# Patient Record
Sex: FEMALE | Race: WHITE | NOT HISPANIC OR LATINO | Employment: OTHER | URBAN - METROPOLITAN AREA
[De-identification: names, ages, dates, MRNs, and addresses within clinical notes are randomized per-mention and may not be internally consistent; named-entity substitution may affect disease eponyms.]

---

## 2017-03-22 ENCOUNTER — HOSPITAL ENCOUNTER (OUTPATIENT)
Dept: RADIOLOGY | Facility: HOSPITAL | Age: 72
Discharge: HOME | End: 2017-03-22
Attending: OBSTETRICS & GYNECOLOGY
Payer: MEDICARE

## 2017-03-22 DIAGNOSIS — Z12.31 VISIT FOR SCREENING MAMMOGRAM: ICD-10-CM

## 2017-03-22 PROCEDURE — 77067 SCR MAMMO BI INCL CAD: CPT | Mod: TC

## 2017-03-22 PROCEDURE — 77067 SCR MAMMO BI INCL CAD: CPT | Mod: 26,,, | Performed by: RADIOLOGY

## 2019-10-04 DIAGNOSIS — Z12.31 VISIT FOR SCREENING MAMMOGRAM: Primary | ICD-10-CM

## 2020-01-27 ENCOUNTER — HOSPITAL ENCOUNTER (OUTPATIENT)
Dept: RADIOLOGY | Facility: HOSPITAL | Age: 75
Discharge: HOME | End: 2020-01-27
Attending: OBSTETRICS & GYNECOLOGY
Payer: MEDICARE

## 2020-01-27 VITALS — WEIGHT: 109 LBS

## 2020-01-27 DIAGNOSIS — Z12.31 VISIT FOR SCREENING MAMMOGRAM: ICD-10-CM

## 2020-01-27 PROCEDURE — 77063 BREAST TOMOSYNTHESIS BI: CPT | Mod: 26,,, | Performed by: RADIOLOGY

## 2020-01-27 PROCEDURE — 77067 SCR MAMMO BI INCL CAD: CPT | Mod: TC

## 2020-01-27 PROCEDURE — 77063 MAMMO DIGITAL SCREENING BILAT WITH TOMOSYNTHESIS_CAD: ICD-10-PCS | Mod: 26,,, | Performed by: RADIOLOGY

## 2020-01-27 PROCEDURE — 77067 SCR MAMMO BI INCL CAD: CPT | Mod: 26,,, | Performed by: RADIOLOGY

## 2020-01-27 PROCEDURE — 77067 MAMMO DIGITAL SCREENING BILAT WITH TOMOSYNTHESIS_CAD: ICD-10-PCS | Mod: 26,,, | Performed by: RADIOLOGY

## 2021-02-10 ENCOUNTER — IMMUNIZATION (OUTPATIENT)
Dept: PRIMARY CARE CLINIC | Facility: CLINIC | Age: 76
End: 2021-02-10
Payer: MEDICARE

## 2021-02-10 DIAGNOSIS — Z23 NEED FOR VACCINATION: Primary | ICD-10-CM

## 2021-02-10 PROCEDURE — 91300 PR SARS-COV- 2 COVID-19 VACCINE, NO PRSV, 30MCG/0.3ML, IM: CPT | Mod: PBBFAC | Performed by: INTERNAL MEDICINE

## 2021-02-10 PROCEDURE — 0001A PR IMMUNIZ ADMIN, SARS-COV-2 COVID-19 VACC, 30MCG/0.3ML, 1ST DOSE: CPT | Mod: PBBFAC | Performed by: INTERNAL MEDICINE

## 2021-02-10 RX ADMIN — RNA INGREDIENT BNT-162B2 0.3 ML: 0.23 INJECTION, SUSPENSION INTRAMUSCULAR at 01:02

## 2021-03-03 ENCOUNTER — IMMUNIZATION (OUTPATIENT)
Dept: PRIMARY CARE CLINIC | Facility: CLINIC | Age: 76
End: 2021-03-03
Payer: MEDICARE

## 2021-03-03 DIAGNOSIS — Z23 NEED FOR VACCINATION: Primary | ICD-10-CM

## 2021-03-03 PROCEDURE — 91300 PR SARS-COV- 2 COVID-19 VACCINE, NO PRSV, 30MCG/0.3ML, IM: CPT | Mod: S$GLB,,, | Performed by: INTERNAL MEDICINE

## 2021-03-03 PROCEDURE — 0002A PR IMMUNIZ ADMIN, SARS-COV-2 COVID-19 VACC, 30MCG/0.3ML, 2ND DOSE: CPT | Mod: CV19,S$GLB,, | Performed by: INTERNAL MEDICINE

## 2021-03-03 PROCEDURE — 0002A PR IMMUNIZ ADMIN, SARS-COV-2 COVID-19 VACC, 30MCG/0.3ML, 2ND DOSE: ICD-10-PCS | Mod: CV19,S$GLB,, | Performed by: INTERNAL MEDICINE

## 2021-03-03 PROCEDURE — 91300 PR SARS-COV- 2 COVID-19 VACCINE, NO PRSV, 30MCG/0.3ML, IM: ICD-10-PCS | Mod: S$GLB,,, | Performed by: INTERNAL MEDICINE

## 2021-03-03 RX ADMIN — Medication 0.3 ML: at 01:03

## 2021-07-01 DIAGNOSIS — Z12.31 VISIT FOR SCREENING MAMMOGRAM: Primary | ICD-10-CM

## 2021-07-01 DIAGNOSIS — M85.89 OTHER SPECIFIED DISORDERS OF BONE DENSITY AND STRUCTURE, MULTIPLE SITES: ICD-10-CM

## 2024-03-01 DIAGNOSIS — Z12.31 ENCOUNTER FOR SCREENING MAMMOGRAM FOR MALIGNANT NEOPLASM OF BREAST: Primary | ICD-10-CM

## 2024-04-15 ENCOUNTER — HOSPITAL ENCOUNTER (EMERGENCY)
Facility: HOSPITAL | Age: 79
Discharge: HOME | End: 2024-04-15
Attending: EMERGENCY MEDICINE
Payer: MEDICARE

## 2024-04-15 VITALS
OXYGEN SATURATION: 98 % | HEART RATE: 92 BPM | DIASTOLIC BLOOD PRESSURE: 90 MMHG | SYSTOLIC BLOOD PRESSURE: 160 MMHG | TEMPERATURE: 98 F | HEIGHT: 62 IN | BODY MASS INDEX: 20.24 KG/M2 | RESPIRATION RATE: 16 BRPM | WEIGHT: 110 LBS

## 2024-04-15 DIAGNOSIS — N39.0 URINARY TRACT INFECTION WITHOUT HEMATURIA, SITE UNSPECIFIED: Primary | ICD-10-CM

## 2024-04-15 DIAGNOSIS — S51.811A LACERATION OF RIGHT FOREARM, INITIAL ENCOUNTER: ICD-10-CM

## 2024-04-15 DIAGNOSIS — R79.89 ELEVATED LFTS: ICD-10-CM

## 2024-04-15 LAB
ALBUMIN SERPL BCP-MCNC: 4.4 G/DL (ref 3.5–5.2)
ALP SERPL-CCNC: 74 U/L (ref 55–135)
ALT SERPL W/O P-5'-P-CCNC: 24 U/L (ref 10–44)
ANION GAP SERPL CALC-SCNC: 13 MMOL/L (ref 8–16)
APTT PPP: <21 SEC (ref 21–32)
AST SERPL-CCNC: 53 U/L (ref 10–40)
BACTERIA #/AREA URNS AUTO: ABNORMAL /HPF
BASOPHILS # BLD AUTO: 0.05 K/UL (ref 0–0.2)
BASOPHILS NFR BLD: 0.3 % (ref 0–1.9)
BILIRUB SERPL-MCNC: 1.8 MG/DL (ref 0.1–1)
BILIRUB UR QL STRIP: NEGATIVE
BUN SERPL-MCNC: 16 MG/DL (ref 8–23)
BUN SERPL-MCNC: 18 MG/DL (ref 6–30)
CALCIUM SERPL-MCNC: 9.7 MG/DL (ref 8.7–10.5)
CHLORIDE SERPL-SCNC: 102 MMOL/L (ref 95–110)
CHLORIDE SERPL-SCNC: 103 MMOL/L (ref 95–110)
CLARITY UR REFRACT.AUTO: ABNORMAL
CO2 SERPL-SCNC: 22 MMOL/L (ref 23–29)
COLOR UR AUTO: YELLOW
CREAT SERPL-MCNC: 1.1 MG/DL (ref 0.5–1.4)
CREAT SERPL-MCNC: 1.2 MG/DL (ref 0.5–1.4)
DIFFERENTIAL METHOD BLD: ABNORMAL
EOSINOPHIL # BLD AUTO: 0 K/UL (ref 0–0.5)
EOSINOPHIL NFR BLD: 0.1 % (ref 0–8)
ERYTHROCYTE [DISTWIDTH] IN BLOOD BY AUTOMATED COUNT: 12.9 % (ref 11.5–14.5)
EST. GFR  (NO RACE VARIABLE): 52.1 ML/MIN/1.73 M^2
GLUCOSE SERPL-MCNC: 84 MG/DL (ref 70–110)
GLUCOSE SERPL-MCNC: 90 MG/DL (ref 70–110)
GLUCOSE UR QL STRIP: NEGATIVE
HCT VFR BLD AUTO: 41.7 % (ref 37–48.5)
HCT VFR BLD CALC: 41 %PCV (ref 36–54)
HGB BLD-MCNC: 13.6 G/DL (ref 12–16)
HGB UR QL STRIP: ABNORMAL
IMM GRANULOCYTES # BLD AUTO: 0.09 K/UL (ref 0–0.04)
IMM GRANULOCYTES NFR BLD AUTO: 0.5 % (ref 0–0.5)
KETONES UR QL STRIP: NEGATIVE
LEUKOCYTE ESTERASE UR QL STRIP: ABNORMAL
LYMPHOCYTES # BLD AUTO: 1.4 K/UL (ref 1–4.8)
LYMPHOCYTES NFR BLD: 7.6 % (ref 18–48)
MAGNESIUM SERPL-MCNC: 2.2 MG/DL (ref 1.6–2.6)
MCH RBC QN AUTO: 31.9 PG (ref 27–31)
MCHC RBC AUTO-ENTMCNC: 32.6 G/DL (ref 32–36)
MCV RBC AUTO: 98 FL (ref 82–98)
MICROSCOPIC COMMENT: ABNORMAL
MONOCYTES # BLD AUTO: 1.1 K/UL (ref 0.3–1)
MONOCYTES NFR BLD: 5.8 % (ref 4–15)
NEUTROPHILS # BLD AUTO: 15.6 K/UL (ref 1.8–7.7)
NEUTROPHILS NFR BLD: 85.7 % (ref 38–73)
NITRITE UR QL STRIP: POSITIVE
NRBC BLD-RTO: 0 /100 WBC
OHS QRS DURATION: 76 MS
OHS QTC CALCULATION: 462 MS
PH UR STRIP: 7 [PH] (ref 5–8)
PLATELET # BLD AUTO: 224 K/UL (ref 150–450)
PMV BLD AUTO: 10.5 FL (ref 9.2–12.9)
POC IONIZED CALCIUM: 1.02 MMOL/L (ref 1.06–1.42)
POC TCO2 (MEASURED): 25 MMOL/L (ref 23–29)
POTASSIUM BLD-SCNC: 4.2 MMOL/L (ref 3.5–5.1)
POTASSIUM SERPL-SCNC: 4.3 MMOL/L (ref 3.5–5.1)
PROT SERPL-MCNC: 7.4 G/DL (ref 6–8.4)
PROT UR QL STRIP: ABNORMAL
RBC # BLD AUTO: 4.26 M/UL (ref 4–5.4)
RBC #/AREA URNS AUTO: 1 /HPF (ref 0–4)
SAMPLE: ABNORMAL
SODIUM BLD-SCNC: 137 MMOL/L (ref 136–145)
SODIUM SERPL-SCNC: 138 MMOL/L (ref 136–145)
SP GR UR STRIP: 1.01 (ref 1–1.03)
SQUAMOUS #/AREA URNS AUTO: 0 /HPF
TROPONIN I SERPL DL<=0.01 NG/ML-MCNC: 0.01 NG/ML (ref 0–0.03)
TSH SERPL DL<=0.005 MIU/L-ACNC: 0.62 UIU/ML (ref 0.4–4)
URN SPEC COLLECT METH UR: ABNORMAL
WBC # BLD AUTO: 18.19 K/UL (ref 3.9–12.7)
WBC #/AREA URNS AUTO: 8 /HPF (ref 0–5)

## 2024-04-15 PROCEDURE — 83735 ASSAY OF MAGNESIUM: CPT | Performed by: EMERGENCY MEDICINE

## 2024-04-15 PROCEDURE — 85730 THROMBOPLASTIN TIME PARTIAL: CPT | Performed by: PHYSICIAN ASSISTANT

## 2024-04-15 PROCEDURE — 80053 COMPREHEN METABOLIC PANEL: CPT | Performed by: EMERGENCY MEDICINE

## 2024-04-15 PROCEDURE — 12002 RPR S/N/AX/GEN/TRNK2.6-7.5CM: CPT

## 2024-04-15 PROCEDURE — 63600175 PHARM REV CODE 636 W HCPCS: Performed by: PHYSICIAN ASSISTANT

## 2024-04-15 PROCEDURE — 85025 COMPLETE CBC W/AUTO DIFF WBC: CPT | Performed by: EMERGENCY MEDICINE

## 2024-04-15 PROCEDURE — 84443 ASSAY THYROID STIM HORMONE: CPT | Performed by: EMERGENCY MEDICINE

## 2024-04-15 PROCEDURE — 25000003 PHARM REV CODE 250: Performed by: PHYSICIAN ASSISTANT

## 2024-04-15 PROCEDURE — 81001 URINALYSIS AUTO W/SCOPE: CPT | Performed by: EMERGENCY MEDICINE

## 2024-04-15 PROCEDURE — 99285 EMERGENCY DEPT VISIT HI MDM: CPT | Mod: 25

## 2024-04-15 PROCEDURE — 90471 IMMUNIZATION ADMIN: CPT | Performed by: PHYSICIAN ASSISTANT

## 2024-04-15 PROCEDURE — 80048 BASIC METABOLIC PNL TOTAL CA: CPT | Mod: XB

## 2024-04-15 PROCEDURE — 90715 TDAP VACCINE 7 YRS/> IM: CPT | Performed by: PHYSICIAN ASSISTANT

## 2024-04-15 PROCEDURE — 93010 ELECTROCARDIOGRAM REPORT: CPT | Mod: ,,, | Performed by: INTERNAL MEDICINE

## 2024-04-15 PROCEDURE — 84484 ASSAY OF TROPONIN QUANT: CPT | Performed by: EMERGENCY MEDICINE

## 2024-04-15 PROCEDURE — 93005 ELECTROCARDIOGRAM TRACING: CPT

## 2024-04-15 PROCEDURE — 12001 RPR S/N/AX/GEN/TRNK 2.5CM/<: CPT

## 2024-04-15 RX ORDER — CEPHALEXIN 500 MG/1
500 CAPSULE ORAL
Status: COMPLETED | OUTPATIENT
Start: 2024-04-15 | End: 2024-04-15

## 2024-04-15 RX ORDER — CEPHALEXIN 500 MG/1
500 CAPSULE ORAL EVERY 8 HOURS
Qty: 21 CAPSULE | Refills: 0 | Status: SHIPPED | OUTPATIENT
Start: 2024-04-15 | End: 2024-04-22

## 2024-04-15 RX ADMIN — CEPHALEXIN 500 MG: 500 CAPSULE ORAL at 04:04

## 2024-04-15 RX ADMIN — TETANUS TOXOID, REDUCED DIPHTHERIA TOXOID AND ACELLULAR PERTUSSIS VACCINE, ADSORBED 0.5 ML: 5; 2.5; 8; 8; 2.5 SUSPENSION INTRAMUSCULAR at 04:04

## 2024-04-15 NOTE — ED PROVIDER NOTES
Encounter Date: 4/15/2024       History     Chief Complaint   Patient presents with    Altered Mental Status     Pt found in front yard trying to climb fence and cut R forearm. Oriented x1. GCS15 at baseline.     78-year-old female presents to the ER for evaluation of altered mental status.  Patient arrives via EMS as she was found trying to jump the fence onto her neighbor's property.      As per patient she was not trying to jump in the neighbors found however she was trying to jump over the chain link fence that goes to the site of her house so she can get to the back door to let herself into the house.  She has a laceration to the right forearm.  As per patient she was trying to jump the fence as she locked herself out.  Her neighbor called the police.  Patient states she lives at home with her dogs.  She is angry that she is here in the emergency room today as she does not feel that she needs to be evaluated.  She states that there was nothing wrong with her.      Denies any recent antibiotic use.  Denies any head injury or LOC. She denies any abdominal pain, chest pain or shortness of breath.    The history is provided by the patient.     Review of patient's allergies indicates:  Not on File  No past medical history on file.  No past surgical history on file.  No family history on file.  Social History     Tobacco Use    Smoking status: Never    Smokeless tobacco: Never   Substance Use Topics    Alcohol use: Never     Review of Systems   Constitutional:  Negative for chills and fever.   HENT:  Negative for congestion.    Eyes:  Negative for visual disturbance.   Respiratory:  Negative for cough and shortness of breath.    Cardiovascular:  Negative for chest pain.   Gastrointestinal:  Negative for nausea and vomiting.   Genitourinary:  Negative for dysuria and flank pain.   Musculoskeletal:  Negative for myalgias.   Skin:  Positive for wound. Negative for rash.   Allergic/Immunologic: Negative for  immunocompromised state.   Neurological:  Negative for weakness and numbness.   Hematological:  Does not bruise/bleed easily.   Psychiatric/Behavioral:  Negative for confusion.        Physical Exam     Initial Vitals [04/15/24 1158]   BP Pulse Resp Temp SpO2   (!) 155/100 108 18 97.5 °F (36.4 °C) 98 %      MAP       --         Physical Exam    Vitals reviewed.  Constitutional: She appears well-developed and well-nourished. She is not diaphoretic. No distress.   HENT:   Head: Normocephalic and atraumatic.   Eyes: Conjunctivae and EOM are normal. Pupils are equal, round, and reactive to light.   Neck: Neck supple.   Cardiovascular:  Normal rate, regular rhythm, normal heart sounds and intact distal pulses.           Pulmonary/Chest: Breath sounds normal. No respiratory distress.   Musculoskeletal:         General: Normal range of motion.      Cervical back: Neck supple.     Neurological: She is alert and oriented to person, place, and time. She is not disoriented. GCS score is 15. GCS eye subscore is 4. GCS verbal subscore is 5. GCS motor subscore is 6.   Skin: Skin is warm.   Three small skin tears approximately 2 cm each.  No active bleeding.         ED Course   Lac Repair    Date/Time: 4/15/2024 10:35 PM    Performed by: Connie Rodney PA-C  Authorized by: Caleb Hatch III, MD    Consent:     Risks discussed:  Pain and infection  Universal protocol:     Patient identity confirmed:  Verbally with patient and arm band  Anesthesia:     Anesthesia method:  None  Laceration details:     Location:  Shoulder/arm    Shoulder/arm location:  R lower arm    Length (cm):  2 (3 separate skin tears 2 cm each)  Treatment:     Area cleansed with:  Povidone-iodine    Amount of cleaning:  Standard    Irrigation solution:  Sterile saline    Irrigation method:  Pressure wash    Debridement:  None    Undermining:  None  Skin repair:     Repair method:  Tissue adhesive  Approximation:     Approximation:  Close  Repair type:      Repair type:  Simple  Post-procedure details:     Dressing:  Open (no dressing)    Procedure completion:  Tolerated well, no immediate complications    Labs Reviewed   CBC W/ AUTO DIFFERENTIAL - Abnormal; Notable for the following components:       Result Value    WBC 18.19 (*)     MCH 31.9 (*)     Gran # (ANC) 15.6 (*)     Immature Grans (Abs) 0.09 (*)     Mono # 1.1 (*)     Gran % 85.7 (*)     Lymph % 7.6 (*)     All other components within normal limits    Narrative:     add on mg per Rainer Swan RN/Caleb Hatch III, MD order#   [id number] 04/15/2024 @ 14:27    COMPREHENSIVE METABOLIC PANEL - Abnormal; Notable for the following components:    CO2 22 (*)     Total Bilirubin 1.8 (*)     AST 53 (*)     eGFR 52.1 (*)     All other components within normal limits    Narrative:     add on mg per Rainer Swan RN/Caleb Hatch III, MD order#   [id number] 04/15/2024 @ 14:27    URINALYSIS, REFLEX TO URINE CULTURE - Abnormal; Notable for the following components:    Appearance, UA Hazy (*)     Protein, UA Trace (*)     Occult Blood UA 1+ (*)     Nitrite, UA Positive (*)     Leukocytes, UA 1+ (*)     All other components within normal limits    Narrative:     Specimen Source->Urine   URINALYSIS MICROSCOPIC - Abnormal; Notable for the following components:    WBC, UA 8 (*)     Bacteria Moderate (*)     All other components within normal limits    Narrative:     Specimen Source->Urine   ISTAT PROCEDURE - Abnormal; Notable for the following components:    POC Ionized Calcium 1.02 (*)     All other components within normal limits   TROPONIN I    Narrative:     add on mg per Rainer Swan RN/Caleb Hatch III, MD order#   [id number] 04/15/2024 @ 14:27    TSH    Narrative:     add on mg per Rainer Swan RN/Caleb Hatch III, MD order#   [id number] 04/15/2024 @ 14:27    APTT   MAGNESIUM   MAGNESIUM    Narrative:     add on mg per Rainer Swan RN/Caleb Hatch III, MD order#   [id number] 04/15/2024 @ 14:27         ECG Results               EKG 12-lead (Final result)        Collection Time Result Time QRS Duration OHS QTC Calculation    04/15/24 13:55:26 04/15/24 16:26:43 76 462                     Final result by Interface, Lab In [facility] (04/15/24 16:26:50)                   Narrative:    Test Reason : R41.82,    Vent. Rate : 095 BPM     Atrial Rate : 095 BPM     P-R Int : 182 ms          QRS Dur : 076 ms      QT Int : 368 ms       P-R-T Axes : 076 -53 067 degrees     QTc Int : 462 ms    Normal sinus rhythm  Left anterior fascicular block  Abnormal ECG  No previous ECGs available  Confirmed by SHANNON LUEVANO, JULITA ([id number]) on 4/15/2024 4:26:41 PM    Referred By: System System           Confirmed By:JULITA ORTEGA MD                                  Imaging Results              CT Head Without Contrast (Final result)  Result time 04/15/24 15:06:53      Final result by Marcin Wallis MD (04/15/24 15:06:53)                   Impression:      No acute intracranial pathology.    Sequela of chronic microvascular ischemic change and central volume loss.      Electronically signed by: Marcin Wallis  Date:    04/15/2024  Time:    15:06               Narrative:    EXAMINATION:  CT HEAD WITHOUT CONTRAST    CLINICAL HISTORY:  Mental status change, unknown cause;    TECHNIQUE:  Low dose axial CT images obtained throughout the head without intravenous contrast. Sagittal and coronal reconstructions were performed.    COMPARISON:  None.    FINDINGS:  Intracranial compartment:    Prominence of the ventricles with compensatory enlargement of the sulci, in keeping with central volume loss.  No extra-axial blood or fluid collections.    Scattered regions of subcortical and periventricular hypoattenuation, overall nonspecific, but can be seen the setting of microvascular ischemic change.  Marked cerebral volume loss.  No parenchymal mass, hemorrhage, edema or major vascular distribution infarct.    Skull/extracranial contents (limited evaluation): No fracture.  Aerated secretions left sphenoid sinus.  Remainder the paranasal sinuses and mastoid air cells are essentially clear.                                       X-Ray Chest AP Portable (Final result)  Result time 04/15/24 14:25:52      Final result by Logan Caldwell MD (04/15/24 14:25:52)                   Impression:      See above      Electronically signed by: Logan Caldwell MD  Date:    04/15/2024  Time:    14:25               Narrative:    EXAMINATION:  XR CHEST AP PORTABLE    CLINICAL HISTORY:  Altered mental status, unspecified    TECHNIQUE:  Single frontal view of the chest was performed.    COMPARISON:  None    FINDINGS:  Heart size normal.  The lungs are clear.  No pleural effusion                                       Medications   Tdap (BOOSTRIX) vaccine injection 0.5 mL (0.5 mLs Intramuscular Given 4/15/24 1650)   cephALEXin capsule 500 mg (500 mg Oral Given 4/15/24 1649)     Medical Decision Making  Differential diagnosis:    Electrolyte derangement, intracranial bleed, intracranial mass, ACS, MI, arrhythmia, ANEL, UTI    Amount and/or Complexity of Data Reviewed  Labs: ordered.  Radiology: ordered. Decision-making details documented in ED Course.    Risk  Prescription drug management.         APC / Resident Notes:   Patient seen in the ER promptly upon arrival.  She is afebrile, no acute distress.  She is alert and oriented x3.  No focal neurological deficit on exam.  She is ambulatory with a steady gait.  Heart lung sounds normal.  Abdomen soft, nondistended, nontender.  Three small skin tears noted to the right distal forearm.  No deformity noted.  No active bleeding.  These are about 2 cm each.  Very superficial in nature  IV access established, labs ordered.        ED Course as of 04/15/24 2238   Mon Apr 15, 2024   1359 EKG shows normal sinus rhythm at a rate of 95 beats per minute.  Left anterior fascicular block [AJ]   1451 X-Ray Chest AP Portable  Unremarkable.  No evidence of acute pathology. [AJ]    1515 CT Head Without Contrast  Impression:     No acute intracranial pathology.     Sequela of chronic microvascular ischemic change and central volume loss.      [AJ]   1528 Patient is somewhat uncooperative at times with nursing staff.  She states she is very unhappy that she was brought to the emergency room and wants to be discharge.  She states she is very concerned about her horse in the [facility] that she checks on on a daily basis.  She states she is the only 1 that and feed the horse.  She also states that she has 2 dogs that are elderly and needs care. [AJ]   1529 I discussed with patient's son regarding patient's mental status.  He does confirm that patient does have a horse and 2 dogs on that baseline his mother can be confrontational.    As per patient's son, the fence to the neighbor's house is about 8 ft tall however there is a chain link fence that she likely jumped over to get to the back of her house. [AJ]   1531 Laboratory studies show leukocytosis of 18.1.  Hemoglobin is stable.  Chemistries reveal bilirubin of 1.8, AST 53, no previous for comparison.  GFR 52, no previous kidney functions noted in chart [AJ]   1532 As per patient's son, his mother does not go to the hospital often. [AJ]   1532 Cardiac workup was essentially unremarkable.    Thyroid function within normal limits. [AJ]   1635 Urinalysis reveals nitrite positive urine with moderate bacteria.  Patient will be given Keflex in the ED [AJ]   2237 Again no evidence of altered mental status during stay in the ED. no evidence of sepsis.  Vitals remained stable  Prescribed home on Keflex use as directed.  Patient's grandson came to pick patient up intake home.  Agreeable to close follow-up with primary doctor.  Given strict return precautions ED that patient agreeable to.  She is otherwise stable for discharge.    The care of this patient was overseen by attending physician who agrees with treatment, plan, and disposition.   [AJ]       ED Course User Index  [AJ] Connie Rodney PA-C                             Clinical Impression:  Final diagnoses:  [N39.0] Urinary tract infection without hematuria, site unspecified (Primary)  [S51.811A] Laceration of right forearm, initial encounter  [R79.89] Elevated LFTs          ED Disposition Condition    Discharge Stable          ED Prescriptions       Medication Sig Dispense Start Date End Date Auth. Provider    cephALEXin (KEFLEX) 500 MG capsule Take 1 capsule (500 mg total) by mouth every 8 (eight) hours. for 7 days 21 capsule 4/15/2024 4/22/2024 Connie Rodney PA-C          Follow-up Information       Follow up With Specialties Details Why Contact Info    Edwar Castaneda II, MD Obstetrics   [address] [place] [id number] [phone]               Connie Rodney PA-C  04/15/24 [id number]

## 2024-04-15 NOTE — ED TRIAGE NOTES
Patient identifiers for Mohini Dougherty 78 y.o. female checked and correct.  Chief Complaint   Patient presents with    Altered Mental Status     Pt found in front yard trying to climb fence and cut R forearm. Oriented x1. GCS15 at baseline.     No past medical history on file.  Allergies reported: Review of patient's allergies indicates:  Not on File      LOC: Patient is awake, alert, and aware of environment with an appropriate affect. Patient is oriented x 4 and speaking appropriately.  APPEARANCE: Patient resting comfortably and in no acute distress. Patient is clean and well groomed, patient's clothing is properly fastened.  SKIN: The skin is warm and dry. Patient has normal skin turgor and moist mucus membranes. Lacerations to right forearm  MUSKULOSKELETAL: Patient is moving all extremities well, no obvious deformities noted. Pulses intact.   RESPIRATORY: Airway is open and patent. Respirations are spontaneous and non-labored with normal effort and rate.  CARDIAC: Patient has a normal rate and rhythm. Normal sinus on cardiac monitor. No peripheral edema noted.   ABDOMEN: No distention noted. Soft and non-tender upon palpation.  NEUROLOGICAL: PERRL. Facial expression is symmetrical. Hand grasps are equal bilaterally. Normal sensation in all extremities when touched with finger.

## 2024-04-15 NOTE — DISCHARGE INSTRUCTIONS
You have a urinary tract infection.  You will need to finish full course of antibiotics that is prescribed to you.  Stay hydrated at home.  Follow up with the primary doctor provided to you.    Your liver function is also elevated, you will need close follow-up for this.

## 2024-06-24 ENCOUNTER — HOSPITAL ENCOUNTER (INPATIENT)
Facility: HOSPITAL | Age: 79
LOS: 199 days | DRG: 884 | End: 2025-01-10
Attending: EMERGENCY MEDICINE | Admitting: STUDENT IN AN ORGANIZED HEALTH CARE EDUCATION/TRAINING PROGRAM
Payer: MEDICARE

## 2024-06-24 DIAGNOSIS — G93.40 ENCEPHALOPATHY: ICD-10-CM

## 2024-06-24 DIAGNOSIS — H40.1290 LOW TENSION GLAUCOMA, UNSPECIFIED GLAUCOMA STAGE, UNSPECIFIED LATERALITY: ICD-10-CM

## 2024-06-24 DIAGNOSIS — R56.9 SEIZURE: ICD-10-CM

## 2024-06-24 DIAGNOSIS — F03.90 DEMENTIA WITHOUT BEHAVIORAL DISTURBANCE: ICD-10-CM

## 2024-06-24 DIAGNOSIS — R07.9 CHEST PAIN: ICD-10-CM

## 2024-06-24 DIAGNOSIS — R41.82 AMS (ALTERED MENTAL STATUS): ICD-10-CM

## 2024-06-24 DIAGNOSIS — I44.0 PROLONGED P-R INTERVAL: ICD-10-CM

## 2024-06-24 DIAGNOSIS — R94.31 PROLONGED Q-T INTERVAL ON ECG: ICD-10-CM

## 2024-06-24 DIAGNOSIS — R41.89 COGNITIVE AND BEHAVIORAL CHANGES: Primary | ICD-10-CM

## 2024-06-24 DIAGNOSIS — R46.89 COGNITIVE AND BEHAVIORAL CHANGES: Primary | ICD-10-CM

## 2024-06-24 PROBLEM — G93.41 ENCEPHALOPATHY, METABOLIC: Chronic | Status: ACTIVE | Noted: 2024-06-24

## 2024-06-24 PROBLEM — G93.41 ENCEPHALOPATHY, METABOLIC: Status: ACTIVE | Noted: 2024-06-24

## 2024-06-24 LAB
ALBUMIN SERPL BCP-MCNC: 4.1 G/DL (ref 3.5–5.2)
ALP SERPL-CCNC: 75 U/L (ref 55–135)
ALT SERPL W/O P-5'-P-CCNC: 20 U/L (ref 10–44)
ANION GAP SERPL CALC-SCNC: 13 MMOL/L (ref 8–16)
AST SERPL-CCNC: 43 U/L (ref 10–40)
BASOPHILS # BLD AUTO: 0.05 K/UL (ref 0–0.2)
BASOPHILS NFR BLD: 0.4 % (ref 0–1.9)
BILIRUB SERPL-MCNC: 1.6 MG/DL (ref 0.1–1)
BILIRUB UR QL STRIP: NEGATIVE
BNP SERPL-MCNC: 115 PG/ML (ref 0–99)
BUN SERPL-MCNC: 18 MG/DL (ref 8–23)
CALCIUM SERPL-MCNC: 9.5 MG/DL (ref 8.7–10.5)
CHLORIDE SERPL-SCNC: 104 MMOL/L (ref 95–110)
CLARITY UR REFRACT.AUTO: CLEAR
CO2 SERPL-SCNC: 20 MMOL/L (ref 23–29)
COLOR UR AUTO: YELLOW
CREAT SERPL-MCNC: 1.1 MG/DL (ref 0.5–1.4)
DIFFERENTIAL METHOD BLD: ABNORMAL
EOSINOPHIL # BLD AUTO: 0.1 K/UL (ref 0–0.5)
EOSINOPHIL NFR BLD: 0.6 % (ref 0–8)
ERYTHROCYTE [DISTWIDTH] IN BLOOD BY AUTOMATED COUNT: 13 % (ref 11.5–14.5)
EST. GFR  (NO RACE VARIABLE): 52.1 ML/MIN/1.73 M^2
GLUCOSE SERPL-MCNC: 88 MG/DL (ref 70–110)
GLUCOSE UR QL STRIP: NEGATIVE
HCT VFR BLD AUTO: 38.2 % (ref 37–48.5)
HCV AB SERPL QL IA: NORMAL
HGB BLD-MCNC: 12.9 G/DL (ref 12–16)
HGB UR QL STRIP: NEGATIVE
HIV 1+2 AB+HIV1 P24 AG SERPL QL IA: NORMAL
IMM GRANULOCYTES # BLD AUTO: 0.06 K/UL (ref 0–0.04)
IMM GRANULOCYTES NFR BLD AUTO: 0.4 % (ref 0–0.5)
KETONES UR QL STRIP: ABNORMAL
LACTATE SERPL-SCNC: 0.8 MMOL/L (ref 0.5–2.2)
LEUKOCYTE ESTERASE UR QL STRIP: NEGATIVE
LYMPHOCYTES # BLD AUTO: 1.7 K/UL (ref 1–4.8)
LYMPHOCYTES NFR BLD: 11.9 % (ref 18–48)
MAGNESIUM SERPL-MCNC: 2.3 MG/DL (ref 1.6–2.6)
MCH RBC QN AUTO: 32.8 PG (ref 27–31)
MCHC RBC AUTO-ENTMCNC: 33.8 G/DL (ref 32–36)
MCV RBC AUTO: 97 FL (ref 82–98)
MONOCYTES # BLD AUTO: 0.9 K/UL (ref 0.3–1)
MONOCYTES NFR BLD: 6.3 % (ref 4–15)
NEUTROPHILS # BLD AUTO: 11.1 K/UL (ref 1.8–7.7)
NEUTROPHILS NFR BLD: 80.4 % (ref 38–73)
NITRITE UR QL STRIP: NEGATIVE
NRBC BLD-RTO: 0 /100 WBC
OHS QRS DURATION: 68 MS
OHS QTC CALCULATION: 462 MS
PH UR STRIP: 6 [PH] (ref 5–8)
PLATELET # BLD AUTO: 263 K/UL (ref 150–450)
PMV BLD AUTO: 10.3 FL (ref 9.2–12.9)
POTASSIUM SERPL-SCNC: 3.9 MMOL/L (ref 3.5–5.1)
PROCALCITONIN SERPL IA-MCNC: 0.04 NG/ML
PROT SERPL-MCNC: 6.6 G/DL (ref 6–8.4)
PROT UR QL STRIP: ABNORMAL
RBC # BLD AUTO: 3.93 M/UL (ref 4–5.4)
SODIUM SERPL-SCNC: 137 MMOL/L (ref 136–145)
SP GR UR STRIP: 1.02 (ref 1–1.03)
TROPONIN I SERPL DL<=0.01 NG/ML-MCNC: 0.01 NG/ML (ref 0–0.03)
TSH SERPL DL<=0.005 MIU/L-ACNC: 0.65 UIU/ML (ref 0.4–4)
URN SPEC COLLECT METH UR: ABNORMAL
WBC # BLD AUTO: 13.86 K/UL (ref 3.9–12.7)

## 2024-06-24 PROCEDURE — 84591 ASSAY OF NOS VITAMIN: CPT

## 2024-06-24 PROCEDURE — 80143 DRUG ASSAY ACETAMINOPHEN: CPT

## 2024-06-24 PROCEDURE — 87040 BLOOD CULTURE FOR BACTERIA: CPT | Mod: 59 | Performed by: HOSPITALIST

## 2024-06-24 PROCEDURE — 82525 ASSAY OF COPPER: CPT

## 2024-06-24 PROCEDURE — 82746 ASSAY OF FOLIC ACID SERUM: CPT

## 2024-06-24 PROCEDURE — 86803 HEPATITIS C AB TEST: CPT | Performed by: PHYSICIAN ASSISTANT

## 2024-06-24 PROCEDURE — 83605 ASSAY OF LACTIC ACID: CPT | Performed by: HOSPITALIST

## 2024-06-24 PROCEDURE — 82607 VITAMIN B-12: CPT

## 2024-06-24 PROCEDURE — 80179 DRUG ASSAY SALICYLATE: CPT

## 2024-06-24 PROCEDURE — 93010 ELECTROCARDIOGRAM REPORT: CPT | Mod: ,,, | Performed by: INTERNAL MEDICINE

## 2024-06-24 PROCEDURE — 84443 ASSAY THYROID STIM HORMONE: CPT

## 2024-06-24 PROCEDURE — 87389 HIV-1 AG W/HIV-1&-2 AB AG IA: CPT | Performed by: PHYSICIAN ASSISTANT

## 2024-06-24 PROCEDURE — 84484 ASSAY OF TROPONIN QUANT: CPT

## 2024-06-24 PROCEDURE — 84425 ASSAY OF VITAMIN B-1: CPT

## 2024-06-24 PROCEDURE — 84145 PROCALCITONIN (PCT): CPT | Performed by: HOSPITALIST

## 2024-06-24 PROCEDURE — G0378 HOSPITAL OBSERVATION PER HR: HCPCS

## 2024-06-24 PROCEDURE — 81003 URINALYSIS AUTO W/O SCOPE: CPT | Performed by: EMERGENCY MEDICINE

## 2024-06-24 PROCEDURE — 85025 COMPLETE CBC W/AUTO DIFF WBC: CPT

## 2024-06-24 PROCEDURE — 93005 ELECTROCARDIOGRAM TRACING: CPT

## 2024-06-24 PROCEDURE — 83735 ASSAY OF MAGNESIUM: CPT

## 2024-06-24 PROCEDURE — 80053 COMPREHEN METABOLIC PANEL: CPT

## 2024-06-24 PROCEDURE — 86593 SYPHILIS TEST NON-TREP QUANT: CPT

## 2024-06-24 PROCEDURE — 83880 ASSAY OF NATRIURETIC PEPTIDE: CPT

## 2024-06-24 RX ORDER — IBUPROFEN 200 MG
24 TABLET ORAL
Status: DISCONTINUED | OUTPATIENT
Start: 2024-06-24 | End: 2025-01-10 | Stop reason: HOSPADM

## 2024-06-24 RX ORDER — SODIUM CHLORIDE 0.9 % (FLUSH) 0.9 %
2 SYRINGE (ML) INJECTION EVERY 12 HOURS PRN
Status: DISCONTINUED | OUTPATIENT
Start: 2024-06-24 | End: 2025-01-10 | Stop reason: HOSPADM

## 2024-06-24 RX ORDER — POLYETHYLENE GLYCOL 3350 17 G/17G
17 POWDER, FOR SOLUTION ORAL
Status: DISCONTINUED | OUTPATIENT
Start: 2024-06-24 | End: 2024-11-27

## 2024-06-24 RX ORDER — PROCHLORPERAZINE EDISYLATE 5 MG/ML
5 INJECTION INTRAMUSCULAR; INTRAVENOUS EVERY 6 HOURS PRN
Status: DISCONTINUED | OUTPATIENT
Start: 2024-06-24 | End: 2024-07-06

## 2024-06-24 RX ORDER — NALOXONE HCL 0.4 MG/ML
0.02 VIAL (ML) INJECTION
Status: DISCONTINUED | OUTPATIENT
Start: 2024-06-24 | End: 2025-01-10 | Stop reason: HOSPADM

## 2024-06-24 RX ORDER — ACETAMINOPHEN 325 MG/1
650 TABLET ORAL EVERY 4 HOURS PRN
Status: DISCONTINUED | OUTPATIENT
Start: 2024-06-24 | End: 2025-01-10 | Stop reason: HOSPADM

## 2024-06-24 RX ORDER — ACETAMINOPHEN 325 MG/1
650 TABLET ORAL EVERY 8 HOURS PRN
Status: DISCONTINUED | OUTPATIENT
Start: 2024-06-24 | End: 2025-01-10 | Stop reason: HOSPADM

## 2024-06-24 RX ORDER — IBUPROFEN 200 MG
16 TABLET ORAL
Status: DISCONTINUED | OUTPATIENT
Start: 2024-06-24 | End: 2025-01-10 | Stop reason: HOSPADM

## 2024-06-24 RX ORDER — TALC
6 POWDER (GRAM) TOPICAL NIGHTLY PRN
Status: DISCONTINUED | OUTPATIENT
Start: 2024-06-24 | End: 2025-01-10 | Stop reason: HOSPADM

## 2024-06-24 RX ORDER — GLUCAGON 1 MG
1 KIT INJECTION
Status: DISCONTINUED | OUTPATIENT
Start: 2024-06-24 | End: 2025-01-10 | Stop reason: HOSPADM

## 2024-06-24 RX ORDER — ENOXAPARIN SODIUM 100 MG/ML
40 INJECTION SUBCUTANEOUS EVERY 24 HOURS
Status: DISCONTINUED | OUTPATIENT
Start: 2024-06-24 | End: 2024-07-02

## 2024-06-24 NOTE — ED TRIAGE NOTES
Patient presents to the ED via EMS from home with complaints of altered mental status. Pt found by family sitting on the porch confused, GCS 14. Family states pt had recent UTI. No neuro deficits. Pt oriented to self. Pt denies any complaints.
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
## 2024-06-25 LAB
ALBUMIN SERPL BCP-MCNC: 3.7 G/DL (ref 3.5–5.2)
ALLENS TEST: ABNORMAL
ALP SERPL-CCNC: 68 U/L (ref 55–135)
ALT SERPL W/O P-5'-P-CCNC: 18 U/L (ref 10–44)
AMPHET+METHAMPHET UR QL: NEGATIVE
ANION GAP SERPL CALC-SCNC: 11 MMOL/L (ref 8–16)
APAP SERPL-MCNC: <3 UG/ML (ref 10–20)
AST SERPL-CCNC: 42 U/L (ref 10–40)
BARBITURATES UR QL SCN>200 NG/ML: NEGATIVE
BASOPHILS # BLD AUTO: 0.06 K/UL (ref 0–0.2)
BASOPHILS NFR BLD: 0.5 % (ref 0–1.9)
BENZODIAZ UR QL SCN>200 NG/ML: NEGATIVE
BILIRUB SERPL-MCNC: 1.3 MG/DL (ref 0.1–1)
BUN SERPL-MCNC: 17 MG/DL (ref 8–23)
BZE UR QL SCN: NEGATIVE
CALCIUM SERPL-MCNC: 8.9 MG/DL (ref 8.7–10.5)
CANNABINOIDS UR QL SCN: NEGATIVE
CHLORIDE SERPL-SCNC: 105 MMOL/L (ref 95–110)
CO2 SERPL-SCNC: 21 MMOL/L (ref 23–29)
CREAT SERPL-MCNC: 0.9 MG/DL (ref 0.5–1.4)
CREAT UR-MCNC: 148 MG/DL (ref 15–325)
DIFFERENTIAL METHOD BLD: ABNORMAL
EOSINOPHIL # BLD AUTO: 0.2 K/UL (ref 0–0.5)
EOSINOPHIL NFR BLD: 1.7 % (ref 0–8)
ERYTHROCYTE [DISTWIDTH] IN BLOOD BY AUTOMATED COUNT: 12.9 % (ref 11.5–14.5)
EST. GFR  (NO RACE VARIABLE): >60 ML/MIN/1.73 M^2
ETHANOL UR-MCNC: <10 MG/DL
FOLATE SERPL-MCNC: 13.5 NG/ML (ref 4–24)
GLUCOSE SERPL-MCNC: 87 MG/DL (ref 70–110)
HCO3 UR-SCNC: 25.1 MMOL/L (ref 24–28)
HCT VFR BLD AUTO: 36.3 % (ref 37–48.5)
HGB BLD-MCNC: 12 G/DL (ref 12–16)
IMM GRANULOCYTES # BLD AUTO: 0.03 K/UL (ref 0–0.04)
IMM GRANULOCYTES NFR BLD AUTO: 0.3 % (ref 0–0.5)
LYMPHOCYTES # BLD AUTO: 1.6 K/UL (ref 1–4.8)
LYMPHOCYTES NFR BLD: 13.6 % (ref 18–48)
MAGNESIUM SERPL-MCNC: 2.2 MG/DL (ref 1.6–2.6)
MCH RBC QN AUTO: 32.3 PG (ref 27–31)
MCHC RBC AUTO-ENTMCNC: 33.1 G/DL (ref 32–36)
MCV RBC AUTO: 98 FL (ref 82–98)
METHADONE UR QL SCN>300 NG/ML: NEGATIVE
MONOCYTES # BLD AUTO: 0.7 K/UL (ref 0.3–1)
MONOCYTES NFR BLD: 5.6 % (ref 4–15)
NEUTROPHILS # BLD AUTO: 9.1 K/UL (ref 1.8–7.7)
NEUTROPHILS NFR BLD: 78.3 % (ref 38–73)
NRBC BLD-RTO: 0 /100 WBC
OPIATES UR QL SCN: NEGATIVE
PCO2 BLDA: 38.5 MMHG (ref 35–45)
PCP UR QL SCN>25 NG/ML: NEGATIVE
PH SMN: 7.42 [PH] (ref 7.35–7.45)
PHOSPHATE SERPL-MCNC: 3.7 MG/DL (ref 2.7–4.5)
PLATELET # BLD AUTO: 252 K/UL (ref 150–450)
PMV BLD AUTO: 10.2 FL (ref 9.2–12.9)
PO2 BLDA: 36 MMHG (ref 40–60)
POC BE: 1 MMOL/L
POC SATURATED O2: 71 % (ref 95–100)
POC TCO2: 26 MMOL/L (ref 24–29)
POTASSIUM SERPL-SCNC: 3.7 MMOL/L (ref 3.5–5.1)
PROT SERPL-MCNC: 6.3 G/DL (ref 6–8.4)
RBC # BLD AUTO: 3.71 M/UL (ref 4–5.4)
SALICYLATES SERPL-MCNC: <5 MG/DL (ref 15–30)
SAMPLE: ABNORMAL
SITE: ABNORMAL
SODIUM SERPL-SCNC: 137 MMOL/L (ref 136–145)
T4 FREE SERPL-MCNC: 1.04 NG/DL (ref 0.71–1.51)
TOXICOLOGY INFORMATION: NORMAL
TREPONEMA PALLIDUM IGG+IGM AB [PRESENCE] IN SERUM OR PLASMA BY IMMUNOASSAY: NONREACTIVE
VIT B12 SERPL-MCNC: 308 PG/ML (ref 210–950)
WBC # BLD AUTO: 11.56 K/UL (ref 3.9–12.7)

## 2024-06-25 PROCEDURE — 25000003 PHARM REV CODE 250

## 2024-06-25 PROCEDURE — 63600175 PHARM REV CODE 636 W HCPCS

## 2024-06-25 PROCEDURE — 11000001 HC ACUTE MED/SURG PRIVATE ROOM

## 2024-06-25 PROCEDURE — 96365 THER/PROPH/DIAG IV INF INIT: CPT

## 2024-06-25 PROCEDURE — 84439 ASSAY OF FREE THYROXINE: CPT

## 2024-06-25 PROCEDURE — 94761 N-INVAS EAR/PLS OXIMETRY MLT: CPT | Mod: XB

## 2024-06-25 PROCEDURE — 80053 COMPREHEN METABOLIC PANEL: CPT

## 2024-06-25 PROCEDURE — A9585 GADOBUTROL INJECTION: HCPCS | Performed by: STUDENT IN AN ORGANIZED HEALTH CARE EDUCATION/TRAINING PROGRAM

## 2024-06-25 PROCEDURE — 99223 1ST HOSP IP/OBS HIGH 75: CPT | Mod: GC,,, | Performed by: PSYCHIATRY & NEUROLOGY

## 2024-06-25 PROCEDURE — 82803 BLOOD GASES ANY COMBINATION: CPT

## 2024-06-25 PROCEDURE — 80321 ALCOHOLS BIOMARKERS 1OR 2: CPT

## 2024-06-25 PROCEDURE — 99900035 HC TECH TIME PER 15 MIN (STAT)

## 2024-06-25 PROCEDURE — 80307 DRUG TEST PRSMV CHEM ANLYZR: CPT

## 2024-06-25 PROCEDURE — 0361U NEURFLMNT LT CHN DIG IA QUAN: CPT

## 2024-06-25 PROCEDURE — 96375 TX/PRO/DX INJ NEW DRUG ADDON: CPT

## 2024-06-25 PROCEDURE — 83735 ASSAY OF MAGNESIUM: CPT

## 2024-06-25 PROCEDURE — S5010 5% DEXTROSE AND 0.45% SALINE: HCPCS

## 2024-06-25 PROCEDURE — 85025 COMPLETE CBC W/AUTO DIFF WBC: CPT

## 2024-06-25 PROCEDURE — 99285 EMERGENCY DEPT VISIT HI MDM: CPT | Mod: 25

## 2024-06-25 PROCEDURE — 84100 ASSAY OF PHOSPHORUS: CPT

## 2024-06-25 PROCEDURE — 25500020 PHARM REV CODE 255: Performed by: STUDENT IN AN ORGANIZED HEALTH CARE EDUCATION/TRAINING PROGRAM

## 2024-06-25 PROCEDURE — 83921 ORGANIC ACID SINGLE QUANT: CPT

## 2024-06-25 PROCEDURE — 36415 COLL VENOUS BLD VENIPUNCTURE: CPT

## 2024-06-25 RX ORDER — DEXTROSE MONOHYDRATE AND SODIUM CHLORIDE 5; .45 G/100ML; G/100ML
INJECTION, SOLUTION INTRAVENOUS CONTINUOUS
Status: ACTIVE | OUTPATIENT
Start: 2024-06-25 | End: 2024-06-26

## 2024-06-25 RX ORDER — LORAZEPAM 2 MG/ML
0.5 INJECTION INTRAMUSCULAR ONCE AS NEEDED
Status: COMPLETED | OUTPATIENT
Start: 2024-06-25 | End: 2024-06-25

## 2024-06-25 RX ORDER — GADOBUTROL 604.72 MG/ML
5 INJECTION INTRAVENOUS
Status: COMPLETED | OUTPATIENT
Start: 2024-06-25 | End: 2024-06-25

## 2024-06-25 RX ADMIN — CEFTRIAXONE 1 G: 1 INJECTION, POWDER, FOR SOLUTION INTRAMUSCULAR; INTRAVENOUS at 12:06

## 2024-06-25 RX ADMIN — ENOXAPARIN SODIUM 40 MG: 40 INJECTION SUBCUTANEOUS at 06:06

## 2024-06-25 RX ADMIN — GADOBUTROL 5 ML: 604.72 INJECTION INTRAVENOUS at 03:06

## 2024-06-25 RX ADMIN — DEXTROSE AND SODIUM CHLORIDE: 5; 450 INJECTION, SOLUTION INTRAVENOUS at 02:06

## 2024-06-25 RX ADMIN — LORAZEPAM 0.5 MG: 2 INJECTION INTRAMUSCULAR; INTRAVENOUS at 02:06

## 2024-06-25 NOTE — SUBJECTIVE & OBJECTIVE
History reviewed. No pertinent past medical history.    History reviewed. No pertinent surgical history.    Review of patient's allergies indicates:  No Known Allergies    No current facility-administered medications on file prior to encounter.     No current outpatient medications on file prior to encounter.     Family History    None       Tobacco Use    Smoking status: Never    Smokeless tobacco: Never   Substance and Sexual Activity    Alcohol use: Never    Drug use: Not on file    Sexual activity: Not on file     Review of Systems   Unable to perform ROS: Mental status change     Objective:     Vital Signs (Most Recent):  Temp: 98.1 °F (36.7 °C) (06/24/24 1751)  Pulse: 84 (06/24/24 2002)  Resp: 18 (06/24/24 1751)  BP: (!) 157/69 (06/24/24 2002)  SpO2: 100 % (06/24/24 1751) Vital Signs (24h Range):  Temp:  [98.1 °F (36.7 °C)-98.4 °F (36.9 °C)] 98.1 °F (36.7 °C)  Pulse:  [] 84  Resp:  [18] 18  SpO2:  [100 %] 100 %  BP: (128-159)/(63-79) 157/69     Weight: 49.9 kg (110 lb)  Body mass index is 20.12 kg/m².     Physical Exam  Vitals and nursing note reviewed.   Constitutional:       General: She is not in acute distress.     Appearance: Normal appearance. She is not ill-appearing or toxic-appearing.   HENT:      Head: Normocephalic and atraumatic.      Nose: Nose normal.      Mouth/Throat:      Mouth: Mucous membranes are moist.   Eyes:      General: No scleral icterus.        Right eye: No discharge.         Left eye: No discharge.      Conjunctiva/sclera: Conjunctivae normal.      Right eye: Right conjunctiva is not injected. No chemosis, exudate or hemorrhage.     Left eye: Left conjunctiva is not injected. No chemosis, exudate or hemorrhage.  Neck:      Meningeal: Kernig's sign absent.      Comments: There is a mobile cystic mass to her right supraclavicular area that is painless.  (apparently she was diagnosed with cyst in that area 20 years ago)  No meningeal signs  Cardiovascular:      Rate and Rhythm:  Normal rate and regular rhythm.      Heart sounds: Normal heart sounds. No murmur heard.     No friction rub. No gallop.   Pulmonary:      Effort: Pulmonary effort is normal. No respiratory distress.      Breath sounds: No wheezing, rhonchi or rales.   Abdominal:      General: Abdomen is flat. There is no distension.      Palpations: Abdomen is soft. There is no mass.      Tenderness: There is no abdominal tenderness. There is no guarding.      Hernia: No hernia is present.   Musculoskeletal:         General: Normal range of motion.      Cervical back: Normal range of motion. No rigidity or tenderness. No pain with movement. Normal range of motion.      Right lower leg: No edema.      Left lower leg: No edema.   Skin:     General: Skin is warm.      Capillary Refill: Capillary refill takes less than 2 seconds.      Coloration: Skin is not jaundiced.      Findings: No bruising or erythema.   Neurological:      General: No focal deficit present.      Mental Status: She is alert. She is disoriented.      Comments: Oriented to self, thinks his 2010, replies that she is somewhere that is non-existent (gibberish)  5/5 upper and lower extremity strength  No asterixis  Clock drawning in media   Psychiatric:         Attention and Perception: She does not perceive auditory or visual hallucinations.         Speech: Speech is delayed.         Behavior: Behavior is slowed. Behavior is cooperative.         Cognition and Memory: Memory is impaired. She exhibits impaired recent memory.      Comments: Remembered 1/3 words.                Significant Labs: All pertinent labs within the past 24 hours have been reviewed.  CBC:   Recent Labs   Lab 06/24/24  1658   WBC 13.86*   HGB 12.9   HCT 38.2        CMP:   Recent Labs   Lab 06/24/24  1658      K 3.9      CO2 20*   GLU 88   BUN 18   CREATININE 1.1   CALCIUM 9.5   PROT 6.6   ALBUMIN 4.1   BILITOT 1.6*   ALKPHOS 75   AST 43*   ALT 20   ANIONGAP 13       Significant  Imaging: I have reviewed all pertinent imaging results/findings within the past 24 hours.

## 2024-06-25 NOTE — ED NOTES
Nurses Note -- 4 Eyes      06/24/24  1905 AM      Skin assessed during: Admit      [] No Altered Skin Integrity Present    []Prevention Measures Documented      [x] Yes- Altered Skin Integrity Present or Discovered   [x] LDA Added if Not in Epic (Describe Wound)   [x] New Altered Skin Integrity was Present on Admit and Documented in LDA   [x] Wound Image Taken    Wound Care Consulted? No    Attending Nurse:  Monique Quinones RN/Staff Member:   EFRAIN Hope
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
## 2024-06-25 NOTE — NURSING
4 EYES:    TWO SKIN TEARS ON SACRAL AREA.  BRUISE ON LEFT SHOULDER  BRUISE ON RIGHT HIP, AND THIGH  BRUISING ON BOTH ARMS    JEFF VALDOVINOS
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
## 2024-06-25 NOTE — ED NOTES
ED techs to transport pt upstairs to patient room. Pt removed off pure wick device, sheets dry. Pt updated on of transport to room.
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
## 2024-06-25 NOTE — ED NOTES
Pt AAOx1, resting comfortably in bed, NAD, respirations E/UL, updated on POC, wheels locked and in low position, call bell with in reach, Comfort positioning and restroom needs were addressed. Necessary items were placed with in reach and was advised when a reassessment would take place.

## 2024-06-25 NOTE — ASSESSMENT & PLAN NOTE
"78-year-old lady here with encephalopathy.  At her baseline, she is normally "authoritative" demanding things, speaking about [employer] acts.  However unclear her mental status as her son does not keep close contact with her.  He does not really know if her mentation has gotten progressively worse or what is her baseline.  Normally she is able to go to the [place] daily to see her horse.  Overall workup was only notable for a leukocytosis with granulocytes however with no clear source or systemic response.  However, we will treat 1 time dose of ceftriaxone see if improvement in her symptoms.    Unclear etiology at this time, however we will rule out reversible causes of acute encephalopathy, such as infectious, pending blood cultures/RPR, vitamin deficiencies (B1, B3, B9, B12), less likely meningitis as patient has full range of motion of her neck however can consider LP in the morning?  Utox.     Differentials include but not limited to progressing age-related dementia, vitamin deficiencies encephalitis, UTI?, less likely stroke, toxicities,     Results:  WBCs 13  CMP unremarkable  Lactate normal,  BNP mildly elevated at 115, troponin negative  EKG was normal sinus rhythm, no ischemic changes  TSH WNL  Protocol normal  UA positive for 2+ ketones trace protein no nitrites    CT head showed no acute intracranial process  Chest x-ray showed no focal consolidation    Plan:  - pending reversible causes workup  - F/U B1, B3, B9, B12  - F/U copper  - F/U MRI brain  - F/U syphilis antibody  - 1 time dose of ceftriaxone, less likely UTI however if improving symptoms possible source?  - can consider LP in the morning, however patient is afebrile, no compliant of headache or visual changes, no meningeal signs.         "
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
## 2024-06-25 NOTE — SUBJECTIVE & OBJECTIVE
"History reviewed. No pertinent past medical history.    History reviewed. No pertinent surgical history.    Review of patient's allergies indicates:  No Known Allergies    Current Neurological Medications:     No current facility-administered medications on file prior to encounter.     No current outpatient medications on file prior to encounter.     Family History    None       Tobacco Use    Smoking status: Never    Smokeless tobacco: Never   Substance and Sexual Activity    Alcohol use: Never    Drug use: Not on file    Sexual activity: Not on file     Review of Systems   Unable to perform ROS: Mental status change     Objective:     Vital Signs (Most Recent):  Temp: 98 °F (36.7 °C) (06/25/24 1100)  Pulse: 89 (06/25/24 1100)  Resp: 18 (06/25/24 1100)  BP: 130/72 (06/25/24 1100)  SpO2: 99 % (06/25/24 1100) Vital Signs (24h Range):  Temp:  [98 °F (36.7 °C)-99 °F (37.2 °C)] 98 °F (36.7 °C)  Pulse:  [] 89  Resp:  [15-20] 18  SpO2:  [98 %-100 %] 99 %  BP: (106-159)/(56-88) 130/72     Weight: 49.9 kg (110 lb)  Body mass index is 20.12 kg/m².     Physical Exam  Vitals and nursing note reviewed.   Constitutional:       Appearance: She is normal weight.      Comments: Asleep but arousable to voice; unkempt   HENT:      Head: Normocephalic and atraumatic.   Pulmonary:      Effort: Pulmonary effort is normal. No respiratory distress.   Musculoskeletal:      Right lower leg: No edema.      Left lower leg: No edema.   Skin:     General: Skin is warm and dry.   Neurological:      Motor: Motor strength is normal.     Coordination: Finger-Nose-Finger Test normal.      Deep Tendon Reflexes:      Reflex Scores:       Brachioradialis reflexes are 2+ on the right side and 2+ on the left side.       Patellar reflexes are 2+ on the right side and 2+ on the left side.  Psychiatric:         Speech: Speech normal.          NEUROLOGICAL EXAMINATION:     MENTAL STATUS   Oriented to person ("Mohini Farhat").   Oriented to place. " "("[facility]")  Disoriented to time. Disoriented to year, month, date, day and season.   Registration: recalls 3 of 3 objects. Recall of objects at 5 minutes: Recalls 0 of 3 objects at 5 minutes. Follows 1 step commands.   Attention: decreased. Concentration: decreased.   Speech: speech is normal   Level of consciousness: arousable by verbal stimuli    CRANIAL NERVES     CN II   Visual fields full to confrontation.     CN III, IV, VI   Upgaze: abnormal (Upward gaze palsy)    CN V   Facial sensation intact.     CN VII   Facial expression full, symmetric.     CN VIII   CN VIII normal.     CN IX, X   CN IX normal.   CN X normal.     CN XI   CN XI normal.     CN XII   CN XII normal.     MOTOR EXAM   Muscle bulk: normal  Overall muscle tone: normal    Strength   Strength 5/5 throughout.     REFLEXES     Reflexes   Right brachioradialis: 2+  Left brachioradialis: 2+  Right patellar: 2+  Left patellar: 2+  Right plantar: upgoing  Left plantar: upgoing    SENSORY EXAM        Unable to assess due to mental status     GAIT AND COORDINATION      Coordination   Finger to nose coordination: normal      Significant Labs: All pertinent lab results from the past 24 hours have been reviewed.    Significant Imaging: I have reviewed and interpreted all pertinent imaging results/findings within the past 24 hours.  "

## 2024-06-25 NOTE — ASSESSMENT & PLAN NOTE
"78-year-old lady here with encephalopathy.  At her baseline, she is normally "authoritative" demanding things, speaking about Protestant acts.  However unclear her mental status as her son does not keep close contact with her.  He does not really know if her mentation has gotten progressively worse or what is her baseline.  Normally she is able to go to the [place] daily to see her horse.  Overall workup was only notable for a leukocytosis with granulocytes with no clear source or systemic response. Will treat 1 time dose of ceftriaxone see if improvement in her symptoms.    Unclear etiology at this time, but will rule out reversible causes of acute encephalopathy, such as infectious, pending blood cultures/RPR, vitamin deficiencies (B1, B3, B9, B12), less likely meningitis as patient has full range of motion of her neck, can consider LP in the morning to r/o encephalitis vs menigitis?  Utox.pending    Differentials include but not limited to progressing age-related dementia, vitamin deficiencies encephalitis, UTI?, less likely stroke, toxicities,     Results:  WBCs 13  CMP unremarkable  Lactate normal,  BNP mildly elevated at 115, troponin negative  EKG was normal sinus rhythm, no ischemic changes  TSH WNL  Protocol normal  UA positive for 2+ ketones trace protein no nitrites    CT head showed no acute intracranial process  Chest x-ray showed no focal consolidation    Workup is notable for non-acute MRI Brain with mild/mod generalized atrophy and microvascular disease, grossly unremarkable CBC, CMP, UA. WNL B12 (low normal), and WNL TSH.    Plan:  - f/u with Neuro recs  - reach out to family for more collateral   - will treat empirically w/ ctx less likely UTI but if improvement symptoms possible source? Reassess in the AM  - can consider LP in the morning, however patient is afebrile, no compliant of headache or visual changes, no meningeal signs.         "

## 2024-06-26 LAB
ALBUMIN SERPL BCP-MCNC: 3.6 G/DL (ref 3.5–5.2)
ALP SERPL-CCNC: 75 U/L (ref 55–135)
ALT SERPL W/O P-5'-P-CCNC: 20 U/L (ref 10–44)
ANION GAP SERPL CALC-SCNC: 9 MMOL/L (ref 8–16)
AST SERPL-CCNC: 35 U/L (ref 10–40)
BASOPHILS # BLD AUTO: 0.08 K/UL (ref 0–0.2)
BASOPHILS NFR BLD: 0.7 % (ref 0–1.9)
BILIRUB SERPL-MCNC: 0.9 MG/DL (ref 0.1–1)
BUN SERPL-MCNC: 15 MG/DL (ref 8–23)
CALCIUM SERPL-MCNC: 9 MG/DL (ref 8.7–10.5)
CHLORIDE SERPL-SCNC: 107 MMOL/L (ref 95–110)
CO2 SERPL-SCNC: 22 MMOL/L (ref 23–29)
CREAT SERPL-MCNC: 0.9 MG/DL (ref 0.5–1.4)
DIFFERENTIAL METHOD BLD: ABNORMAL
EOSINOPHIL # BLD AUTO: 0.5 K/UL (ref 0–0.5)
EOSINOPHIL NFR BLD: 4.2 % (ref 0–8)
ERYTHROCYTE [DISTWIDTH] IN BLOOD BY AUTOMATED COUNT: 12.8 % (ref 11.5–14.5)
EST. GFR  (NO RACE VARIABLE): >60 ML/MIN/1.73 M^2
GLUCOSE SERPL-MCNC: 114 MG/DL (ref 70–110)
HCT VFR BLD AUTO: 39.8 % (ref 37–48.5)
HGB BLD-MCNC: 12.8 G/DL (ref 12–16)
IMM GRANULOCYTES # BLD AUTO: 0.04 K/UL (ref 0–0.04)
IMM GRANULOCYTES NFR BLD AUTO: 0.4 % (ref 0–0.5)
LYMPHOCYTES # BLD AUTO: 1.8 K/UL (ref 1–4.8)
LYMPHOCYTES NFR BLD: 16.5 % (ref 18–48)
MAGNESIUM SERPL-MCNC: 2 MG/DL (ref 1.6–2.6)
MCH RBC QN AUTO: 31.2 PG (ref 27–31)
MCHC RBC AUTO-ENTMCNC: 32.2 G/DL (ref 32–36)
MCV RBC AUTO: 97 FL (ref 82–98)
MONOCYTES # BLD AUTO: 0.8 K/UL (ref 0.3–1)
MONOCYTES NFR BLD: 7.3 % (ref 4–15)
NEUTROPHILS # BLD AUTO: 7.9 K/UL (ref 1.8–7.7)
NEUTROPHILS NFR BLD: 70.9 % (ref 38–73)
NRBC BLD-RTO: 0 /100 WBC
PHOSPHATE SERPL-MCNC: 2.8 MG/DL (ref 2.7–4.5)
PLATELET # BLD AUTO: 282 K/UL (ref 150–450)
PMV BLD AUTO: 10.3 FL (ref 9.2–12.9)
POTASSIUM SERPL-SCNC: 3.6 MMOL/L (ref 3.5–5.1)
PROT SERPL-MCNC: 6.2 G/DL (ref 6–8.4)
RBC # BLD AUTO: 4.1 M/UL (ref 4–5.4)
SODIUM SERPL-SCNC: 138 MMOL/L (ref 136–145)
WBC # BLD AUTO: 11.06 K/UL (ref 3.9–12.7)

## 2024-06-26 PROCEDURE — 25000003 PHARM REV CODE 250

## 2024-06-26 PROCEDURE — 84100 ASSAY OF PHOSPHORUS: CPT

## 2024-06-26 PROCEDURE — 92610 EVALUATE SWALLOWING FUNCTION: CPT

## 2024-06-26 PROCEDURE — 11000001 HC ACUTE MED/SURG PRIVATE ROOM

## 2024-06-26 PROCEDURE — 83735 ASSAY OF MAGNESIUM: CPT

## 2024-06-26 PROCEDURE — 63600175 PHARM REV CODE 636 W HCPCS

## 2024-06-26 PROCEDURE — 97162 PT EVAL MOD COMPLEX 30 MIN: CPT

## 2024-06-26 PROCEDURE — 63600175 PHARM REV CODE 636 W HCPCS: Mod: JZ,JG

## 2024-06-26 PROCEDURE — 85025 COMPLETE CBC W/AUTO DIFF WBC: CPT

## 2024-06-26 PROCEDURE — 80053 COMPREHEN METABOLIC PANEL: CPT

## 2024-06-26 PROCEDURE — 99233 SBSQ HOSP IP/OBS HIGH 50: CPT | Mod: GC,,, | Performed by: PSYCHIATRY & NEUROLOGY

## 2024-06-26 PROCEDURE — 97116 GAIT TRAINING THERAPY: CPT

## 2024-06-26 PROCEDURE — 36415 COLL VENOUS BLD VENIPUNCTURE: CPT

## 2024-06-26 PROCEDURE — 97535 SELF CARE MNGMENT TRAINING: CPT

## 2024-06-26 PROCEDURE — 97166 OT EVAL MOD COMPLEX 45 MIN: CPT

## 2024-06-26 RX ORDER — OLANZAPINE 10 MG/2ML
2.5 INJECTION, POWDER, FOR SOLUTION INTRAMUSCULAR ONCE AS NEEDED
Status: COMPLETED | OUTPATIENT
Start: 2024-06-26 | End: 2024-06-26

## 2024-06-26 RX ORDER — RIVASTIGMINE 4.6 MG/24H
1 PATCH, EXTENDED RELEASE TRANSDERMAL DAILY
Status: DISCONTINUED | OUTPATIENT
Start: 2024-06-26 | End: 2024-08-31

## 2024-06-26 RX ADMIN — OLANZAPINE 2.5 MG: 10 INJECTION, POWDER, FOR SOLUTION INTRAMUSCULAR at 01:06

## 2024-06-26 RX ADMIN — RIVASTIGMINE 1 PATCH: 4.6 PATCH, EXTENDED RELEASE TRANSDERMAL at 01:06

## 2024-06-26 RX ADMIN — OLANZAPINE 2.5 MG: 10 INJECTION, POWDER, FOR SOLUTION INTRAMUSCULAR at 09:06

## 2024-06-26 RX ADMIN — ENOXAPARIN SODIUM 40 MG: 40 INJECTION SUBCUTANEOUS at 04:06

## 2024-06-26 RX ADMIN — CEFTRIAXONE 1 G: 1 INJECTION, POWDER, FOR SOLUTION INTRAMUSCULAR; INTRAVENOUS at 01:06
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
## 2024-06-26 NOTE — SUBJECTIVE & OBJECTIVE
Interval History: NAEO. Pt more oriented to surroundings today, 6/26/24. Still no clear metabolic cause of acute encephalopathic episode. Neurology would like to start Donepezil for dementia.     Review of Systems   Constitutional:  Negative for chills, fatigue and fever.   HENT: Negative.     Respiratory: Negative.     Cardiovascular: Negative.    Gastrointestinal: Negative.    Endocrine: Negative.    Genitourinary: Negative.    Musculoskeletal: Negative.    Neurological: Negative.    Hematological: Negative.    Psychiatric/Behavioral: Negative.       Objective:     Vital Signs (Most Recent):  Temp: 98.5 °F (36.9 °C) (06/26/24 1130)  Pulse: 91 (06/26/24 1130)  Resp: 18 (06/26/24 1130)  BP: 133/79 (06/26/24 1130)  SpO2: 97 % (06/26/24 1130) Vital Signs (24h Range):  Temp:  [98.1 °F (36.7 °C)-99.6 °F (37.6 °C)] 98.5 °F (36.9 °C)  Pulse:  [72-95] 91  Resp:  [17-18] 18  SpO2:  [96 %-99 %] 97 %  BP: (125-150)/(68-81) 133/79     Weight: 49.9 kg (110 lb)  Body mass index is 20.12 kg/m².    Intake/Output Summary (Last 24 hours) at 6/26/2024 1224  Last data filed at 6/26/2024 0432  Gross per 24 hour   Intake --   Output 300 ml   Net -300 ml         Physical Exam  Vitals and nursing note reviewed.   Constitutional:       Appearance: She is normal weight.      Comments: Asleep but arousable to voice; unkempt   HENT:      Head: Normocephalic and atraumatic.      Nose: Nose normal.      Mouth/Throat:      Mouth: Mucous membranes are moist.      Pharynx: Oropharynx is clear.   Eyes:      Extraocular Movements: Extraocular movements intact.      Conjunctiva/sclera: Conjunctivae normal.      Pupils: Pupils are equal, round, and reactive to light.   Cardiovascular:      Rate and Rhythm: Normal rate and regular rhythm.      Pulses: Normal pulses.      Heart sounds: Normal heart sounds.   Pulmonary:      Effort: Pulmonary effort is normal. No respiratory distress.   Abdominal:      General: Abdomen is flat. Bowel sounds are normal.       Palpations: Abdomen is soft.   Musculoskeletal:         General: Normal range of motion.      Right lower leg: No edema.      Left lower leg: No edema.   Skin:     General: Skin is warm and dry.   Neurological:      General: No focal deficit present.      Mental Status: She is oriented to person, place, and time. Mental status is at baseline.      Coordination: Finger-Nose-Finger Test normal.      Deep Tendon Reflexes:      Reflex Scores:       Brachioradialis reflexes are 2+ on the right side and 2+ on the left side.       Patellar reflexes are 2+ on the right side and 2+ on the left side.  Psychiatric:         Speech: Speech normal.             Significant Labs: All pertinent labs within the past 24 hours have been reviewed.    Significant Imaging: I have reviewed all pertinent imaging results/findings within the past 24 hours.

## 2024-06-26 NOTE — PLAN OF CARE
Problem: SLP  Goal: SLP Goal  Description: Speech Language Pathology Goals  Goals expected to be met by 6/26:    1. The pt will participate in a bedside swallow evaluation.-Met 6/26    Outcome: Met     Bedside swallow evaluation completed. SLP with recommendation for a regular/thin diet with standard aspiration precautions. At this time, SLP services are not warranted.     Jadyn Gaytan CF-SLP

## 2024-06-26 NOTE — PLAN OF CARE
Problem: Occupational Therapy  Goal: Occupational Therapy Goal  Description: Goals to be met by: 7/10/24     Patient will increase functional independence with ADLs by performing:    LE Dressing with Supervision.  Grooming while standing at sink with Supervision.  Toileting from toilet with Supervision for hygiene and clothing management.   Supine to sit with Supervision.  Toilet transfer to toilet with Supervision.  [place] with BUE HEP to improve activity tolerance to complete ADLs and IADLs  Within home ambulation distances with supervision and LRAD necessary to complete ADLs.      Outcome: Progressing     Patient tolerated OT eval. Goals and POC established. See note for further details.

## 2024-06-26 NOTE — ASSESSMENT & PLAN NOTE
"78-year-old lady here with encephalopathy.  At her baseline, she is normally "authoritative" demanding things, speaking about Vood[employer] acts.  However unclear her mental status as her son does not keep close contact with her.  He does not really know if her mentation has gotten progressively worse or what is her baseline.  Normally she is able to go to the [place] daily to see her horse.  Overall workup was only notable for a leukocytosis with granulocytes with no clear source or systemic response. Will treat 1 time dose of ceftriaxone see if improvement in her symptoms.    Unclear etiology at this time, but will rule out reversible causes of acute encephalopathy, such as infectious, pending blood cultures/RPR, vitamin deficiencies (B1, B3, B9, B12), less likely meningitis as patient has full range of motion of her neck, can consider LP in the morning to r/o encephalitis vs menigitis?  Utox.pending    Differentials include but not limited to progressing age-related dementia, vitamin deficiencies encephalitis, UTI?, less likely stroke, toxicities,     Results:  WBCs 13  CMP unremarkable  Lactate normal,  BNP mildly elevated at 115, troponin negative  EKG was normal sinus rhythm, no ischemic changes  TSH WNL  Protocol normal  UA positive for 2+ ketones trace protein no nitrites    CT head showed no acute intracranial process  Chest x-ray showed no focal consolidation    Workup is notable for non-acute MRI Brain with mild/mod generalized atrophy and microvascular disease, grossly unremarkable CBC, CMP, UA. WNL B12 (low normal), and WNL TSH.    Plan:  - f/u with Neuro recs  - Upon further discussions with son Jose Alejandro, will discuss with legal regarding how to go about appointing him as POA as next of kin. Due to concern of lack of capacity   - will treat empirically w/ CTX less likely UTI but if improvement symptoms possible source  - starting Rivastigmine 4.6 mg  - psych consult for recs to help with behavior and " mood
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
## 2024-06-26 NOTE — HOSPITAL COURSE
Pt admitted to [facility] for encephalopathy workup. Collateral from son strongly suggest Dementia. Psych and Neurology consulted for assistance. Brain imaging suggest dementia but no acute findings such as stroke. Metabolic workup largely negative. She has no active infection, no electrolyte derangements, TSH wnl, RPR negative, cardiac causes ruled out, UDS negative, HIV negative, hepatitis negative, VBG negative for hypercapnia. UA showed no signs of infection, given hx of UTIs we treated with IV CTX and saw no improvement. Hospital course c/b continued attempts to leave hospital and she was PECed on 7/15. CEC  on . Via assessment by the medical team patient has situational capacity and has the ability to designate her POA (currently in process). Pending memory unit placement. Friend, David, has resigned as MPOA. Per CM note, [facility] and [facility] have accepted pt on . Overnight on  patient had a witnessed seizure for 3 minutes with jerking of the left arm and right leg, with post-ictal state. Labs with anion gap acidosis, otherwise no findings.  Discontinued Rivastigmine. EEG abnormal study due to moderate diffuse background slowing consistent with diffuse cerebral dysfunction and encephalopathy which may be on the basis of toxic, metabolic, or primary neuronal disorder. Patient denied evaluation by ophtho despite self reported history of elevated pressure in the eyes. Patient suffered a second seizure , AEDs (Lacosamide 100 mg BID) started per neurology recs. Decreased to Lacosamide 50 mg BID as patient complaining of shaking. IV line off, placed on PRN rectal diazepam. Labs to be drawn once a month on the .     Patient with a witnessed episode of incontinence (urine and bowel) on , concerning for another potential seizures. CBC and CMP unremarkable. UA without signs of infection. CK, lactic acid and magnesium were all WNL. CXR without evidence of focal consolidation or  infective focus. Patient seemed to return to baseline. No antibiotics or further investigations pursued. Court date 12/16, patient under care of Curator Ca Merchant at [phone]. Patient medically stable for discharge to [facility].

## 2024-06-26 NOTE — PLAN OF CARE
Problem: Physical Therapy  Goal: Physical Therapy Goal  Description: Goals to be met by:      Patient will increase functional independence with mobility by performin. Supine to sit with Modified [place]  2. Sit to supine with Modified [place]  3. Sit to stand transfer with Modified [place]  4. Ambulated 200' with LRAD and stand by assistance.     Outcome: Progressing   Evaluation completed, initiated plan of care.   Gianna Aguiar, PT  2024
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
## 2024-06-26 NOTE — SUBJECTIVE & OBJECTIVE
Subjective:     Interval History: Today becoming agitated per RN. IM Zyprexa given to no avail.     Current Neurological Medications:     Current Facility-Administered Medications   Medication Dose Route Frequency Provider Last Rate Last Admin    acetaminophen tablet 650 mg  650 mg Oral Q8H PRN Dior, Son, DO        acetaminophen tablet 650 mg  650 mg Oral Q4H PRN Rader, Son, DO        cefTRIAXone (Rocephin) 1 g in D5W 100 mL IVPB (MB+)  1 g Intravenous Q24H Rader, Apolinar, DO   Stopped at 06/26/24 0223    enoxaparin injection 40 mg  40 mg Subcutaneous Daily Rader, Son, DO   40 mg at 06/25/24 1826    glucagon (human recombinant) injection 1 mg  1 mg Intramuscular PRN Rader, Apolinar, DO        glucose chewable tablet 16 g  16 g Oral PRN Rader, Son, DO        glucose chewable tablet 24 g  24 g Oral PRN Rader, Son, DO        melatonin tablet 6 mg  6 mg Oral Nightly PRN Rader, Apolinar, DO        naloxone 0.4 mg/mL injection 0.02 mg  0.02 mg Intravenous PRN Rader, Son, DO        polyethylene glycol packet 17 g  17 g Oral PRN Rader, Son, DO        prochlorperazine injection Soln 5 mg  5 mg Intravenous Q6H PRN Rader, Apolinar, DO        rivastigmine 4.6 mg/24 hour 1 patch  1 patch Transdermal Daily Tony Charles, DO   1 patch at 06/26/24 1343    sodium chloride 0.9% flush 2 mL  2 mL Intravenous Q12H PRN Rader, Apolinar, DO           Review of Systems   Unable to perform ROS: Dementia     Objective:     Vital Signs (Most Recent):  Temp: 98.2 °F (36.8 °C) (06/26/24 1549)  Pulse: 93 (06/26/24 1549)  Resp: 17 (06/26/24 1549)  BP: 122/79 (06/26/24 1549)  SpO2: 98 % (06/26/24 1549) Vital Signs (24h Range):  Temp:  [98.2 °F (36.8 °C)-99.6 °F (37.6 °C)] 98.2 °F (36.8 °C)  Pulse:  [75-95] 93  Resp:  [17-18] 17  SpO2:  [96 %-98 %] 98 %  BP: (122-150)/(68-81) 122/79     Weight: 49.9 kg (110 lb)  Body mass index is 20.12 kg/m².     Physical Exam  Vitals and nursing note reviewed.   Constitutional:       Comments: Unkempt   HENT:      Head:  Normocephalic and atraumatic.   Pulmonary:      Effort: Pulmonary effort is normal. No respiratory distress.   Musculoskeletal:      Right lower leg: No edema.      Left lower leg: No edema.   Skin:     General: Skin is warm and dry.   Neurological:      Mental Status: She is alert.      Motor: Motor strength is normal.     Deep Tendon Reflexes:      Reflex Scores:       Brachioradialis reflexes are 2+ on the right side and 2+ on the left side.       Patellar reflexes are 2+ on the right side and 2+ on the left side.  Psychiatric:         Speech: Speech normal.          NEUROLOGICAL EXAMINATION:     MENTAL STATUS   Disoriented to person.   Disoriented to place.   Oriented to time.   Attention: decreased. Concentration: decreased.   Speech: speech is normal     CRANIAL NERVES     CN III, IV, VI   Upgaze: abnormal (vertical gaze palsy)    CN V   Facial sensation intact.     CN VII   Facial expression full, symmetric.     CN VIII   CN VIII normal.     CN IX, X   CN IX normal.   CN X normal.     CN XI   CN XI normal.     CN XII   CN XII normal.     MOTOR EXAM   Muscle bulk: normal  Overall muscle tone: normal    Strength   Strength 5/5 throughout.     REFLEXES     Reflexes   Right brachioradialis: 2+  Left brachioradialis: 2+  Right patellar: 2+  Left patellar: 2+      Significant Labs: All pertinent lab results from the past 24 hours have been reviewed.    Significant Imaging: I have reviewed and interpreted all pertinent imaging results/findings within the past 24 hours.

## 2024-06-26 NOTE — CONSULTS
"[facility] ([place] [address])    Wound Care     Patient Name:  Mohini Dougherty  MRN:  [id number]  Date: 6/26/2024  Diagnosis: Encephalopathy     History:  History reviewed. No pertinent past medical history.  Social History     Socioeconomic History    Marital status:    Tobacco Use    Smoking status: Never    Smokeless tobacco: Never   Substance and Sexual Activity    Alcohol use: Never     Precautions:  Allergies as of 06/24/2024    (No Known Allergies)       WO Assessment Details / Treatment:    Patient seen for wound care: New Consult   Chart reviewed for this encounter.   Labs:   WBC (K/uL)   Date Value   06/26/2024 11.06   06/25/2024 11.56     Glucose (mg/dL)   Date Value   06/26/2024 114 (H)   06/25/2024 87     Albumin (g/dL)   Date Value   06/26/2024 3.6   06/25/2024 3.7       Pola Score: 17    Narrative:  Pt seen for WC consultation / follow-up and agreement of visit/assessment : Yes  Pt position: lying down  Pt currently on specialized surface: MedSurg Surface    Removed socks, noted pt has an ulceration on what appears to be a bunion on medial 1st metatarsal head. The ulceration has well adherent dry intact fibrin, cleansed w/Vashe, gently patted dry, applied foam border. Pt states she has had this "for a while" and does not see a podiatrist.     RECOMMENDATIONS:  Bedside nurse assess for acute changes (purulence, increased redness/swelling, increased drainage, malodor, increased pain, pallor, necrosis) please contact physician on any acute changes.  Kristopher - daily     Discussed POC with patient and primary nurse.   See EMR for orders & patient education.     Discussed nutrition and the role of protein in wound healing with the patient. Instructed patient to optimize protein for wound healing.     Bedside nursing to continue care & monitoring.  Bedside nursing to maintain pressure injury prevention interventions.    See Flowsheet for additional documentation.      Goals: Removal of " slough/eschar, Reduce bioburden, and Educate on proper wound management post D/C     Barriers to Wound Healing: dry wound bed advanced age fragile skin history of poor compliance     Recommendations provided? Yes     Thank you for the consult. Wound Care will sign off.  Please place a new consult if needed.       06/26/24 1000   WOCN Assessment   WOCN Total Time (mins) 20   Visit Date 06/26/24   Visit Time 1000   Consult Type New   WOCN Speciality Wound   Wound other   Intervention assessed;applied;chart review;orders   Teaching on-going        Wound 06/24/24 1758 Ulceration Right lateral Toe, first   Date First Assessed/Time First Assessed: 06/24/24 1758   Present on Original Admission: Yes  Primary Wound Type: Ulceration  Side: Right  Orientation: lateral  Location: (c) Toe, first  Is this injury device related?: (c)    Wound Image    Dressing Appearance Open to air   Drainage Amount None   Drainage Characteristics/Odor No odor   Appearance Intact;Fibrin   Tissue loss description Not applicable   Periwound Area Intact;Dry   Wound Edges Defined   Wound Length (cm) 0.5 cm   Wound Width (cm) 0.8 cm   Wound Depth (cm) 0.1 cm   Wound Volume (cm^3) 0.04 cm^3   Wound Surface Area (cm^2) 0.4 cm^2   Care Cleansed with:;Sterile normal saline   Dressing Applied;Foam   Dressing Change Due 07/01/24

## 2024-06-26 NOTE — SUBJECTIVE & OBJECTIVE
Interval History:   Patient mentation waxes and weans. Upon evaluation patient was oriented to self and time but not place. Extensive discussion with son Jose Alejandro. Patient living in unkempt house unknown duration. Awaiting psych input     Review of Systems   Constitutional:  Negative for chills, fatigue and fever.   HENT: Negative.     Respiratory: Negative.     Cardiovascular: Negative.    Gastrointestinal: Negative.    Endocrine: Negative.    Genitourinary: Negative.    Musculoskeletal: Negative.    Neurological: Negative.    Hematological: Negative.    Psychiatric/Behavioral: Negative.       Objective:     Vital Signs (Most Recent):  Temp: 98.2 °F (36.8 °C) (06/26/24 1549)  Pulse: 93 (06/26/24 1549)  Resp: 17 (06/26/24 1549)  BP: 122/79 (06/26/24 1549)  SpO2: 98 % (06/26/24 1549) Vital Signs (24h Range):  Temp:  [98.2 °F (36.8 °C)-99.6 °F (37.6 °C)] 98.2 °F (36.8 °C)  Pulse:  [75-95] 93  Resp:  [17-18] 17  SpO2:  [96 %-98 %] 98 %  BP: (122-150)/(68-81) 122/79     Weight: 49.9 kg (110 lb)  Body mass index is 20.12 kg/m².    Intake/Output Summary (Last 24 hours) at 6/26/2024 1810  Last data filed at 6/26/2024 0432  Gross per 24 hour   Intake --   Output 300 ml   Net -300 ml         Physical Exam  Vitals and nursing note reviewed.   Constitutional:       Appearance: She is normal weight.   HENT:      Head: Normocephalic and atraumatic.      Nose: Nose normal.      Mouth/Throat:      Mouth: Mucous membranes are moist.      Pharynx: Oropharynx is clear.   Eyes:      Extraocular Movements: Extraocular movements intact.      Conjunctiva/sclera: Conjunctivae normal.      Pupils: Pupils are equal, round, and reactive to light.   Cardiovascular:      Rate and Rhythm: Normal rate and regular rhythm.      Pulses: Normal pulses.      Heart sounds: Normal heart sounds.   Pulmonary:      Effort: Pulmonary effort is normal. No respiratory distress.   Abdominal:      General: Abdomen is flat. Bowel sounds are normal.       Palpations: Abdomen is soft.   Musculoskeletal:         General: Normal range of motion.      Right lower leg: No edema.      Left lower leg: No edema.   Skin:     General: Skin is warm and dry.   Neurological:      General: No focal deficit present.      Mental Status: Mental status is at baseline. She is disoriented.      Coordination: Finger-Nose-Finger Test normal.      Deep Tendon Reflexes:      Reflex Scores:       Brachioradialis reflexes are 2+ on the right side and 2+ on the left side.       Patellar reflexes are 2+ on the right side and 2+ on the left side.  Psychiatric:         Speech: Speech normal.             Significant Labs: All pertinent labs within the past 24 hours have been reviewed.    Significant Imaging: I have reviewed all pertinent imaging results/findings within the past 24 hours.
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
## 2024-06-27 LAB
ALBUMIN SERPL BCP-MCNC: 3.6 G/DL (ref 3.5–5.2)
ALP SERPL-CCNC: 73 U/L (ref 55–135)
ALT SERPL W/O P-5'-P-CCNC: 18 U/L (ref 10–44)
ANION GAP SERPL CALC-SCNC: 9 MMOL/L (ref 8–16)
AST SERPL-CCNC: 33 U/L (ref 10–40)
BASOPHILS # BLD AUTO: 0.07 K/UL (ref 0–0.2)
BASOPHILS NFR BLD: 0.7 % (ref 0–1.9)
BILIRUB SERPL-MCNC: 0.8 MG/DL (ref 0.1–1)
BUN SERPL-MCNC: 14 MG/DL (ref 8–23)
CALCIUM SERPL-MCNC: 9.1 MG/DL (ref 8.7–10.5)
CHLORIDE SERPL-SCNC: 109 MMOL/L (ref 95–110)
CLINICAL BIOCHEMIST REVIEW: NORMAL
CO2 SERPL-SCNC: 25 MMOL/L (ref 23–29)
CREAT SERPL-MCNC: 1 MG/DL (ref 0.5–1.4)
DIFFERENTIAL METHOD BLD: ABNORMAL
EOSINOPHIL # BLD AUTO: 0.6 K/UL (ref 0–0.5)
EOSINOPHIL NFR BLD: 5.8 % (ref 0–8)
ERYTHROCYTE [DISTWIDTH] IN BLOOD BY AUTOMATED COUNT: 13.1 % (ref 11.5–14.5)
EST. GFR  (NO RACE VARIABLE): 58.4 ML/MIN/1.73 M^2
GLUCOSE SERPL-MCNC: 93 MG/DL (ref 70–110)
HCT VFR BLD AUTO: 37.3 % (ref 37–48.5)
HGB BLD-MCNC: 12.1 G/DL (ref 12–16)
IMM GRANULOCYTES # BLD AUTO: 0.03 K/UL (ref 0–0.04)
IMM GRANULOCYTES NFR BLD AUTO: 0.3 % (ref 0–0.5)
LYMPHOCYTES # BLD AUTO: 2.3 K/UL (ref 1–4.8)
LYMPHOCYTES NFR BLD: 23.5 % (ref 18–48)
MAGNESIUM SERPL-MCNC: 2 MG/DL (ref 1.6–2.6)
MCH RBC QN AUTO: 32 PG (ref 27–31)
MCHC RBC AUTO-ENTMCNC: 32.4 G/DL (ref 32–36)
MCV RBC AUTO: 99 FL (ref 82–98)
MONOCYTES # BLD AUTO: 0.7 K/UL (ref 0.3–1)
MONOCYTES NFR BLD: 7.3 % (ref 4–15)
NEUTROPHILS # BLD AUTO: 6 K/UL (ref 1.8–7.7)
NEUTROPHILS NFR BLD: 62.4 % (ref 38–73)
NRBC BLD-RTO: 0 /100 WBC
PHOSPHATE SERPL-MCNC: 3.2 MG/DL (ref 2.7–4.5)
PLATELET # BLD AUTO: 272 K/UL (ref 150–450)
PLPETH BLD-MCNC: <10 NG/ML
PMV BLD AUTO: 10.6 FL (ref 9.2–12.9)
POPETH BLD-MCNC: <10 NG/ML
POTASSIUM SERPL-SCNC: 3.6 MMOL/L (ref 3.5–5.1)
PROT SERPL-MCNC: 6.2 G/DL (ref 6–8.4)
RBC # BLD AUTO: 3.78 M/UL (ref 4–5.4)
SODIUM SERPL-SCNC: 143 MMOL/L (ref 136–145)
WBC # BLD AUTO: 9.6 K/UL (ref 3.9–12.7)

## 2024-06-27 PROCEDURE — 85025 COMPLETE CBC W/AUTO DIFF WBC: CPT

## 2024-06-27 PROCEDURE — 97535 SELF CARE MNGMENT TRAINING: CPT

## 2024-06-27 PROCEDURE — 11000001 HC ACUTE MED/SURG PRIVATE ROOM

## 2024-06-27 PROCEDURE — 80053 COMPREHEN METABOLIC PANEL: CPT

## 2024-06-27 PROCEDURE — 63600175 PHARM REV CODE 636 W HCPCS

## 2024-06-27 PROCEDURE — 83735 ASSAY OF MAGNESIUM: CPT

## 2024-06-27 PROCEDURE — 84100 ASSAY OF PHOSPHORUS: CPT

## 2024-06-27 PROCEDURE — 25000003 PHARM REV CODE 250

## 2024-06-27 PROCEDURE — 99233 SBSQ HOSP IP/OBS HIGH 50: CPT | Mod: GC,,, | Performed by: PSYCHIATRY & NEUROLOGY

## 2024-06-27 PROCEDURE — 36415 COLL VENOUS BLD VENIPUNCTURE: CPT

## 2024-06-27 PROCEDURE — 90792 PSYCH DIAG EVAL W/MED SRVCS: CPT | Mod: GC,,, | Performed by: PSYCHIATRY & NEUROLOGY

## 2024-06-27 RX ADMIN — ENOXAPARIN SODIUM 40 MG: 40 INJECTION SUBCUTANEOUS at 05:06

## 2024-06-27 RX ADMIN — CEFTRIAXONE 1 G: 1 INJECTION, POWDER, FOR SOLUTION INTRAMUSCULAR; INTRAVENOUS at 11:06

## 2024-06-27 RX ADMIN — RIVASTIGMINE 1 PATCH: 4.6 PATCH, EXTENDED RELEASE TRANSDERMAL at 08:06

## 2024-06-27 RX ADMIN — CEFTRIAXONE 1 G: 1 INJECTION, POWDER, FOR SOLUTION INTRAMUSCULAR; INTRAVENOUS at 05:06
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
## 2024-06-27 NOTE — CONSULTS
"CONSULTATION LIAISON PSYCHIATRY INITIAL EVALUATION    Patient Name: Mohini Dougherty  MRN: [id number]  Patient Class: IP- Inpatient  Admission Date: 2024  Attending Physician: Tobin Schilling, *      HPI:   Mohini Dougherty is a 78 y.o. female with no known psychiatric history or significant medical history who presents with AMS and is admitted since  for Encephalopathy. Patient reportedly found in neighbors driveway and noted to be confused by son and police.    Psychiatry consulted for "non-redirectable agitation, fighting staff, recs for behavioral control medications."     Per Neuro consult ():  During my phone conversation with her son, Jose Alejandro, he expressed to me that they have had a strained relationship for ~20 years, starting when he moved out of his family home at age 30. He states that she has always been short-tempered, and would get (in his opinion) disproportionately angry at him when he didn't immediately answer the phone or couldn't leave work or his family to drive her to see her horses on the [place]. He has no knowledge of any psychiatric diagnosis. He does endorse that she is socially isolated and does not really have meaningful relationships, unable to tell me the timeline of this. He noted that on , he noted "weird talk" from his mom on the phone - states that she was talking nonsense and sounded like she had trouble thinking on the right word. Today, he was at her house and reports that it was a mess - there were papers everywhere and  food in the fridge. He is concerned that she is not caring for herself or her home, and is worried about disposition after this hospitalization. By contrast, patient endorses completing all ADLs and IADLs on her own, she stated that she drives herself to the grocery store when she needs food and manages her own finances.      Exam is notable for initially hyper now normotensive, afebrile who is A+OX2, able to follow simple " "commands consistently, 3/3 registration, 2/3 recall, vertical gaze palsy, with no bradyphrenia, tremor, rigidity and o/w grossly non-focal exam. Workup is notable for non-acute MRI Brain with mild/mod generalized atrophy and microvascular disease, grossly unremarkable CBC, CMP, UA. WNL B12 (low normal), and WNL TSH. Pt's presentation is concerning for an underlying neurodegenerative process impairing cognition.     On psych eval by night resident, pt is AAO x3. States she is in the hospital because her son was concerned. Most of her responses to questions are vague, and she doesn't directly address some questions d/t confusion. Has been confused lately w/ impaired short-term memory. States that she lives at home w/ 2 weiner dogs. She also has a rescue horse on the [facility] that she sees each week. Reports that she stays "very busy" and is "constantly moving." Not able to provide a detailed or cohesive explanation of what she means by this. Reports PMH of glaucoma and no past psych hx. Used to work for the [employer] but retired several years ago. No recent issues w/ mood or sleep.     On psych exam by daytime resident:   Patient seen sitting at bedside, awake/alert, and does not appear to be in any acute distress. She is oriented to person, place, month, and year. She is unable to tell me how long she has been in the hospital or why she was brought here. She denies any confusion or difficulties with memory. She reports that she lives alone and states that she takes care of all ADL's independently. She denies having any social support or support otherwise. She reports her son lives locally but she does not see or speak with him frequently as he is busy. She denies any psychiatric complaints and ROS negative. With mental status exam, she appeared to have difficulty with distractibility and reports that she would forget instructions within less than a minute.    Per Chart Review:  Pt received zyprexa 2.5 mg " "IM at 1345 today for agitation    Collateral with patient's permission:     Jose Alejandro Dougherty (Son)  [phone] ([place])       Medical Review of Systems:  A comprehensive review of systems was negative.    Psychiatric Review of Systems (is patient experiencing or having changes in):  sleep: no  appetite: no  weight: no  energy/anergy: no  interest/pleasure/anhedonia: no  somatic symptoms: no  libido: no  anxiety/panic: no  guilty/hopelessness: no  concentration: no  [place]:no  Psychosis: no  Trauma: no  S.I.B.s/risky behavior: no    Past Psychiatric History:  Previous Medication Trials: no  Previous Psychiatric Hospitalizations:no   Previous Suicide Attempts: no  History of Violence: no  Outpatient Psychiatrist: no  Family Psychiatric History: no    Substance Abuse History (with emphasis over the last 12 months):  Recreational Drugs:  denies  Use of Alcohol: denied  Tobacco Use:denies  Rehab History:denies    Social History:  Marital Status:   Children: 1son, lives locally  Employment Status/Info: retired  :no  Education: high school diploma/GED  Special Ed: no  Housing Status: alone  Access to gun: no  Psychosocial Stressors: denies  Functioning Relationships: alone & isolated    Legal History:  Past Charges/Incarcerations: no  Pending charges:no    Mental Status Exam:  General Appearance: appears stated age, well developed and nourished, adequately groomed and appropriately dressed, in no acute distress  Behavior: normal; cooperative; reasonably friendly, pleasant, and polite; appropriate eye-contact; under good behavioral control  Involuntary Movements and Motor Activity: no abnormal involuntary movements noted; no tics, no tremors, no akathisia, no dystonia, no evidence of tardive dyskinesia; no psychomotor agitation or retardation  Gait and Station: unable to assess - patient lying down or seated  Speech and Language: normal rate, rhythm, volume, tone, and pitch  Mood: "fine"  Affect: " constricted  Thought Process and Associations: linear, goal-directed  Thought Content and Perceptions:: no suicidal or homicidal ideation, no auditory or visual hallucinations, no paranoid ideation, no ideas of reference, no evidence of delusions or psychosis  Sensorium and Orientation: alert, oriented to person and place, oriented partially to time, oriented to year, oriented to month  Recent and Remote Memory: impaired, registers 3/3 objects, recalls 0/3 objects at 5 minutes  Attention and Concentration: impaired, easily distractible, unable to spell WORLD forwards, unable to spell WORLD backwards  Fund of Knowledge:  limited, correctly identifies the , incorrectly identifies the last five Presidents of the [place] (in order)  Insight: limited, limited/partial awareness of illness  Judgment: impaired due to neurocognitive disorder, encephalopathy, behavior is inappropriate to the circumstances, noncompliant with health provider's recommendations and instructions    CAM ICU positive? no      ASSESSMENT & RECOMMENDATIONS   Unspecified Major Neurocognitive Disorder  R/O acute encephalopathy, resolving    Dementia  PSYCH MEDICATIONS  PRN - Zyprexa 2.5 mg PO or IM q8hrs PRN for non-redirectable agitation    DELIRIUM  DELIRIUM BEHAVIOR MANAGEMENT  PLEASE utilize CHEMICAL restraints with PRN meds first for agitation. Minimize use of PHYSICAL restraints OR have periods of being out of physical restraints if possible.  Keep window shades open and room lit during day and room dim at night in order to promote normal sleep-wake cycles  Encourage family at bedside. [place] patient often to situation, location, date.  Continue to Limit or Discontinue use of Narcotics, Benzos and Anti-cholinergic medications as they may worsen delirium.  Continue medical workup for causative etiology of Delirium.     RISK ASSESSMENT  NO NEED FOR PEC patient NOT in any imminent danger of hurting self or others  and not gravely disabled.     FOLLOW UP  Will follow up while in house    DISPOSITION - once medically cleared:   Defer to medical team    Please contact ON CALL psychiatry service (24/7) for any acute issues that may arise.    Note prepared by Carlos Meade MD, overnight resident. Patient seen and note edited based on my own evaluation. Case staffed and discussed with Staff Attending, Dr. Haider.    Dr. Krysta Stubbs   Psychiatry  [facility]  6/27/2024 11:50 AM        --------------------------------------------------------------------------------------------------------------------------------------------------------------------------------------------------------------------------------------    CONTINUED HISTORY & OBJECTIVE clinical data & findings reviewed and noted for above decision making    Past Medical/Surgical History:   History reviewed. No pertinent past medical history.  History reviewed. No pertinent surgical history.    Current Medications:   Scheduled Meds:    cefTRIAXone (Rocephin) IV (PEDS and ADULTS)  1 g Intravenous Q24H    enoxparin  40 mg Subcutaneous Daily    rivastigmine  1 patch Transdermal Daily     PRN Meds:   Current Facility-Administered Medications:     acetaminophen, 650 mg, Oral, Q8H PRN    acetaminophen, 650 mg, Oral, Q4H PRN    glucagon (human recombinant), 1 mg, Intramuscular, PRN    glucose, 16 g, Oral, PRN    glucose, 24 g, Oral, PRN    melatonin, 6 mg, Oral, Nightly PRN    naloxone, 0.02 mg, Intravenous, PRN    polyethylene glycol, 17 g, Oral, PRN    prochlorperazine, 5 mg, Intravenous, Q6H PRN    sodium chloride 0.9%, 2 mL, Intravenous, Q12H PRN    Allergies:   Review of patient's allergies indicates:  No Known Allergies    Vitals  Vitals:    06/27/24 1127   BP: 128/70   Pulse: 96   Resp: 18   Temp: 98.5 °F (36.9 °C)       Labs/Imaging/Studies:  Recent Results (from the past 24 hour(s))   Comprehensive Metabolic Panel (CMP)    Collection Time: 06/27/24   3:31 AM   Result Value Ref Range    Sodium 143 136 - 145 mmol/L    Potassium 3.6 3.5 - 5.1 mmol/L    Chloride 109 95 - 110 mmol/L    CO2 25 23 - 29 mmol/L    Glucose 93 70 - 110 mg/dL    BUN 14 8 - 23 mg/dL    Creatinine 1.0 0.5 - 1.4 mg/dL    Calcium 9.1 8.7 - 10.5 mg/dL    Total Protein 6.2 6.0 - 8.4 g/dL    Albumin 3.6 3.5 - 5.2 g/dL    Total Bilirubin 0.8 0.1 - 1.0 mg/dL    Alkaline Phosphatase 73 55 - 135 U/L    AST 33 10 - 40 U/L    ALT 18 10 - 44 U/L    eGFR 58.4 (A) >60 mL/min/1.73 m^2    Anion Gap 9 8 - 16 mmol/L   Magnesium    Collection Time: 06/27/24  3:31 AM   Result Value Ref Range    Magnesium 2.0 1.6 - 2.6 mg/dL   Phosphorus    Collection Time: 06/27/24  3:31 AM   Result Value Ref Range    Phosphorus 3.2 2.7 - 4.5 mg/dL   CBC with Automated Differential    Collection Time: 06/27/24  3:31 AM   Result Value Ref Range    WBC 9.60 3.90 - 12.70 K/uL    RBC 3.78 (L) 4.00 - 5.40 M/uL    Hemoglobin 12.1 12.0 - 16.0 g/dL    Hematocrit 37.3 37.0 - 48.5 %    MCV 99 (H) 82 - 98 fL    MCH 32.0 (H) 27.0 - 31.0 pg    MCHC 32.4 32.0 - 36.0 g/dL    RDW 13.1 11.5 - 14.5 %    Platelets 272 150 - 450 K/uL    MPV 10.6 9.2 - 12.9 fL    Immature Granulocytes 0.3 0.0 - 0.5 %    Gran # (ANC) 6.0 1.8 - 7.7 K/uL    Immature Grans (Abs) 0.03 0.00 - 0.04 K/uL    Lymph # 2.3 1.0 - 4.8 K/uL    Mono # 0.7 0.3 - 1.0 K/uL    Eos # 0.6 (H) 0.0 - 0.5 K/uL    Baso # 0.07 0.00 - 0.20 K/uL    nRBC 0 0 /100 WBC    Gran % 62.4 38.0 - 73.0 %    Lymph % 23.5 18.0 - 48.0 %    Mono % 7.3 4.0 - 15.0 %    Eosinophil % 5.8 0.0 - 8.0 %    Basophil % 0.7 0.0 - 1.9 %    Differential Method Automated      Imaging Results              MRI Brain W WO Contrast (Final result)  Result time 06/25/24 04:11:58      Final result by Hugh Godwin MD (06/25/24 04:11:58)                   Impression:      Motion artifact slightly limits examination.    No acute intracranial process specifically no acute intracranial hemorrhage or acute  infarct.    Electronically signed by resident: Bethany Novak  Date:    06/25/2024  Time:    03:41    Electronically signed by: Hugh Godwin MD  Date:    06/25/2024  Time:    04:11               Narrative:    EXAMINATION:  MRI BRAIN W WO CONTRAST    CLINICAL HISTORY:  Mental status change, unknown cause;    TECHNIQUE:  Multiplanar multisequence MR imaging of the brain was performed before and after the administration of 5 mL Gadavist intravenous contrast.    COMPARISON:  CT head 06/24/2024.    FINDINGS:  Motion artifact slightly limits examination.    Mild generalized cerebral atrophy compensatory enlargement of the ventricles and subarachnoid spaces.  Few scattered punctate foci of T2/FLAIR signal hyperintensity in the supratentorial white matter without corresponding diffusion signal abnormality enhancement which is nonspecific and may be sequela of chronic small vessel ischemic change.    No diffusion restriction to indicate acute infarction.  No intraparenchymal hemorrhage, edema or mass.No abnormal postcontrast parenchymal or leptomeningeal enhancement.    Ventricles are stable in size and configuration for age.  No hydrocephalus or midline shift.  Basal cisterns are patent.    No extra-axial blood or fluid collections.    The T2 skull base flow voids are preserved.    Bone marrow signal intensity unremarkable.    Fluid signal in the sphenoid sinus, otherwise the paranasal sinuses are unremarkable mastoid air cells are unremarkable.                                       X-Ray Abdomen AP 1 View (Final result)  Result time 06/25/24 01:08:27      Final result by Hugh Godwin MD (06/25/24 01:08:27)                   Impression:      No unexpected metallic foreign body identified in the field of view.      Electronically signed by: Hugh Godwin MD  Date:    06/25/2024  Time:    01:08               Narrative:    EXAMINATION:  XR ABDOMEN AP 1 VIEW    CLINICAL HISTORY:  for MRI scan;    TECHNIQUE:  Single AP  View of the abdomen was performed.    COMPARISON:  None.    FINDINGS:  Cardiac wires overlie the chest and abdomen.  Bowel gas pattern is unremarkable.  Calcifications overlie the pelvis.  No unexpected metallic foreign body identified in the field of view.                                       CT Head Without Contrast (Final result)  Result time 06/24/24 17:49:41      Final result by Mane Hsieh MD (06/24/24 17:49:41)                   Impression:      No acute intracranial process.  Additional evaluation with MRI of the brain may be obtained, as clinically warranted.    Changes of chronic vessel ischemic disease and cerebral volume loss.      Electronically signed by: Mane Hsieh MD  Date:    06/24/2024  Time:    17:49               Narrative:    EXAMINATION:  CT HEAD WITHOUT CONTRAST    CLINICAL HISTORY:  Mental status change, unknown cause;    TECHNIQUE:  Low dose axial images were obtained through the head.  Coronal and sagittal reformations were also performed. Contrast was not administered.    COMPARISON:  04/15/2024.    FINDINGS:  The subcutaneous tissues are unremarkable.  The bony calvarium is intact.  The paranasal sinuses are unremarkable.  The mastoid air cells are clear.  The orbits and intraorbital contents are within normal limits.    The craniocervical junction is intact.  The sellar and parasellar structures are unremarkable.  There is no evidence of intracranial hemorrhage.  The ventricles and sulci are prominent, consistent cerebral volume loss.  There are hypodensities within the periventricular and subcortical white matter.  The gray-white differentiation is maintained.  There is no dense vessel sign.  There is no evidence of mass effect.                                       X-Ray Chest AP Portable (Final result)  Result time 06/24/24 17:59:50      Final result by Jarrett Stewart MD (06/24/24 17:59:50)                   Impression:      1. Chronic appearing interstitial findings, no  large focal consolidation.  Correlation with any history of COPD/emphysema.      Electronically signed by: Jarrett Stewart MD  Date:    06/24/2024  Time:    17:59               Narrative:    EXAMINATION:  XR CHEST AP PORTABLE    CLINICAL HISTORY:  AMS;    TECHNIQUE:  Single frontal view of the chest was performed.    COMPARISON:  04/15/2024    FINDINGS:  The cardiomediastinal silhouette is not enlarged.  There is no pleural effusion.  The trachea is midline.  The lungs are symmetrically expanded bilaterally with coarse interstitial attenuation bilaterally, similar to the previous examination..  No large focal consolidation seen.  There is no pneumothorax.  The osseous structures are remarkable for degenerative change..

## 2024-06-27 NOTE — ASSESSMENT & PLAN NOTE
"77 yo F with no significant PMH admitted for AMS. Starting Sunday (6/23), son noted "weird talk" - difficult finding the right word as well as "talking nonsense." Yesterday, was found disoriented by police/son after parking her rental car in neighbor's driveway. Has strained relationship with her son, so he is unable to attest to whether or not there has been a cognitive decline over a prolonged period of time. Today, he went to her house to feed her dogs and noted it "was a mess" with spoiled food in the fridge. He reports a recent hospitalization for encephalopathy associated with UTI this past April, from which she seemingly recovered. Has FH of "senile dementia" in her mother with similar age of onset and presentation. On my exam, she was AO x 2 (self & place), had upward vertical gaze palsy, registered 3/3 objects but recalled 0/3 at 5 minutes. No source of infection noted thus far. Review of MRI shows frontotemporal atrophy and chronic microvascular changes. History and exam most concerning for dementia (can be AD, PSP, vascular, or FTD, among other variants), although diagnosis cannot formally be made in inpatient setting.     Labs  Pending: niacin, copper, Vitamin B1, PETH, MMA, neurofilament light chain, blood cultures  Normal: UDS, Treponema, Vitamin B12, folate, TSH, free T4, HIV, Hep C antibody, U/A [non-infectious]    Imaging  6/25/2024 MRI Brain w/wo contrast - No acute intracranial process specifically no acute intracranial hemorrhage or acute infarct.   6/24/2024 CTH - No acute intracranial process. Changes of chronic vessel ischemic disease and cerebral volume loss.  6/24/2024 CXR - Chronic appearing interstitial findings, no large focal consolidation.     - F/u pending labs as noted above  - No indication for EEG at this time  - Continue rivastigmine 4.6 mg transdermal patch daily  - Recommend Psychiatry consult to assess capacity to self-determine dispo as well as assistance with " agitation/behavior management  - Ambulatory referral to Behavioral Neurology ([facility] message sent to Dr. Urias's staff for scheduling)  - PT/OT  - Delirium precautions    We are signing off. Please contact us if you have any questions.

## 2024-06-27 NOTE — PT/OT/SLP PROGRESS
Occupational Therapy   Treatment    Name: Mohini Dougherty  MRN: [id number]  Admitting Diagnosis:  Encephalopathy       Recommendations:     Discharge Recommendations: Moderate Intensity Therapy  Discharge Equipment Recommendations:  bath bench  Barriers to discharge:  None    Assessment:     Mohini Dougherty is a 78 y.o. female with a medical diagnosis of Encephalopathy.  She presents with good participation and motivation. Pt with improvement in balance on this date. Performance deficits affecting function are weakness, impaired endurance, impaired self care skills, impaired functional mobility, impaired balance, decreased upper extremity function, decreased lower extremity function, decreased safety awareness, impaired cognition.     Rehab Prognosis:  Good; patient would benefit from acute skilled OT services to address these deficits and reach maximum level of function.       Plan:     Patient to be seen 4 x/week to address the above listed problems via self-care/home management, therapeutic activities, therapeutic exercises, neuromuscular re-education, cognitive retraining  Plan of Care Expires: 07/10/24  Plan of Care Reviewed with: patient    Subjective     Chief Complaint: none stated  Patient/Family Comments/goals: Pt was agreeable to therapy session.  Pain/Comfort:  Pain Rating 1: 0/10  Pain Rating Post-Intervention 1: 0/10    Objective:     Communicated with: RN prior to session.  Patient found up in chair with  (no lines; sitter present at start of session) upon OT entry to room.    General Precautions: Standard, fall    Orthopedic Precautions:N/A  Braces: N/A     Occupational Performance:     Functional Mobility/Transfers:  Patient completed Sit <> Stand Transfer with stand by assistance  with  no assistive device   Functional Mobility: SBA-CGA with functional mobility with majority of time at SBA in room & hallway for endurance training.    Activities of Daily Living:  Grooming: stand by assistance  grooming while standing at sink  Upper Body Dressing: moderate assistance donning gown around back while standing  Lower Body Dressing: stand by assistance doffing & donning socks seated in chair.      [facility] 6 Click ADL: 18    Treatment & Education:  Pt performed BUE AROM in 3 planes x 10 reps each while standing to address dynamic standing balance.  Provided education on safety. Pt had no further questions & when asked whether there were any concerns pt reported none.      Patient left up in chair with all lines intact, call button in reach, RN notified, and [employer] present    GOALS:   Multidisciplinary Problems       Occupational Therapy Goals          Problem: Occupational Therapy    Goal Priority Disciplines Outcome Interventions   Occupational Therapy Goal     OT, PT/OT Progressing    Description: Goals to be met by: 7/10/24     Patient will increase functional independence with ADLs by performing:    LE Dressing with Supervision.  Grooming while standing at sink with Supervision.  Toileting from toilet with Supervision for hygiene and clothing management.   Supine to sit with Supervision.  Toilet transfer to toilet with Supervision.  [place] with BUE HEP to improve activity tolerance to complete ADLs and IADLs  Within home ambulation distances with supervision and LRAD necessary to complete ADLs.                           Time Tracking:     OT Date of Treatment: 06/27/24  OT Start Time: 1344  OT Stop Time: 1401  OT Total Time (min): 17 min    Billable Minutes:Self Care/Home Management 17    OT/ELIESER: OT          6/27/2024
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
## 2024-06-27 NOTE — PLAN OF CARE
Problem: Occupational Therapy  Goal: Occupational Therapy Goal  Description: Goals to be met by: 7/10/24     Patient will increase functional independence with ADLs by performing:    LE Dressing with Supervision.  Grooming while standing at sink with Supervision.  Toileting from toilet with Supervision for hygiene and clothing management.   Supine to sit with Supervision.  Toilet transfer to toilet with Supervision.  [place] with BUE HEP to improve activity tolerance to complete ADLs and IADLs  Within home ambulation distances with supervision and LRAD necessary to complete ADLs.      Outcome: Progressing     Goals remain appropriate

## 2024-06-27 NOTE — PLAN OF CARE
Asim Cortez - Med Surg ([facility]-16)  Discharge Reassessment    Primary Care Provider: Edwar Castaneda II, MD    Expected Discharge Date: 7/1/2024    Reassessment (most recent)       Discharge Reassessment - 06/27/24 1527          Discharge Reassessment    Assessment Type Discharge Planning Reassessment (P)      Did the patient's condition or plan change since previous assessment? Yes (P)      Discharge Plan discussed with: Adult children (P)      Communicated MARVEL with patient/caregiver Date not available/Unable to determine (P)      Discharge Plan A New Nursing Home placement - senior living care facility (P)      Discharge Plan B New Nursing Home placement - senior living care facility (P)      DME Needed Upon Discharge  none (P)      Transition of Care Barriers No family/friends to help (P)      Why the patient remains in the hospital Requires continued medical care (P)         Post-Acute Status    Post-Acute Authorization Placement (P)      Post-Acute Placement Status Referrals Sent (P)      Coverage [employer] (P)      Discharge Delays Post-Acute Set-up (P)                  Per MD, pt will need NH placement upon d/c.  Pt doesn't have capacity, son will be making decisions.      Met with pt's son Jose Alejandro on phone [phone] to review discharge recommendation of NH and is agreeable to plan    Patient/family declined list of facilities in-network with patient's payor plan.     Notified that referral sent to below listed facilities from in-network list based on proximity to home/family support:   [facility], [facility], [facility]    Patient/family instructed to identify preference.    Preferred Facility: (if more than 1, listed in order of descending preference)  Somewhere on the [facility].    If an additional preferred facility not listed above is identified, additional referral to be sent. If above facilities unable to accept, will send additional referrals to in-network providers.     CM instructed that  facility will need last 3 mos of bank statements; CG states he has one recent bank statement, will look for others, but there is lots of paperwork to look through - it is a hoarding situation.  Instructed that he will need to sign admit paperwork for NH admission.  ARG[place]INA v/u.    Duyen Fam, MEGHANN, BS, RN, CCM

## 2024-06-27 NOTE — SUBJECTIVE & OBJECTIVE
Subjective:     Interval History: NAEON.     Current Neurological Medications:     Current Facility-Administered Medications   Medication Dose Route Frequency Provider Last Rate Last Admin    acetaminophen tablet 650 mg  650 mg Oral Q8H PRN Dior, Son, DO        acetaminophen tablet 650 mg  650 mg Oral Q4H PRN Dior, Son, DO        cefTRIAXone (Rocephin) 1 g in D5W 100 mL IVPB (MB+)  1 g Intravenous Q24H Rader, Apolinar, DO   Stopped at 06/27/24 0618    enoxaparin injection 40 mg  40 mg Subcutaneous Daily Rader, Apolinar, DO   40 mg at 06/26/24 1604    glucagon (human recombinant) injection 1 mg  1 mg Intramuscular PRN Rader, Apolinar, DO        glucose chewable tablet 16 g  16 g Oral PRN Dior, Son, DO        glucose chewable tablet 24 g  24 g Oral PRN Dior, Son, DO        melatonin tablet 6 mg  6 mg Oral Nightly PRN Dior, Apolinar, DO        naloxone 0.4 mg/mL injection 0.02 mg  0.02 mg Intravenous PRN Dior, Apolinar, DO        polyethylene glycol packet 17 g  17 g Oral PRN Dior, Son, DO        prochlorperazine injection Soln 5 mg  5 mg Intravenous Q6H PRN Dior, Apolinar, DO        rivastigmine 4.6 mg/24 hour 1 patch  1 patch Transdermal Daily Tony Charles DO   1 patch at 06/26/24 1343    sodium chloride 0.9% flush 2 mL  2 mL Intravenous Q12H PRN Dior, Apolinar, DO           Review of Systems   Unable to perform ROS: Dementia     Objective:     Vital Signs (Most Recent):  Temp: 98.6 °F (37 °C) (06/27/24 0733)  Pulse: 86 (06/27/24 0733)  Resp: 18 (06/27/24 0733)  BP: 139/74 (06/27/24 0733)  SpO2: 98 % (06/27/24 0733) Vital Signs (24h Range):  Temp:  [97.8 °F (36.6 °C)-98.6 °F (37 °C)] 98.6 °F (37 °C)  Pulse:  [81-93] 86  Resp:  [17-18] 18  SpO2:  [97 %-99 %] 98 %  BP: (122-157)/(70-81) 139/74     Weight: 49.9 kg (110 lb)  Body mass index is 20.12 kg/m².     Physical Exam  Vitals and nursing note reviewed.   Constitutional:       General: She is not in acute distress.     Appearance: She is not ill-appearing.      Comments: Thin,  better hygiene today with lorie in her hair (thank you to nursing staff)   HENT:      Head: Normocephalic and atraumatic.   Pulmonary:      Effort: Pulmonary effort is normal. No respiratory distress.   Musculoskeletal:      Right lower leg: No edema.      Left lower leg: No edema.   Skin:     General: Skin is warm and dry.   Neurological:      Mental Status: She is alert.      Cranial Nerves: Cranial nerves 2-12 are intact.      Motor: Motor strength is normal.     Coordination: Finger-Nose-Finger Test normal.      Deep Tendon Reflexes:      Reflex Scores:       Brachioradialis reflexes are 2+ on the right side and 2+ on the left side.       Patellar reflexes are 2+ on the right side and 2+ on the left side.  Psychiatric:         Speech: Speech normal.          NEUROLOGICAL EXAMINATION:     MENTAL STATUS   Disoriented to person.   Oriented to place.   Disoriented to time. Oriented to year.   Attention: decreased. Concentration: decreased.   Speech: speech is normal   Level of consciousness: alert    CRANIAL NERVES   Cranial nerves II through XII intact.     MOTOR EXAM   Muscle bulk: normal  Overall muscle tone: normal    Strength   Strength 5/5 throughout.     REFLEXES     Reflexes   Right brachioradialis: 2+  Left brachioradialis: 2+  Right patellar: 2+  Left patellar: 2+    GAIT AND COORDINATION      Coordination   Finger to nose coordination: normal      Significant Labs: All pertinent lab results from the past 24 hours have been reviewed.    Significant Imaging: I have reviewed and interpreted all pertinent imaging results/findings within the past 24 hours.

## 2024-06-27 NOTE — PT/OT/SLP PROGRESS
Physical Therapy      Patient Name:  Mohini Dougherty   MRN:  [id number]    Patient not seen today secondary to attempts x 2 made by writing therapist. Pt actively working with OT Marva VARGAS on initial attempt. Upon second attempt made at approximately 1500, pt actively using restroom with medical team waiting outside room door for consultation. Pt still with good potential to meet PT POC. Will follow-up next available treatment date.    Debby Lazaro, PTA  6/27/2024

## 2024-06-27 NOTE — PLAN OF CARE
Asim Cortez - Med Surg ([facility]16)  Initial Discharge Assessment       Primary Care Provider: Edwar Castaneda II, MD    Admission Diagnosis: Chest pain [R07.9]  AMS (altered mental status) [R41.82]    Admission Date: 6/24/2024  Expected Discharge Date: 7/1/2024    Transition of Care Barriers: (P) None    Payor: MEDICARE / Plan: MEDICARE PART A & B / Product Type: Government /     Extended Emergency Contact Information  Primary Emergency Contact: Jose Alejandro Dougherty   [facility]  Home Phone: [phone]  Mobile Phone: [phone]  Relation: Son    Discharge Plan A: (P) Home  Discharge Plan B: (P) Home    No Pharmacies Listed    Initial Assessment (most recent)       Adult Discharge Assessment - 06/27/24 0819          Discharge Assessment    Assessment Type Discharge Planning Assessment (P)      Confirmed/corrected address, phone number and insurance Yes (P)      Confirmed Demographics Correct on Facesheet (P)      Source of Information patient (P)      Reason For Admission Encephalopathy (P)      People in Home alone (P)      Facility Arrived From: n/a (P)      Do you expect to return to your current living situation? Yes (P)      Do you have help at home or someone to help you manage your care at home? No (P)      Prior to hospitilization cognitive status: Unable to Assess (P)      Current cognitive status: Alert/Oriented (P)      Walking or Climbing Stairs Difficulty no (P)      Dressing/Bathing Difficulty no (P)      Home Layout Able to live on 1st floor (P)      Equipment Currently Used at Home none (P)      Readmission within 30 days? No (P)      Do you currently have service(s) that help you manage your care at home? No (P)      Do you take prescription medications? Yes (P)      Do you have prescription coverage? Yes (P)      Coverage [employer] (P)      Do you have any problems affording any of your prescribed medications? No (P)      Who is going to help you get home at discharge? She states  she will get a ride (P)      How do you get to doctors appointments? car, drives self (P)      Are you on dialysis? No (P)      Do you take coumadin? No (P)      Discharge Plan A Home (P)      Discharge Plan B Home (P)      DME Needed Upon Discharge  none (P)      Discharge Plan discussed with: Patient (P)      Transition of Care Barriers None (P)         Physical Activity    On average, how many days per week do you engage in moderate to strenuous exercise (like a brisk walk)? 0 days (P)      On average, how many minutes do you engage in exercise at this level? 0 min (P)         Financial Resource Strain    How hard is it for you to pay for the very basics like food, housing, medical care, and heating? Not hard at all (P)         Stress    Do you feel stress - tense, restless, nervous, or anxious, or unable to sleep at night because your mind is troubled all the time - these days? Not at all (P)         Social Isolation    How often do you feel lonely or isolated from those around you?  Patient declined (P)         Alcohol Use    Q1: How often do you have a drink containing alcohol? Never (P)      Q2: How many drinks containing alcohol do you have on a typical day when you are drinking? Patient does not drink (P)      Q3: How often do you have six or more drinks on one occasion? Never (P)         Health Literacy    How often do you need to have someone help you when you read instructions, pamphlets, or other written material from your doctor or pharmacy? Rarely (P)                  CM spoke with pt in room yesterday 6/26/24.  Pt lives alone in 1 story home, came from home.  Pt states she will get a ride home, drives to MD appts.  No HH, DME, coumadin, or HD.  Pt independent with bathing and dressing.  Pharmacy: [facility].    Duyen Fam, MEGHANN, BS, RN, CCM

## 2024-06-28 LAB
ALBUMIN SERPL BCP-MCNC: 3.3 G/DL (ref 3.5–5.2)
ALP SERPL-CCNC: 77 U/L (ref 55–135)
ALT SERPL W/O P-5'-P-CCNC: 21 U/L (ref 10–44)
ANION GAP SERPL CALC-SCNC: 9 MMOL/L (ref 8–16)
AST SERPL-CCNC: 34 U/L (ref 10–40)
BASOPHILS # BLD AUTO: 0.09 K/UL (ref 0–0.2)
BASOPHILS NFR BLD: 0.8 % (ref 0–1.9)
BILIRUB SERPL-MCNC: 0.5 MG/DL (ref 0.1–1)
BUN SERPL-MCNC: 20 MG/DL (ref 8–23)
CALCIUM SERPL-MCNC: 8.9 MG/DL (ref 8.7–10.5)
CHLORIDE SERPL-SCNC: 109 MMOL/L (ref 95–110)
CO2 SERPL-SCNC: 23 MMOL/L (ref 23–29)
COPPER SERPL-MCNC: 1058 UG/L (ref 810–1990)
CREAT SERPL-MCNC: 0.9 MG/DL (ref 0.5–1.4)
DIFFERENTIAL METHOD BLD: ABNORMAL
EOSINOPHIL # BLD AUTO: 0.7 K/UL (ref 0–0.5)
EOSINOPHIL NFR BLD: 5.8 % (ref 0–8)
ERYTHROCYTE [DISTWIDTH] IN BLOOD BY AUTOMATED COUNT: 13.1 % (ref 11.5–14.5)
EST. GFR  (NO RACE VARIABLE): >60 ML/MIN/1.73 M^2
GLUCOSE SERPL-MCNC: 103 MG/DL (ref 70–110)
HCT VFR BLD AUTO: 38.2 % (ref 37–48.5)
HGB BLD-MCNC: 11.9 G/DL (ref 12–16)
IMM GRANULOCYTES # BLD AUTO: 0.04 K/UL (ref 0–0.04)
IMM GRANULOCYTES NFR BLD AUTO: 0.3 % (ref 0–0.5)
LYMPHOCYTES # BLD AUTO: 2.3 K/UL (ref 1–4.8)
LYMPHOCYTES NFR BLD: 19.7 % (ref 18–48)
MAGNESIUM SERPL-MCNC: 1.9 MG/DL (ref 1.6–2.6)
MCH RBC QN AUTO: 32.4 PG (ref 27–31)
MCHC RBC AUTO-ENTMCNC: 31.2 G/DL (ref 32–36)
MCV RBC AUTO: 104 FL (ref 82–98)
MONOCYTES # BLD AUTO: 0.8 K/UL (ref 0.3–1)
MONOCYTES NFR BLD: 7 % (ref 4–15)
NEUROFILAMENT LIGHT CHAIN, PLASMA: 30.2 PG/ML
NEUTROPHILS # BLD AUTO: 7.8 K/UL (ref 1.8–7.7)
NEUTROPHILS NFR BLD: 66.4 % (ref 38–73)
NIACIN SERPL-MCNC: <5 NG/ML
NICOTINAMIDE SERPL-MCNC: 22.8 NG/ML (ref 5–48)
NICOTINURATE SERPL-MCNC: <5 NG/ML
NRBC BLD-RTO: 0 /100 WBC
PHOSPHATE SERPL-MCNC: 3.7 MG/DL (ref 2.7–4.5)
PLATELET # BLD AUTO: 257 K/UL (ref 150–450)
PMV BLD AUTO: 10.4 FL (ref 9.2–12.9)
POTASSIUM SERPL-SCNC: 3.6 MMOL/L (ref 3.5–5.1)
PROT SERPL-MCNC: 5.7 G/DL (ref 6–8.4)
RBC # BLD AUTO: 3.67 M/UL (ref 4–5.4)
SODIUM SERPL-SCNC: 141 MMOL/L (ref 136–145)
VIT B1 BLD-MCNC: 64 UG/L (ref 38–122)
WBC # BLD AUTO: 11.65 K/UL (ref 3.9–12.7)

## 2024-06-28 PROCEDURE — 36415 COLL VENOUS BLD VENIPUNCTURE: CPT

## 2024-06-28 PROCEDURE — 11000001 HC ACUTE MED/SURG PRIVATE ROOM

## 2024-06-28 PROCEDURE — 25000003 PHARM REV CODE 250

## 2024-06-28 PROCEDURE — 83735 ASSAY OF MAGNESIUM: CPT

## 2024-06-28 PROCEDURE — 85025 COMPLETE CBC W/AUTO DIFF WBC: CPT

## 2024-06-28 PROCEDURE — 97110 THERAPEUTIC EXERCISES: CPT | Mod: CQ

## 2024-06-28 PROCEDURE — 63600175 PHARM REV CODE 636 W HCPCS: Mod: JZ,JG

## 2024-06-28 PROCEDURE — 80053 COMPREHEN METABOLIC PANEL: CPT

## 2024-06-28 PROCEDURE — 84100 ASSAY OF PHOSPHORUS: CPT

## 2024-06-28 PROCEDURE — 63600175 PHARM REV CODE 636 W HCPCS

## 2024-06-28 PROCEDURE — 97530 THERAPEUTIC ACTIVITIES: CPT | Mod: CQ

## 2024-06-28 RX ORDER — OLANZAPINE 10 MG/2ML
2.5 INJECTION, POWDER, FOR SOLUTION INTRAMUSCULAR ONCE AS NEEDED
Status: COMPLETED | OUTPATIENT
Start: 2024-06-28 | End: 2024-06-28

## 2024-06-28 RX ADMIN — OLANZAPINE 2.5 MG: 10 INJECTION, POWDER, FOR SOLUTION INTRAMUSCULAR at 06:06

## 2024-06-28 RX ADMIN — RIVASTIGMINE 1 PATCH: 4.6 PATCH, EXTENDED RELEASE TRANSDERMAL at 09:06

## 2024-06-28 RX ADMIN — ENOXAPARIN SODIUM 40 MG: 40 INJECTION SUBCUTANEOUS at 05:06

## 2024-06-28 NOTE — NURSING
Patient frequently getting out of bed without staff and attempting to walk out of room, stating she is going home. Attempted to redirect patient multiple times without success. Patient cursing at staff and being combative, fighting and kicking. Charge RN notified GABBIE Rdaer MD via secure chat. New orders placed for soft restraints and zyprexa IM x1 dose. Restraints applied and zyprexa given. Skin tear noted to left posterior hand from patient scratching herself; LDA charted.

## 2024-06-28 NOTE — ASSESSMENT & PLAN NOTE
"78-year-old lady here with encephalopathy.  At her baseline, she is normally "authoritative" demanding things, speaking about Yazidi acts.  However unclear her mental status as her son does not keep close contact with her.  He does not really know if her mentation has gotten progressively worse or what is her baseline.  Normally she is able to go to the [place] daily to see her horse.  Overall workup was only notable for a leukocytosis with granulocytes with no clear source or systemic response. Will treat 1 time dose of ceftriaxone see if improvement in her symptoms.    Unclear etiology at this time, but will rule out reversible causes of acute encephalopathy, such as infectious, pending blood cultures/RPR, vitamin deficiencies (B1, B3, B9, B12), less likely meningitis as patient has full range of motion of her neck, can consider LP in the morning to r/o encephalitis vs menigitis?  Utox.pending    Differentials include but not limited to progressing age-related dementia, vitamin deficiencies encephalitis, UTI?, less likely stroke, toxicities,     Results:  WBCs 13  CMP unremarkable  Lactate normal,  BNP mildly elevated at 115, troponin negative  EKG was normal sinus rhythm, no ischemic changes  TSH WNL  Protocol normal  UA positive for 2+ ketones trace protein no nitrites    CT head showed no acute intracranial process  Chest x-ray showed no focal consolidation    Workup is notable for non-acute MRI Brain with mild/mod generalized atrophy and microvascular disease, grossly unremarkable CBC, CMP, UA. WNL B12 (low normal), and WNL TSH.    Plan:  - f/u with Neuro recs, likely underlying dementia driving presentation (type can't be determined in inpatient setting)  - Behavioral neurology referral per neurology   - Upon further discussions with son Jose Alejandro, will discuss with legal regarding how to go about appointing him as POA as next of kin. Due to concern of lack of capacity   - Completed 3 day course of CTX " with no improvement in mentation, unlikely to be UTI driving presentation. Treated for acute cystitis   - starting Rivastigmine 4.6 mg  - psych consult to determine capacity and agitation recs  - Determined not to have capacity by psychiatry  - Coordinate with CM for placement in NH due to lack of capacity

## 2024-06-28 NOTE — PT/OT/SLP PROGRESS
"Physical Therapy Treatment    Patient Name:  Mohini Dougherty   MRN:  [id number]    Recommendations:     Discharge Recommendations: Moderate Intensity Therapy  Discharge Equipment Recommendations: bath bench  Barriers to discharge: Decreased caregiver support and high fall risk    Assessment:     Mohini Dougherty is a 78 y.o. female admitted with a medical diagnosis of Encephalopathy.  She presents with the following impairments/functional limitations: weakness, impaired endurance, impaired functional mobility, gait instability, decreased coordination, impaired cognition, pain, decreased safety awareness.  Pt was agreeable and tolerated fairly well. Pt progress is limited by impaired cognition and minimally carryover from previous session. Pt ambulated within the room with CGA and RW. Pt demonstrated decrease safety awareness and occasional poor RW management. She is not safe to return home due to confusion and fall risk. Patient continues to demonstrate the need for moderate intensity therapy on a daily basis post acute exhibited by decreased independence with functional mobility    Rehab Prognosis: Good and Fair; patient would benefit from acute skilled PT services to address these deficits and reach maximum level of function.    Recent Surgery: * No surgery found *      Plan:     During this hospitalization, patient to be seen 4 x/week to address the identified rehab impairments via gait training, therapeutic activities, neuromuscular re-education, therapeutic exercises and progress toward the following goals:    Plan of Care Expires:  07/26/24    Subjective     Chief Complaint: wanting to go home   Patient/Family Comments/goals: " I want to see if I can find my car, I drove here yesterday"  Pain/Comfort:  Pain Rating 1: 0/10  Pain Rating Post-Intervention 1: 0/10      Objective:     Communicated with nurse prior to session.  Patient found HOB elevated with restraints, PureWick upon PT entry to room.     General " Precautions: Standard, fall  Orthopedic Precautions: N/A  Braces: N/A  Respiratory Status: Room air     Functional Mobility:  Additional staff present: N/A  Bed Mobility:   Supine to Sit: contact guard assistance; HOB elevated  Sit to Supine: contact guard assistance  Transfers:   Sit <> Stand Transfer: contact guard assistance with rolling walker   Gait:  Pt ambulated ~15+35 ft with contact guard assistance and rolling walker.  1 standing rest break and no LOB  All lines remained intact throughout ambulation trial, gait belt utilized.  Gait Deviation(s): mostly steady gait with decrease arias, decrease step length, poor safety awareness, poor RW management when turning and flexed posture   Verbal/tactile cues for keeping RW on the ground and making smaller turns for safety and hand placement on RW  Balance:   Static/Dynamic sitting EOB balance: CGA-SBA  Completed seated therex    AM-PAC 6 CLICK MOBILITY  Turning over in bed (including adjusting bedclothes, sheets and blankets)?: 4  Sitting down on and standing up from a chair with arms (e.g., wheelchair, bedside commode, etc.): 3  Moving from lying on back to sitting on the side of the bed?: 3  Moving to and from a bed to a chair (including a wheelchair)?: 3  Need to walk in hospital room?: 3  Climbing 3-5 steps with a railing?: 2  Basic Mobility Total Score: 18       Treatment & Education:  Seated BLE therex x15 reps: ankle pumps, long arc quads, and marches  Patient educated on role of therapy, goals of session, and benefits of out of bed mobility.   Instructed on use of call button and importance of calling nursing staff for assistance with mobility   Questions/concerns addressed within PTA scope of practice      Patient left HOB elevated with all lines intact, call button in reach, restraints reapplied at end of session, and PCT present..    GOALS:   Multidisciplinary Problems       Physical Therapy Goals          Problem: Physical Therapy    Goal Priority  Disciplines Outcome Goal Variances Interventions   Physical Therapy Goal     PT, PT/OT Progressing     Description: Goals to be met by:      Patient will increase functional independence with mobility by performin. Supine to sit with Modified [place]  2. Sit to supine with Modified [place]  3. Sit to stand transfer with Modified [place]  4. Ambulated 200' with LRAD and stand by assistance.                          Time Tracking:     PT Received On: 24  PT Start Time: 1135     PT Stop Time: 1200  PT Total Time (min): 25 min     Billable Minutes: Therapeutic Activity 15 and Therapeutic Exercise 10    Treatment Type: Treatment  PT/PTA: PTA     Number of PTA visits since last PT visit: 2024

## 2024-06-28 NOTE — PROGRESS NOTES
"CONSULTATION LIAISON PSYCHIATRY PROGRESS NOTE    Patient Name: Mohini Dougherty  MRN: [id number]  Patient Class: IP- Inpatient  Admission Date: 6/24/2024  Attending Physician: Tobin Schilling, *      SUBJECTIVE:   Mohini Dougherty is a 78 y.o. female with no known psychiatric history or significant medical history who presents with AMS and is admitted since 6/24 for Encephalopathy. Patient reportedly found in neighbors driveway and noted to be confused by son and police.     Psychiatry consulted for "non-redirectable agitation, fighting staff, recs for behavioral control medications."     Zyprexa rec'd 0605    Today, pt sitting up in bed. Soft wrist restraints in place. Pt combative and agitated over night. Tried leaving her room. She states she doesn't understand why the restraints are in place: "I was just trying to leave to get my clothes." She is disoriented to time, place, situation. Oriented to self only. She is calm and cooperative during interview but agitated when discussing her disposition. She states she is able to live at home on her own and there are no benefits for her going to a nursing home. She states there are only risks to going to a nursing home and no benefits. She also states there are no risks of going home and living on her own and states "my animals can take care of me." During interview she has difficult formulating sentences and often forgets what she is trying to say. She is tangential requiring redirection       OBJECTIVE:    Mental Status Exam:  General Appearance: appears stated age, well developed and nourished, adequately groomed and appropriately dressed, in no acute distress  Behavior: normal, cooperative, agitated, redirectable  Involuntary Movements and Motor Activity: no abnormal involuntary movements noted; no tics, no tremors, no akathisia, no dystonia, no evidence of tardive dyskinesia; no psychomotor agitation or retardation  Gait and Station: unable to assess - patient " "lying down or seated  Speech and Language: intact; normal rate, rhythm, volume, tone, and pitch; conversational, spontaneous, and coherent; speaks and understands English proficiently and fluently; repeats words and phrases, no word finding difficulties are noted  Mood: "not good"  Affect: constricted  Thought Process and Associations: illogical, tangential  Thought Content and Perceptions:: no suicidal or homicidal ideation, no auditory or visual hallucinations, no paranoid ideation, no ideas of reference, no evidence of delusions or psychosis  Sensorium and Orientation: oriented to person only  Recent and Remote Memory: impaired  Attention and Concentration: impaired  Fund of Knowledge:  limited, correctly identifies the , incorrectly identifies the last five Presidents of the [place] (in order)   Insight: limited/partial awareness of illness  Judgment: impaired due to neurocognitive disorder, encephalopathy, behavior is inappropriate to the circumstances, noncompliant with health provider's recommendations and instructions     CAM ICU positive? No    CAPACITY EVALUATION         Capacity for:  Ability to refuse nursing home placement     During interview pt was assessed for capacity to refuse nursing home placement. She states she is able to live at home on her own and tend to her ADLs. However, she does not recall being found down at home and does not remember coming to the hospital. She states today that her animals can take care of her when she is home and states she has friends at her [employer] that would be able to take care of when. When asked what would happen if she fell again at home while living alone and she diverts the question back to me stating "well what would happen if you fell." I explained that I do not live alone and if I fell I have a plan in place an again asked her what her plan would be. She states "well I don't have one and I will not fall again." She is " unable to state any benefits of going to a nursing home stating it will only pose risks to her. She is unable to state any risks of living alone and states there are only benefits of living at home alone. She is argumentative during interview regarding her disposition and her story changes often. She initially states she has brand new car to get around then later states she needs to get home because her car is at the repair shop.     Assessment of capacity for the above reason for Mohini Dougherty  :  This patient understands the clinical condition relation to the evaluation:  No.  This patient understands the nature of the proposed assessments and/or treatments:  No.  This patient understands the risks and benefits of the proposed assessments and/or treatments:  No.  This patient understands the risks and benefits of refusing the proposed assessments and/or treatments:  No.  This patient is not in agreement with the assessment and does not consent to treatment.  This patient has evidence of impaired insight and/or judgement:  Yes.    Based upon my evaluation of Mohini Dougherty regarding her decision-making capacity for the ability to refuse nursing home placement, I found with reasonable certainty that she does not have capacity to refuse nursing home placement at this time.  (Please contact next of kin to make medical decisions on pts behalf if pt does not have capacity)        ASSESSMENT & RECOMMENDATIONS   Unspecified Major Neurocognitive Disorder  R/O acute encephalopathy, resolving     Dementia  PSYCH MEDICATIONS  PRN - Zyprexa 2.5 mg PO or IM q8hrs PRN for non-redirectable agitation     DELIRIUM  DELIRIUM BEHAVIOR MANAGEMENT  PLEASE utilize CHEMICAL restraints with PRN meds first for agitation. Minimize use of PHYSICAL restraints OR have periods of being out of physical restraints if possible.  Keep window shades open and room lit during day and room dim at night in order to promote normal sleep-wake  cycles  Encourage family at bedside. [place] patient often to situation, location, date.  Continue to Limit or Discontinue use of Narcotics, Benzos and Anti-cholinergic medications as they may worsen delirium.  Continue medical workup for causative etiology of Delirium.      RISK ASSESSMENT  NO NEED FOR PEC patient NOT in any imminent danger of hurting self or others and not gravely disabled.      FOLLOW UP  Will follow up while in house     DISPOSITION - once medically cleared:   Defer to medical team    Please contact ON CALL psychiatry service (24/7) for any acute issues that may arise.    ARELIS Han   Psychiatry  [facility]  6/28/2024 10:13 AM        --------------------------------------------------------------------------------------------------------------------------------------------------------------------------------------------------------------------------------------    CONTINUED OBJECTIVE clinical data & findings reviewed and noted for above decision making    Current Medications:   Scheduled Meds:    cefTRIAXone (Rocephin) IV (PEDS and ADULTS)  1 g Intravenous Q24H    enoxparin  40 mg Subcutaneous Daily    rivastigmine  1 patch Transdermal Daily     PRN Meds:   Current Facility-Administered Medications:     acetaminophen, 650 mg, Oral, Q8H PRN    acetaminophen, 650 mg, Oral, Q4H PRN    glucagon (human recombinant), 1 mg, Intramuscular, PRN    glucose, 16 g, Oral, PRN    glucose, 24 g, Oral, PRN    melatonin, 6 mg, Oral, Nightly PRN    naloxone, 0.02 mg, Intravenous, PRN    polyethylene glycol, 17 g, Oral, PRN    prochlorperazine, 5 mg, Intravenous, Q6H PRN    sodium chloride 0.9%, 2 mL, Intravenous, Q12H PRN    Allergies:   Review of patient's allergies indicates:  No Known Allergies    Vitals  Vitals:    06/28/24 0728   BP: (!) 146/76   Pulse: 99   Resp: 19   Temp: 97.7 °F (36.5 °C)       Labs/Imaging/Studies:  Recent Results (from the past 24 hour(s))   Comprehensive  Metabolic Panel (CMP)    Collection Time: 06/28/24  3:11 AM   Result Value Ref Range    Sodium 141 136 - 145 mmol/L    Potassium 3.6 3.5 - 5.1 mmol/L    Chloride 109 95 - 110 mmol/L    CO2 23 23 - 29 mmol/L    Glucose 103 70 - 110 mg/dL    BUN 20 8 - 23 mg/dL    Creatinine 0.9 0.5 - 1.4 mg/dL    Calcium 8.9 8.7 - 10.5 mg/dL    Total Protein 5.7 (L) 6.0 - 8.4 g/dL    Albumin 3.3 (L) 3.5 - 5.2 g/dL    Total Bilirubin 0.5 0.1 - 1.0 mg/dL    Alkaline Phosphatase 77 55 - 135 U/L    AST 34 10 - 40 U/L    ALT 21 10 - 44 U/L    eGFR >60.0 >60 mL/min/1.73 m^2    Anion Gap 9 8 - 16 mmol/L   Magnesium    Collection Time: 06/28/24  3:11 AM   Result Value Ref Range    Magnesium 1.9 1.6 - 2.6 mg/dL   Phosphorus    Collection Time: 06/28/24  3:11 AM   Result Value Ref Range    Phosphorus 3.7 2.7 - 4.5 mg/dL   CBC with Automated Differential    Collection Time: 06/28/24  3:12 AM   Result Value Ref Range    WBC 11.65 3.90 - 12.70 K/uL    RBC 3.67 (L) 4.00 - 5.40 M/uL    Hemoglobin 11.9 (L) 12.0 - 16.0 g/dL    Hematocrit 38.2 37.0 - 48.5 %     (H) 82 - 98 fL    MCH 32.4 (H) 27.0 - 31.0 pg    MCHC 31.2 (L) 32.0 - 36.0 g/dL    RDW 13.1 11.5 - 14.5 %    Platelets 257 150 - 450 K/uL    MPV 10.4 9.2 - 12.9 fL    Immature Granulocytes 0.3 0.0 - 0.5 %    Gran # (ANC) 7.8 (H) 1.8 - 7.7 K/uL    Immature Grans (Abs) 0.04 0.00 - 0.04 K/uL    Lymph # 2.3 1.0 - 4.8 K/uL    Mono # 0.8 0.3 - 1.0 K/uL    Eos # 0.7 (H) 0.0 - 0.5 K/uL    Baso # 0.09 0.00 - 0.20 K/uL    nRBC 0 0 /100 WBC    Gran % 66.4 38.0 - 73.0 %    Lymph % 19.7 18.0 - 48.0 %    Mono % 7.0 4.0 - 15.0 %    Eosinophil % 5.8 0.0 - 8.0 %    Basophil % 0.8 0.0 - 1.9 %    Differential Method Automated      Imaging Results              MRI Brain W WO Contrast (Final result)  Result time 06/25/24 04:11:58      Final result by Hugh Godwin MD (06/25/24 04:11:58)                   Impression:      Motion artifact slightly limits examination.    No acute intracranial process  specifically no acute intracranial hemorrhage or acute infarct.    Electronically signed by resident: Bethany Novak  Date:    06/25/2024  Time:    03:41    Electronically signed by: Hugh Godwin MD  Date:    06/25/2024  Time:    04:11               Narrative:    EXAMINATION:  MRI BRAIN W WO CONTRAST    CLINICAL HISTORY:  Mental status change, unknown cause;    TECHNIQUE:  Multiplanar multisequence MR imaging of the brain was performed before and after the administration of 5 mL Gadavist intravenous contrast.    COMPARISON:  CT head 06/24/2024.    FINDINGS:  Motion artifact slightly limits examination.    Mild generalized cerebral atrophy compensatory enlargement of the ventricles and subarachnoid spaces.  Few scattered punctate foci of T2/FLAIR signal hyperintensity in the supratentorial white matter without corresponding diffusion signal abnormality enhancement which is nonspecific and may be sequela of chronic small vessel ischemic change.    No diffusion restriction to indicate acute infarction.  No intraparenchymal hemorrhage, edema or mass.No abnormal postcontrast parenchymal or leptomeningeal enhancement.    Ventricles are stable in size and configuration for age.  No hydrocephalus or midline shift.  Basal cisterns are patent.    No extra-axial blood or fluid collections.    The T2 skull base flow voids are preserved.    Bone marrow signal intensity unremarkable.    Fluid signal in the sphenoid sinus, otherwise the paranasal sinuses are unremarkable mastoid air cells are unremarkable.                                       X-Ray Abdomen AP 1 View (Final result)  Result time 06/25/24 01:08:27      Final result by Hugh Godwin MD (06/25/24 01:08:27)                   Impression:      No unexpected metallic foreign body identified in the field of view.      Electronically signed by: Hugh Godwin MD  Date:    06/25/2024  Time:    01:08               Narrative:    EXAMINATION:  XR ABDOMEN AP 1  VIEW    CLINICAL HISTORY:  for MRI scan;    TECHNIQUE:  Single AP View of the abdomen was performed.    COMPARISON:  None.    FINDINGS:  Cardiac wires overlie the chest and abdomen.  Bowel gas pattern is unremarkable.  Calcifications overlie the pelvis.  No unexpected metallic foreign body identified in the field of view.                                       CT Head Without Contrast (Final result)  Result time 06/24/24 17:49:41      Final result by Mane Hsieh MD (06/24/24 17:49:41)                   Impression:      No acute intracranial process.  Additional evaluation with MRI of the brain may be obtained, as clinically warranted.    Changes of chronic vessel ischemic disease and cerebral volume loss.      Electronically signed by: Mane Hsieh MD  Date:    06/24/2024  Time:    17:49               Narrative:    EXAMINATION:  CT HEAD WITHOUT CONTRAST    CLINICAL HISTORY:  Mental status change, unknown cause;    TECHNIQUE:  Low dose axial images were obtained through the head.  Coronal and sagittal reformations were also performed. Contrast was not administered.    COMPARISON:  04/15/2024.    FINDINGS:  The subcutaneous tissues are unremarkable.  The bony calvarium is intact.  The paranasal sinuses are unremarkable.  The mastoid air cells are clear.  The orbits and intraorbital contents are within normal limits.    The craniocervical junction is intact.  The sellar and parasellar structures are unremarkable.  There is no evidence of intracranial hemorrhage.  The ventricles and sulci are prominent, consistent cerebral volume loss.  There are hypodensities within the periventricular and subcortical white matter.  The gray-white differentiation is maintained.  There is no dense vessel sign.  There is no evidence of mass effect.                                       X-Ray Chest AP Portable (Final result)  Result time 06/24/24 17:59:50      Final result by Jarrett Stewart MD (06/24/24 17:59:50)                    Impression:      1. Chronic appearing interstitial findings, no large focal consolidation.  Correlation with any history of COPD/emphysema.      Electronically signed by: Jarrett Stewart MD  Date:    06/24/2024  Time:    17:59               Narrative:    EXAMINATION:  XR CHEST AP PORTABLE    CLINICAL HISTORY:  AMS;    TECHNIQUE:  Single frontal view of the chest was performed.    COMPARISON:  04/15/2024    FINDINGS:  The cardiomediastinal silhouette is not enlarged.  There is no pleural effusion.  The trachea is midline.  The lungs are symmetrically expanded bilaterally with coarse interstitial attenuation bilaterally, similar to the previous examination..  No large focal consolidation seen.  There is no pneumothorax.  The osseous structures are remarkable for degenerative change..
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
## 2024-06-28 NOTE — SUBJECTIVE & OBJECTIVE
Interval History:   Patient agitated and combative overnight. Needed prn Zyprexa and soft restraints. Psych consulted to determine if patient had capacity, they determined that the patient does not have medical decision capacity at this time.     Review of Systems   Constitutional:  Negative for chills, fatigue and fever.   HENT: Negative.     Respiratory: Negative.     Cardiovascular: Negative.    Gastrointestinal: Negative.    Endocrine: Negative.    Genitourinary: Negative.    Musculoskeletal: Negative.    Neurological: Negative.    Hematological: Negative.    Psychiatric/Behavioral: Negative.       Objective:     Vital Signs (Most Recent):  Temp: 98.5 °F (36.9 °C) (06/28/24 1208)  Pulse: 97 (06/28/24 1208)  Resp: 18 (06/28/24 1208)  BP: (!) 146/88 (06/28/24 1208)  SpO2: 95 % (06/28/24 1208) Vital Signs (24h Range):  Temp:  [97.7 °F (36.5 °C)-98.8 °F (37.1 °C)] 98.5 °F (36.9 °C)  Pulse:  [90-99] 97  Resp:  [18-19] 18  SpO2:  [95 %-98 %] 95 %  BP: (123-175)/(63-88) 146/88     Weight: 49.9 kg (110 lb)  Body mass index is 20.12 kg/m².  No intake or output data in the 24 hours ending 06/28/24 1216        Physical Exam  Vitals and nursing note reviewed.   Constitutional:       Appearance: She is normal weight.   HENT:      Head: Normocephalic and atraumatic.      Nose: Nose normal.      Mouth/Throat:      Mouth: Mucous membranes are moist.      Pharynx: Oropharynx is clear.   Eyes:      Extraocular Movements: Extraocular movements intact.      Conjunctiva/sclera: Conjunctivae normal.      Pupils: Pupils are equal, round, and reactive to light.   Cardiovascular:      Rate and Rhythm: Normal rate and regular rhythm.      Pulses: Normal pulses.      Heart sounds: Normal heart sounds.   Pulmonary:      Effort: Pulmonary effort is normal. No respiratory distress.   Abdominal:      General: Abdomen is flat. Bowel sounds are normal.      Palpations: Abdomen is soft.   Musculoskeletal:         General: Normal range of motion.       Right lower leg: No edema.      Left lower leg: No edema.   Skin:     General: Skin is warm and dry.   Neurological:      General: No focal deficit present.      Mental Status: Mental status is at baseline. She is disoriented.      Coordination: Finger-Nose-Finger Test normal.      Deep Tendon Reflexes:      Reflex Scores:       Brachioradialis reflexes are 2+ on the right side and 2+ on the left side.       Patellar reflexes are 2+ on the right side and 2+ on the left side.  Psychiatric:         Speech: Speech normal.             Significant Labs: All pertinent labs within the past 24 hours have been reviewed.    Significant Imaging: I have reviewed all pertinent imaging results/findings within the past 24 hours.

## 2024-06-28 NOTE — PLAN OF CARE
Problem: Adult Inpatient Plan of Care  Goal: Plan of Care Review  Outcome: Progressing     Problem: Skin Injury Risk Increased  Goal: Skin Health and Integrity  Outcome: Progressing     Problem: Confusion Chronic  Goal: Optimal Cognitive Function  Outcome: Progressing

## 2024-06-29 LAB
BACTERIA BLD CULT: NORMAL
BACTERIA BLD CULT: NORMAL
METHYLMALONATE SERPL-SCNC: 0.16 UMOL/L

## 2024-06-29 PROCEDURE — 63600175 PHARM REV CODE 636 W HCPCS

## 2024-06-29 PROCEDURE — 25000003 PHARM REV CODE 250

## 2024-06-29 PROCEDURE — 11000001 HC ACUTE MED/SURG PRIVATE ROOM

## 2024-06-29 RX ADMIN — RIVASTIGMINE 1 PATCH: 4.6 PATCH, EXTENDED RELEASE TRANSDERMAL at 08:06

## 2024-06-29 RX ADMIN — ENOXAPARIN SODIUM 40 MG: 40 INJECTION SUBCUTANEOUS at 04:06

## 2024-06-29 NOTE — ASSESSMENT & PLAN NOTE
"78-year-old lady here with encephalopathy.  At her baseline, she is normally "authoritative" demanding things, speaking about [employer] acts.  However unclear her mental status as her son does not keep close contact with her.  He does not really know if her mentation has gotten progressively worse or what is her baseline.  Normally she is able to go to the [place] daily to see her horse.  Overall workup was only notable for a leukocytosis with granulocytes with no clear source or systemic response. Will treat 1 time dose of ceftriaxone see if improvement in her symptoms.    Unclear etiology at this time, but will rule out reversible causes of acute encephalopathy, such as infectious, pending blood cultures/RPR, vitamin deficiencies (B1, B3, B9, B12), less likely meningitis as patient has full range of motion of her neck, can consider LP in the morning to r/o encephalitis vs menigitis?  Utox.pending    Differentials include but not limited to progressing age-related dementia, vitamin deficiencies encephalitis, UTI?, less likely stroke, toxicities,     Results:  WBCs 13  CMP unremarkable  Lactate normal,  BNP mildly elevated at 115, troponin negative  EKG was normal sinus rhythm, no ischemic changes  TSH WNL  Protocol normal  UA positive for 2+ ketones trace protein no nitrites    CT head showed no acute intracranial process  Chest x-ray showed no focal consolidation    Workup is notable for non-acute MRI Brain with mild/mod generalized atrophy and microvascular disease, grossly unremarkable CBC, CMP, UA. WNL B12 (low normal), and WNL TSH.    Plan:  - f/u with Neuro recs, likely underlying dementia driving presentation (type can't be determined in inpatient setting)  - Behavioral neurology referral per neurology   - Upon further discussions with son Jose Alejandro, will discuss with legal regarding how to go about appointing him as POA as next of kin. Due to concern of lack of capacity   - Completed 3 day course of CTX " "with no improvement in mentation, unlikely to be UTI driving presentation. Treated for acute cystitis   - starting Rivastigmine 4.6 mg  - psych consult to determine capacity and agitation recs  - Determined not having capacity by psychiatry, noting "her dogs will take care of her" if she goes home   - Coordinate with CM for placement in NH due to lack of capacity     "
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
## 2024-06-29 NOTE — PLAN OF CARE
Problem: Adult Inpatient Plan of Care  Goal: Absence of Hospital-Acquired Illness or Injury  Outcome: Progressing  Intervention: Identify and Manage Fall Risk  Flowsheets (Taken 6/29/2024 1711)  Safety Promotion/Fall Prevention:   assistive device/personal item within reach   bed alarm set   bedside commode chair   diversional activities provided   commode/urinal/bedpan at bedside   Fall Risk reviewed with patient/family   Fall Risk signage in place   family to remain at bedside   high risk medications identified   in recliner, wheels locked   lighting adjusted   medications reviewed   muscle strengthening facilitated   nonskid shoes/socks when out of bed   pulse ox   room near unit station   side rails raised x 2  Intervention: Prevent Skin Injury  Flowsheets (Taken 6/29/2024 1711)  Body Position: position changed independently  Skin Protection:   incontinence pads utilized   pulse oximeter probe site changed   skin sealant/moisture barrier applied  Device Skin Pressure Protection:   absorbent pad utilized/changed   adhesive use limited   positioning supports utilized   pressure points protected  Intervention: Prevent and Manage VTE (Venous Thromboembolism) Risk  Flowsheets (Taken 6/29/2024 1711)  VTE Prevention/Management:   remove, assess skin, and reapply sequential compression device   ambulation promoted   bleeding precations maintained   bleeding risk assessed   bleeding risk factor(s) identified, provider notified   dorsiflexion/plantar flexion performed   fluids promoted   ROM (active) performed  Intervention: Prevent Infection  Flowsheets (Taken 6/29/2024 1711)  Infection Prevention:   cohorting utilized   hand hygiene promoted   single patient room provided   environmental surveillance performed   equipment surfaces disinfected   rest/sleep promoted  Goal: Optimal Comfort and Wellbeing  Outcome: Progressing  Intervention: Monitor Pain and Promote Comfort  Flowsheets (Taken 6/29/2024 1711)  Pain  Management Interventions:   around-the-clock dosing utilized   awakened for pain meds per patient request   breathing exercises utilized   care clustered   cold applied   diversional activity provided   heat applied   medication offered   pain management plan reviewed with patient/caregiver   pillow support provided   position adjusted   premedicated for activity   quiet environment facilitated   relaxation techniques promoted   warm blanket provided  Intervention: Provide Person-Centered Care  Flowsheets (Taken 6/29/2024 1711)  Trust Relationship/Rapport:   care explained   questions answered   choices provided   questions encouraged   emotional support provided   reassurance provided   empathic listening provided   thoughts/feelings acknowledged     Problem: Skin Injury Risk Increased  Goal: Skin Health and Integrity  Outcome: Progressing  Intervention: Optimize Skin Protection  Flowsheets (Taken 6/29/2024 1711)  Pressure Reduction Techniques: positioned off wounds  Pressure Reduction Devices:   foam padding utilized   positioning supports utilized  Skin Protection:   incontinence pads utilized   pulse oximeter probe site changed   skin sealant/moisture barrier applied  Activity Management: Ambulated to bathroom - L4  Head of Bed (HOB) Positioning: HOB elevated  Intervention: Promote and Optimize Oral Intake  Flowsheets (Taken 6/29/2024 1711)  Oral Nutrition Promotion:   social interaction promoted   physical activity promoted   referred to outpatient services   rest periods promoted  Nutrition Interventions:   meal set-up provided   food preferences provided   frequent small meals provided   supplemental foods provided   meals from home/family encouraged   referred to nutrition assistant/tech     Problem: Fall Injury Risk  Goal: Absence of Fall and Fall-Related Injury  Outcome: Progressing  Intervention: Identify and Manage Contributors  Flowsheets (Taken 6/29/2024 1711)  Self-Care Promotion:   independence  encouraged   meal set-up provided   BADL personal objects within reach   BADL personal routines maintained  Medication Review/Management:   medications reviewed   high-risk medications identified   provider consulted   pharmacy consulted  Intervention: Promote Injury-Free Environment  Flowsheets (Taken 6/29/2024 1711)  Safety Promotion/Fall Prevention:   assistive device/personal item within reach   bed alarm set   bedside commode chair   diversional activities provided   commode/urinal/bedpan at bedside   Fall Risk reviewed with patient/family   Fall Risk signage in place   family to remain at bedside   high risk medications identified   in recliner, wheels locked   lighting adjusted   medications reviewed   muscle strengthening facilitated   nonskid shoes/socks when out of bed   pulse ox   room near unit station   side rails raised x 2     Problem: Confusion Chronic  Goal: Optimal Cognitive Function  Outcome: Met  Intervention: Minimize Injury Risk and Provide Safety  Flowsheets (Taken 6/29/2024 1711)  Enhanced Safety Measures: room near unit station  Intervention: Minimize and Manage Confusion  Flowsheets (Taken 6/29/2024 1711)  Self-Care Promotion:   independence encouraged   meal set-up provided   BADL personal objects within reach   BADL personal routines maintained  Environmental Support: calm environment promoted

## 2024-06-29 NOTE — PLAN OF CARE
Problem: Adult Inpatient Plan of Care  Goal: Plan of Care Review  Outcome: Progressing  Goal: Patient-Specific Goal (Individualized)  Outcome: Progressing  Goal: Absence of Hospital-Acquired Illness or Injury  Outcome: Progressing  Goal: Optimal Comfort and Wellbeing  Outcome: Progressing  Goal: Readiness for Transition of Care  Outcome: Progressing     Problem: Wound  Goal: Optimal Coping  Outcome: Progressing  Goal: Optimal Functional Ability  Outcome: Progressing  Goal: Absence of Infection Signs and Symptoms  Outcome: Progressing  Goal: Improved Oral Intake  Outcome: Progressing  Goal: Optimal Pain Control and Function  Outcome: Progressing  Goal: Skin Health and Integrity  Outcome: Progressing  Goal: Optimal Wound Healing  Outcome: Progressing     Problem: Skin Injury Risk Increased  Goal: Skin Health and Integrity  Outcome: Progressing     Problem: Confusion Chronic  Goal: Optimal Cognitive Function  Outcome: Progressing     Problem: Fall Injury Risk  Goal: Absence of Fall and Fall-Related Injury  Outcome: Progressing     Problem: Restraint, Nonviolent  Goal: Absence of Harm or Injury  Outcome: Progressing

## 2024-06-29 NOTE — SUBJECTIVE & OBJECTIVE
Interval History:   Patient very pleasant on eval this AM. She reports she is in no acute distress and had a productive convo with her son Jose Alejandro yesterday. Prn meds not needed for agitation.     Review of Systems   Constitutional:  Negative for chills, fatigue and fever.   HENT: Negative.     Respiratory: Negative.     Cardiovascular: Negative.    Gastrointestinal: Negative.    Endocrine: Negative.    Genitourinary: Negative.    Musculoskeletal: Negative.    Neurological: Negative.    Hematological: Negative.    Psychiatric/Behavioral: Negative.       Objective:     Vital Signs (Most Recent):  Temp: 98.1 °F (36.7 °C) (06/29/24 0340)  Pulse: 86 (06/29/24 0340)  Resp: 18 (06/29/24 0340)  BP: 127/74 (06/29/24 0340)  SpO2: 95 % (06/29/24 0340) Vital Signs (24h Range):  Temp:  [97.7 °F (36.5 °C)-98.7 °F (37.1 °C)] 98.1 °F (36.7 °C)  Pulse:  [] 86  Resp:  [18-19] 18  SpO2:  [95 %-97 %] 95 %  BP: (106-146)/(71-88) 127/74     Weight: 49.9 kg (110 lb)  Body mass index is 20.12 kg/m².  No intake or output data in the 24 hours ending 06/29/24 0703        Physical Exam  Vitals and nursing note reviewed.   Constitutional:       Appearance: She is normal weight.   HENT:      Head: Normocephalic and atraumatic.      Nose: Nose normal.      Mouth/Throat:      Mouth: Mucous membranes are moist.      Pharynx: Oropharynx is clear.   Eyes:      Extraocular Movements: Extraocular movements intact.      Conjunctiva/sclera: Conjunctivae normal.      Pupils: Pupils are equal, round, and reactive to light.   Cardiovascular:      Rate and Rhythm: Normal rate and regular rhythm.      Pulses: Normal pulses.      Heart sounds: Normal heart sounds.   Pulmonary:      Effort: Pulmonary effort is normal. No respiratory distress.   Abdominal:      General: Abdomen is flat. Bowel sounds are normal.      Palpations: Abdomen is soft.   Musculoskeletal:         General: Normal range of motion.      Right lower leg: No edema.      Left lower leg:  No edema.   Skin:     General: Skin is warm and dry.   Neurological:      General: No focal deficit present.      Mental Status: Mental status is at baseline. She is disoriented.      Coordination: Finger-Nose-Finger Test normal.      Deep Tendon Reflexes:      Reflex Scores:       Brachioradialis reflexes are 2+ on the right side and 2+ on the left side.       Patellar reflexes are 2+ on the right side and 2+ on the left side.  Psychiatric:         Speech: Speech normal.             Significant Labs: All pertinent labs within the past 24 hours have been reviewed.    Significant Imaging: I have reviewed all pertinent imaging results/findings within the past 24 hours.

## 2024-06-29 NOTE — PLAN OF CARE
Problem: Adult Inpatient Plan of Care  Goal: Plan of Care Review  Outcome: Progressing     Problem: Skin Injury Risk Increased  Goal: Skin Health and Integrity  Outcome: Progressing     Problem: Confusion Chronic  Goal: Optimal Cognitive Function  Outcome: Progressing     Problem: Fall Injury Risk  Goal: Absence of Fall and Fall-Related Injury  Outcome: Progressing
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
## 2024-06-30 PROCEDURE — 97535 SELF CARE MNGMENT TRAINING: CPT

## 2024-06-30 PROCEDURE — 97530 THERAPEUTIC ACTIVITIES: CPT

## 2024-06-30 PROCEDURE — 11000001 HC ACUTE MED/SURG PRIVATE ROOM

## 2024-06-30 PROCEDURE — 63600175 PHARM REV CODE 636 W HCPCS: Mod: JZ,JG

## 2024-06-30 PROCEDURE — 63600175 PHARM REV CODE 636 W HCPCS

## 2024-06-30 PROCEDURE — 25000003 PHARM REV CODE 250

## 2024-06-30 RX ORDER — OLANZAPINE 10 MG/2ML
2.5 INJECTION, POWDER, FOR SOLUTION INTRAMUSCULAR ONCE AS NEEDED
Status: COMPLETED | OUTPATIENT
Start: 2024-06-30 | End: 2024-06-30

## 2024-06-30 RX ORDER — OLANZAPINE 10 MG/2ML
2.5 INJECTION, POWDER, FOR SOLUTION INTRAMUSCULAR ONCE AS NEEDED
Status: DISCONTINUED | OUTPATIENT
Start: 2024-06-30 | End: 2024-07-11

## 2024-06-30 RX ADMIN — ENOXAPARIN SODIUM 40 MG: 40 INJECTION SUBCUTANEOUS at 04:06

## 2024-06-30 RX ADMIN — OLANZAPINE 2.5 MG: 10 INJECTION, POWDER, FOR SOLUTION INTRAMUSCULAR at 01:06

## 2024-06-30 RX ADMIN — RIVASTIGMINE 1 PATCH: 4.6 PATCH, EXTENDED RELEASE TRANSDERMAL at 09:06
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
## 2024-06-30 NOTE — PLAN OF CARE
Pt engaged well in therapy this date.     Problem: Occupational Therapy  Goal: Occupational Therapy Goal  Description: Goals to be met by: 7/10/24     Patient will increase functional independence with ADLs by performing:    LE Dressing with Supervision.   Grooming while standing at sink with Supervision. - ONGOING  Toileting from toilet with Supervision for hygiene and clothing management.  - ONGOING with verbal cues  Supine to sit with Supervision.  Toilet transfer to toilet with Supervision. - ONGOING  [place] with BUE HEP to improve activity tolerance to complete ADLs and IADLs  Within home ambulation distances with supervision and LRAD necessary to complete ADLs.  - ONGOING    Outcome: Progressing

## 2024-06-30 NOTE — PLAN OF CARE
Problem: Adult Inpatient Plan of Care  Goal: Plan of Care Review  Outcome: Progressing     Problem: Wound  Goal: Skin Health and Integrity  Outcome: Progressing     Problem: Skin Injury Risk Increased  Goal: Skin Health and Integrity  Outcome: Progressing     Problem: Fall Injury Risk  Goal: Absence of Fall and Fall-Related Injury  Outcome: Progressing

## 2024-06-30 NOTE — ASSESSMENT & PLAN NOTE
"78-year-old lady here with encephalopathy.  At her baseline, she is normally "authoritative" demanding things, speaking about [employer] acts.  However unclear her mental status as her son does not keep close contact with her.  He does not really know if her mentation has gotten progressively worse or what is her baseline.  Normally she is able to go to the [place] daily to see her horse.  Overall workup was only notable for a leukocytosis with granulocytes with no clear source or systemic response. Will treat 1 time dose of ceftriaxone see if improvement in her symptoms.    Unclear etiology at this time, but will rule out reversible causes of acute encephalopathy, such as infectious, pending blood cultures/RPR, vitamin deficiencies (B1, B3, B9, B12), less likely meningitis as patient has full range of motion of her neck, can consider LP in the morning to r/o encephalitis vs menigitis?  Utox.pending    Differentials include but not limited to progressing age-related dementia, vitamin deficiencies encephalitis, UTI?, less likely stroke, toxicities,     Results:  WBCs 13  CMP unremarkable  Lactate normal,  BNP mildly elevated at 115, troponin negative  EKG was normal sinus rhythm, no ischemic changes  TSH WNL  Protocol normal  UA positive for 2+ ketones trace protein no nitrites    CT head showed no acute intracranial process  Chest x-ray showed no focal consolidation    Workup is notable for non-acute MRI Brain with mild/mod generalized atrophy and microvascular disease, grossly unremarkable CBC, CMP, UA. WNL B12 (low normal), and WNL TSH.    Patient very resistant to discharging to SNF or any facility. Per our team and Psychiatry patient does not show decision making capacity. Son prefers SNF or facility placement. Per psychiatry, PEC evaluation for psych hospital admission is not warranted as they believe this is not a psychiatric condition but rather neuro-cognitive decline.     Plan:  - f/u with Neuro recs, " "likely underlying dementia driving presentation (type can't be determined in inpatient setting)  - Behavioral neurology referral per neurology   - Upon further discussions with son Jose Alejandro, will discuss with legal regarding how to go about appointing him as POA as next of kin. Due to concern of lack of capacity   - Completed 3 day course of CTX with no improvement in mentation, unlikely to be UTI driving presentation. Treated for acute cystitis   - starting Rivastigmine 4.6 mg  - psych consult to determine capacity and agitation recs; determined not having capacity by psychiatry, noting "her dogs will take care of her" if she goes home   - Coordinate with CM for placement in NH due to lack of capacity \  - persistent concern for appropriateness of discharge to home; will reach out to legal team for their assistance with this.     "

## 2024-06-30 NOTE — PT/OT/SLP PROGRESS
"Occupational Therapy   Treatment    Name: Mohini Dougherty  MRN: [id number]  Admitting Diagnosis:  Encephalopathy       Recommendations:     Discharge Recommendations: Moderate Intensity Therapy  Discharge Equipment Recommendations:  bath bench  Barriers to discharge:  None    Assessment:     Mohini Dougherty is a 78 y.o. female with a medical diagnosis of Encephalopathy.  She presents with performance deficits affecting function are weakness, impaired endurance, impaired self care skills, impaired functional mobility, impaired balance, decreased upper extremity function, decreased lower extremity function, decreased safety awareness, impaired cognition.     Pt encountered ambulatory in room with camera alarm just beginning to sound, sitter at doorway and handed off to writing OT.  OT assisted pt to bathroom with SBA/CGA and toilet with CGA 2* difficulty doffing underwear and urinating on floor.  OT assisted with clean up/floor disinfectant, assisted pt with dressing/toileting ADLs and grooming at sink.  Increased time needed during ADLs 2* confusion and difficulty with functional attention to tasks requiring occasional cues to continue task safely.  Pt returned to upright chair with sitter present, alarm/camera on.  Pt on pathway to post acute therapy at moderate level of intensity.     Rehab Prognosis:  Good; patient would benefit from acute skilled OT services to address these deficits and reach maximum level of function.       Plan:     Patient to be seen 4 x/week to address the above listed problems via self-care/home management, therapeutic activities, therapeutic exercises, neuromuscular re-education, cognitive retraining  Plan of Care Expires: 07/10/24  Plan of Care Reviewed with: patient    Subjective     Chief Complaint: "Who's taking care of my dogs? I need to get out of here and go home"   Patient/Family Comments/goals: Get better, return home   Pain/Comfort:  Pain Rating 1: 0/10    Objective: "     Communicated with: RN prior to session.  Patient found ambulatory in room/bailey with  (no lines, [employer] camera, sitter present) upon OT entry to room.    General Precautions: Standard, fall    Orthopedic Precautions:N/A  Braces: N/A  Respiratory Status: Room air     Occupational Performance:     Bed Mobility:    Not observed     Functional Mobility/Transfers:  Patient completed Sit <> Stand Transfer  From upright chair with stand by assistance  with  no assistive device   To toilet with CGA and cues for safe hand placement on gb  To upright chair with CGA/cues for safe hand placement and controlled lowering of self to chair   Patient completed Toilet Transfer Step Transfer technique with stand by assistance with  hand-held assist  Pt completed sustained  front of toilet and sink ~15min with SBA  Functional Mobility: Pt engaging in functional mobility to simulate household/community distances within hospital room and bathroom with SBA to CGA with unsteadiness and utilizing no AD in order to maximize functional activity tolerance and standing balance required for engagement in occupations of choice.     Activities of Daily Living:  Grooming: contact guard assistance brushing teeth, using mouthwash, washing face with warm towel with frequent cues to redirect pt to activities and also to not perseverate on rinsing toothbrush  Upper Body Dressing: minimum assistance affixing gown at neck and back to maximize coverage   Lower Body Dressing: moderate assistance doffing soiled underwear and donning fresh underwear, assistance with bilateral coordination 2* confusion, redirection to task    Toileting: set up assistance completing seth-rectal care with toilet paper and wet wipes for thorough cleanse, multiple cues to redirect pt to task      [facility] 6 Click ADL: 21    Treatment & Education:  Pt educated on role of OT, POC, and goals for therapy.    POC was dicussed with patient/caregiver, who was included in its  development and is in agreement with the identified goals and treatment plan.   Patient and family aware of patient's deficits and therapy progression.   Time provided for therapeutic counseling and discussion of health disposition.   Educated on importance of EOB/OOB mobility, maintaining routine, sitting up in chair, and maximizing independence with ADLs during admission   Pt completed ADLs and functional mobility for treatment session as noted above   Pt/caregiver verbalized understanding and expressed no further concerns/questions.  Updated communication board with level of assist required       Patient left up in chair with call button in reach, RN notified, sitter present, and [employer] camera system present    GOALS:   Multidisciplinary Problems       Occupational Therapy Goals          Problem: Occupational Therapy    Goal Priority Disciplines Outcome Interventions   Occupational Therapy Goal     OT, PT/OT Progressing    Description: Goals to be met by: 7/10/24     Patient will increase functional independence with ADLs by performing:    LE Dressing with Supervision.  Grooming while standing at sink with Supervision.  Toileting from toilet with Supervision for hygiene and clothing management.   Supine to sit with Supervision.  Toilet transfer to toilet with Supervision.  [place] with BUE HEP to improve activity tolerance to complete ADLs and IADLs  Within home ambulation distances with supervision and LRAD necessary to complete ADLs.                           Time Tracking:     OT Date of Treatment: 06/30/24  OT Start Time: 1219  OT Stop Time: 1242  OT Total Time (min): 23 min    Billable Minutes:Self Care/Home Management 15  Therapeutic Activity 8    OT/ELIESER: OT          6/30/2024

## 2024-06-30 NOTE — SUBJECTIVE & OBJECTIVE
Interval History: Pt with persecutory thoughts this AM. She is frustrated with everyone and does not want to go a SNF or any facility. She believes that we are plotting to take over her property. She is moderately agitated this morning. Team discussed discharge options with her son, who is amenable to facility such as SNF. Spoke to Psychiatry who does not PEC for psych hospital admission is warranted in this situation as this does not seem to be a psychiatric condition from their viewpoint but rather a neuro-cognitive decline. Will reach out to legal team to evaluate legality of discharge to home.     Review of Systems  Objective:     Vital Signs (Most Recent):  Temp: 99.2 °F (37.3 °C) (06/30/24 0809)  Pulse: 96 (06/30/24 0809)  Resp: 17 (06/30/24 0809)  BP: (!) 158/84 (06/30/24 1207)  SpO2: 98 % (06/30/24 0809) Vital Signs (24h Range):  Temp:  [97.9 °F (36.6 °C)-99.2 °F (37.3 °C)] 99.2 °F (37.3 °C)  Pulse:  [83-97] 96  Resp:  [17-18] 17  SpO2:  [97 %-98 %] 98 %  BP: (103-158)/(58-84) 158/84     Weight: 49.9 kg (110 lb)  Body mass index is 20.12 kg/m².  No intake or output data in the 24 hours ending 06/30/24 1338      Physical Exam  Vitals and nursing note reviewed.   Constitutional:       Appearance: Normal appearance. She is normal weight. She is not ill-appearing.   HENT:      Head: Normocephalic and atraumatic.      Nose: Nose normal.      Mouth/Throat:      Mouth: Mucous membranes are moist.      Pharynx: Oropharynx is clear.   Eyes:      Pupils: Pupils are equal, round, and reactive to light.   Cardiovascular:      Rate and Rhythm: Normal rate and regular rhythm.      Pulses: Normal pulses.      Heart sounds: Normal heart sounds. No murmur heard.  Pulmonary:      Effort: Pulmonary effort is normal. No respiratory distress.      Breath sounds: No wheezing or rales.   Abdominal:      General: Abdomen is flat. Bowel sounds are normal.      Palpations: Abdomen is soft.   Musculoskeletal:         General: Normal  range of motion.      Right lower leg: No edema.      Left lower leg: No edema.   Skin:     General: Skin is warm and dry.   Neurological:      General: No focal deficit present.      Mental Status: She is disoriented.      Cranial Nerves: No dysarthria or facial asymmetry.   Psychiatric:         Attention and Perception: Attention normal.         Mood and Affect: Mood is anxious. Affect is angry.         Speech: Speech normal.         Behavior: Behavior is agitated.         Thought Content: Thought content is delusional (presecutory thoughts).             Significant Labs: All pertinent labs within the past 24 hours have been reviewed.    Significant Imaging: I have reviewed all pertinent imaging results/findings within the past 24 hours.

## 2024-06-30 NOTE — PLAN OF CARE
Problem: Adult Inpatient Plan of Care  Goal: Absence of Hospital-Acquired Illness or Injury  Outcome: Progressing  Intervention: Identify and Manage Fall Risk  Flowsheets (Taken 6/30/2024 1624)  Safety Promotion/Fall Prevention:   assistive device/personal item within reach   bed alarm set   bedside commode chair   commode/urinal/bedpan at bedside   diversional activities provided   Fall Risk reviewed with patient/family   Fall Risk signage in place   chair alarm set   family to remain at bedside   high risk medications identified   in recliner, wheels locked   lighting adjusted   medications reviewed   muscle strengthening facilitated   nonskid shoes/socks when out of bed   pulse ox   room near unit station   /camera at bedside   supervised activity   instructed to call staff for mobility  Intervention: Prevent Skin Injury  Flowsheets (Taken 6/30/2024 1624)  Body Position: position changed independently  Skin Protection:   hydrocolloids used   incontinence pads utilized   pulse oximeter probe site changed   skin sealant/moisture barrier applied  Device Skin Pressure Protection:   absorbent pad utilized/changed   adhesive use limited   pressure points protected   positioning supports utilized  Intervention: Prevent and Manage VTE (Venous Thromboembolism) Risk  Flowsheets (Taken 6/30/2024 1624)  VTE Prevention/Management:   remove, assess skin, and reapply sequential compression device   ambulation promoted   bleeding precations maintained   bleeding risk assessed   bleeding risk factor(s) identified, provider notified   dorsiflexion/plantar flexion performed   fluids promoted   ROM (active) performed  Intervention: Prevent Infection  Flowsheets (Taken 6/30/2024 1624)  Infection Prevention:   cohorting utilized   environmental surveillance performed   hand hygiene promoted   single patient room provided   equipment surfaces disinfected   rest/sleep promoted  Goal: Optimal Comfort and  Wellbeing  Outcome: Progressing  Intervention: Monitor Pain and Promote Comfort  Flowsheets (Taken 6/30/2024 1624)  Pain Management Interventions:   around-the-clock dosing utilized   awakened for pain meds per patient request   breathing exercises utilized   care clustered   cold applied   diversional activity provided   heat applied   medication offered   pain management plan reviewed with patient/caregiver   pillow support provided   position adjusted   premedicated for activity   relaxation techniques promoted   quiet environment facilitated   warm blanket provided     Problem: Skin Injury Risk Increased  Goal: Skin Health and Integrity  Outcome: Progressing  Intervention: Optimize Skin Protection  Flowsheets (Taken 6/30/2024 1624)  Pressure Reduction Techniques:   frequent weight shift encouraged   positioned off wounds   pressure points protected   weight shift assistance provided  Pressure Reduction Devices:   pressure-redistributing mattress utilized   positioning supports utilized  Skin Protection:   hydrocolloids used   incontinence pads utilized   pulse oximeter probe site changed   skin sealant/moisture barrier applied  Activity Management: Ambulated in room - L4  Head of Bed (HOB) Positioning: HOB elevated  Intervention: Promote and Optimize Oral Intake  Flowsheets (Taken 6/30/2024 1624)  Oral Nutrition Promotion:   adaptive equipment use encouraged   social interaction promoted   physical activity promoted   nutrition counseling provided   rest periods promoted  Nutrition Interventions:   meal set-up provided   referred to nutrition assistant/tech   meals from home/family encouraged   supplemental foods provided   supplemental drinks provided   frequent small meals provided   food preferences provided   diet advanced     Problem: Fall Injury Risk  Goal: Absence of Fall and Fall-Related Injury  Outcome: Not Progressing  Intervention: Identify and Manage Contributors  Flowsheets (Taken 6/30/2024  1624)  Self-Care Promotion:   independence encouraged   meal set-up provided   BADL personal objects within reach   BADL personal routines maintained  Medication Review/Management:   medications reviewed   high-risk medications identified   pharmacy consulted   provider consulted  Intervention: Promote Injury-Free Environment  Flowsheets (Taken 6/30/2024 1624)  Safety Promotion/Fall Prevention:   assistive device/personal item within reach   bed alarm set   bedside commode chair   commode/urinal/bedpan at bedside   diversional activities provided   Fall Risk reviewed with patient/family   Fall Risk signage in place   chair alarm set   family to remain at bedside   high risk medications identified   in recliner, wheels locked   lighting adjusted   medications reviewed   muscle strengthening facilitated   nonskid shoes/socks when out of bed   pulse ox   room near unit station   /camera at bedside   supervised activity   instructed to call staff for mobility

## 2024-06-30 NOTE — CONSULTS
Plan of Care    Discussed current concerns with primary team regarding patient's desire to go home rather than to a nursing home and need for a PEC. Per primary team and prior psychiatry evaluations, pt does not have capacity to leave AMA. Pt has not required PRN medications per chart review and per primary has been eating and drinking and has been adherent to meds. Discussed possible dispositions for the patient, including nursing home and [facility] psych placement, which would only be appropriate if pt had an acute behavioral disturbance that could be managed in such a setting.     Low threshold to place PEC for grave disability if patient attempt to leave AMA as pt does not have capacity to do so and there is concern for underlying neurocognitive disorder. Plan for weekday CL team to see the patient on Monday morning for further evaluation.       oJy Roy MD  [facility] Psychiat[facility] PGY-II
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
## 2024-07-01 LAB
ALBUMIN SERPL BCP-MCNC: 3.5 G/DL (ref 3.5–5.2)
ALP SERPL-CCNC: 67 U/L (ref 55–135)
ALT SERPL W/O P-5'-P-CCNC: 82 U/L (ref 10–44)
ANION GAP SERPL CALC-SCNC: 10 MMOL/L (ref 8–16)
AST SERPL-CCNC: 75 U/L (ref 10–40)
BASOPHILS # BLD AUTO: 0.07 K/UL (ref 0–0.2)
BASOPHILS NFR BLD: 0.7 % (ref 0–1.9)
BILIRUB SERPL-MCNC: 0.7 MG/DL (ref 0.1–1)
BUN SERPL-MCNC: 15 MG/DL (ref 8–23)
CALCIUM SERPL-MCNC: 9.1 MG/DL (ref 8.7–10.5)
CHLORIDE SERPL-SCNC: 108 MMOL/L (ref 95–110)
CO2 SERPL-SCNC: 24 MMOL/L (ref 23–29)
CREAT SERPL-MCNC: 1 MG/DL (ref 0.5–1.4)
DIFFERENTIAL METHOD BLD: ABNORMAL
EOSINOPHIL # BLD AUTO: 0.6 K/UL (ref 0–0.5)
EOSINOPHIL NFR BLD: 5.7 % (ref 0–8)
ERYTHROCYTE [DISTWIDTH] IN BLOOD BY AUTOMATED COUNT: 13.2 % (ref 11.5–14.5)
EST. GFR  (NO RACE VARIABLE): 58.4 ML/MIN/1.73 M^2
GLUCOSE SERPL-MCNC: 97 MG/DL (ref 70–110)
HCT VFR BLD AUTO: 35.8 % (ref 37–48.5)
HGB BLD-MCNC: 11.7 G/DL (ref 12–16)
IMM GRANULOCYTES # BLD AUTO: 0.03 K/UL (ref 0–0.04)
IMM GRANULOCYTES NFR BLD AUTO: 0.3 % (ref 0–0.5)
LYMPHOCYTES # BLD AUTO: 2.1 K/UL (ref 1–4.8)
LYMPHOCYTES NFR BLD: 20.1 % (ref 18–48)
MCH RBC QN AUTO: 32 PG (ref 27–31)
MCHC RBC AUTO-ENTMCNC: 32.7 G/DL (ref 32–36)
MCV RBC AUTO: 98 FL (ref 82–98)
MONOCYTES # BLD AUTO: 0.7 K/UL (ref 0.3–1)
MONOCYTES NFR BLD: 7.1 % (ref 4–15)
NEUTROPHILS # BLD AUTO: 6.8 K/UL (ref 1.8–7.7)
NEUTROPHILS NFR BLD: 66.1 % (ref 38–73)
NRBC BLD-RTO: 0 /100 WBC
PLATELET # BLD AUTO: 266 K/UL (ref 150–450)
PMV BLD AUTO: 10.5 FL (ref 9.2–12.9)
POTASSIUM SERPL-SCNC: 3.6 MMOL/L (ref 3.5–5.1)
PROT SERPL-MCNC: 5.8 G/DL (ref 6–8.4)
RBC # BLD AUTO: 3.66 M/UL (ref 4–5.4)
SODIUM SERPL-SCNC: 142 MMOL/L (ref 136–145)
WBC # BLD AUTO: 10.27 K/UL (ref 3.9–12.7)

## 2024-07-01 PROCEDURE — 85025 COMPLETE CBC W/AUTO DIFF WBC: CPT

## 2024-07-01 PROCEDURE — 97116 GAIT TRAINING THERAPY: CPT | Mod: CQ

## 2024-07-01 PROCEDURE — 36415 COLL VENOUS BLD VENIPUNCTURE: CPT

## 2024-07-01 PROCEDURE — 25000003 PHARM REV CODE 250

## 2024-07-01 PROCEDURE — 80053 COMPREHEN METABOLIC PANEL: CPT

## 2024-07-01 PROCEDURE — 11000001 HC ACUTE MED/SURG PRIVATE ROOM

## 2024-07-01 PROCEDURE — 99232 SBSQ HOSP IP/OBS MODERATE 35: CPT | Mod: ,,,

## 2024-07-01 PROCEDURE — 63600175 PHARM REV CODE 636 W HCPCS

## 2024-07-01 RX ADMIN — ENOXAPARIN SODIUM 40 MG: 40 INJECTION SUBCUTANEOUS at 05:07

## 2024-07-01 RX ADMIN — RIVASTIGMINE 1 PATCH: 4.6 PATCH, EXTENDED RELEASE TRANSDERMAL at 08:07
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
## 2024-07-01 NOTE — MEDICAL/APP STUDENT
"CONSULTATION LIAISON PSYCHIATRY PROGRESS NOTE    Patient Name: Mohini Dougherty  MRN: [id number]  Patient Class: IP- Inpatient  Admission Date: 6/24/2024  Attending Physician: Gil Ch MD      SUBJECTIVE:   Mohini Dougherty is a 78 y.o. female with no past psychiatric history or past pertinent medical history. She presents to the ED/admitted to the hospital for Encephalopathy    Psychiatry consulted for "agitation, fighting staff, recs for behavioral control medications."    Today, pt has gotten her clothes back, which she says makes her feel much better and more human. She mentions that she's doing "alright" and that she is being sent to go "somewhere in [facility]" for a while until she can figure something else out. She is eager to get back together with her dogs Aaliyah and Ben as well as her horse Casa. She mentions that her dogs are currently being taken care of at a [facility] on [facility] and are being kept together. She is willing to cooperate on disposition as is and hopefully get to a place where she can get back together with her pets. She is cooperative and is able to maintain attention to our conversation.        OBJECTIVE:    Mental Status Exam:  General Appearance: appears stated age, well developed and nourished, adequately groomed and appropriately dressed, in no acute distress  Behavior: normal; cooperative; reasonably friendly, pleasant, and polite; appropriate eye-contact; under good behavioral control  Involuntary Movements and Motor Activity: no abnormal involuntary movements noted; no tics, no tremors, no akathisia, no dystonia, no evidence of tardive dyskinesia; no psychomotor agitation or retardation  Gait and Station: intact  Speech and Language: intact; normal rate, rhythm, volume, tone, and pitch; conversational, spontaneous, and coherent; speaks and understands English proficiently and fluently; repeats words and phrases, no word finding difficulties are noted  Mood: "Alright"  Affect: " euthymic, reactive, appropriate to situation and context  Thought Process and Associations: intact; linear, goal-directed, organized, and logical; no loosening of associations noted  Thought Content and Perceptions:: no suicidal or homicidal ideation, no auditory or visual hallucinations, no paranoid ideation, no ideas of reference, no evidence of delusions or psychosis  Sensorium and Orientation: intact; alert with clear sensorium; oriented fully to person, place, time and situation  Recent and Remote Memory: mild impairments noted  Attention and Concentration: attentive to conversation, able to spell WORLD forwards, unable to spell WORLD backwards  Fund of Knowledge: incorrectly identifies the last five Presidents of the [place] (in order)  Insight: demonstrates awareness of situation, limited/partial awareness of illness  Judgment: intact, behavior is adequate/appropriate to the circumstances, compliant with health provider's recommendations and instructions    CAM ICU positive? no      ASSESSMENT & RECOMMENDATIONS   (Please list each relevant SPECIFIC psychiatric DSMV or medical diagnosis and recs for it under the listing DO NOT WRITE AN IMPRESSION)    PSYCH MEDICATIONS  None needed    RISK ASSESSMENT  NO NEED FOR PEC     FOLLOW UP  Will follow up while in house  Will sign off.     DISPOSITION - once medically cleared:   Defer to medical team    Please contact ON CALL psychiatry service (24/7) for any acute issues that may arise.    MS3 Sung ZAMARRIPA Psychiatry  [facility]  7/1/2024 10:17 AM        --------------------------------------------------------------------------------------------------------------------------------------------------------------------------------------------------------------------------------------    CONTINUED OBJECTIVE clinical data & findings reviewed and noted for above decision making    Current Medications:   Scheduled Meds:    enoxparin  40 mg  Subcutaneous Daily    rivastigmine  1 patch Transdermal Daily     PRN Meds:   Current Facility-Administered Medications:     acetaminophen, 650 mg, Oral, Q8H PRN    acetaminophen, 650 mg, Oral, Q4H PRN    glucagon (human recombinant), 1 mg, Intramuscular, PRN    glucose, 16 g, Oral, PRN    glucose, 24 g, Oral, PRN    melatonin, 6 mg, Oral, Nightly PRN    naloxone, 0.02 mg, Intravenous, PRN    OLANZapine, 2.5 mg, Intramuscular, Once PRN    polyethylene glycol, 17 g, Oral, PRN    prochlorperazine, 5 mg, Intravenous, Q6H PRN    sodium chloride 0.9%, 2 mL, Intravenous, Q12H PRN    Allergies:   Review of patient's allergies indicates:  No Known Allergies    Vitals  Vitals:    07/01/24 0729   BP: (!) 142/79   Pulse: 87   Resp: 18   Temp: 98.6 °F (37 °C)       Labs/Imaging/Studies:  Recent Results (from the past 24 hour(s))   CBC auto differential    Collection Time: 07/01/24  3:59 AM   Result Value Ref Range    WBC 10.27 3.90 - 12.70 K/uL    RBC 3.66 (L) 4.00 - 5.40 M/uL    Hemoglobin 11.7 (L) 12.0 - 16.0 g/dL    Hematocrit 35.8 (L) 37.0 - 48.5 %    MCV 98 82 - 98 fL    MCH 32.0 (H) 27.0 - 31.0 pg    MCHC 32.7 32.0 - 36.0 g/dL    RDW 13.2 11.5 - 14.5 %    Platelets 266 150 - 450 K/uL    MPV 10.5 9.2 - 12.9 fL    Immature Granulocytes 0.3 0.0 - 0.5 %    Gran # (ANC) 6.8 1.8 - 7.7 K/uL    Immature Grans (Abs) 0.03 0.00 - 0.04 K/uL    Lymph # 2.1 1.0 - 4.8 K/uL    Mono # 0.7 0.3 - 1.0 K/uL    Eos # 0.6 (H) 0.0 - 0.5 K/uL    Baso # 0.07 0.00 - 0.20 K/uL    nRBC 0 0 /100 WBC    Gran % 66.1 38.0 - 73.0 %    Lymph % 20.1 18.0 - 48.0 %    Mono % 7.1 4.0 - 15.0 %    Eosinophil % 5.7 0.0 - 8.0 %    Basophil % 0.7 0.0 - 1.9 %    Differential Method Automated    Comprehensive metabolic panel    Collection Time: 07/01/24  3:59 AM   Result Value Ref Range    Sodium 142 136 - 145 mmol/L    Potassium 3.6 3.5 - 5.1 mmol/L    Chloride 108 95 - 110 mmol/L    CO2 24 23 - 29 mmol/L    Glucose 97 70 - 110 mg/dL    BUN 15 8 - 23 mg/dL     Creatinine 1.0 0.5 - 1.4 mg/dL    Calcium 9.1 8.7 - 10.5 mg/dL    Total Protein 5.8 (L) 6.0 - 8.4 g/dL    Albumin 3.5 3.5 - 5.2 g/dL    Total Bilirubin 0.7 0.1 - 1.0 mg/dL    Alkaline Phosphatase 67 55 - 135 U/L    AST 75 (H) 10 - 40 U/L    ALT 82 (H) 10 - 44 U/L    eGFR 58.4 (A) >60 mL/min/1.73 m^2    Anion Gap 10 8 - 16 mmol/L     Imaging Results              MRI Brain W WO Contrast (Final result)  Result time 06/25/24 04:11:58      Final result by Hugh Godwin MD (06/25/24 04:11:58)                   Impression:      Motion artifact slightly limits examination.    No acute intracranial process specifically no acute intracranial hemorrhage or acute infarct.    Electronically signed by resident: Bethany Novak  Date:    06/25/2024  Time:    03:41    Electronically signed by: Hugh Godwin MD  Date:    06/25/2024  Time:    04:11               Narrative:    EXAMINATION:  MRI BRAIN W WO CONTRAST    CLINICAL HISTORY:  Mental status change, unknown cause;    TECHNIQUE:  Multiplanar multisequence MR imaging of the brain was performed before and after the administration of 5 mL Gadavist intravenous contrast.    COMPARISON:  CT head 06/24/2024.    FINDINGS:  Motion artifact slightly limits examination.    Mild generalized cerebral atrophy compensatory enlargement of the ventricles and subarachnoid spaces.  Few scattered punctate foci of T2/FLAIR signal hyperintensity in the supratentorial white matter without corresponding diffusion signal abnormality enhancement which is nonspecific and may be sequela of chronic small vessel ischemic change.    No diffusion restriction to indicate acute infarction.  No intraparenchymal hemorrhage, edema or mass.No abnormal postcontrast parenchymal or leptomeningeal enhancement.    Ventricles are stable in size and configuration for age.  No hydrocephalus or midline shift.  Basal cisterns are patent.    No extra-axial blood or fluid collections.    The T2 skull base flow voids are  preserved.    Bone marrow signal intensity unremarkable.    Fluid signal in the sphenoid sinus, otherwise the paranasal sinuses are unremarkable mastoid air cells are unremarkable.                                       X-Ray Abdomen AP 1 View (Final result)  Result time 06/25/24 01:08:27      Final result by Hugh Godwin MD (06/25/24 01:08:27)                   Impression:      No unexpected metallic foreign body identified in the field of view.      Electronically signed by: Hugh Godwin MD  Date:    06/25/2024  Time:    01:08               Narrative:    EXAMINATION:  XR ABDOMEN AP 1 VIEW    CLINICAL HISTORY:  for MRI scan;    TECHNIQUE:  Single AP View of the abdomen was performed.    COMPARISON:  None.    FINDINGS:  Cardiac wires overlie the chest and abdomen.  Bowel gas pattern is unremarkable.  Calcifications overlie the pelvis.  No unexpected metallic foreign body identified in the field of view.                                       CT Head Without Contrast (Final result)  Result time 06/24/24 17:49:41      Final result by Mane Hsieh MD (06/24/24 17:49:41)                   Impression:      No acute intracranial process.  Additional evaluation with MRI of the brain may be obtained, as clinically warranted.    Changes of chronic vessel ischemic disease and cerebral volume loss.      Electronically signed by: Mane Hsieh MD  Date:    06/24/2024  Time:    17:49               Narrative:    EXAMINATION:  CT HEAD WITHOUT CONTRAST    CLINICAL HISTORY:  Mental status change, unknown cause;    TECHNIQUE:  Low dose axial images were obtained through the head.  Coronal and sagittal reformations were also performed. Contrast was not administered.    COMPARISON:  04/15/2024.    FINDINGS:  The subcutaneous tissues are unremarkable.  The bony calvarium is intact.  The paranasal sinuses are unremarkable.  The mastoid air cells are clear.  The orbits and intraorbital contents are within normal limits.    The  craniocervical junction is intact.  The sellar and parasellar structures are unremarkable.  There is no evidence of intracranial hemorrhage.  The ventricles and sulci are prominent, consistent cerebral volume loss.  There are hypodensities within the periventricular and subcortical white matter.  The gray-white differentiation is maintained.  There is no dense vessel sign.  There is no evidence of mass effect.                                       X-Ray Chest AP Portable (Final result)  Result time 06/24/24 17:59:50      Final result by Jarrett Stewart MD (06/24/24 17:59:50)                   Impression:      1. Chronic appearing interstitial findings, no large focal consolidation.  Correlation with any history of COPD/emphysema.      Electronically signed by: Jarrett Stewart MD  Date:    06/24/2024  Time:    17:59               Narrative:    EXAMINATION:  XR CHEST AP PORTABLE    CLINICAL HISTORY:  AMS;    TECHNIQUE:  Single frontal view of the chest was performed.    COMPARISON:  04/15/2024    FINDINGS:  The cardiomediastinal silhouette is not enlarged.  There is no pleural effusion.  The trachea is midline.  The lungs are symmetrically expanded bilaterally with coarse interstitial attenuation bilaterally, similar to the previous examination..  No large focal consolidation seen.  There is no pneumothorax.  The osseous structures are remarkable for degenerative change..

## 2024-07-01 NOTE — PLAN OF CARE
"CM spoke with pt in room, she states she is agreeable to go to nursing home if she can take her dogs.  CM instructed that NH's do not accept dogs.    Per Dr. Charles, he spoke with her after CM spoke with her.  He states that She is now willing to go to nursing home as a "temporary measure" until she can organize her finances and hire additional help at the house.     2:33 PM  CM spoke with pt's son Jose Alejandro Dougherty [phone].  Instructed that pt had been accepted by [facility] and [facility]'s.  He states he would probably choose [facility], but hasn't had a chance to check either one out.   has sent 2 mos worth of bank statements to CM via email.  He was unable to find the third one and has no legal authority to request bank statements on her behalf. He said he was told by [facility] doctors that the  could get her PEC'd in order to have her go to the NH. He's concerned that if he signs paperwork, since he has no legal authority on her behalf, once she checks in, she could just check herself out of her own volition, and he doesn't want to do that. He wants the  to start the interdiction process. Medical team notified.    MEGHAN CantuN, BS, RN, CCM      "

## 2024-07-01 NOTE — PROGRESS NOTES
"CONSULTATION LIAISON PSYCHIATRY PROGRESS NOTE    Patient Name: Mohini Dougherty  MRN: [id number]  Patient Class: IP- Inpatient  Admission Date: 6/24/2024  Attending Physician: Gil Ch MD      SUBJECTIVE:   Mohini Dougherty is a 78 y.o. female with no past psychiatric history or past pertinent medical history. She presents to the ED/admitted to the hospital for Encephalopathy    Psychiatry consulted for "agitation, fighting staff, recs for behavioral control medications."    No behavioral PRNs given overnight.    Today, pt sitting up in bedside chair. Alert and oriented x3; still disoriented to situation, though improving. Calm, cooperative, and pleasant during today's interview. She has gotten her clothes back, which she says makes her feel much better and more human. She mentions that she's doing "alright" and that she is being sent to go "somewhere on the [facility]" for a while until she can figure something else out. She is eager to get back together with her dogs, Aaliyah and Ben, as well as her horse Casa. She mentions that her dogs are currently being taken care of at a [facility] on [facility] and are being kept together. She is willing to cooperate on disposition as is and hopefully get to a place where she can get back together with her pets. She is cooperative and is able to maintain attention to our conversation.        OBJECTIVE:    Mental Status Exam:  General Appearance: appears stated age, well developed and nourished, adequately groomed and appropriately dressed, in no acute distress  Behavior: normal; cooperative; reasonably friendly, pleasant, and polite; appropriate eye-contact; under good behavioral control  Involuntary Movements and Motor Activity: no abnormal involuntary movements noted; no tics, no tremors, no akathisia, no dystonia, no evidence of tardive dyskinesia; no psychomotor agitation or retardation  Gait and Station: intact  Speech and Language: intact; normal rate, rhythm, volume, " "tone, and pitch; conversational, spontaneous, and coherent; speaks and understands English proficiently and fluently; repeats words and phrases, no word finding difficulties are noted  Mood: "Alright"  Affect: euthymic, reactive, appropriate to situation and context  Thought Process and Associations: intact; linear, goal-directed, organized, and logical; no loosening of associations noted  Thought Content and Perceptions:: no suicidal or homicidal ideation, no auditory or visual hallucinations, no paranoid ideation, no ideas of reference, no evidence of delusions or psychosis  Sensorium and Orientation: oriented fully (to person, place, and time)  Recent and Remote Memory: mild impairments noted  Attention and Concentration: attentive to conversation, able to spell WORLD forwards, unable to spell WORLD backwards  Fund of Knowledge: incorrectly identifies the last five Presidents of the [place] (in order)  Insight: limited/partial awareness of illness  Judgment: impaired    CAM ICU positive? no      ASSESSMENT & RECOMMENDATIONS   Unspecified Major Neurocognitive Disorder  R/O acute encephalopathy, resolving     Dementia  PSYCH MEDICATIONS  PRN - Zyprexa 2.5 mg PO or IM q8hrs PRN for non-redirectable agitation     DELIRIUM  DELIRIUM BEHAVIOR MANAGEMENT  PLEASE utilize CHEMICAL restraints with PRN meds first for agitation. Minimize use of PHYSICAL restraints OR have periods of being out of physical restraints if possible.  Keep window shades open and room lit during day and room dim at night in order to promote normal sleep-wake cycles  Encourage family at bedside. [place] patient often to situation, location, date.  Continue to Limit or Discontinue use of Narcotics, Benzos and Anti-cholinergic medications as they may worsen delirium.  Continue medical workup for causative etiology of Delirium.      RISK ASSESSMENT  NO NEED FOR PEC patient NOT in any imminent danger of hurting self or others and not gravely " disabled.      FOLLOW UP  Will sign off. Please re-consult if any new issues arise     DISPOSITION - once medically cleared:   Defer to medical team    Please contact ON CALL psychiatry service (24/7) for any acute issues that may arise.    ARELIS Han   Psychiatry  [facility]  7/1/2024 10:17 AM        --------------------------------------------------------------------------------------------------------------------------------------------------------------------------------------------------------------------------------------    CONTINUED OBJECTIVE clinical data & findings reviewed and noted for above decision making    Current Medications:   Scheduled Meds:    enoxparin  40 mg Subcutaneous Daily    rivastigmine  1 patch Transdermal Daily     PRN Meds:   Current Facility-Administered Medications:     acetaminophen, 650 mg, Oral, Q8H PRN    acetaminophen, 650 mg, Oral, Q4H PRN    glucagon (human recombinant), 1 mg, Intramuscular, PRN    glucose, 16 g, Oral, PRN    glucose, 24 g, Oral, PRN    melatonin, 6 mg, Oral, Nightly PRN    naloxone, 0.02 mg, Intravenous, PRN    OLANZapine, 2.5 mg, Intramuscular, Once PRN    polyethylene glycol, 17 g, Oral, PRN    prochlorperazine, 5 mg, Intravenous, Q6H PRN    sodium chloride 0.9%, 2 mL, Intravenous, Q12H PRN    Allergies:   Review of patient's allergies indicates:  No Known Allergies    Vitals  Vitals:    07/01/24 0729   BP: (!) 142/79   Pulse: 87   Resp: 18   Temp: 98.6 °F (37 °C)       Labs/Imaging/Studies:  Recent Results (from the past 24 hour(s))   CBC auto differential    Collection Time: 07/01/24  3:59 AM   Result Value Ref Range    WBC 10.27 3.90 - 12.70 K/uL    RBC 3.66 (L) 4.00 - 5.40 M/uL    Hemoglobin 11.7 (L) 12.0 - 16.0 g/dL    Hematocrit 35.8 (L) 37.0 - 48.5 %    MCV 98 82 - 98 fL    MCH 32.0 (H) 27.0 - 31.0 pg    MCHC 32.7 32.0 - 36.0 g/dL    RDW 13.2 11.5 - 14.5 %    Platelets 266 150 - 450 K/uL    MPV 10.5 9.2 - 12.9 fL     Immature Granulocytes 0.3 0.0 - 0.5 %    Gran # (ANC) 6.8 1.8 - 7.7 K/uL    Immature Grans (Abs) 0.03 0.00 - 0.04 K/uL    Lymph # 2.1 1.0 - 4.8 K/uL    Mono # 0.7 0.3 - 1.0 K/uL    Eos # 0.6 (H) 0.0 - 0.5 K/uL    Baso # 0.07 0.00 - 0.20 K/uL    nRBC 0 0 /100 WBC    Gran % 66.1 38.0 - 73.0 %    Lymph % 20.1 18.0 - 48.0 %    Mono % 7.1 4.0 - 15.0 %    Eosinophil % 5.7 0.0 - 8.0 %    Basophil % 0.7 0.0 - 1.9 %    Differential Method Automated    Comprehensive metabolic panel    Collection Time: 07/01/24  3:59 AM   Result Value Ref Range    Sodium 142 136 - 145 mmol/L    Potassium 3.6 3.5 - 5.1 mmol/L    Chloride 108 95 - 110 mmol/L    CO2 24 23 - 29 mmol/L    Glucose 97 70 - 110 mg/dL    BUN 15 8 - 23 mg/dL    Creatinine 1.0 0.5 - 1.4 mg/dL    Calcium 9.1 8.7 - 10.5 mg/dL    Total Protein 5.8 (L) 6.0 - 8.4 g/dL    Albumin 3.5 3.5 - 5.2 g/dL    Total Bilirubin 0.7 0.1 - 1.0 mg/dL    Alkaline Phosphatase 67 55 - 135 U/L    AST 75 (H) 10 - 40 U/L    ALT 82 (H) 10 - 44 U/L    eGFR 58.4 (A) >60 mL/min/1.73 m^2    Anion Gap 10 8 - 16 mmol/L     Imaging Results              MRI Brain W WO Contrast (Final result)  Result time 06/25/24 04:11:58      Final result by Hugh Godwin MD (06/25/24 04:11:58)                   Impression:      Motion artifact slightly limits examination.    No acute intracranial process specifically no acute intracranial hemorrhage or acute infarct.    Electronically signed by resident: Bethany Novak  Date:    06/25/2024  Time:    03:41    Electronically signed by: Hugh Godwin MD  Date:    06/25/2024  Time:    04:11               Narrative:    EXAMINATION:  MRI BRAIN W WO CONTRAST    CLINICAL HISTORY:  Mental status change, unknown cause;    TECHNIQUE:  Multiplanar multisequence MR imaging of the brain was performed before and after the administration of 5 mL Gadavist intravenous contrast.    COMPARISON:  CT head 06/24/2024.    FINDINGS:  Motion artifact slightly limits examination.    Mild  generalized cerebral atrophy compensatory enlargement of the ventricles and subarachnoid spaces.  Few scattered punctate foci of T2/FLAIR signal hyperintensity in the supratentorial white matter without corresponding diffusion signal abnormality enhancement which is nonspecific and may be sequela of chronic small vessel ischemic change.    No diffusion restriction to indicate acute infarction.  No intraparenchymal hemorrhage, edema or mass.No abnormal postcontrast parenchymal or leptomeningeal enhancement.    Ventricles are stable in size and configuration for age.  No hydrocephalus or midline shift.  Basal cisterns are patent.    No extra-axial blood or fluid collections.    The T2 skull base flow voids are preserved.    Bone marrow signal intensity unremarkable.    Fluid signal in the sphenoid sinus, otherwise the paranasal sinuses are unremarkable mastoid air cells are unremarkable.                                       X-Ray Abdomen AP 1 View (Final result)  Result time 06/25/24 01:08:27      Final result by Hugh Godwin MD (06/25/24 01:08:27)                   Impression:      No unexpected metallic foreign body identified in the field of view.      Electronically signed by: Hugh Godwin MD  Date:    06/25/2024  Time:    01:08               Narrative:    EXAMINATION:  XR ABDOMEN AP 1 VIEW    CLINICAL HISTORY:  for MRI scan;    TECHNIQUE:  Single AP View of the abdomen was performed.    COMPARISON:  None.    FINDINGS:  Cardiac wires overlie the chest and abdomen.  Bowel gas pattern is unremarkable.  Calcifications overlie the pelvis.  No unexpected metallic foreign body identified in the field of view.                                       CT Head Without Contrast (Final result)  Result time 06/24/24 17:49:41      Final result by Mane Hsieh MD (06/24/24 17:49:41)                   Impression:      No acute intracranial process.  Additional evaluation with MRI of the brain may be obtained, as  clinically warranted.    Changes of chronic vessel ischemic disease and cerebral volume loss.      Electronically signed by: Mane Hsieh MD  Date:    06/24/2024  Time:    17:49               Narrative:    EXAMINATION:  CT HEAD WITHOUT CONTRAST    CLINICAL HISTORY:  Mental status change, unknown cause;    TECHNIQUE:  Low dose axial images were obtained through the head.  Coronal and sagittal reformations were also performed. Contrast was not administered.    COMPARISON:  04/15/2024.    FINDINGS:  The subcutaneous tissues are unremarkable.  The bony calvarium is intact.  The paranasal sinuses are unremarkable.  The mastoid air cells are clear.  The orbits and intraorbital contents are within normal limits.    The craniocervical junction is intact.  The sellar and parasellar structures are unremarkable.  There is no evidence of intracranial hemorrhage.  The ventricles and sulci are prominent, consistent cerebral volume loss.  There are hypodensities within the periventricular and subcortical white matter.  The gray-white differentiation is maintained.  There is no dense vessel sign.  There is no evidence of mass effect.                                       X-Ray Chest AP Portable (Final result)  Result time 06/24/24 17:59:50      Final result by Jarrett Stewart MD (06/24/24 17:59:50)                   Impression:      1. Chronic appearing interstitial findings, no large focal consolidation.  Correlation with any history of COPD/emphysema.      Electronically signed by: Jarrett Stewart MD  Date:    06/24/2024  Time:    17:59               Narrative:    EXAMINATION:  XR CHEST AP PORTABLE    CLINICAL HISTORY:  AMS;    TECHNIQUE:  Single frontal view of the chest was performed.    COMPARISON:  04/15/2024    FINDINGS:  The cardiomediastinal silhouette is not enlarged.  There is no pleural effusion.  The trachea is midline.  The lungs are symmetrically expanded bilaterally with coarse interstitial attenuation  bilaterally, similar to the previous examination..  No large focal consolidation seen.  There is no pneumothorax.  The osseous structures are remarkable for degenerative change..
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
## 2024-07-01 NOTE — PT/OT/SLP PROGRESS
"Physical Therapy Treatment    Patient Name:  Mohini Dougherty   MRN:  [id number]    Recommendations:     Discharge Recommendations: Moderate Intensity Therapy  Discharge Equipment Recommendations: bath bench  Barriers to discharge: Decreased caregiver support and high fall risk    Assessment:     Mohini Dougherty is a 78 y.o. female admitted with a medical diagnosis of Encephalopathy.  She presents with the following impairments/functional limitations: weakness, impaired endurance, impaired functional mobility, impaired balance, gait instability, impaired cognition, decreased safety awareness Pt was found up in chair fully dressed and watching tv. Pt was agreeable to participate in therapy and was excited to go for a walk. Pt demonstrated increased walking tolerance and improved gait pattern this session. Pt does demonstrate decreased cognition and safety awareness. Pt required frequent VC's on sequencing and directions when ambulating. Pt would benefit from further care at this time.     Rehab Prognosis: Good and Fair; patient would benefit from acute skilled PT services to address these deficits and reach maximum level of function.    Recent Surgery: * No surgery found *      Plan:     During this hospitalization, patient to be seen 4 x/week to address the identified rehab impairments via gait training, therapeutic activities, neuromuscular re-education, therapeutic exercises and progress toward the following goals:    Plan of Care Expires:  07/26/24    Subjective     Chief Complaint: "I want to know if my car is fixed so when I get out of here I can drive. I don't have any family to help me out."  Patient/Family Comments/goals: To go home  Pain/Comfort:  Pain Rating 1: 0/10      Objective:     Communicated with nursing prior to session.  Patient found up in chair with room air  upon PT entry to room.     General Precautions: Standard, fall  Orthopedic Precautions: N/A  Braces: N/A  Respiratory Status: Room air   "   Functional Mobility:  Transfers:     Sit to Stand:  stand by assistance with rolling walker  Gait: ~450 ft with RW and CGA.  Good gait pattern but poor AD mechanics. Required VC's to stay within walker and not to pick it up. Pt needed VC's for directions and had difficult time managing obstacles in hallway.   Balance: static Standing SBA; dynamic Standing CGA      AM-PAC 6 CLICK MOBILITY  Turning over in bed (including adjusting bedclothes, sheets and blankets)?: 4  Sitting down on and standing up from a chair with arms (e.g., wheelchair, bedside commode, etc.): 4  Moving from lying on back to sitting on the side of the bed?: 3  Moving to and from a bed to a chair (including a wheelchair)?: 3  Need to walk in hospital room?: 4  Climbing 3-5 steps with a railing?: 2  Basic Mobility Total Score: 20       Treatment & Education:  Patient provided with daily orientation and goals of this PT session. They were educated to call for assistance and to transfer with hospital staff only.  Also, pt was educated on the effects of prolonged immobility and the importance of performing OOB activity and exercises to promote healing and reduce recovery time.    Patient left up in chair with all lines intact and call button in reach..    GOALS:   Multidisciplinary Problems       Physical Therapy Goals          Problem: Physical Therapy    Goal Priority Disciplines Outcome Goal Variances Interventions   Physical Therapy Goal     PT, PT/OT Progressing     Description: Goals to be met by:      Patient will increase functional independence with mobility by performin. Supine to sit with Modified Gregory  2. Sit to supine with Modified Gregory  3. Sit to stand transfer with Modified Gregory  4. Ambulated 200' with LRAD and stand by assistance.                          Time Tracking:     PT Received On: 24  PT Start Time: 1121     PT Stop Time: 1137  PT Total Time (min): 16 min     Billable Minutes: Gait  Training 16    Treatment Type: Treatment  PT/PTA: PTA     Number of PTA visits since last PT visit: 2     07/01/2024

## 2024-07-01 NOTE — SUBJECTIVE & OBJECTIVE
Interval History: NAEO. Pt HDS in NAD. Pt more open to idea of nursing home placement while she organized her finances to look for additional help at home.    Review of Systems  Objective:     Vital Signs (Most Recent):  Temp: 98.3 °F (36.8 °C) (07/01/24 1120)  Pulse: 94 (07/01/24 1120)  Resp: 18 (07/01/24 1120)  BP: (!) 112/57 (07/01/24 1120)  SpO2: 97 % (07/01/24 1120) Vital Signs (24h Range):  Temp:  [98.1 °F (36.7 °C)-98.6 °F (37 °C)] 98.3 °F (36.8 °C)  Pulse:  [86-99] 94  Resp:  [18] 18  SpO2:  [97 %-99 %] 97 %  BP: (108-144)/(57-95) 112/57     Weight: 49.9 kg (110 lb)  Body mass index is 20.12 kg/m².  No intake or output data in the 24 hours ending 07/01/24 1423      Physical Exam  Vitals and nursing note reviewed.   Constitutional:       Appearance: Normal appearance. She is normal weight. She is not ill-appearing.   HENT:      Head: Normocephalic and atraumatic.      Nose: Nose normal.      Mouth/Throat:      Mouth: Mucous membranes are moist.      Pharynx: Oropharynx is clear.   Eyes:      Pupils: Pupils are equal, round, and reactive to light.   Cardiovascular:      Rate and Rhythm: Normal rate and regular rhythm.      Pulses: Normal pulses.      Heart sounds: Normal heart sounds. No murmur heard.  Pulmonary:      Effort: Pulmonary effort is normal. No respiratory distress.      Breath sounds: No wheezing or rales.   Abdominal:      General: Abdomen is flat. Bowel sounds are normal.      Palpations: Abdomen is soft.   Musculoskeletal:         General: Normal range of motion.      Right lower leg: No edema.      Left lower leg: No edema.   Skin:     General: Skin is warm and dry.   Neurological:      General: No focal deficit present.      Mental Status: She is disoriented.      Cranial Nerves: No dysarthria or facial asymmetry.   Psychiatric:         Attention and Perception: Attention normal.         Mood and Affect: Mood is anxious. Affect is angry.         Speech: Speech normal.         Behavior:  Behavior is agitated.         Thought Content: Thought content is delusional (presecutory thoughts).             Significant Labs: All pertinent labs within the past 24 hours have been reviewed.    Significant Imaging: I have reviewed all pertinent imaging results/findings within the past 24 hours.

## 2024-07-01 NOTE — NURSING
Pt's son, Sukumar, brought up belongings to pt.  Belongings include:  12 dollars, key to her home, key to her barn, 's license, debit card with her name on it, cell phone, cell phone , jeans, 2 shirts, and 1 pair of brown shoes.  Belongings currently secured with pt.

## 2024-07-02 LAB
ALBUMIN SERPL BCP-MCNC: 3.5 G/DL (ref 3.5–5.2)
ALP SERPL-CCNC: 73 U/L (ref 55–135)
ALT SERPL W/O P-5'-P-CCNC: 70 U/L (ref 10–44)
ANION GAP SERPL CALC-SCNC: 9 MMOL/L (ref 8–16)
AST SERPL-CCNC: 53 U/L (ref 10–40)
BASOPHILS # BLD AUTO: 0.08 K/UL (ref 0–0.2)
BASOPHILS NFR BLD: 0.8 % (ref 0–1.9)
BILIRUB SERPL-MCNC: 0.4 MG/DL (ref 0.1–1)
BUN SERPL-MCNC: 21 MG/DL (ref 8–23)
CALCIUM SERPL-MCNC: 9.1 MG/DL (ref 8.7–10.5)
CHLORIDE SERPL-SCNC: 108 MMOL/L (ref 95–110)
CO2 SERPL-SCNC: 25 MMOL/L (ref 23–29)
CREAT SERPL-MCNC: 1 MG/DL (ref 0.5–1.4)
DIFFERENTIAL METHOD BLD: ABNORMAL
EOSINOPHIL # BLD AUTO: 0.6 K/UL (ref 0–0.5)
EOSINOPHIL NFR BLD: 5.8 % (ref 0–8)
ERYTHROCYTE [DISTWIDTH] IN BLOOD BY AUTOMATED COUNT: 13.2 % (ref 11.5–14.5)
EST. GFR  (NO RACE VARIABLE): 58.4 ML/MIN/1.73 M^2
GLUCOSE SERPL-MCNC: 75 MG/DL (ref 70–110)
HCT VFR BLD AUTO: 37.9 % (ref 37–48.5)
HGB BLD-MCNC: 12.1 G/DL (ref 12–16)
IMM GRANULOCYTES # BLD AUTO: 0.04 K/UL (ref 0–0.04)
IMM GRANULOCYTES NFR BLD AUTO: 0.4 % (ref 0–0.5)
LYMPHOCYTES # BLD AUTO: 2.4 K/UL (ref 1–4.8)
LYMPHOCYTES NFR BLD: 25 % (ref 18–48)
MCH RBC QN AUTO: 31.8 PG (ref 27–31)
MCHC RBC AUTO-ENTMCNC: 31.9 G/DL (ref 32–36)
MCV RBC AUTO: 100 FL (ref 82–98)
MONOCYTES # BLD AUTO: 0.7 K/UL (ref 0.3–1)
MONOCYTES NFR BLD: 6.8 % (ref 4–15)
NEUTROPHILS # BLD AUTO: 6 K/UL (ref 1.8–7.7)
NEUTROPHILS NFR BLD: 61.2 % (ref 38–73)
NRBC BLD-RTO: 0 /100 WBC
PLATELET # BLD AUTO: 178 K/UL (ref 150–450)
PMV BLD AUTO: 11.2 FL (ref 9.2–12.9)
POTASSIUM SERPL-SCNC: 4.4 MMOL/L (ref 3.5–5.1)
PROT SERPL-MCNC: 6 G/DL (ref 6–8.4)
RBC # BLD AUTO: 3.8 M/UL (ref 4–5.4)
SODIUM SERPL-SCNC: 142 MMOL/L (ref 136–145)
WBC # BLD AUTO: 9.73 K/UL (ref 3.9–12.7)

## 2024-07-02 PROCEDURE — 97530 THERAPEUTIC ACTIVITIES: CPT | Mod: CO

## 2024-07-02 PROCEDURE — 97112 NEUROMUSCULAR REEDUCATION: CPT | Mod: CQ

## 2024-07-02 PROCEDURE — 25000003 PHARM REV CODE 250

## 2024-07-02 PROCEDURE — 80053 COMPREHEN METABOLIC PANEL: CPT

## 2024-07-02 PROCEDURE — 85025 COMPLETE CBC W/AUTO DIFF WBC: CPT

## 2024-07-02 PROCEDURE — 11000001 HC ACUTE MED/SURG PRIVATE ROOM

## 2024-07-02 PROCEDURE — 36415 COLL VENOUS BLD VENIPUNCTURE: CPT

## 2024-07-02 RX ADMIN — RIVASTIGMINE 1 PATCH: 4.6 PATCH, EXTENDED RELEASE TRANSDERMAL at 09:07

## 2024-07-02 RX ADMIN — Medication 6 MG: at 09:07

## 2024-07-02 NOTE — PLAN OF CARE
Problem: Adult Inpatient Plan of Care  Goal: Plan of Care Review  Outcome: Progressing     Problem: Adult Inpatient Plan of Care  Goal: Patient-Specific Goal (Individualized)  Outcome: Progressing     Problem: Adult Inpatient Plan of Care  Goal: Absence of Hospital-Acquired Illness or Injury  Outcome: Progressing     Problem: Wound  Goal: Skin Health and Integrity  Outcome: Progressing     Problem: Wound  Goal: Optimal Wound Healing  Outcome: Progressing     Problem: Skin Injury Risk Increased  Goal: Skin Health and Integrity  Outcome: Progressing

## 2024-07-02 NOTE — PLAN OF CARE
NICHOLAS uploaded March and May bank statements to .    1:20 PM  CM spoke with pt in room.  She wanted to speak with her son Jose Alejandro about getting bank statements.  We called Jose Alejandro [phone] together to discuss, CM informed Jose Alejandro that we would be calling [facility] together to get statements.  Jose Alejandro asked if I had followed up on our conversation yesterday (interdiction process, getting pt CEC'd, him being able to make decisions on pt's behalf without medical POA).  CM requested medical team to call son to explain process further.  Dr. Charles states he will call Jose Alejandro.    CM asked Jose Alejandro if he'd made a choice regarding which facility he wants pt to go to - he states the one in [place] ([facility]) would be most convenient.  Pt nods in agreement.  NICHOLAS spoke with Karlee at [facility] [phone] and informed that family wants to proceed with [facility].    NICHOLAS called [facility] [phone] together with pt, spoke with Rachid, requested bank statements to be emailed to NICHOLAS.  Rachid states that he will.  Statement receipt pending.    2:13 PM  NICHOLAS has not received emailed bank statements.  NICHOLAS called [facility], asked for Rachid; Jules states there's no Rachid there, but there is an Dereck.  He is at lunch right now.  Jules will give Dereck a message to call NICHOLAS back.    2:45 PM  CM called [facility] back.  LVM with Dereck requesting call back.    3:45 PM  CM called [facility] back.  LVM with Dereck requesting call back.    MARTA Cantu, BS, RN, CCM

## 2024-07-02 NOTE — PT/OT/SLP PROGRESS
"Physical Therapy Treatment    Patient Name:  Mohini Dougherty   MRN:  [id number]    Recommendations:     Discharge Recommendations: Moderate Intensity Therapy  Discharge Equipment Recommendations: bath bench  Barriers to discharge: Decreased caregiver support    Assessment:     Mohini Dougherty is a 78 y.o. female admitted with a medical diagnosis of Encephalopathy.  She presents with the following impairments/functional limitations: impaired cognition, decreased safety awareness, gait instability Pt was found up in chair with sitter present and willing to participate in skilled therapy. Pt demonstrated improving functional mobility and balance but is still at risk for falling when not supervised. Pt's cognitive deficits are primary reason for increased risk of falling at this time.     Rehab Prognosis: Good; patient would benefit from acute skilled PT services to address these deficits and reach maximum level of function.    Recent Surgery: * No surgery found *      Plan:     During this hospitalization, patient to be seen 4 x/week to address the identified rehab impairments via gait training, therapeutic activities, therapeutic exercises, neuromuscular re-education and progress toward the following goals:    Plan of Care Expires:  07/26/24    Subjective     Chief Complaint: "I have to get home to my dogs and horse."  Patient/Family Comments/goals: To go home  Pain/Comfort:  Pain Rating 1: 0/10      Objective:     Communicated with nursing (Jesus) prior to session.  Patient found up in chair with  (no lines, avasys camera, sitter present) upon PT entry to room.     General Precautions: Standard, fall  Orthopedic Precautions: N/A  Braces: N/A  Respiratory Status: Room air     Functional Mobility:  Transfers:     Sit to Stand:  supervision with no AD x15 trials including incorporating step forward after standing to challenge balance  Balance: Performed static and dynamic standing activities w/o UE support with no LOB " or assistance need  Gandhi Score: 44/56       AM-PAC 6 CLICK MOBILITY  Turning over in bed (including adjusting bedclothes, sheets and blankets)?: 4  Sitting down on and standing up from a chair with arms (e.g., wheelchair, bedside commode, etc.): 4  Moving from lying on back to sitting on the side of the bed?: 4  Moving to and from a bed to a chair (including a wheelchair)?: 4  Need to walk in hospital room?: 3  Climbing 3-5 steps with a railing?: 3  Basic Mobility Total Score: 22       Treatment & Education:  Patient provided with daily orientation and goals of this PT session. They were educated to call for assistance and to transfer with hospital staff only.  Also, pt was educated on the progress she has made in skilled PT and current level of assistance being provided during sessions.     Patient left up in chair with all lines intact, call button in reach, and sitter present..    GOALS:   Multidisciplinary Problems       Physical Therapy Goals          Problem: Physical Therapy    Goal Priority Disciplines Outcome Goal Variances Interventions   Physical Therapy Goal     PT, PT/OT Progressing     Description: Goals to be met by:      Patient will increase functional independence with mobility by performin. Supine to sit with Modified [place]  2. Sit to supine with Modified [place]  3. Sit to stand transfer with Modified [place] MET 2024  4. Ambulated 200' with LRAD and stand by assistance. MET 2024    Goals 3-4 MET 2024                         Time Tracking:     PT Received On: 24  PT Start Time: 1111     PT Stop Time: 1129  PT Total Time (min): 18 min     Billable Minutes: Neuromuscular Re-education 18    Treatment Type: Treatment  PT/PTA: PTA     Number of PTA visits since last PT visit: 3     2024

## 2024-07-02 NOTE — ASSESSMENT & PLAN NOTE
"78-year-old lady here with encephalopathy.  At her baseline, she is normally "authoritative" demanding things, speaking about [employer] acts.  However unclear her mental status as her son does not keep close contact with her.  He does not really know if her mentation has gotten progressively worse or what is her baseline.  Normally she is able to go to the [place] daily to see her horse.  Overall workup was only notable for a leukocytosis with granulocytes with no clear source or systemic response. Will treat 1 time dose of ceftriaxone see if improvement in her symptoms.    Unclear etiology at this time, but will rule out reversible causes of acute encephalopathy, such as infectious, pending blood cultures/RPR, vitamin deficiencies (B1, B3, B9, B12), less likely meningitis as patient has full range of motion of her neck, can consider LP in the morning to r/o encephalitis vs menigitis?  Utox.pending    Differentials include but not limited to progressing age-related dementia, vitamin deficiencies encephalitis, UTI?, less likely stroke, toxicities,     Results:  WBCs 13  CMP unremarkable  Lactate normal,  BNP mildly elevated at 115, troponin negative  EKG was normal sinus rhythm, no ischemic changes  TSH WNL  Protocol normal  UA positive for 2+ ketones trace protein no nitrites    CT head showed no acute intracranial process  Chest x-ray showed no focal consolidation    Workup is notable for non-acute MRI Brain with mild/mod generalized atrophy and microvascular disease, grossly unremarkable CBC, CMP, UA. WNL B12 (low normal), and WNL TSH.    Patient very resistant to discharging to SNF or any facility. Per our team and Psychiatry patient does not show decision making capacity. Son prefers SNF or facility placement. Per psychiatry, PEC evaluation for psych hospital admission is not warranted as they believe this is not a psychiatric condition but rather neuro-cognitive decline.     Plan:  - f/u with Neuro recs, " "likely underlying dementia driving presentation (type can't be determined in inpatient setting)  - Behavioral neurology referral per neurology   - Upon further discussions with son Jose Alejandro, will discuss with legal regarding how to go about appointing him as POA as next of kin. Due to concern of lack of capacity   - Completed 3 day course of CTX with no improvement in mentation, unlikely to be UTI driving presentation. Treated for acute cystitis   - starting Rivastigmine 4.6 mg  - psych consult to determine capacity and agitation recs; determined not having capacity by psychiatry, noting "her dogs will take care of her" if she goes home   - Coordinate with CM for placement in NH due to lack of capacity \  - persistent concern for appropriateness of discharge to home; will reach out to legal team for their assistance with this.     "
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
## 2024-07-02 NOTE — PLAN OF CARE
Problem: Wound  Goal: Absence of Infection Signs and Symptoms  Outcome: Progressing     Problem: Fall Injury Risk  Goal: Absence of Fall and Fall-Related Injury  Outcome: Progressing   Sitter and camera in the room. Pt remained calm and cooperative throughout the day. Bed locked and in lowest position. Call light in reach.
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
## 2024-07-02 NOTE — SUBJECTIVE & OBJECTIVE
Interval History: Working with  to pursue nursing home placement.    Review of Systems  Objective:     Vital Signs (Most Recent):  Temp: 98.4 °F (36.9 °C) (07/02/24 1553)  Pulse: 85 (07/02/24 1553)  Resp: 18 (07/02/24 1553)  BP: 116/66 (07/02/24 1553)  SpO2: 95 % (07/02/24 1553) Vital Signs (24h Range):  Temp:  [98 °F (36.7 °C)-98.7 °F (37.1 °C)] 98.4 °F (36.9 °C)  Pulse:  [85-91] 85  Resp:  [17-18] 18  SpO2:  [95 %-99 %] 95 %  BP: (116-167)/(60-71) 116/66     Weight: 49.9 kg (110 lb)  Body mass index is 20.12 kg/m².    Intake/Output Summary (Last 24 hours) at 7/2/2024 1626  Last data filed at 7/1/2024 2100  Gross per 24 hour   Intake 480 ml   Output --   Net 480 ml         Physical Exam  Vitals and nursing note reviewed.   Constitutional:       Appearance: Normal appearance. She is normal weight. She is not ill-appearing.   HENT:      Head: Normocephalic and atraumatic.      Nose: Nose normal.      Mouth/Throat:      Mouth: Mucous membranes are moist.      Pharynx: Oropharynx is clear.   Eyes:      Pupils: Pupils are equal, round, and reactive to light.   Cardiovascular:      Rate and Rhythm: Normal rate and regular rhythm.      Pulses: Normal pulses.      Heart sounds: Normal heart sounds. No murmur heard.  Pulmonary:      Effort: Pulmonary effort is normal. No respiratory distress.      Breath sounds: No wheezing or rales.   Abdominal:      General: Abdomen is flat. Bowel sounds are normal.      Palpations: Abdomen is soft.   Musculoskeletal:         General: Normal range of motion.      Right lower leg: No edema.      Left lower leg: No edema.   Skin:     General: Skin is warm and dry.   Neurological:      General: No focal deficit present.      Mental Status: She is disoriented.      Cranial Nerves: No dysarthria or facial asymmetry.   Psychiatric:         Attention and Perception: Attention normal.         Mood and Affect: Mood is anxious. Affect is angry.         Speech: Speech normal.         Behavior:  Behavior is agitated.         Thought Content: Thought content is delusional (presecutory thoughts).             Significant Labs: All pertinent labs within the past 24 hours have been reviewed.    Significant Imaging: I have reviewed all pertinent imaging results/findings within the past 24 hours.

## 2024-07-02 NOTE — PLAN OF CARE
Problem: Physical Therapy  Goal: Physical Therapy Goal  Description: Goals to be met by:      Patient will increase functional independence with mobility by performin. Supine to sit with Modified [place]  2. Sit to supine with Modified [place]  3. Sit to stand transfer with Modified [place] MET 2024  4. Ambulated 200' with LRAD and stand by assistance. MET 2024    Goals 3-4 MET 2024    Outcome: Progressing

## 2024-07-02 NOTE — PT/OT/SLP PROGRESS
Occupational Therapy   Treatment    Name: Mohini Dougherty  MRN: [id number]  Admitting Diagnosis:  Encephalopathy       Recommendations:     Discharge Recommendations: Moderate Intensity Therapy  Discharge Equipment Recommendations:  bath bench  Barriers to discharge:       Assessment:     Mohini Dougherty is a 78 y.o. female with a medical diagnosis of Encephalopathy.  She presents with the following performance deficits affecting function: weakness, impaired endurance, decreased safety awareness, gait instability, impaired cognition.     Rehab Prognosis:  Good; patient would benefit from acute skilled OT services to address these deficits and reach maximum level of function.       Plan:     Patient to be seen 4 x/week to address the above listed problems via self-care/home management, therapeutic activities, therapeutic exercises, cognitive retraining  Plan of Care Expires: 07/10/24  Plan of Care Reviewed with: patient    Subjective     Patient/Family Comments/goals: to improve function  Pain/Comfort:  Pain Rating 1: 0/10  Pain Rating Post-Intervention 1: 0/10    Objective:     Communicated with: RN prior to session.  Patient found up in chair with  (no lines, avasys camera and siter present) upon OT entry to room.  A client care conference was completed by the OTR and the LAGUNAS prior to treatment by the LAGUNAS to discuss the patient's POC and current status.    General Precautions: Standard, fall    Orthopedic Precautions:N/A  Braces: N/A  Respiratory Status: Room air     Occupational Performance:     Bed Mobility:    Pt up in chair     Functional Mobility/Transfers:  Patient completed Sit <> Stand Transfer with supervision  with  no assistive device   Patient completed Toilet Transfer Step Transfer technique with stand by assistance with  no AD  Functional Mobility: pt ambulating community distances with SBA using no AD. No LOB or SOB noted.     Activities of Daily Living:  Lower Body Dressing: supervision to don/doff  boots      [facility] 6 Click ADL: 21    Treatment & Education:  Pt educated on OT POC and frequency during hospital stay.   Pt educated on importance of OOB activity to improve function and activity tolerance.  Addressed all patient questions/concerns within LAGUNAS scope of practice.     Patient left up in chair with all lines intact, call button in reach, RN notified, and sitter present    GOALS:   Multidisciplinary Problems       Occupational Therapy Goals          Problem: Occupational Therapy    Goal Priority Disciplines Outcome Interventions   Occupational Therapy Goal     OT, PT/OT Progressing    Description: Goals to be met by: 7/10/24     Patient will increase functional independence with ADLs by performing:    LE Dressing with Supervision.  Grooming while standing at sink with Supervision.  Toileting from toilet with Supervision for hygiene and clothing management.   Supine to sit with Supervision.  Toilet transfer to toilet with Supervision.  [place] with BUE HEP to improve activity tolerance to complete ADLs and IADLs  Within home ambulation distances with supervision and LRAD necessary to complete ADLs.                           Time Tracking:     OT Date of Treatment: 07/02/24  OT Start Time: 1019  OT Stop Time: 1029  OT Total Time (min): 10 min    Billable Minutes:Therapeutic Activity 10    OT/ELIESER: ELIESER     Number of ELIESER visits since last OT visit: 1 7/2/2024

## 2024-07-03 PROCEDURE — 25000003 PHARM REV CODE 250

## 2024-07-03 PROCEDURE — 97530 THERAPEUTIC ACTIVITIES: CPT | Mod: CO

## 2024-07-03 PROCEDURE — 97116 GAIT TRAINING THERAPY: CPT

## 2024-07-03 PROCEDURE — 11000001 HC ACUTE MED/SURG PRIVATE ROOM

## 2024-07-03 RX ADMIN — RIVASTIGMINE 1 PATCH: 4.6 PATCH, EXTENDED RELEASE TRANSDERMAL at 09:07

## 2024-07-03 NOTE — PLAN OF CARE
Problem: Physical Therapy  Goal: Physical Therapy Goal  Description: Goals to be met by:      Patient will increase functional independence with mobility by performin. Supine to sit with Modified [place]  2. Sit to supine with Modified [place]  3. Sit to stand transfer with Modified [place] MET 2024  4. Ambulated 200' with LRAD and stand by assistance. MET 2024    Goals 3-4 MET 2024    Outcome: Met   Patient has met goals, appropriate for RN mobility protocol. She is safe to ambulate with supervision for safety due to cognitive deficits. She would benefit from 24 hr care for safety on discharge.   Gianna Aguiar, PT  7/3/2024

## 2024-07-03 NOTE — PLAN OF CARE
CM called [employer] [phone] to f/u on CM never received bank statements.  Phone rang and rang, no answer.    12:16 PM  CM spoke with pt in room per her request.  She states that she signed some papers with her son who had been in her room, she's not sure what papers were and she wants to speak with her son about it.  CM offered to call pt's son together.  Pt declined.  Pt wants to know why she's not supposed to go home, why she needs to go somewhere else upon d/c.  CM explained that it wasn't safe, MD had determined that she needs to go elsewhere.  Pt kept repeating the same questions.  Pt states she wants to wait until her  Alen comes back; he is out of town right now.      CM called [employer] [phone] [phone].  CM left message requesting call back from Dereck.  CM emailed [email or web address] and requested bank statements.    2:18 PM  NICHOLAS spoke with pt's son Jose Alejandro [phone].  Instructed that pt was concerned about the paperwork she signed.   Jose Alejandro states the paperwork was for POA, that's what he told her when she signed it. He will come by hospital at some point to give pt a copy of the letter.  CM instructed CG that pt states that she wants to wait until her  Alen comes back.  CG states that pt doesn't have  named Alen; Alen is not an , he is someone pt used to work with.  CG states he discussed this with MD yesterday and states that pt needs to be interdicted.    Duyen Fam, MEGHANN, BS, RN, CCM
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
## 2024-07-03 NOTE — PT/OT/SLP PROGRESS
"Physical Therapy Treatment and Discharge    Patient Name:  Mohini Dougherty   MRN:  [id number]    Recommendations:     Discharge Recommendations: No Therapy Indicated (24/7 supervision for safety due to cognitive deficits)  Discharge Equipment Recommendations: bath bench  Barriers to discharge: Decreased caregiver support    The patient is safe and appropriate to mobilize with RN staff outside of therapy sessions: The patient is safe to ambulate with RN supervision.       Assessment:     Mohini Dougherty is a 78 y.o. female admitted with a medical diagnosis of Encephalopathy.  She presents with the following impairments/functional limitations: gait instability, decreased safety awareness. The patient demonstrates good functional strength with mobility. She continues to need supervision for safety with mobility due to cognitive deficits and confusion. She would benefit from basic NH placement for safety.     Plan:   Discharge from acute PT at this time, patient is at her functional baseline.     Subjective     Chief Complaint: "I need a copy of the papers I signed" - patient had met with  for POA   Patient/Family Comments/goals: unable to state  Pain/Comfort:  Pain Rating 1: 0/10      Objective:     Communicated with RN prior to session.  Patient found up in chair with  (avasys and sitter present) upon PT entry to room.     General Precautions: Standard, fall  Orthopedic Precautions: N/A  Braces: N/A  Respiratory Status: Room air     Functional Mobility:    Bed Mobility  Deferred, up in chair   Transfers Sit to Stand:  supervision assistance    Gait  Gait Distance: 300 ft with no AD  Assistance Level: supervision assistance   Description: increased lateral weight shift, shuffling gait pattern, reciprocal arm swing          AM-PAC 6 CLICK MOBILITY  Turning over in bed (including adjusting bedclothes, sheets and blankets)?: 4  Sitting down on and standing up from a chair with arms (e.g., wheelchair, bedside " commode, etc.): 4  Moving from lying on back to sitting on the side of the bed?: 4  Moving to and from a bed to a chair (including a wheelchair)?: 4  Need to walk in hospital room?: 4  Climbing 3-5 steps with a railing?: 4  Basic Mobility Total Score: 24       Treatment & Education:  Patient educated on:  -role of therapy  -goals of session  -PT POC  -benefits of out of bed mobility and consequences of immobility  -calling for staff assist to mobilize safely  Patient agreeable to mobilize with therapy.      Gait training: cued for upright posture, reciprocal strides, pacing for energy conservation     Patient encouraged to ambulate, sit up in chair 3x/day to prevent deconditioning during hospitalization. Patient verbalized understanding and agreement to mobilize only with RN assist for safety.     Patient left up in chair with all lines intact, call button in reach, and sitter present..    GOALS:   Multidisciplinary Problems       Physical Therapy Goals       Not on file              Multidisciplinary Problems (Resolved)          Problem: Physical Therapy    Goal Priority Disciplines Outcome Goal Variances Interventions   Physical Therapy Goal   (Resolved)     PT, PT/OT Met     Description: Goals to be met by:      Patient will increase functional independence with mobility by performin. Supine to sit with Modified Edgar  2. Sit to supine with Modified Edgar  3. Sit to stand transfer with Modified Edgar MET 2024  4. Ambulated 200' with LRAD and stand by assistance. MET 2024    Goals 3-4 MET 2024                         Time Tracking:     PT Received On: 24  PT Start Time: 1154     PT Stop Time: 1208  PT Total Time (min): 14 min     Billable Minutes: Gait Training 14    Treatment Type: Treatment (and discharge)  PT/PTA: PT     Number of PTA visits since last PT visit: 0     2024

## 2024-07-03 NOTE — PT/OT/SLP PROGRESS
Occupational Therapy   Treatment    Name: Mohini Dougherty  MRN: [id number]  Admitting Diagnosis:  Encephalopathy       Recommendations:     Discharge Recommendations: Low Intensity Therapy (24/7 supervison)  Discharge Equipment Recommendations:  bath bench  Barriers to discharge:       Assessment:     Mohini Dougherty is a 78 y.o. female with a medical diagnosis of Encephalopathy.  She presents with the following performance deficits affecting function: gait instability, decreased safety awareness, impaired cognition.     Rehab Prognosis:  Good; patient would benefit from acute skilled OT services to address these deficits and reach maximum level of function.       Plan:     Patient to be seen 4 x/week to address the above listed problems via self-care/home management, therapeutic activities, therapeutic exercises, cognitive retraining  Plan of Care Expires: 07/10/24  Plan of Care Reviewed with: patient    Subjective     Patient/Family Comments/goals: to improve function  Pain/Comfort:  Pain Rating 1: 0/10  Pain Rating Post-Intervention 1: 0/10    Objective:     Communicated with: RN prior to session.  Patient found up in chair with Other (comments) (avasys and sitter present) upon OT entry to room.    General Precautions: Standard, fall    Orthopedic Precautions:N/A  Braces: N/A  Respiratory Status: Room air     Occupational Performance:     Bed Mobility:    Up in chair     Functional Mobility/Transfers:  Patient completed Sit <> Stand Transfer with modified independence  with  no assistive device   Patient completed Toilet Transfer Step Transfer technique with modified independence with  no AD  Patient completed  Shower Transfer Step Transfer technique with modified independence with no AD  Functional Mobility: pt ambulating community distances around unit with supervision with no use of AD. No LOB or SOB noted    Activities of Daily Living:  Lower Body Dressing: supervision to don boots seated in chair      [facility] 6  Click ADL: 22    Treatment & Education:  Pt educated on OT POC and frequency during hospital stay.   Pt educated on importance of OOB activity to improve function and activity tolerance.  Addressed all patient questions/concerns within [facility] scope of practice.     Patient left up in chair with all lines intact, call button in reach, RN notified, and sitter present    GOALS:   Multidisciplinary Problems       Occupational Therapy Goals          Problem: Occupational Therapy    Goal Priority Disciplines Outcome Interventions   Occupational Therapy Goal     OT, PT/OT Progressing    Description: Goals to be met by: 7/10/24     Patient will increase functional independence with ADLs by performing:    LE Dressing with Supervision.  Grooming while standing at sink with Supervision.  Toileting from toilet with Supervision for hygiene and clothing management.   Supine to sit with Supervision.  Toilet transfer to toilet with Supervision.  Nash with BUE HEP to improve activity tolerance to complete ADLs and IADLs  Within home ambulation distances with supervision and LRAD necessary to complete ADLs.                           Time Tracking:     OT Date of Treatment: 07/03/24  OT Start Time: 0853  OT Stop Time: 0904  OT Total Time (min): 11 min    Billable Minutes:Therapeutic Activity 11    OT/ELIESER: ELIESER     Number of ELIESER visits since last OT visit: 2    7/3/2024

## 2024-07-03 NOTE — SUBJECTIVE & OBJECTIVE
Interval History: NAEON. Patient eager to go home, aware of plan to transfer to NH. Working with son to make arrangements.    Review of Systems   Constitutional:  Negative for activity change, appetite change and fatigue.   HENT:  Negative for congestion and sore throat.    Eyes: Negative.    Respiratory:  Negative for cough and wheezing.    Cardiovascular:  Negative for chest pain and palpitations.   Gastrointestinal:  Negative for abdominal distention and abdominal pain.   Endocrine: Negative.    Genitourinary:  Negative for frequency and urgency.   Musculoskeletal:  Negative for arthralgias.   Skin: Negative.    Allergic/Immunologic: Negative.    Neurological:  Negative for weakness and numbness.   Hematological: Negative.    Psychiatric/Behavioral:  Negative for agitation, behavioral problems and confusion.      Objective:     Vital Signs (Most Recent):  Temp: 98 °F (36.7 °C) (07/03/24 1209)  Pulse: 89 (07/03/24 1209)  Resp: 16 (07/03/24 1209)  BP: (!) 142/81 (07/03/24 1209)  SpO2: 98 % (07/03/24 1209) Vital Signs (24h Range):  Temp:  [97.6 °F (36.4 °C)-98.5 °F (36.9 °C)] 98 °F (36.7 °C)  Pulse:  [72-89] 89  Resp:  [16-18] 16  SpO2:  [97 %-98 %] 98 %  BP: (120-142)/(56-81) 142/81     Weight: 49.9 kg (110 lb)  Body mass index is 20.12 kg/m².  No intake or output data in the 24 hours ending 07/03/24 1612      Physical Exam        Significant Labs: All pertinent labs within the past 24 hours have been reviewed.    Significant Imaging: I have reviewed all pertinent imaging results/findings within the past 24 hours.

## 2024-07-04 PROCEDURE — 11000001 HC ACUTE MED/SURG PRIVATE ROOM

## 2024-07-04 PROCEDURE — 25000003 PHARM REV CODE 250

## 2024-07-04 RX ADMIN — RIVASTIGMINE 1 PATCH: 4.6 PATCH, EXTENDED RELEASE TRANSDERMAL at 09:07

## 2024-07-04 NOTE — ASSESSMENT & PLAN NOTE
"78-year-old lady here with encephalopathy.  At her baseline, she is normally "authoritative" demanding things, speaking about [employer] acts.  However unclear her mental status as her son does not keep close contact with her.  He does not really know if her mentation has gotten progressively worse or what is her baseline.  Normally she is able to go to the [place] daily to see her horse.  Overall workup was only notable for a leukocytosis with granulocytes with no clear source or systemic response. Will treat 1 time dose of ceftriaxone see if improvement in her symptoms.    Unclear etiology at this time, but will rule out reversible causes of acute encephalopathy, such as infectious, pending blood cultures/RPR, vitamin deficiencies (B1, B3, B9, B12), less likely meningitis as patient has full range of motion of her neck, can consider LP in the morning to r/o encephalitis vs menigitis?  Utox.pending    Differentials include but not limited to progressing age-related dementia, vitamin deficiencies encephalitis, UTI?, less likely stroke, toxicities,     Results:  WBCs 13  CMP unremarkable  Lactate normal,  BNP mildly elevated at 115, troponin negative  EKG was normal sinus rhythm, no ischemic changes  TSH WNL  Protocol normal  UA positive for 2+ ketones trace protein no nitrites    CT head showed no acute intracranial process  Chest x-ray showed no focal consolidation    Workup is notable for non-acute MRI Brain with mild/mod generalized atrophy and microvascular disease, grossly unremarkable CBC, CMP, UA. WNL B12 (low normal), and WNL TSH.    Patient very resistant to discharging to SNF or any facility. Per our team and Psychiatry patient does not show decision making capacity. Son prefers SNF or facility placement. Per psychiatry, PEC evaluation for psych hospital admission is not warranted as they believe this is not a psychiatric condition but rather neuro-cognitive decline.     Plan:  - f/u with Neuro recs, " "likely underlying dementia driving presentation (type can't be determined in inpatient setting)  - Behavioral neurology referral per neurology   - Upon further discussions with son Jose Alejandro, will discuss with legal regarding how to go about appointing him as POA as next of kin. Due to concern of lack of capacity   - Completed 3 day course of CTX with no improvement in mentation, unlikely to be UTI driving presentation. Treated for acute cystitis   - Rivastigmine 4.6 mg patch  - psych consult determined not having capacity by psychiatry, noting "her dogs will take care of her" if she goes home   - Coordinate with CM for placement in NH due to lack of capacity, organizing options for NH with memory unit or secure perez    "

## 2024-07-04 NOTE — SUBJECTIVE & OBJECTIVE
Interval History: NAEON. Patient comfortable and pleasant. Continues to mobilize around the room with nurse supervision. Working on nursing home placement, patient is willing but expresses disappointment being  from her pets. Arranging for pet therapy to visit her during her hospital stay.    Review of Systems   Constitutional:  Negative for activity change, appetite change and fatigue.   HENT:  Negative for congestion and sore throat.    Eyes: Negative.    Respiratory:  Negative for cough and wheezing.    Cardiovascular:  Negative for chest pain and palpitations.   Gastrointestinal:  Negative for abdominal distention and abdominal pain.   Endocrine: Negative.    Genitourinary:  Negative for frequency and urgency.   Musculoskeletal:  Negative for arthralgias.   Skin: Negative.    Allergic/Immunologic: Negative.    Neurological:  Negative for weakness and numbness.   Hematological: Negative.    Psychiatric/Behavioral:  Negative for agitation, behavioral problems and confusion.      Objective:     Vital Signs (Most Recent):  Temp: 96.6 °F (35.9 °C) (07/04/24 0832)  Pulse: 95 (07/04/24 0832)  Resp: 18 (07/04/24 0832)  BP: (!) 117/58 (07/04/24 0832)  SpO2: 96 % (07/04/24 0832) Vital Signs (24h Range):  Temp:  [96.6 °F (35.9 °C)-98.9 °F (37.2 °C)] 96.6 °F (35.9 °C)  Pulse:  [67-95] 95  Resp:  [16-18] 18  SpO2:  [95 %-98 %] 96 %  BP: (110-144)/(53-81) 117/58     Weight: 49.9 kg (110 lb)  Body mass index is 20.12 kg/m².  No intake or output data in the 24 hours ending 07/04/24 1115      Physical Exam  Vitals and nursing note reviewed.   Constitutional:       Appearance: Normal appearance. She is normal weight. She is not ill-appearing.   HENT:      Head: Normocephalic and atraumatic.      Nose: Nose normal.      Mouth/Throat:      Mouth: Mucous membranes are moist.      Pharynx: Oropharynx is clear.   Eyes:      Pupils: Pupils are equal, round, and reactive to light.   Cardiovascular:      Rate and Rhythm: Normal  rate and regular rhythm.      Pulses: Normal pulses.      Heart sounds: Normal heart sounds. No murmur heard.  Pulmonary:      Effort: Pulmonary effort is normal. No respiratory distress.      Breath sounds: No wheezing or rales.   Abdominal:      General: Abdomen is flat. Bowel sounds are normal.      Palpations: Abdomen is soft.   Musculoskeletal:         General: Normal range of motion.      Right lower leg: No edema.      Left lower leg: No edema.   Skin:     General: Skin is warm and dry.   Neurological:      General: No focal deficit present.      Mental Status: She is disoriented.      Cranial Nerves: No dysarthria or facial asymmetry.   Psychiatric:         Attention and Perception: Attention normal.         Mood and Affect: Mood is anxious. Affect is angry.         Speech: Speech normal.         Behavior: Behavior is agitated.         Thought Content: Thought content is delusional (presecutory thoughts).             Significant Labs: All pertinent labs within the past 24 hours have been reviewed.    Significant Imaging: I have reviewed all pertinent imaging results/findings within the past 24 hours.
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
## 2024-07-04 NOTE — PLAN OF CARE
Problem: Adult Inpatient Plan of Care  Goal: Plan of Care Review  Outcome: Progressing  Goal: Patient-Specific Goal (Individualized)  Outcome: Progressing  Goal: Absence of Hospital-Acquired Illness or Injury  Outcome: Progressing  Goal: Optimal Comfort and Wellbeing  Outcome: Progressing  Goal: Readiness for Transition of Care  Outcome: Progressing     Problem: Wound  Goal: Optimal Coping  Outcome: Progressing  Goal: Optimal Functional Ability  Outcome: Progressing  Goal: Absence of Infection Signs and Symptoms  Outcome: Progressing  Goal: Improved Oral Intake  Outcome: Progressing  Goal: Optimal Pain Control and Function  Outcome: Progressing  Goal: Skin Health and Integrity  Outcome: Progressing  Goal: Optimal Wound Healing  Outcome: Progressing      left knee pain

## 2024-07-05 PROCEDURE — 11000001 HC ACUTE MED/SURG PRIVATE ROOM

## 2024-07-05 PROCEDURE — 25000003 PHARM REV CODE 250

## 2024-07-05 PROCEDURE — 97535 SELF CARE MNGMENT TRAINING: CPT | Mod: CO

## 2024-07-05 RX ADMIN — RIVASTIGMINE 1 PATCH: 4.6 PATCH, EXTENDED RELEASE TRANSDERMAL at 09:07

## 2024-07-05 NOTE — PLAN OF CARE
"CM asked Dr. Charles if pt's POA that she signed the other day is valid d/t pt doesn't have capacity.  Dr. Charles states he has a call out to legal, response is pending.    2:37 PM  Per email 7/3/24 from Dereck Ledesma at [employer], he's unable to send requested bank statements with only pt's verbal permission.  He needs photo ID and signed statement by pt "I Mohini Dougherty would like three months of bank statements emailed to Duyen Fam at [email or web address] at my request."      CM went to pt room to request, pt refuses, saying she wants to speak with her  Alen.  CM explained that per pt's son, Alen is not her , he is someone she used to work with.  Pt did not understand.  Pt exhibits suspicious behavior such as repeatedly wanting to speak with someone else, wanting to know exactly who Dereck Billy is.    Duyen Fam, MARTA, BS, RN, CCM      "

## 2024-07-05 NOTE — ASSESSMENT & PLAN NOTE
"78-year-old lady here with encephalopathy.  At her baseline, she is normally "authoritative" demanding things, speaking about Yazidi acts.  However unclear her mental status as her son does not keep close contact with her.  He does not really know if her mentation has gotten progressively worse or what is her baseline.  Normally she is able to go to the [place] daily to see her horse.  Overall workup was only notable for a leukocytosis with granulocytes with no clear source or systemic response. Will treat 1 time dose of ceftriaxone see if improvement in her symptoms.    Unclear etiology at this time, but will rule out reversible causes of acute encephalopathy, such as infectious, pending blood cultures/RPR, vitamin deficiencies (B1, B3, B9, B12), less likely meningitis as patient has full range of motion of her neck, can consider LP in the morning to r/o encephalitis vs menigitis?  Utox.pending    Differentials include but not limited to progressing age-related dementia, vitamin deficiencies encephalitis, UTI?, less likely stroke, toxicities,     Results:  WBCs 13  CMP unremarkable  Lactate normal,  BNP mildly elevated at 115, troponin negative  EKG was normal sinus rhythm, no ischemic changes  TSH WNL  Protocol normal  UA positive for 2+ ketones trace protein no nitrites    CT head showed no acute intracranial process  Chest x-ray showed no focal consolidation    Workup is notable for non-acute MRI Brain with mild/mod generalized atrophy and microvascular disease, grossly unremarkable CBC, CMP, UA. WNL B12 (low normal), and WNL TSH.    Patient very resistant to discharging to SNF or any facility. Per our team and Psychiatry patient does not show decision making capacity. Son prefers SNF or facility placement. Per psychiatry, PEC evaluation for psych hospital admission is not warranted as they believe this is not a psychiatric condition but rather neuro-cognitive decline.     Plan:  - f/u with Neuro recs, " "likely underlying dementia driving presentation (type can't be determined in inpatient setting)  - Behavioral neurology referral per neurology   - Upon further discussions with son Jose Alejandro, will discuss with legal regarding how to go about appointing him as POA as next of kin. Due to concern of lack of capacity   - Completed 3 day course of CTX with no improvement in mentation, unlikely to be UTI driving presentation. Treated for acute cystitis   - Rivastigmine 4.6 mg patch  - psych consult determined not having capacity by psychiatry, noting "her dogs will take care of her" if she goes home   - Coordinate with CM for placement in NH due to lack of capacity, organizing options for NH with memory unit or secure perez    "

## 2024-07-05 NOTE — SUBJECTIVE & OBJECTIVE
Interval History: NAEON. Patient eager to go home, repeatedly asking about her pets. Continue to provide reassurance that the animals are being cared for. Discussed placement in nursing home pending documents needed from son. She was interviewed sitting bedside in her home clothes.    Review of Systems   Constitutional:  Negative for activity change, appetite change and fatigue.   HENT:  Negative for congestion and sore throat.    Eyes: Negative.    Respiratory:  Negative for cough and wheezing.    Cardiovascular:  Negative for chest pain and palpitations.   Gastrointestinal:  Negative for abdominal distention and abdominal pain.   Endocrine: Negative.    Genitourinary:  Negative for frequency and urgency.   Musculoskeletal:  Negative for arthralgias.   Skin: Negative.    Allergic/Immunologic: Negative.    Neurological:  Negative for weakness and numbness.   Hematological: Negative.    Psychiatric/Behavioral:  Negative for agitation, behavioral problems and confusion.      Objective:     Vital Signs (Most Recent):  Temp: 98 °F (36.7 °C) (07/05/24 1120)  Pulse: 84 (07/05/24 1120)  Resp: 16 (07/05/24 1120)  BP: 122/70 (07/05/24 1120)  SpO2: 96 % (07/05/24 1120) Vital Signs (24h Range):  Temp:  [96.3 °F (35.7 °C)-98.2 °F (36.8 °C)] 98 °F (36.7 °C)  Pulse:  [72-84] 84  Resp:  [16-18] 16  SpO2:  [96 %-99 %] 96 %  BP: (109-146)/(55-82) 122/70     Weight: 49.9 kg (110 lb)  Body mass index is 20.12 kg/m².  No intake or output data in the 24 hours ending 07/05/24 1409      Physical Exam  Vitals and nursing note reviewed.   Constitutional:       Appearance: Normal appearance. She is normal weight. She is not ill-appearing.   HENT:      Head: Normocephalic and atraumatic.      Nose: Nose normal.      Mouth/Throat:      Mouth: Mucous membranes are moist.      Pharynx: Oropharynx is clear.   Eyes:      Pupils: Pupils are equal, round, and reactive to light.   Cardiovascular:      Rate and Rhythm: Normal rate and regular rhythm.       Pulses: Normal pulses.      Heart sounds: Normal heart sounds. No murmur heard.  Pulmonary:      Effort: Pulmonary effort is normal. No respiratory distress.      Breath sounds: No wheezing or rales.   Abdominal:      General: Abdomen is flat. Bowel sounds are normal.      Palpations: Abdomen is soft.   Musculoskeletal:         General: Normal range of motion.      Right lower leg: No edema.      Left lower leg: No edema.   Skin:     General: Skin is warm and dry.   Neurological:      General: No focal deficit present.      Mental Status: She is disoriented.      Cranial Nerves: No dysarthria or facial asymmetry.   Psychiatric:         Attention and Perception: Attention normal.         Mood and Affect: Mood is anxious. Affect is angry.         Speech: Speech normal.         Behavior: Behavior is agitated.         Thought Content: Thought content is delusional (presecutory thoughts).             Significant Labs: All pertinent labs within the past 24 hours have been reviewed.    Significant Imaging: I have reviewed all pertinent imaging results/findings within the past 24 hours.
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
## 2024-07-05 NOTE — PT/OT/SLP PROGRESS
Occupational Therapy   Treatment    Name: Mohini Dougherty  MRN: [id number]  Admitting Diagnosis:  Encephalopathy       Recommendations:     Discharge Recommendations: Low Intensity Therapy (24/7 supervison)  Discharge Equipment Recommendations:  bath bench  Barriers to discharge:       Assessment:     Mohini Dougherty is a 78 y.o. female with a medical diagnosis of Encephalopathy.  She presents with the following performance deficits affecting function: decreased safety awareness, gait instability.     Rehab Prognosis:  Good; patient would benefit from acute skilled OT services to address these deficits and reach maximum level of function.       Plan:     Patient to be seen 4 x/week to address the above listed problems via self-care/home management, therapeutic activities, therapeutic exercises, cognitive retraining  Plan of Care Expires: 07/10/24  Plan of Care Reviewed with: patient    Subjective     Patient/Family Comments/goals: to go home  Pain/Comfort:  Pain Rating 1: 0/10  Pain Rating Post-Intervention 1: 0/10    Objective:     Communicated with: RN prior to session.  Patient found up in chair with  (avasys camera) upon OT entry to room.    General Precautions: Standard, fall    Orthopedic Precautions:N/A  Braces: N/A  Respiratory Status: Room air     Occupational Performance:     Bed Mobility:    Pt up in chair     Functional Mobility/Transfers:  Patient completed Sit <> Stand Transfer with modified independence  with  no assistive device   Functional Mobility: pt ambulating 12ft x 2 trials with supervision with no use of AD. No LOB or SOB noted.     Activities of Daily Living:  Grooming: supervision for hair care standing at sink with no AD ~8min      [facility] 6 Click ADL: 22    Treatment & Education:  Pt educated on OT POC and frequency during hospital stay.   Pt educated on importance of OOB activity to improve function and activity tolerance.  Addressed all patient questions/concerns within [facility] scope of  practice.     Patient left up in chair with all lines intact, call button in reach, and RN notified    GOALS:   Multidisciplinary Problems       Occupational Therapy Goals          Problem: Occupational Therapy    Goal Priority Disciplines Outcome Interventions   Occupational Therapy Goal     OT, PT/OT Progressing    Description: Goals to be met by: 7/10/24     Patient will increase functional independence with ADLs by performing:    LE Dressing with Supervision. MET  Grooming while standing at sink with Supervision. MET  Toileting from toilet with Supervision for hygiene and clothing management.   Supine to sit with Supervision.  Toilet transfer to toilet with Supervision. MET  [place] with BUE HEP to improve activity tolerance to complete ADLs and IADLs  Within home ambulation distances with supervision and LRAD necessary to complete ADLs.  MET                         Time Tracking:     OT Date of Treatment: 07/05/24  OT Start Time: 0857  OT Stop Time: 0913  OT Total Time (min): 16 min    Billable Minutes:Self Care/Home Management 16    OT/ELIESER: ELIESER     Number of ELIESER visits since last OT visit: 3    7/5/2024

## 2024-07-06 PROCEDURE — 25000003 PHARM REV CODE 250

## 2024-07-06 PROCEDURE — 11000001 HC ACUTE MED/SURG PRIVATE ROOM

## 2024-07-06 RX ADMIN — RIVASTIGMINE 1 PATCH: 4.6 PATCH, EXTENDED RELEASE TRANSDERMAL at 08:07

## 2024-07-06 RX ADMIN — Medication 6 MG: at 10:07

## 2024-07-06 NOTE — NURSING
Patient requiring frequent redirection through shift, standing up without assistance, coming out of room ,etc. Tele sitter alarming appropriately. Patient easily redirectable at this time, and pleasant. Fall measures remain in place. Requested physical sitter from house supervisor.

## 2024-07-06 NOTE — ASSESSMENT & PLAN NOTE
"78-year-old lady here with encephalopathy.  At her baseline, she is normally "authoritative" demanding things, speaking about [employer] acts.  However unclear her mental status as her son does not keep close contact with her.  He does not really know if her mentation has gotten progressively worse or what is her baseline.  Normally she is able to go to the [place] daily to see her horse.  Overall workup was only notable for a leukocytosis with granulocytes with no clear source or systemic response. Will treat 1 time dose of ceftriaxone see if improvement in her symptoms.    Unclear etiology at this time, but will rule out reversible causes of acute encephalopathy, such as infectious, pending blood cultures/RPR, vitamin deficiencies (B1, B3, B9, B12), less likely meningitis as patient has full range of motion of her neck, can consider LP in the morning to r/o encephalitis vs menigitis?  Utox.pending    Differentials include but not limited to progressing age-related dementia, vitamin deficiencies encephalitis, UTI?, less likely stroke, toxicities,     Results:  WBCs 13  CMP unremarkable  Lactate normal,  BNP mildly elevated at 115, troponin negative  EKG was normal sinus rhythm, no ischemic changes  TSH WNL  Protocol normal  UA positive for 2+ ketones trace protein no nitrites    CT head showed no acute intracranial process  Chest x-ray showed no focal consolidation    Workup is notable for non-acute MRI Brain with mild/mod generalized atrophy and microvascular disease, grossly unremarkable CBC, CMP, UA. WNL B12 (low normal), and WNL TSH.    Patient very resistant to discharging to SNF or any facility. Per our team and Psychiatry patient does not show decision making capacity. Son prefers SNF or facility placement. Per psychiatry, PEC evaluation for psych hospital admission is not warranted as they believe this is not a psychiatric condition but rather neuro-cognitive decline.     Plan:  - f/u with Neuro recs, " "likely underlying dementia driving presentation (type can't be determined in inpatient setting)  - Behavioral neurology referral per neurology   - Upon further discussions with son Jose Alejandro, will discuss with legal regarding how to go about appointing him as POA as next of kin. Due to concern of lack of capacity   - Completed 3 day course of CTX with no improvement in mentation, unlikely to be UTI driving presentation. Treated for acute cystitis   - Rivastigmine 4.6 mg patch  - psych consult determined not having capacity by psychiatry, noting "her dogs will take care of her" if she goes home   - Coordinate with CM for placement in NH due to lack of capacity, organizing options for NH with memory unit or secure perez    "

## 2024-07-06 NOTE — SUBJECTIVE & OBJECTIVE
Interval History: NAEO. Pt HDS in NAD. Will try to get therapy dog to pay visit. Will pursue facility placement after weekend.     Review of Systems   Constitutional:  Negative for activity change, appetite change and fatigue.   HENT:  Negative for congestion and sore throat.    Eyes: Negative.    Respiratory:  Negative for cough and wheezing.    Cardiovascular:  Negative for chest pain and palpitations.   Gastrointestinal:  Negative for abdominal distention and abdominal pain.   Endocrine: Negative.    Genitourinary:  Negative for frequency and urgency.   Musculoskeletal:  Negative for arthralgias.   Skin: Negative.    Allergic/Immunologic: Negative.    Neurological:  Negative for weakness and numbness.   Hematological: Negative.    Psychiatric/Behavioral:  Negative for agitation, behavioral problems and confusion.      Objective:     Vital Signs (Most Recent):  Temp: 97.6 °F (36.4 °C) (07/06/24 1205)  Pulse: 87 (07/06/24 1205)  Resp: 17 (07/06/24 1205)  BP: (!) 119/59 (07/06/24 1205)  SpO2: 98 % (07/06/24 1205) Vital Signs (24h Range):  Temp:  [97.5 °F (36.4 °C)-98.1 °F (36.7 °C)] 97.6 °F (36.4 °C)  Pulse:  [74-87] 87  Resp:  [17-19] 17  SpO2:  [93 %-100 %] 98 %  BP: (119-192)/(59-91) 119/59     Weight: 49.9 kg (110 lb)  Body mass index is 20.12 kg/m².    Intake/Output Summary (Last 24 hours) at 7/6/2024 1439  Last data filed at 7/6/2024 1234  Gross per 24 hour   Intake 560 ml   Output --   Net 560 ml         Physical Exam  Vitals and nursing note reviewed.   Constitutional:       Appearance: Normal appearance. She is normal weight. She is not ill-appearing.   HENT:      Head: Normocephalic and atraumatic.      Nose: Nose normal.      Mouth/Throat:      Mouth: Mucous membranes are moist.      Pharynx: Oropharynx is clear.   Eyes:      Pupils: Pupils are equal, round, and reactive to light.   Cardiovascular:      Rate and Rhythm: Normal rate and regular rhythm.      Pulses: Normal pulses.      Heart sounds: Normal  heart sounds. No murmur heard.  Pulmonary:      Effort: Pulmonary effort is normal. No respiratory distress.      Breath sounds: No wheezing or rales.   Abdominal:      General: Abdomen is flat. Bowel sounds are normal.      Palpations: Abdomen is soft.   Musculoskeletal:         General: Normal range of motion.      Right lower leg: No edema.      Left lower leg: No edema.   Skin:     General: Skin is warm and dry.   Neurological:      General: No focal deficit present.      Mental Status: She is disoriented.      Cranial Nerves: No dysarthria or facial asymmetry.   Psychiatric:         Attention and Perception: Attention normal.         Mood and Affect: Mood is depressed.         Speech: Speech normal.         Behavior: Behavior is cooperative.         Thought Content: Thought content is delusional (presecutory thoughts).         Cognition and Memory: Cognition is impaired. Memory is impaired.             Significant Labs: All pertinent labs within the past 24 hours have been reviewed.    Significant Imaging: I have reviewed all pertinent imaging results/findings within the past 24 hours.

## 2024-07-06 NOTE — PLAN OF CARE
Problem: Adult Inpatient Plan of Care  Goal: Plan of Care Review  Outcome: Progressing  Goal: Patient-Specific Goal (Individualized)  Outcome: Progressing  Goal: Absence of Hospital-Acquired Illness or Injury  Outcome: Progressing  Goal: Optimal Comfort and Wellbeing  Outcome: Progressing  Goal: Readiness for Transition of Care  Outcome: Progressing     Problem: Wound  Goal: Optimal Coping  Outcome: Progressing  Goal: Optimal Functional Ability  Outcome: Progressing  Goal: Absence of Infection Signs and Symptoms  Outcome: Progressing  Goal: Improved Oral Intake  Outcome: Progressing  Goal: Optimal Pain Control and Function  Outcome: Progressing  Goal: Skin Health and Integrity  Outcome: Progressing  Goal: Optimal Wound Healing  Outcome: Progressing     Problem: Skin Injury Risk Increased  Goal: Skin Health and Integrity  Outcome: Progressing     Problem: Fall Injury Risk  Goal: Absence of Fall and Fall-Related Injury  Outcome: Progressing       Pt had an uneventful night. VSS. Pt denied any pain or discomfort  Pt walking around room at times or getting out of bed. Pt was redirected back to a sitting position for safety. Pt loss iv access and 22 g iv replaced to lt posterior hand . Pt slept rather well .  [place] monitor remains in place. Pt is free of falls and injuries. Bed in lowest position , bed alarm set and call light within reach. Safety maintained
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
## 2024-07-07 PROCEDURE — 25000003 PHARM REV CODE 250

## 2024-07-07 PROCEDURE — 11000001 HC ACUTE MED/SURG PRIVATE ROOM

## 2024-07-07 RX ADMIN — RIVASTIGMINE 1 PATCH: 4.6 PATCH, EXTENDED RELEASE TRANSDERMAL at 08:07

## 2024-07-07 NOTE — SUBJECTIVE & OBJECTIVE
Interval History: NAEON. Patient is AOx4. Moved patient from bed to chair to eat breakfast. She is aware of why she remains in the hospital. Continue to console patient that her animals are being cared for. Patient is eager for discharge, and understanding of her future nursing home placement.    Review of Systems   Constitutional:  Negative for activity change, appetite change and fatigue.   HENT:  Negative for congestion and sore throat.    Eyes: Negative.    Respiratory:  Negative for cough and wheezing.    Cardiovascular:  Negative for chest pain and palpitations.   Gastrointestinal:  Negative for abdominal distention and abdominal pain.   Endocrine: Negative.    Genitourinary:  Negative for frequency and urgency.   Musculoskeletal:  Negative for arthralgias.   Skin: Negative.    Allergic/Immunologic: Negative.    Neurological:  Negative for weakness and numbness.   Hematological: Negative.    Psychiatric/Behavioral:  Negative for agitation, behavioral problems and confusion.      Objective:     Vital Signs (Most Recent):  Temp: 98.8 °F (37.1 °C) (07/07/24 1123)  Pulse: 84 (07/07/24 1123)  Resp: 17 (07/07/24 1123)  BP: (!) 144/57 (07/07/24 1123)  SpO2: (!) 94 % (07/07/24 1123) Vital Signs (24h Range):  Temp:  [98.3 °F (36.8 °C)-98.8 °F (37.1 °C)] 98.8 °F (37.1 °C)  Pulse:  [66-87] 84  Resp:  [17-18] 17  SpO2:  [94 %-100 %] 94 %  BP: (115-144)/(57-77) 144/57     Weight: 49.9 kg (110 lb)  Body mass index is 20.12 kg/m².    Intake/Output Summary (Last 24 hours) at 7/7/2024 1205  Last data filed at 7/7/2024 0819  Gross per 24 hour   Intake 440 ml   Output --   Net 440 ml         Physical Exam  Vitals and nursing note reviewed.   Constitutional:       Appearance: Normal appearance. She is normal weight. She is not ill-appearing.   HENT:      Head: Normocephalic and atraumatic.      Nose: Nose normal.      Mouth/Throat:      Mouth: Mucous membranes are moist.      Pharynx: Oropharynx is clear.   Eyes:      Pupils:  Pupils are equal, round, and reactive to light.   Cardiovascular:      Rate and Rhythm: Normal rate and regular rhythm.      Pulses: Normal pulses.      Heart sounds: Normal heart sounds. No murmur heard.  Pulmonary:      Effort: Pulmonary effort is normal. No respiratory distress.      Breath sounds: No wheezing or rales.   Abdominal:      General: Abdomen is flat. Bowel sounds are normal.      Palpations: Abdomen is soft.   Musculoskeletal:         General: Normal range of motion.      Right lower leg: No edema.      Left lower leg: No edema.   Skin:     General: Skin is warm and dry.   Neurological:      General: No focal deficit present.      Mental Status: She is disoriented.      Cranial Nerves: No dysarthria or facial asymmetry.   Psychiatric:         Attention and Perception: Attention normal.         Mood and Affect: Mood is depressed.         Speech: Speech normal.         Behavior: Behavior is cooperative.         Thought Content: Thought content is delusional (presecutory thoughts).         Cognition and Memory: Cognition is impaired. Memory is impaired.             Significant Labs: All pertinent labs within the past 24 hours have been reviewed.    Significant Imaging: I have reviewed all pertinent imaging results/findings within the past 24 hours.

## 2024-07-07 NOTE — NURSING
Patient camera tele sitter alarming, upon entering patient was being escorted back to room from hallway by another nurse. Educated on importance of calling, due to diagnosis, patient not always going to remember to call. Fall measures remain in place. Close monitoring continues.

## 2024-07-07 NOTE — PLAN OF CARE
Problem: Adult Inpatient Plan of Care  Goal: Plan of Care Review  Outcome: Progressing  Goal: Patient-Specific Goal (Individualized)  Outcome: Progressing  Goal: Absence of Hospital-Acquired Illness or Injury  Outcome: Progressing  Goal: Optimal Comfort and Wellbeing  Outcome: Progressing  Goal: Readiness for Transition of Care  Outcome: Progressing     Problem: Skin Injury Risk Increased  Goal: Skin Health and Integrity  Outcome: Progressing     Problem: Fall Injury Risk  Goal: Absence of Fall and Fall-Related Injury  Outcome: Progressing     Problem: Delirium  Goal: Optimal Coping  Outcome: Progressing     Problem: Confusion Chronic  Goal: Optimal Cognitive Function  Outcome: Progressing
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
## 2024-07-07 NOTE — ASSESSMENT & PLAN NOTE
"78-year-old lady here with encephalopathy.  At her baseline, she is normally "authoritative" demanding things, speaking about Zoroastrianism acts.  However unclear her mental status as her son does not keep close contact with her.  He does not really know if her mentation has gotten progressively worse or what is her baseline.  Normally she is able to go to the [place] daily to see her horse.  Overall workup was only notable for a leukocytosis with granulocytes with no clear source or systemic response. Will treat 1 time dose of ceftriaxone see if improvement in her symptoms.    Unclear etiology at this time, but will rule out reversible causes of acute encephalopathy, such as infectious, pending blood cultures/RPR, vitamin deficiencies (B1, B3, B9, B12), less likely meningitis as patient has full range of motion of her neck, can consider LP in the morning to r/o encephalitis vs menigitis?  Utox.pending    Differentials include but not limited to progressing age-related dementia, vitamin deficiencies encephalitis, UTI?, less likely stroke, toxicities,     Results:  WBCs 13  CMP unremarkable  Lactate normal,  BNP mildly elevated at 115, troponin negative  EKG was normal sinus rhythm, no ischemic changes  TSH WNL  Protocol normal  UA positive for 2+ ketones trace protein no nitrites    CT head showed no acute intracranial process  Chest x-ray showed no focal consolidation    Workup is notable for non-acute MRI Brain with mild/mod generalized atrophy and microvascular disease, grossly unremarkable CBC, CMP, UA. WNL B12 (low normal), and WNL TSH.    Patient very resistant to discharging to SNF or any facility. Per our team and Psychiatry patient does not show decision making capacity. Son prefers SNF or facility placement. Per psychiatry, PEC evaluation for psych hospital admission is not warranted as they believe this is not a psychiatric condition but rather neuro-cognitive decline.     Plan:  - f/u with Neuro recs, " "likely underlying dementia driving presentation (type can't be determined in inpatient setting)  - Behavioral neurology referral per neurology   - Upon further discussions with son Jose Alejandro, will discuss with legal regarding how to go about appointing him as POA as next of kin. Due to concern of lack of capacity   - Completed 3 day course of CTX with no improvement in mentation, unlikely to be UTI driving presentation. Treated for acute cystitis   - Rivastigmine 4.6 mg patch  - psych consult determined not having capacity by psychiatry, noting "her dogs will take care of her" if she goes home   - Coordinate with CM for placement in NH due to lack of capacity, organizing options for NH with memory unit or secure perez    "

## 2024-07-08 PROCEDURE — 25000003 PHARM REV CODE 250

## 2024-07-08 PROCEDURE — 11000001 HC ACUTE MED/SURG PRIVATE ROOM

## 2024-07-08 PROCEDURE — 97530 THERAPEUTIC ACTIVITIES: CPT

## 2024-07-08 RX ADMIN — Medication 6 MG: at 08:07

## 2024-07-08 RX ADMIN — RIVASTIGMINE 1 PATCH: 4.6 PATCH, EXTENDED RELEASE TRANSDERMAL at 08:07
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
## 2024-07-08 NOTE — PLAN OF CARE
Problem: Adult Inpatient Plan of Care  Goal: Plan of Care Review  Outcome: Progressing  Goal: Patient-Specific Goal (Individualized)  Outcome: Progressing  Goal: Absence of Hospital-Acquired Illness or Injury  Outcome: Progressing  Goal: Optimal Comfort and Wellbeing  Outcome: Progressing  Goal: Readiness for Transition of Care  Outcome: Progressing     Problem: Wound  Goal: Optimal Coping  Outcome: Progressing  Goal: Optimal Functional Ability  Outcome: Progressing  Goal: Absence of Infection Signs and Symptoms  Outcome: Progressing  Goal: Improved Oral Intake  Outcome: Progressing  Goal: Optimal Pain Control and Function  Outcome: Progressing  Goal: Skin Health and Integrity  Outcome: Progressing  Goal: Optimal Wound Healing  Outcome: Progressing     Problem: Skin Injury Risk Increased  Goal: Skin Health and Integrity  Outcome: Progressing     Problem: Fall Injury Risk  Goal: Absence of Fall and Fall-Related Injury  Outcome: Progressing     Problem: Delirium  Goal: Optimal Coping  Outcome: Progressing  Goal: Improved Behavioral Control  Outcome: Progressing  Goal: Improved Attention and Thought Clarity  Outcome: Progressing  Goal: Improved Sleep  Outcome: Progressing     Problem: Confusion Chronic  Goal: Optimal Cognitive Function  Outcome: Progressing

## 2024-07-08 NOTE — PLAN OF CARE
Problem: Adult Inpatient Plan of Care  Goal: Plan of Care Review  Outcome: Progressing  Goal: Patient-Specific Goal (Individualized)  Outcome: Progressing  Goal: Absence of Hospital-Acquired Illness or Injury  Outcome: Progressing  Goal: Optimal Comfort and Wellbeing  Outcome: Progressing  Goal: Readiness for Transition of Care  Outcome: Progressing     Problem: Skin Injury Risk Increased  Goal: Skin Health and Integrity  Outcome: Progressing     Problem: Confusion Chronic  Goal: Optimal Cognitive Function  Outcome: Progressing     Problem: Fall Injury Risk  Goal: Absence of Fall and Fall-Related Injury  Outcome: Progressing     Problem: Delirium  Goal: Optimal Coping  Outcome: Progressing  Goal: Improved Behavioral Control  Outcome: Progressing  Goal: Improved Attention and Thought Clarity  Outcome: Progressing  Goal: Improved Sleep  Outcome: Progressing

## 2024-07-08 NOTE — PT/OT/SLP PROGRESS
Occupational Therapy   Treatment and Discharge    Name: Mohini Dougherty  MRN: [id number]  Admitting Diagnosis:  Encephalopathy       Recommendations:     Discharge Recommendations: No Therapy Indicated (24/7 supervision for safety due to cognitive deficits)  Discharge Equipment Recommendations:  bath bench  Barriers to discharge:  None    Assessment:     Mohini Dougherty is a 78 y.o. female with a medical diagnosis of Encephalopathy.  She presents with good participation and motivation. Pt is currently functioning at their baseline in ADLs and functional mobility. Therefore, further skilled OT intervention is not warranted at this time. Please re-consult if pt's functional status changes.  . Performance deficits affecting function are decreased safety awareness.     Rehab Prognosis:  Good; patient would benefit from acute skilled OT services to address these deficits and reach maximum level of function.       Plan:     Patient to be seen 4 x/week to address the above listed problems via self-care/home management, therapeutic activities, therapeutic exercises, cognitive retraining  Plan of Care Expires: 07/10/24  Plan of Care Reviewed with: patient    Subjective     Chief Complaint: None stated  Patient/Family Comments/goals: I'll like to walk  Pain/Comfort:  Pain Rating 1: 0/10  Pain Rating Post-Intervention 1: 0/10    Objective:     Communicated with: RN prior to session.  Patient found up in chair with  (avasys) upon OT entry to room.    General Precautions: Standard, fall    Orthopedic Precautions:N/A  Braces: N/A  Respiratory Status: Room air     Occupational Performance:     Bed Mobility:    NT    Functional Mobility/Transfers:  Patient completed Sit <> Stand Transfer with independence  with  no assistive device   Functional Mobility: Pt engaged in functional mobility to simulate household/community distances 81 ft x 2 trials with SPV and no AD in order to maximize functional endurance and standing balance  required for engagement in occupations of choice      Activities of Daily Living:  Completed prior to OT arrival      [facility] 6 Click ADL: 24    Treatment & Education:  -Education on energy conservation and task modification to maximize safety and (I) during ADLs and mobility  -Education on importance of OOB activity to improve overall activity tolerance and promote recovery  -Pt educated to call for assistance and to transfer with hospital staff only  -Provided education regarding role of OT, POC, & discharge recommendations with pt verbalizing understanding.  Pt had no further questions & when asked whether there were any concerns pt reported none.      Patient left up in chair with all lines intact and call button in reach    GOALS:   Multidisciplinary Problems       Occupational Therapy Goals       Not on file              Multidisciplinary Problems (Resolved)          Problem: Occupational Therapy    Goal Priority Disciplines Outcome Interventions   Occupational Therapy Goal   (Resolved)     OT, PT/OT Met    Description: Goals to be met by: 7/10/24     Patient will increase functional independence with ADLs by performing:    LE Dressing with Supervision. MET  Grooming while standing at sink with Supervision. MET  Toileting from toilet with Supervision for hygiene and clothing management. MET  Supine to sit with Supervision. MET  Toilet transfer to toilet with Supervision. MET  [place] with BUE HEP to improve activity tolerance to complete ADLs and IADLs. MET  Within home ambulation distances with supervision and LRAD necessary to complete ADLs.  MET                         Time Tracking:     OT Date of Treatment: 07/08/24  OT Start Time: 1331  OT Stop Time: 1341  OT Total Time (min): 10 min    Billable Minutes:Therapeutic Activity 10    OT/ELIESER: OT     Number of ELIESER visits since last OT visit: 3    7/8/2024

## 2024-07-08 NOTE — PROGRESS NOTES
"                    Medical Nutrition Therapy      Reason for Assessment: LOS (Day 15)  Dx: Encephalopathy   Medical Hx: -     General Info: Pt disoriented; per RN documentation, pt tolerating diet w/ 75% PO intake. Per chart review, pt w/ UBW of 110# x 4 years. Pt appears thin, however no indicators of malnutrition noted.     Current Diet: Regular  % intake of meals: 75%    Ht: 5'2"  Wt: 50kg   BMI: 20.1    Labs: Reviewed  Meds: Reviewed    Overall Physical Appearance: Thin    Level of Risk: Low    Nutrition Dx: No nutrition diagnosis at this time.    RD follow-up? Yes    Thanks!  Latanya MS, RD, LDN     "

## 2024-07-08 NOTE — SUBJECTIVE & OBJECTIVE
Interval History: NAEON. Med student discussed with patient the need to provide son written permission to release bank statements to him so that we may move forward with nursing home placement.    Review of Systems   Constitutional:  Negative for activity change, appetite change and fatigue.   HENT: Negative.     Eyes: Negative.    Respiratory:  Negative for cough and wheezing.    Cardiovascular:  Negative for chest pain and palpitations.   Gastrointestinal: Negative.    Endocrine: Negative.    Genitourinary:  Negative for frequency and urgency.   Musculoskeletal:  Negative for arthralgias.   Skin: Negative.    Allergic/Immunologic: Negative.    Neurological:  Negative for weakness and numbness.   Hematological: Negative.    Psychiatric/Behavioral:  Negative for agitation, behavioral problems and sleep disturbance.      Objective:     Vital Signs (Most Recent):  Temp: 98.9 °F (37.2 °C) (07/08/24 1509)  Pulse: 92 (07/08/24 1509)  Resp: 17 (07/08/24 1509)  BP: 117/61 (07/08/24 1509)  SpO2: 97 % (07/08/24 1509) Vital Signs (24h Range):  Temp:  [97.9 °F (36.6 °C)-98.9 °F (37.2 °C)] 98.9 °F (37.2 °C)  Pulse:  [65-98] 92  Resp:  [16-18] 17  SpO2:  [97 %-99 %] 97 %  BP: (117-144)/(61-88) 117/61     Weight: 49.9 kg (110 lb)  Body mass index is 20.12 kg/m².    Intake/Output Summary (Last 24 hours) at 7/8/2024 1630  Last data filed at 7/8/2024 1200  Gross per 24 hour   Intake 700 ml   Output --   Net 700 ml         Physical Exam  Vitals and nursing note reviewed.   Constitutional:       Appearance: Normal appearance. She is normal weight. She is not ill-appearing.   HENT:      Head: Normocephalic and atraumatic.      Nose: Nose normal.      Mouth/Throat:      Mouth: Mucous membranes are moist.      Pharynx: Oropharynx is clear.   Eyes:      Extraocular Movements: Extraocular movements intact.      Pupils: Pupils are equal, round, and reactive to light.   Pulmonary:      Effort: Pulmonary effort is normal.   Abdominal:       General: Abdomen is flat.   Musculoskeletal:         General: Normal range of motion.      Right lower leg: No edema.      Left lower leg: No edema.   Skin:     General: Skin is warm and dry.   Neurological:      General: No focal deficit present.      Mental Status: She is disoriented.      Cranial Nerves: No dysarthria or facial asymmetry.   Psychiatric:         Attention and Perception: Attention normal.         Mood and Affect: Mood is depressed.         Speech: Speech normal.         Behavior: Behavior normal. Behavior is cooperative.         Thought Content: Thought content is delusional (presecutory thoughts).         Cognition and Memory: Cognition is impaired. Memory is impaired.             Significant Labs: All pertinent labs within the past 24 hours have been reviewed.  None    Significant Imaging: I have reviewed all pertinent imaging results/findings within the past 24 hours.

## 2024-07-08 NOTE — PLAN OF CARE
Problem: Occupational Therapy  Goal: Occupational Therapy Goal  Description: Goals to be met by: 7/10/24     Patient will increase functional independence with ADLs by performing:    LE Dressing with Supervision. MET  Grooming while standing at sink with Supervision. MET  Toileting from toilet with Supervision for hygiene and clothing management. MET  Supine to sit with Supervision. MET  Toilet transfer to toilet with Supervision. MET  [place] with BUE HEP to improve activity tolerance to complete ADLs and IADLs. MET  Within home ambulation distances with supervision and LRAD necessary to complete ADLs.  MET    Outcome: Met

## 2024-07-09 PROCEDURE — 11000001 HC ACUTE MED/SURG PRIVATE ROOM

## 2024-07-09 PROCEDURE — 25000003 PHARM REV CODE 250

## 2024-07-09 RX ADMIN — RIVASTIGMINE 1 PATCH: 4.6 PATCH, EXTENDED RELEASE TRANSDERMAL at 08:07

## 2024-07-09 RX ADMIN — Medication 6 MG: at 08:07
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
## 2024-07-09 NOTE — PLAN OF CARE
[facility]im [facility] - Med Surg ([facility])  Discharge Reassessment    Primary Care Provider: Edwar Castaneda II, MD    Expected Discharge Date: 7/10/2024    Reassessment (most recent)       Discharge Reassessment - 07/09/24 0817          Discharge Reassessment    Assessment Type Discharge Planning Reassessment (P)      Did the patient's condition or plan change since previous assessment? Yes (P)      Discharge Plan discussed with: Adult children (P)      Communicated MARVEL with patient/caregiver Date not available/Unable to determine (P)      Discharge Plan A New Nursing Home placement - [facility] care facility (P)      Discharge Plan B Other (P)    Home with private sitters.    DME Needed Upon Discharge  none (P)      Transition of Care Barriers No family/friends to help (P)      Why the patient remains in the hospital Placement issues (P)         Post-Acute Status    Post-Acute Authorization Placement (P)      Post-Acute Placement Status Referrals Sent (P)      Coverage Medicare AB (P)      Discharge Delays Post-Acute Set-up (P)                  CM communicated with pt's son, Jose Alejandro Dougherty, yesterday, via email.  NICHOLAS instructed CG that in order to get pt admitted to a nursing home, we need her bank statements and for a family member to sign paperwork on her behalf.  If she is admitted to a NH memory care unit, she would not be able to sign herself out.      POA that was signed is invalid d/t pt lacks capacity; these are MD/legal decision.  The bank's requirements are different: they need signed permission for bank statements to be released, these have to be signed by the patient.      CG states that he doesn't see why pt's signature would not be valid for POA, and would be valid to release bank statements.  He feels like patient needs to be interdicted.      NICHOLAS discussed pt's situation with Zina Tracy, medical student, who attempted to get pt's signature to release bank statements yesterday and today without  success.  She states that pt could go home with paid sitters 4 hours/day; she is very well physically; she needs supervision to make sure she is eating properly, taking her medicines.      NICHOLAS replied to CG via email today with the suggestion that sending pt to her home with paid sitters could be an option for pt instead of being admitted to a nsg home.      Response is pending.    1:54 PM  Per Jose Alejandro's email, there are no resources to pay for sitters.  NICHOLAS emailed back requesting to schedule a call to discuss.    MARTA Cantu, BS, RN, CCM      Discharge Plan A and Plan B have been determined by review of patient's clinical status, future medical and therapeutic needs, and coverage/benefits for post-acute care in coordination with multidisciplinary team members.
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
## 2024-07-09 NOTE — SUBJECTIVE & OBJECTIVE
Interval History: NAEON. Spiked a fever (Tmax 101.6) in the morning that resolved on its own without tylenol. Discussed with patient the need to discuss with son the financials to progress dispo.    Review of Systems   Constitutional:  Negative for appetite change, fatigue and fever.   HENT: Negative.     Eyes: Negative.    Respiratory:  Negative for cough and wheezing.    Cardiovascular:  Negative for chest pain and palpitations.   Gastrointestinal:  Negative for abdominal pain and nausea.   Endocrine: Negative.    Genitourinary:  Negative for dysuria, frequency and urgency.   Musculoskeletal:  Negative for arthralgias.   Skin: Negative.    Allergic/Immunologic: Negative.    Neurological:  Negative for weakness and numbness.   Hematological: Negative.    Psychiatric/Behavioral:  Negative for agitation, behavioral problems and sleep disturbance.      Objective:     Vital Signs (Most Recent):  Temp: 98.3 °F (36.8 °C) (07/09/24 1532)  Pulse: 80 (07/09/24 1532)  Resp: 18 (07/09/24 1532)  BP: 136/64 (07/09/24 1532)  SpO2: 100 % (07/09/24 1532) Vital Signs (24h Range):  Temp:  [97.6 °F (36.4 °C)-101.6 °F (38.7 °C)] 98.3 °F (36.8 °C)  Pulse:  [65-84] 80  Resp:  [18] 18  SpO2:  [97 %-100 %] 100 %  BP: (117-141)/(58-69) 136/64     Weight: 49.9 kg (110 lb)  Body mass index is 20.12 kg/m².    Intake/Output Summary (Last 24 hours) at 7/9/2024 1713  Last data filed at 7/9/2024 1454  Gross per 24 hour   Intake 730 ml   Output --   Net 730 ml         Physical Exam  Vitals and nursing note reviewed.   Constitutional:       General: She is not in acute distress.     Appearance: Normal appearance. She is normal weight. She is not ill-appearing.   HENT:      Head: Normocephalic and atraumatic.      Nose: Nose normal.      Mouth/Throat:      Mouth: Mucous membranes are moist.      Pharynx: Oropharynx is clear.   Eyes:      Extraocular Movements: Extraocular movements intact.      Pupils: Pupils are equal, round, and reactive to light.    Cardiovascular:      Rate and Rhythm: Normal rate and regular rhythm.      Heart sounds: Normal heart sounds.   Pulmonary:      Effort: Pulmonary effort is normal. No respiratory distress.      Breath sounds: No wheezing or rales.   Abdominal:      General: Abdomen is flat.   Musculoskeletal:         General: Normal range of motion.      Right lower leg: No edema.      Left lower leg: No edema.   Skin:     General: Skin is warm and dry.   Neurological:      General: No focal deficit present.      Mental Status: She is disoriented.      Cranial Nerves: No dysarthria or facial asymmetry.   Psychiatric:         Attention and Perception: Attention normal.         Mood and Affect: Mood is depressed.         Speech: Speech normal.         Behavior: Behavior normal. Behavior is cooperative.         Thought Content: Thought content is delusional (presecutory thoughts).         Cognition and Memory: Cognition is impaired. Memory is impaired.             Significant Labs: All pertinent labs within the past 24 hours have been reviewed.    Significant Imaging: I have reviewed all pertinent imaging results/findings within the past 24 hours.

## 2024-07-10 PROCEDURE — 25000003 PHARM REV CODE 250

## 2024-07-10 PROCEDURE — 11000001 HC ACUTE MED/SURG PRIVATE ROOM

## 2024-07-10 RX ADMIN — RIVASTIGMINE 1 PATCH: 4.6 PATCH, EXTENDED RELEASE TRANSDERMAL at 09:07
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
## 2024-07-10 NOTE — PLAN OF CARE
Problem: Adult Inpatient Plan of Care  Goal: Plan of Care Review  Outcome: Progressing  Goal: Optimal Comfort and Wellbeing  Outcome: Progressing     Problem: Wound  Goal: Optimal Coping  Outcome: Progressing  Goal: Absence of Infection Signs and Symptoms  Outcome: Progressing  Goal: Improved Oral Intake  Outcome: Progressing     Problem: Skin Injury Risk Increased  Goal: Skin Health and Integrity  Outcome: Progressing     Problem: Fall Injury Risk  Goal: Absence of Fall and Fall-Related Injury  Outcome: Progressing

## 2024-07-10 NOTE — SUBJECTIVE & OBJECTIVE
Interval History: NAEON.    Review of Systems   Constitutional:  Negative for appetite change and fever.   HENT: Negative.     Eyes: Negative.    Respiratory:  Negative for cough and wheezing.    Cardiovascular:  Negative for chest pain and palpitations.   Gastrointestinal:  Negative for abdominal pain and nausea.   Endocrine: Negative.    Genitourinary:  Negative for dysuria, frequency and urgency.   Musculoskeletal:  Negative for arthralgias.   Skin: Negative.    Allergic/Immunologic: Negative.    Neurological:  Negative for weakness and headaches.   Hematological: Negative.    Psychiatric/Behavioral:  Negative for agitation, behavioral problems and sleep disturbance.      Objective:     Vital Signs (Most Recent):  Temp: 98.6 °F (37 °C) (07/10/24 1517)  Pulse: 76 (07/10/24 1517)  Resp: 18 (07/10/24 1517)  BP: (!) 113/56 (07/10/24 1517)  SpO2: 96 % (07/10/24 1517) Vital Signs (24h Range):  Temp:  [98 °F (36.7 °C)-98.6 °F (37 °C)] 98.6 °F (37 °C)  Pulse:  [66-83] 76  Resp:  [18] 18  SpO2:  [91 %-100 %] 96 %  BP: (108-124)/(56-70) 113/56     Weight: 49.9 kg (110 lb)  Body mass index is 20.12 kg/m².    Intake/Output Summary (Last 24 hours) at 7/10/2024 1835  Last data filed at 7/10/2024 1741  Gross per 24 hour   Intake 960 ml   Output --   Net 960 ml         Physical Exam  Vitals and nursing note reviewed.   Constitutional:       General: She is not in acute distress.     Appearance: Normal appearance. She is normal weight. She is not ill-appearing.   HENT:      Head: Normocephalic and atraumatic.      Nose: Nose normal.      Mouth/Throat:      Mouth: Mucous membranes are moist.      Pharynx: Oropharynx is clear.   Eyes:      Extraocular Movements: Extraocular movements intact.      Pupils: Pupils are equal, round, and reactive to light.   Cardiovascular:      Rate and Rhythm: Normal rate and regular rhythm.      Heart sounds: Normal heart sounds.   Pulmonary:      Effort: Pulmonary effort is normal.    Musculoskeletal:         General: Normal range of motion.      Right lower leg: No edema.      Left lower leg: No edema.   Skin:     General: Skin is warm and dry.   Neurological:      General: No focal deficit present.      Mental Status: She is oriented to person, place, and time.      Cranial Nerves: No dysarthria or facial asymmetry.   Psychiatric:         Attention and Perception: Attention normal.         Mood and Affect: Mood is depressed.         Speech: Speech normal.         Behavior: Behavior normal. Behavior is cooperative.         Thought Content: Thought content is delusional (presecutory thoughts).         Cognition and Memory: Cognition is impaired. Memory is impaired.             Significant Labs: All pertinent labs within the past 24 hours have been reviewed.    Significant Imaging: I have reviewed all pertinent imaging results/findings within the past 24 hours.

## 2024-07-10 NOTE — ASSESSMENT & PLAN NOTE
"78-year-old lady here with encephalopathy.  At her baseline, she is normally "authoritative" demanding things, speaking about Judaism acts.  However unclear her mental status as her son does not keep close contact with her.  He does not really know if her mentation has gotten progressively worse or what is her baseline.  Normally she is able to go to the [place] daily to see her horse.  Overall workup was only notable for a leukocytosis with granulocytes with no clear source or systemic response. Will treat 1 time dose of ceftriaxone see if improvement in her symptoms.    Unclear etiology at this time, but will rule out reversible causes of acute encephalopathy, such as infectious, pending blood cultures/RPR, vitamin deficiencies (B1, B3, B9, B12), less likely meningitis as patient has full range of motion of her neck, can consider LP in the morning to r/o encephalitis vs menigitis?  Utox.pending    Differentials include but not limited to progressing age-related dementia, vitamin deficiencies encephalitis, UTI?, less likely stroke, toxicities,     Results:  WBCs 13  CMP unremarkable  Lactate normal,  BNP mildly elevated at 115, troponin negative  EKG was normal sinus rhythm, no ischemic changes  TSH WNL  Protocol normal  UA positive for 2+ ketones trace protein no nitrites    CT head showed no acute intracranial process  Chest x-ray showed no focal consolidation    Workup is notable for non-acute MRI Brain with mild/mod generalized atrophy and microvascular disease, grossly unremarkable CBC, CMP, UA. WNL B12 (low normal), and WNL TSH.    Patient very resistant to discharging to SNF or any facility. Per our team and Psychiatry patient does not show decision making capacity. Son prefers SNF or facility placement. Per psychiatry, PEC evaluation for psych hospital admission is not warranted as they believe this is not a psychiatric condition but rather neuro-cognitive decline.     Plan:  - f/u with Neuro recs, " "likely underlying dementia driving presentation (type can't be determined in inpatient setting)  - Behavioral neurology referral per neurology   - Upon further discussions with son Jose Alejandro, will discuss with legal regarding how to go about appointing him as POA as next of kin. Due to concern of lack of capacity   - Completed 3 day course of CTX with no improvement in mentation, unlikely to be UTI driving presentation. Treated for acute cystitis   - Rivastigmine 4.6 mg patch  - psych consult determined not having capacity by psychiatry, noting "her dogs will take care of her" if she goes home   - Coordinate with CM for placement in NH due to lack of capacity, organizing options for NH with memory unit or secure perez    "

## 2024-07-11 PROCEDURE — 11000001 HC ACUTE MED/SURG PRIVATE ROOM

## 2024-07-11 PROCEDURE — 25000003 PHARM REV CODE 250

## 2024-07-11 RX ADMIN — RIVASTIGMINE 1 PATCH: 4.6 PATCH, EXTENDED RELEASE TRANSDERMAL at 10:07
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
## 2024-07-11 NOTE — SUBJECTIVE & OBJECTIVE
Interval History: NAEO. Working with legal towards finding placement for [facility]. Re-consulting Psych to re-assess capacity.     Review of Systems   Constitutional: Negative.    HENT: Negative.     Respiratory: Negative.     Cardiovascular: Negative.    Gastrointestinal: Negative.    Genitourinary: Negative.    Musculoskeletal: Negative.    Neurological: Negative.    Psychiatric/Behavioral:  Positive for agitation and confusion.      Objective:     Vital Signs (Most Recent):  Temp: 98.5 °F (36.9 °C) (07/11/24 1101)  Pulse: 70 (07/11/24 1101)  Resp: 17 (07/11/24 1101)  BP: (!) 117/56 (07/11/24 1101)  SpO2: 96 % (07/11/24 1101) Vital Signs (24h Range):  Temp:  [98 °F (36.7 °C)-98.7 °F (37.1 °C)] 98.5 °F (36.9 °C)  Pulse:  [68-78] 70  Resp:  [16-18] 17  SpO2:  [96 %-98 %] 96 %  BP: (115-134)/(56-65) 117/56     Weight: 49.9 kg (110 lb)  Body mass index is 20.12 kg/m².    Intake/Output Summary (Last 24 hours) at 7/11/2024 1525  Last data filed at 7/10/2024 1741  Gross per 24 hour   Intake 240 ml   Output --   Net 240 ml         Physical Exam  Vitals and nursing note reviewed.   Constitutional:       General: She is not in acute distress.     Appearance: Normal appearance. She is not ill-appearing.   Cardiovascular:      Rate and Rhythm: Normal rate and regular rhythm.      Pulses: Normal pulses.      Heart sounds: Normal heart sounds.   Pulmonary:      Effort: Pulmonary effort is normal. No respiratory distress.      Breath sounds: Normal breath sounds. No wheezing or rales.   Musculoskeletal:         General: No swelling. Normal range of motion.      Right lower leg: No edema.      Left lower leg: No edema.   Neurological:      Mental Status: She is alert.   Psychiatric:         Mood and Affect: Mood normal.         Behavior: Behavior normal.             Significant Labs: All pertinent labs within the past 24 hours have been reviewed.    Significant Imaging: I have reviewed all pertinent imaging results/findings  within the past 24 hours.

## 2024-07-11 NOTE — PLAN OF CARE
Per MD team, Psych has been re-consulted to determine patient's capacity to make decisions. Patient will likely been seen by Psych tomorrow. Will continue to follow for details.    KEYONA Noel, LMSW    Case Management Department  [facility]

## 2024-07-11 NOTE — PLAN OF CARE
Problem: Adult Inpatient Plan of Care  Goal: Plan of Care Review  Outcome: Progressing     Problem: Skin Injury Risk Increased  Goal: Skin Health and Integrity  Outcome: Progressing     Problem: Confusion Chronic  Goal: Optimal Cognitive Function  Outcome: Progressing     Problem: Fall Injury Risk  Goal: Absence of Fall and Fall-Related Injury  Outcome: Progressing     Problem: Delirium  Goal: Improved Attention and Thought Clarity  Outcome: Progressing

## 2024-07-12 PROBLEM — F03.90 DEMENTIA WITHOUT BEHAVIORAL DISTURBANCE: Status: ACTIVE | Noted: 2024-06-24

## 2024-07-12 PROCEDURE — 25000003 PHARM REV CODE 250

## 2024-07-12 PROCEDURE — 11000001 HC ACUTE MED/SURG PRIVATE ROOM

## 2024-07-12 RX ADMIN — RIVASTIGMINE 1 PATCH: 4.6 PATCH, EXTENDED RELEASE TRANSDERMAL at 09:07
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
## 2024-07-12 NOTE — SUBJECTIVE & OBJECTIVE
Interval History: NAEON. Psychiatry evaluated patient today; patient continues to lack capacity in making choice to refuse nursing home placement. Continuing to work with case management and legal team to determine patient's disposition.    Review of Systems   Constitutional: Negative.    HENT: Negative.     Respiratory: Negative.     Cardiovascular: Negative.    Gastrointestinal: Negative.    Genitourinary: Negative.    Musculoskeletal: Negative.    Neurological: Negative.    Psychiatric/Behavioral:  Positive for confusion.      Objective:     Vital Signs (Most Recent):  Temp: 98.4 °F (36.9 °C) (07/12/24 0737)  Pulse: 77 (07/12/24 0737)  Resp: 18 (07/12/24 0737)  BP: 105/60 (07/12/24 0737)  SpO2: 98 % (07/12/24 0737) Vital Signs (24h Range):  Temp:  [98.4 °F (36.9 °C)-98.8 °F (37.1 °C)] 98.4 °F (36.9 °C)  Pulse:  [65-89] 77  Resp:  [17-18] 18  SpO2:  [90 %-99 %] 98 %  BP: (105-131)/(52-68) 105/60     Weight: 49.9 kg (110 lb)  Body mass index is 20.12 kg/m².  No intake or output data in the 24 hours ending 07/12/24 1434      Physical Exam  Vitals and nursing note reviewed.   Constitutional:       General: She is not in acute distress.     Appearance: Normal appearance. She is not ill-appearing.   Eyes:      Extraocular Movements: Extraocular movements intact.      Pupils: Pupils are equal, round, and reactive to light.   Cardiovascular:      Rate and Rhythm: Normal rate and regular rhythm.      Pulses: Normal pulses.      Heart sounds: Normal heart sounds.   Pulmonary:      Effort: Pulmonary effort is normal.   Musculoskeletal:         General: No swelling. Normal range of motion.      Right lower leg: No edema.      Left lower leg: No edema.   Neurological:      General: No focal deficit present.      Mental Status: She is alert and oriented to person, place, and time.   Psychiatric:         Mood and Affect: Mood normal.         Behavior: Behavior normal.             Significant Labs: All pertinent labs within the  past 24 hours have been reviewed.    Significant Imaging: I have reviewed all pertinent imaging results/findings within the past 24 hours.

## 2024-07-12 NOTE — ASSESSMENT & PLAN NOTE
"78-year-old lady here with encephalopathy, secondary to worsening dementia.  At baseline, she is normally "authoritative" demanding things, speaking about [employer] acts.  However unclear her mental status as her son does not keep close contact with her.  He does not really know if her mentation has gotten progressively worse or what is her baseline.  Normally she is able to go to the [place] daily to see her horse.  Overall workup was only notable for a leukocytosis with granulocytes with no clear source or systemic response. Will treat 1 time dose of ceftriaxone see if improvement in her symptoms.    Unclear etiology at this time, but will rule out reversible causes of acute encephalopathy, such as infectious, pending blood cultures/RPR, vitamin deficiencies (B1, B3, B9, B12), less likely meningitis as patient has full range of motion of her neck, can consider LP in the morning to r/o encephalitis vs menigitis?  Utox.pending    Differentials include but not limited to progressing age-related dementia, vitamin deficiencies encephalitis, UTI?, less likely stroke, toxicities,     Results:  WBCs 13  CMP unremarkable  Lactate normal,  BNP mildly elevated at 115, troponin negative  EKG was normal sinus rhythm, no ischemic changes  TSH WNL  Protocol normal  UA positive for 2+ ketones trace protein no nitrites    CT head showed no acute intracranial process  Chest x-ray showed no focal consolidation    Workup is notable for non-acute MRI Brain with mild/mod generalized atrophy and microvascular disease, grossly unremarkable CBC, CMP, UA. WNL B12 (low normal), and WNL TSH.    Patient very resistant to discharging to SNF or any facility. Per our team and Psychiatry patient does not show decision making capacity. Son prefers SNF or facility placement. Per psychiatry, PEC evaluation for psych hospital admission is not warranted as they believe this is not a psychiatric condition but rather neuro-cognitive decline. " "    Plan:  - f/u with Neuro recs, likely underlying dementia driving presentation  - Behavioral neurology outpatient referral per neurology   - Completed 3 day course of CTX for acute cystitis   - Rivastigmine 4.6 mg patch  - psych consult determined not having capacity by psychiatry, noting "her dogs will take care of her" if she goes home, new consult 7/12/24 confirms continued lack of capacity  - Coordinate with CM for placement in NH due to lack of capacity, organizing options for NH with memory unit or secure perez  "

## 2024-07-12 NOTE — CONSULTS
Plan of Care:    Discussed current concerns with primary team regarding patient's desire to go home rather than to a nursing home. Patient has failed to demonstrate a clear and consistent choice regarding this decision and was also unable to appreciate the risks and benefits of the situation. Based on the evaluations of multiple members of the psychiatry team, Ms. Dougherty does not have capacity to refuse nursing home placement.       Tobin Ortega MD  [facility] Psychiatry, PGY-4
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
## 2024-07-12 NOTE — PLAN OF CARE
Problem: Adult Inpatient Plan of Care  Goal: Plan of Care Review  Outcome: Progressing     Problem: Skin Injury Risk Increased  Goal: Skin Health and Integrity  Outcome: Progressing     Problem: Confusion Chronic  Goal: Optimal Cognitive Function  Outcome: Progressing     Problem: Fall Injury Risk  Goal: Absence of Fall and Fall-Related Injury  Outcome: Progressing     Problem: Delirium  Goal: Optimal Coping  Outcome: Progressing

## 2024-07-13 LAB
ALBUMIN SERPL BCP-MCNC: 4 G/DL (ref 3.5–5.2)
ALP SERPL-CCNC: 86 U/L (ref 55–135)
ALT SERPL W/O P-5'-P-CCNC: 22 U/L (ref 10–44)
ANION GAP SERPL CALC-SCNC: 11 MMOL/L (ref 8–16)
AST SERPL-CCNC: 23 U/L (ref 10–40)
BASOPHILS # BLD AUTO: 0.09 K/UL (ref 0–0.2)
BASOPHILS NFR BLD: 0.7 % (ref 0–1.9)
BILIRUB SERPL-MCNC: 0.7 MG/DL (ref 0.1–1)
BUN SERPL-MCNC: 20 MG/DL (ref 8–23)
CALCIUM SERPL-MCNC: 9.9 MG/DL (ref 8.7–10.5)
CHLORIDE SERPL-SCNC: 106 MMOL/L (ref 95–110)
CO2 SERPL-SCNC: 23 MMOL/L (ref 23–29)
CREAT SERPL-MCNC: 1 MG/DL (ref 0.5–1.4)
DIFFERENTIAL METHOD BLD: ABNORMAL
EOSINOPHIL # BLD AUTO: 0.3 K/UL (ref 0–0.5)
EOSINOPHIL NFR BLD: 2.4 % (ref 0–8)
ERYTHROCYTE [DISTWIDTH] IN BLOOD BY AUTOMATED COUNT: 13.5 % (ref 11.5–14.5)
EST. GFR  (NO RACE VARIABLE): 58.4 ML/MIN/1.73 M^2
GLUCOSE SERPL-MCNC: 85 MG/DL (ref 70–110)
HCT VFR BLD AUTO: 42.9 % (ref 37–48.5)
HGB BLD-MCNC: 13.4 G/DL (ref 12–16)
IMM GRANULOCYTES # BLD AUTO: 0.07 K/UL (ref 0–0.04)
IMM GRANULOCYTES NFR BLD AUTO: 0.5 % (ref 0–0.5)
LYMPHOCYTES # BLD AUTO: 2.3 K/UL (ref 1–4.8)
LYMPHOCYTES NFR BLD: 16.9 % (ref 18–48)
MCH RBC QN AUTO: 32 PG (ref 27–31)
MCHC RBC AUTO-ENTMCNC: 31.2 G/DL (ref 32–36)
MCV RBC AUTO: 102 FL (ref 82–98)
MONOCYTES # BLD AUTO: 0.9 K/UL (ref 0.3–1)
MONOCYTES NFR BLD: 6.3 % (ref 4–15)
NEUTROPHILS # BLD AUTO: 9.8 K/UL (ref 1.8–7.7)
NEUTROPHILS NFR BLD: 73.2 % (ref 38–73)
NRBC BLD-RTO: 0 /100 WBC
PLATELET # BLD AUTO: 324 K/UL (ref 150–450)
PMV BLD AUTO: 10.8 FL (ref 9.2–12.9)
POTASSIUM SERPL-SCNC: 3.9 MMOL/L (ref 3.5–5.1)
PROT SERPL-MCNC: 7 G/DL (ref 6–8.4)
RBC # BLD AUTO: 4.19 M/UL (ref 4–5.4)
SODIUM SERPL-SCNC: 140 MMOL/L (ref 136–145)
WBC # BLD AUTO: 13.4 K/UL (ref 3.9–12.7)

## 2024-07-13 PROCEDURE — 36415 COLL VENOUS BLD VENIPUNCTURE: CPT | Performed by: STUDENT IN AN ORGANIZED HEALTH CARE EDUCATION/TRAINING PROGRAM

## 2024-07-13 PROCEDURE — 80053 COMPREHEN METABOLIC PANEL: CPT | Performed by: STUDENT IN AN ORGANIZED HEALTH CARE EDUCATION/TRAINING PROGRAM

## 2024-07-13 PROCEDURE — 25000003 PHARM REV CODE 250

## 2024-07-13 PROCEDURE — 85025 COMPLETE CBC W/AUTO DIFF WBC: CPT | Performed by: STUDENT IN AN ORGANIZED HEALTH CARE EDUCATION/TRAINING PROGRAM

## 2024-07-13 PROCEDURE — 11000001 HC ACUTE MED/SURG PRIVATE ROOM

## 2024-07-13 RX ADMIN — RIVASTIGMINE 1 PATCH: 4.6 PATCH, EXTENDED RELEASE TRANSDERMAL at 10:07

## 2024-07-13 NOTE — PROGRESS NOTES
Pt is AAO X2, she has been dressed looking for Donald and trying to find her dogs. She was very upset because Donald told her he gave them away, she was redirectable but required numerous conversations and continuous direct visual observance. Later in the morning 1 friend visited and had compassion for her then another friend visited who also spent meaningful time with her. Her afternoon has been quiet watching TV and moving about the room.Tele sitter remains in use.

## 2024-07-13 NOTE — ASSESSMENT & PLAN NOTE
"78-year-old lady here with encephalopathy, secondary to worsening dementia.  At baseline, she is normally "authoritative" demanding things, speaking about Yarsanism acts.  However unclear her mental status as her son does not keep close contact with her.  He does not really know if her mentation has gotten progressively worse or what is her baseline.  Normally she is able to go to the [place] daily to see her horse.  Overall workup was only notable for a leukocytosis with granulocytes with no clear source or systemic response. Will treat 1 time dose of ceftriaxone see if improvement in her symptoms.    Unclear etiology at this time, but will rule out reversible causes of acute encephalopathy, such as infectious, pending blood cultures/RPR, vitamin deficiencies (B1, B3, B9, B12), less likely meningitis as patient has full range of motion of her neck, can consider LP in the morning to r/o encephalitis vs menigitis?  Utox.pending    Differentials include but not limited to progressing age-related dementia, vitamin deficiencies encephalitis, UTI?, less likely stroke, toxicities,     Results:  WBCs 13  CMP unremarkable  Lactate normal,  BNP mildly elevated at 115, troponin negative  EKG was normal sinus rhythm, no ischemic changes  TSH WNL  Protocol normal  UA positive for 2+ ketones trace protein no nitrites    CT head showed no acute intracranial process  Chest x-ray showed no focal consolidation    Workup is notable for non-acute MRI Brain with mild/mod generalized atrophy and microvascular disease, grossly unremarkable CBC, CMP, UA. WNL B12 (low normal), and WNL TSH.    Patient very resistant to discharging to SNF or any facility. Per our team and Psychiatry patient does not show decision making capacity. Son prefers SNF or facility placement. Per psychiatry, PEC evaluation for psych hospital admission is not warranted as they believe this is not a psychiatric condition but rather neuro-cognitive decline. " "    Plan:  - f/u with Neuro recs, likely underlying dementia driving presentation  - Behavioral neurology outpatient referral per neurology   - Completed 3 day course of CTX for acute cystitis   - Rivastigmine 4.6 mg patch  - psych consult determined not having capacity by psychiatry, noting "her dogs will take care of her" if she goes home, new consult 7/12/24 confirms continued lack of capacity  - Coordinate with CM for placement in NH due to lack of capacity, organizing options for NH with memory unit or secure [facility]  "

## 2024-07-13 NOTE — PLAN OF CARE
Problem: Adult Inpatient Plan of Care  Goal: Plan of Care Review  Outcome: Progressing  Flowsheets (Taken 7/12/2024 1919)  Plan of Care Reviewed With: patient  Goal: Patient-Specific Goal (Individualized)  Outcome: Progressing  Goal: Absence of Hospital-Acquired Illness or Injury  Outcome: Progressing  Goal: Optimal Comfort and Wellbeing  Outcome: Progressing  Goal: Readiness for Transition of Care  Outcome: Progressing

## 2024-07-13 NOTE — PLAN OF CARE
Problem: Adult Inpatient Plan of Care  Goal: Plan of Care Review  7/13/2024 0130 by Jose Sloan LPN  Outcome: Progressing  7/13/2024 0118 by Jose Sloan LPN  Outcome: Progressing  Goal: Patient-Specific Goal (Individualized)  7/13/2024 0130 by Jose Sloan LPN  Outcome: Progressing  7/13/2024 0118 by Jose Sloan LPN  Outcome: Progressing  Goal: Absence of Hospital-Acquired Illness or Injury  7/13/2024 0130 by Jose Sloan LPN  Outcome: Progressing  7/13/2024 0118 by Jose Sloan LPN  Outcome: Progressing  Goal: Optimal Comfort and Wellbeing  7/13/2024 0130 by Jose Sloan LPN  Outcome: Progressing  7/13/2024 0118 by Jose Sloan LPN  Outcome: Progressing  Goal: Readiness for Transition of Care  7/13/2024 0130 by Jose Sloan LPN  Outcome: Progressing  7/13/2024 0118 by Jose Sloan LPN  Outcome: Progressing     Problem: Wound  Goal: Optimal Coping  7/13/2024 0130 by Jose Sloan LPN  Outcome: Progressing  7/13/2024 0118 by Jose Sloan LPN  Outcome: Progressing  Goal: Optimal Functional Ability  7/13/2024 0130 by Jose Sloan LPN  Outcome: Progressing  7/13/2024 0118 by Jose Sloan LPN  Outcome: Progressing  Goal: Absence of Infection Signs and Symptoms  7/13/2024 0130 by Jose Sloan LPN  Outcome: Progressing  7/13/2024 0118 by Jose Sloan LPN  Outcome: Progressing  Goal: Improved Oral Intake  7/13/2024 0130 by Jose Sloan LPN  Outcome: Progressing  7/13/2024 0118 by Jose Sloan LPN  Outcome: Progressing  Goal: Optimal Pain Control and Function  7/13/2024 0130 by Jose Sloan LPN  Outcome: Progressing  7/13/2024 0118 by Jose Sloan LPN  Outcome: Progressing  Goal: Skin Health and Integrity  7/13/2024 0130 by Jose Sloan LPN  Outcome: Progressing  7/13/2024 0118 by Jose Sloan LPN  Outcome: Progressing  Goal: Optimal Wound Healing  7/13/2024 0130 by Jose Sloan LPN  Outcome:  Progressing  7/13/2024 0118 by Jose Sloan LPN  Outcome: Progressing     Problem: Skin Injury Risk Increased  Goal: Skin Health and Integrity  7/13/2024 0130 by Jose Sloan LPN  Outcome: Progressing  7/13/2024 0118 by Jose Sloan LPN  Outcome: Progressing     Problem: Fall Injury Risk  Goal: Absence of Fall and Fall-Related Injury  7/13/2024 0130 by Jose Sloan LPN  Outcome: Progressing  7/13/2024 0118 by Jose Sloan LPN  Outcome: Progressing     Problem: Delirium  Goal: Optimal Coping  7/13/2024 0130 by Jose Sloan LPN  Outcome: Progressing  7/13/2024 0118 by Jose Sloan LPN  Outcome: Progressing  Goal: Improved Behavioral Control  7/13/2024 0130 by Jose Sloan LPN  Outcome: Progressing  7/13/2024 0118 by Jose Sloan LPN  Outcome: Progressing  Goal: Improved Attention and Thought Clarity  7/13/2024 0130 by Jose Sloan LPN  Outcome: Progressing  7/13/2024 0118 by Jose Sloan LPN  Outcome: Progressing  Goal: Improved Sleep  7/13/2024 0130 by Jose Sloan LPN  Outcome: Progressing  7/13/2024 0118 by Jose Sloan LPN  Outcome: Progressing     Problem: Confusion Chronic  Goal: Optimal Cognitive Function  7/13/2024 0130 by Jose Sloan LPN  Outcome: Progressing  7/13/2024 0118 by Jose Sloan LPN  Outcome: Progressing

## 2024-07-13 NOTE — SUBJECTIVE & OBJECTIVE
Interval History: NAEON. Patient endorses feeling down today, improved after her friends came to visit her at bedside.    Review of Systems   Constitutional: Negative.    HENT: Negative.     Respiratory: Negative.     Cardiovascular: Negative.    Gastrointestinal: Negative.    Genitourinary: Negative.    Musculoskeletal: Negative.    Neurological: Negative.    Psychiatric/Behavioral:  Positive for confusion.      Objective:     Vital Signs (Most Recent):  Temp: 98.2 °F (36.8 °C) (07/13/24 1117)  Pulse: 84 (07/13/24 1117)  Resp: 18 (07/13/24 1117)  BP: (!) 140/93 (07/13/24 1117)  SpO2: 100 % (07/13/24 1117) Vital Signs (24h Range):  Temp:  [97.6 °F (36.4 °C)-98.5 °F (36.9 °C)] 98.2 °F (36.8 °C)  Pulse:  [79-84] 84  Resp:  [17-18] 18  SpO2:  [97 %-100 %] 100 %  BP: (118-140)/(59-93) 140/93     Weight: 49.9 kg (110 lb)  Body mass index is 20.12 kg/m².    Intake/Output Summary (Last 24 hours) at 7/13/2024 1603  Last data filed at 7/13/2024 0653  Gross per 24 hour   Intake 120 ml   Output --   Net 120 ml         Physical Exam  Vitals and nursing note reviewed.   Constitutional:       General: She is not in acute distress.     Appearance: Normal appearance. She is not ill-appearing.   Eyes:      Extraocular Movements: Extraocular movements intact.      Pupils: Pupils are equal, round, and reactive to light.   Pulmonary:      Effort: Pulmonary effort is normal.   Musculoskeletal:         General: No swelling. Normal range of motion.      Right lower leg: No edema.      Left lower leg: No edema.   Neurological:      General: No focal deficit present.      Mental Status: She is alert and oriented to person, place, and time.   Psychiatric:         Attention and Perception: Attention normal.         Mood and Affect: Mood is depressed.         Behavior: Behavior normal. Behavior is cooperative.             Significant Labs: All pertinent labs within the past 24 hours have been reviewed.    Significant Imaging: I have reviewed  all pertinent imaging results/findings within the past 24 hours.
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
## 2024-07-14 PROCEDURE — 11000001 HC ACUTE MED/SURG PRIVATE ROOM

## 2024-07-14 PROCEDURE — 25000003 PHARM REV CODE 250

## 2024-07-14 RX ADMIN — RIVASTIGMINE 1 PATCH: 4.6 PATCH, EXTENDED RELEASE TRANSDERMAL at 10:07

## 2024-07-14 NOTE — SUBJECTIVE & OBJECTIVE
Interval History: NAEON. Patient eating breakfast, sitting on edge of bed with legs dangling. Patient eager to leave hospital. Discussed social work barriers to discharge. Patient accepts disposition to nursing home, but hopes it is temporary.    Review of Systems   Constitutional: Negative.    HENT: Negative.     Respiratory: Negative.     Cardiovascular: Negative.    Gastrointestinal: Negative.    Genitourinary: Negative.    Musculoskeletal: Negative.    Neurological: Negative.    Psychiatric/Behavioral:  Positive for confusion.      Objective:     Vital Signs (Most Recent):  Temp: 98 °F (36.7 °C) (07/14/24 1113)  Pulse: 89 (07/14/24 1113)  Resp: 16 (07/14/24 1113)  BP: 119/81 (07/14/24 1113)  SpO2: 97 % (07/14/24 1113) Vital Signs (24h Range):  Temp:  [97.8 °F (36.6 °C)-98.5 °F (36.9 °C)] 98 °F (36.7 °C)  Pulse:  [67-89] 89  Resp:  [16-18] 16  SpO2:  [97 %-99 %] 97 %  BP: (106-152)/(66-85) 119/81     Weight: 49.9 kg (110 lb)  Body mass index is 20.12 kg/m².    Intake/Output Summary (Last 24 hours) at 7/14/2024 1126  Last data filed at 7/13/2024 1743  Gross per 24 hour   Intake 640 ml   Output --   Net 640 ml         Physical Exam  Vitals and nursing note reviewed.   Constitutional:       General: She is not in acute distress.     Appearance: Normal appearance. She is not ill-appearing.   Eyes:      Extraocular Movements: Extraocular movements intact.      Pupils: Pupils are equal, round, and reactive to light.   Pulmonary:      Effort: Pulmonary effort is normal.   Musculoskeletal:         General: No swelling. Normal range of motion.      Right lower leg: No edema.      Left lower leg: No edema.   Neurological:      General: No focal deficit present.      Mental Status: She is alert and oriented to person, place, and time.   Psychiatric:         Attention and Perception: Attention normal.         Mood and Affect: Mood is depressed.         Behavior: Behavior normal. Behavior is cooperative.              Significant Labs: All pertinent labs within the past 24 hours have been reviewed.    Significant Imaging: I have reviewed all pertinent imaging results/findings within the past 24 hours.

## 2024-07-14 NOTE — ASSESSMENT & PLAN NOTE
"78-year-old lady here with encephalopathy, secondary to worsening dementia.  At baseline, she is normally "authoritative" demanding things, speaking about [employer] acts.  However unclear her mental status as her son does not keep close contact with her.  He does not really know if her mentation has gotten progressively worse or what is her baseline.  Normally she is able to go to the [place] daily to see her horse.  Overall workup was only notable for a leukocytosis with granulocytes with no clear source or systemic response. Will treat 1 time dose of ceftriaxone see if improvement in her symptoms.    Unclear etiology at this time, but will rule out reversible causes of acute encephalopathy, such as infectious, pending blood cultures/RPR, vitamin deficiencies (B1, B3, B9, B12), less likely meningitis as patient has full range of motion of her neck, can consider LP in the morning to r/o encephalitis vs menigitis?  Utox.pending    Differentials include but not limited to progressing age-related dementia, vitamin deficiencies encephalitis, UTI?, less likely stroke, toxicities,     Results:  WBCs 13  CMP unremarkable  Lactate normal,  BNP mildly elevated at 115, troponin negative  EKG was normal sinus rhythm, no ischemic changes  TSH WNL  Protocol normal  UA positive for 2+ ketones trace protein no nitrites    CT head showed no acute intracranial process  Chest x-ray showed no focal consolidation    Workup is notable for non-acute MRI Brain with mild/mod generalized atrophy and microvascular disease, grossly unremarkable CBC, CMP, UA. WNL B12 (low normal), and WNL TSH.    Patient very resistant to discharging to SNF or any facility. Per our team and Psychiatry patient does not show decision making capacity. Son prefers SNF or facility placement. Per psychiatry, PEC evaluation for psych hospital admission is not warranted as they believe this is not a psychiatric condition but rather neuro-cognitive decline. " "    Plan:  - f/u with Neuro recs, likely underlying dementia driving presentation  - Behavioral neurology outpatient referral per neurology   - Completed 3 day course of CTX for acute cystitis   - Rivastigmine 4.6 mg patch  - psych consult determined not having capacity by psychiatry, noting "her dogs will take care of her" if she goes home, new consult 7/12/24 confirms continued lack of capacity  - Coordinate with CM for placement in NH due to lack of capacity, organizing options for NH with memory unit or secure perez  "
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
## 2024-07-15 PROCEDURE — 25000003 PHARM REV CODE 250

## 2024-07-15 PROCEDURE — 11000001 HC ACUTE MED/SURG PRIVATE ROOM

## 2024-07-15 RX ORDER — OLANZAPINE 5 MG/1
5 TABLET, ORALLY DISINTEGRATING ORAL EVERY 8 HOURS PRN
Status: DISCONTINUED | OUTPATIENT
Start: 2024-07-15 | End: 2025-01-10 | Stop reason: HOSPADM

## 2024-07-15 RX ADMIN — RIVASTIGMINE 1 PATCH: 4.6 PATCH, EXTENDED RELEASE TRANSDERMAL at 08:07
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
## 2024-07-15 NOTE — SUBJECTIVE & OBJECTIVE
Interval History: NAEON. Feels well. Eager to return home.     Review of Systems   Constitutional: Negative.    HENT: Negative.     Respiratory: Negative.     Cardiovascular: Negative.    Gastrointestinal: Negative.    Genitourinary: Negative.    Musculoskeletal: Negative.    Neurological: Negative.    Psychiatric/Behavioral:  Positive for confusion.      Objective:     Vital Signs (Most Recent):  Temp: 97.8 °F (36.6 °C) (07/15/24 1141)  Pulse: 74 (07/15/24 1141)  Resp: 17 (07/15/24 0745)  BP: (!) 150/69 (07/15/24 1141)  SpO2: 100 % (07/15/24 1141) Vital Signs (24h Range):  Temp:  [97.8 °F (36.6 °C)-98.3 °F (36.8 °C)] 97.8 °F (36.6 °C)  Pulse:  [68-80] 74  Resp:  [16-17] 17  SpO2:  [96 %-100 %] 100 %  BP: (123-150)/(69-71) 150/69     Weight: 49.9 kg (110 lb)  Body mass index is 20.12 kg/m².    Intake/Output Summary (Last 24 hours) at 7/15/2024 1641  Last data filed at 7/14/2024 2153  Gross per 24 hour   Intake 360 ml   Output --   Net 360 ml         Physical Exam  Constitutional:       Appearance: Normal appearance.   HENT:      Head: Normocephalic and atraumatic.   Cardiovascular:      Rate and Rhythm: Normal rate.   Pulmonary:      Effort: Pulmonary effort is normal. No respiratory distress.   Abdominal:      Palpations: Abdomen is soft.   Musculoskeletal:      Cervical back: No rigidity.      Right lower leg: No edema.      Left lower leg: No edema.   Skin:     General: Skin is warm.   Neurological:      Comments: Oriented to person and place. Cannot correctly state the reason she is in the hospital   Psychiatric:      Comments: Mood and behavior normal in the morning but agitated later in the day, threatening to leave AMA            Significant Labs: All pertinent labs within the past 24 hours have been reviewed.    Significant Imaging: I have reviewed all pertinent imaging results/findings within the past 24 hours.

## 2024-07-15 NOTE — PLAN OF CARE
[facility] ([facility])  Discharge Reassessment    1410  Per MD team, patient is medically ready for d/c at this time. Plan is to d/c to NH for long-term placement once an accepting facility is secured and financials are obtained. Of note, it has been determined by [facility] - Psychiatry that patient does not have capacity to make her own medical decisions.    SW met w/patient at bedside to discuss d/c plans. Per patient, she will not be going to any facility without her animals. Patient expressed that she has been trying to get in touch with her son, Jose Alejandro Dougherty ([phone]), to figure out what the plan is because she was told by MD team that she is medically ready for d/c. Per patient, she called Jose Alejandro today, but he didn't answer. SW called patient's son as well and left  for call back. MD team and  leadership aware. Will continue to follow for needs.    1420  SW called [facility] Phone: [phone]   to f/u on bed availability; as they accepted patient via [facility] on 6/28/24 and have a Memory Unit, per previous CM. SW was placed on hold with Admissions for 18 minutes before SW had to hang up the phone. SW will re-attempt at a later time.    Primary Care Provider: Edwar Castaneda II, MD    Expected Discharge Date:     Reassessment (most recent)       Discharge Reassessment - 07/15/24 1325          Discharge Reassessment    Assessment Type Discharge Planning Reassessment     Did the patient's condition or plan change since previous assessment? No     Discharge Plan discussed with: Patient;Adult children     Name(s) and Number(s) Jose Alejandro Dougherty (Son) [phone]     Communicated MARVEL with patient/caregiver Yes     Discharge Plan A New Nursing Home placement - long-term care facility     Discharge Plan B New Nursing Home placement - long-term care facility     DME Needed Upon Discharge  other (see comments)   TBD    Transition of Care Barriers Mental illness;No  family/friends to help;Social     Why the patient remains in the hospital Social issues;Placement issues        Post-Acute Status    Post-Acute Authorization Placement     Post-Acute Placement Status Pending post-acute provider review/more information requested     Coverage MEDICARE - MEDICARE PART A & B -     Discharge Delays Patient and Family Barriers                   Discharge Plan A and Plan B have been determined by review of patient's clinical status, future medical and therapeutic needs, and coverage/benefits for post-acute care in coordination with multidisciplinary team members.    Duyen Ordaz, KEYONA, LMSW    Case Management Department  [facility]

## 2024-07-15 NOTE — ASSESSMENT & PLAN NOTE
"78-year-old lady here with encephalopathy, secondary to worsening dementia.  At baseline, she is normally "authoritative" demanding things, speaking about [employer] acts.  However unclear her mental status as her son does not keep close contact with her.  He does not really know if her mentation has gotten progressively worse or what is her baseline.  Normally she is able to go to the [place] daily to see her horse.  Overall workup for AMS was only notable for a leukocytosis with granulocytes with no clear source or systemic response.     Extensive workup for AMS has been negative. Neurology suspects her underlying dementia is driving her presentation. Patient very resistant to discharging to SNF or any facility. Per our team and Psychiatry patient does not show decision making capacity. Son prefers SNF or facility placement. Per psychiatry, PEC evaluation for psych hospital admission is not warranted as they believe this is not a psychiatric condition but rather neuro-cognitive decline. However, patient threatened to leave AMA on 7/15 so she was PEC'd.     Plan:  - Per psych she does not have capacity but consulted psych again on 7/15 given new PEC status  - F/u with Neuro recs: suspect dementia, refer for behavioral neurology outpatient  - Rivastigmine 4.6 mg patch  - Coordinate with CM for placement in NH due to lack of capacity, organizing options for NH with memory unit or secure perez  "

## 2024-07-15 NOTE — SIGNIFICANT EVENT
Spoke with patient about desire to leave. Patient states that she has things to take care of at home. Tried to gauge patient's understanding of memory decline and what would happen if she were to fall or drive somewhere and forget where she was, but patient unable to state potential consequence and also not able to repeat back potential consequences when given to her. Speech tangential, disoriented to situation. Patient was previously evaluated by psychiatry who agreed that patient does not have capacity to refuse SNF placement on their evaluation. I agree that she continues to not have capacity to make this decision. Patient adamant that she is leaving and that she will go down the stairwell. Patient placed under PEC due to threat to leave against medical advice and not currently having the capacity to make that decision.

## 2024-07-16 PROCEDURE — 11000001 HC ACUTE MED/SURG PRIVATE ROOM

## 2024-07-16 PROCEDURE — 99232 SBSQ HOSP IP/OBS MODERATE 35: CPT | Mod: ,,, | Performed by: PSYCHIATRY & NEUROLOGY

## 2024-07-16 PROCEDURE — 25000003 PHARM REV CODE 250

## 2024-07-16 RX ADMIN — RIVASTIGMINE 1 PATCH: 4.6 PATCH, EXTENDED RELEASE TRANSDERMAL at 08:07

## 2024-07-16 NOTE — MEDICARE RECERTIFICATION
MEDICARE INPATIENT  PHYSICIAN RECERTIFICATION  OF MEDICAL NECESSITY      1st Recertification (required no later than the 20th day of hospitalization)     I certify that this patient's continued medical needs require  placement to NH and Pursuing memory care unit, need financials from son.  Her son has requested that all communication be in writing, discussing with CM/SW/legal and now PEC'd, unsafe to discharge home as lacks decision making capacity .  I estimate that the patient will be in the hospital for another 3 days.  Post-acute planning is in process, and currently the patient will likely be [facility] to  [facility] .

## 2024-07-16 NOTE — PROGRESS NOTES
"CONSULTATION LIAISON PSYCHIATRY PROGRESS NOTE    Patient Name: Mohini Dougherty  MRN: [id number]  Patient Class: IP- Inpatient  Admission Date: 6/24/2024  Attending Physician: Tobin Schilling, *      SUBJECTIVE:   Mohini Dougherty is a 78 y.o. female with no known psychiatric history or significant medical history who presents with AMS and is admitted since 6/24 for Encephalopathy. Patient reportedly found in neighbors driveway and noted to be confused by son and police.     Psychiatry consulted for "non-redirectable agitation, fighting staff, recs for behavioral control medications."     Yesterday, patient attempted to leave AMA and required PEC, as patient does not have capacity to do so. Overnight, no acute events documented and patient did not require any PRNs for non-redirectable agitation. Of note, patient has not required any PRNs for behavioral reasons since 6/30/24. Today, patient observed sitting up in chair at bedside watching TV. She is oriented to self and year, but disoriented to month, place, and situation. Patient states she slept well and feels "good" this morning. Patient perseverates on being able to go to [employer] to obtain funds. Further questioning leads to tangential responses regarding obtaining funds. She denies any other questions at this time.     OBJECTIVE:    Mental Status Exam:  General Appearance: appears stated age, well developed and nourished, adequately groomed and appropriately dressed, in no acute distress  Behavior: appropriate eye-contact, redirectable  Involuntary Movements and Motor Activity: no abnormal involuntary movements noted; no tics, no tremors, no akathisia, no dystonia, no evidence of tardive dyskinesia; no psychomotor agitation or retardation  Gait and Station: unable to assess - patient lying down or seated  Speech and Language: normal rate, rhythm, volume, tone, and pitch  Mood: "good"  Affect: constricted  Thought Process and Associations: illogical, " tangential  Thought Content and Perceptions:: no suicidal or homicidal ideation, no auditory or visual hallucinations, no paranoid ideation, no ideas of reference, no evidence of delusions or psychosis  Sensorium and Orientation:  oriented to person and year, disoriented to month, place, and situation  Recent and Remote Memory: impaired  Attention and Concentration: impaired  Fund of Knowledge:  limited, correctly identifies the , incorrectly identifies the last five Presidents of [place] (in order)   Insight: poor awareness of illness  Judgment: impaired due to neurocognitive disorder, encephalopathy, behavior is inappropriate to the circumstances, noncompliant with health provider's recommendations and instructions       ASSESSMENT & RECOMMENDATIONS   Unspecified Major Neurocognitive Disorder     Dementia  PSYCH MEDICATIONS  PRN: Zyprexa 2.5 mg PO/IM q8h PRN for non-redirectable agitation  Please obtain EKGs to monitor QTC interval while receiving Zyprexa     DELIRIUM  DELIRIUM BEHAVIOR MANAGEMENT  PLEASE utilize CHEMICAL restraints with PRN meds first for agitation. Minimize use of PHYSICAL restraints OR have periods of being out of physical restraints if possible.  Keep window shades open and room lit during day and room dim at night in order to promote normal sleep-wake cycles  Encourage family at bedside. [place] patient often to situation, location, date.  Continue to Limit or Discontinue use of Narcotics, Benzos and Anti-cholinergic medications as they may worsen delirium.  Continue medical workup for causative etiology of Delirium.      RISK ASSESSMENT  Continue PEC at this time as patient is unable to leave AMA due to lacking capacity to make decisions on disposition     FOLLOW UP  Will follow up while in house     DISPOSITION - once medically cleared:   Defer to medical team    Please contact ON CALL psychiatry service (24/7) for any acute issues that may  arise.      Tobin Ortega MD  [facility], PGY-4        --------------------------------------------------------------------------------------------------------------------------------------------------------------------------------------------------------------------------------------    CONTINUED OBJECTIVE clinical data & findings reviewed and noted for above decision making    Current Medications:   Scheduled Meds:    rivastigmine  1 patch Transdermal Daily     PRN Meds:   Current Facility-Administered Medications:     acetaminophen, 650 mg, Oral, Q8H PRN    acetaminophen, 650 mg, Oral, Q4H PRN    glucagon (human recombinant), 1 mg, Intramuscular, PRN    glucose, 16 g, Oral, PRN    glucose, 24 g, Oral, PRN    melatonin, 6 mg, Oral, Nightly PRN    naloxone, 0.02 mg, Intravenous, PRN    OLANZapine zydis, 5 mg, Oral, Q8H PRN    polyethylene glycol, 17 g, Oral, PRN    sodium chloride 0.9%, 2 mL, Intravenous, Q12H PRN    Allergies:   Review of patient's allergies indicates:  No Known Allergies    Vitals  Vitals:    07/16/24 0724   BP: (!) 140/66   Pulse: 75   Resp:    Temp: 97.9 °F (36.6 °C)       Labs/Imaging/Studies:  No results found for this or any previous visit (from the past 24 hour(s)).    Imaging Results              MRI Brain W WO Contrast (Final result)  Result time 06/25/24 04:11:58      Final result by Hugh Godwin MD (06/25/24 04:11:58)                   Impression:      Motion artifact slightly limits examination.    No acute intracranial process specifically no acute intracranial hemorrhage or acute infarct.    Electronically signed by resident: Bethany Novak  Date:    06/25/2024  Time:    03:41    Electronically signed by: Hugh Godwin MD  Date:    06/25/2024  Time:    04:11               Narrative:    EXAMINATION:  MRI BRAIN W WO CONTRAST    CLINICAL HISTORY:  Mental status change, unknown cause;    TECHNIQUE:  Multiplanar multisequence MR imaging of the brain was performed  before and after the administration of 5 mL Gadavist intravenous contrast.    COMPARISON:  CT head 06/24/2024.    FINDINGS:  Motion artifact slightly limits examination.    Mild generalized cerebral atrophy compensatory enlargement of the ventricles and subarachnoid spaces.  Few scattered punctate foci of T2/FLAIR signal hyperintensity in the supratentorial white matter without corresponding diffusion signal abnormality enhancement which is nonspecific and may be sequela of chronic small vessel ischemic change.    No diffusion restriction to indicate acute infarction.  No intraparenchymal hemorrhage, edema or mass.No abnormal postcontrast parenchymal or leptomeningeal enhancement.    Ventricles are stable in size and configuration for age.  No hydrocephalus or midline shift.  Basal cisterns are patent.    No extra-axial blood or fluid collections.    The T2 skull base flow voids are preserved.    Bone marrow signal intensity unremarkable.    Fluid signal in the sphenoid sinus, otherwise the paranasal sinuses are unremarkable mastoid air cells are unremarkable.                                       X-Ray Abdomen AP 1 View (Final result)  Result time 06/25/24 01:08:27      Final result by Hugh Godwin MD (06/25/24 01:08:27)                   Impression:      No unexpected metallic foreign body identified in the field of view.      Electronically signed by: Hugh Godwin MD  Date:    06/25/2024  Time:    01:08               Narrative:    EXAMINATION:  XR ABDOMEN AP 1 VIEW    CLINICAL HISTORY:  for MRI scan;    TECHNIQUE:  Single AP View of the abdomen was performed.    COMPARISON:  None.    FINDINGS:  Cardiac wires overlie the chest and abdomen.  Bowel gas pattern is unremarkable.  Calcifications overlie the pelvis.  No unexpected metallic foreign body identified in the field of view.                                       CT Head Without Contrast (Final result)  Result time 06/24/24 17:49:41      Final result  by Mane Hsieh MD (06/24/24 17:49:41)                   Impression:      No acute intracranial process.  Additional evaluation with MRI of the brain may be obtained, as clinically warranted.    Changes of chronic vessel ischemic disease and cerebral volume loss.      Electronically signed by: Mane Hsieh MD  Date:    06/24/2024  Time:    17:49               Narrative:    EXAMINATION:  CT HEAD WITHOUT CONTRAST    CLINICAL HISTORY:  Mental status change, unknown cause;    TECHNIQUE:  Low dose axial images were obtained through the head.  Coronal and sagittal reformations were also performed. Contrast was not administered.    COMPARISON:  04/15/2024.    FINDINGS:  The subcutaneous tissues are unremarkable.  The bony calvarium is intact.  The paranasal sinuses are unremarkable.  The mastoid air cells are clear.  The orbits and intraorbital contents are within normal limits.    The craniocervical junction is intact.  The sellar and parasellar structures are unremarkable.  There is no evidence of intracranial hemorrhage.  The ventricles and sulci are prominent, consistent cerebral volume loss.  There are hypodensities within the periventricular and subcortical white matter.  The gray-white differentiation is maintained.  There is no dense vessel sign.  There is no evidence of mass effect.                                       X-Ray Chest AP Portable (Final result)  Result time 06/24/24 17:59:50      Final result by Jarrett Stewart MD (06/24/24 17:59:50)                   Impression:      1. Chronic appearing interstitial findings, no large focal consolidation.  Correlation with any history of COPD/emphysema.      Electronically signed by: Jarrett Stewart MD  Date:    06/24/2024  Time:    17:59               Narrative:    EXAMINATION:  XR CHEST AP PORTABLE    CLINICAL HISTORY:  AMS;    TECHNIQUE:  Single frontal view of the chest was performed.    COMPARISON:  04/15/2024    FINDINGS:  The cardiomediastinal  silhouette is not enlarged.  There is no pleural effusion.  The trachea is midline.  The lungs are symmetrically expanded bilaterally with coarse interstitial attenuation bilaterally, similar to the previous examination..  No large focal consolidation seen.  There is no pneumothorax.  The osseous structures are remarkable for degenerative change..

## 2024-07-16 NOTE — CONSULTS
Please see psychiatry progress note written on 7/16/24.    Tobin Ortega MD  [facility], PGY-4

## 2024-07-16 NOTE — PLAN OF CARE
"Met with patient at bedside with SW-Intern present and sitter present to discuss discharge planning and next of kin. Patient initially on the phone with bank, possibly paying a bill, and completed call prior to speaking with CM Manager.     Patient remains hyper-focused on returning home to her dogs. Patient stated that her son brought them to a vet [place] [address], but she couldn't recall the name or phone number of the vet.     Redirected patient to discussion of SNF/NH placement. Patient states she may be open to going somewhere if her dogs were taken care of or could go with her. Informed patient that the facility would need 3 months of bank statement to apply for Medicaid to help pay for what her insurance doesn't cover. Patient states that she hinson with Leah and would go get the documents. Informed patient it would have to be done by phone as she can't leave the hospital. Patient reluctant stating "I gave them some stuff before". Unable to clarify what "stuff" or to whom. When asked if her son, Jose Alejandro, could assist, patient stated "I don't think he wants to be bothered."    Called [facility] who confirmed that they have patient's dogs, Aaliyah and Ben. They state that the dogs have been surrendered by the patient's son; however, they are "hanging out" at the office. Vet staff would be happy to speak with patient and reassure her that dogs are well and can continue to remain while she goes to facility. Agreed to help facilitate call later today.    Called Jose Alejandro with SW-Intern present to speak with him regarding discharge planning issues. Jose Alejandro states that he has concerns about 3rd party liability signing the nursing home admission documents and is concerned about what would happen if the patient just signed herself out of the facility. Encouraged Jose Alejandro to speak directly to the accepting facility about their policy regarding patients signing themselves out of the memory care unit. Jose Alejandro insisted that he would like " for [facility] to interdict the patient as he does not have means to do it himself. Explained to Jose Alejandro that [facility] is an acute hospital and does not perform probono legal services such as interdiction except in extreme circumstances such as patient abandonment. Jose Alejandro did not confirm abandonment of the patient. Jose Alejandro quoted being unwilling to participate. Reviewed that applies to [place] law and per Jose Alejandro and patient there are no known other family/friends to act on behalf of the patient. Jose Alejandro stated that he is of the opinion that the patient needs placement in a facility with a memory care unit and would be willing to help facilitate such placement depending on the terms of such assistance and that if patient authorizes release of bank statements needed for placement that he will assist her with obtaining these documents and providing them to the accepting facility. Informed Jose Alejandro that once patient is no longer PEC'd, patient no longer has medical necessity for continued hospital stay and would be financially responsible for nightly rate of continued stay. Jose Alejandro ensured he would not be liable for this cost as it is billed to the patient.    Patient is currently PEC'd. Awaiting clarification regarding need for psych placement vs NH placement from the provider team.     Alexia Gaytan, MSW, LCSW  Manager - Case Management;

## 2024-07-16 NOTE — PROGRESS NOTES
"   Medical Nutrition Therapy        Reason for Assessment: RD follow-up  Dx: Encephalopathy   Medical Hx: -      General Info: Per RN documentation, pt continues to tolerate diet w/ % PO intake. Per chart review, pt w/ UBW of 110# x 4 years. Pt appears thin, however no indicators of malnutrition noted.      Current Diet: Regular  % intake of meals: %     Ht: 5'2"  Wt: 50kg   BMI: 20.1     Labs: Reviewed  Meds: Reviewed     Overall Physical Appearance: Thin     Level of Risk: Low     Nutrition Dx: No nutrition diagnosis at this time.     RD follow-up? Yes     Thanks!  Latanya MS, RD, LDN   "

## 2024-07-16 NOTE — PLAN OF CARE
0830  SW spoke w/Dr. Schilling regarding patient's case and d/c plans moving forward. Per Dr. Schilling, he needs to speak with patient's son, Jose Alejandro. SW will reach out to Jose Alejandro w/CM Manager, Alexia Gaytan, at 9am today to d/c detention NH placement further. Will continue to follow for needs.    0940  SW informed by CM Manager, Alexia, that she met with patient at bedside to discuss d/c plans. Per Alexia, patient confirmed that there are no other family members to act on patient's behalf other than patient's only child, Jose Alejandro. Per Alexia, she will look into where patient's animals are currently located to help ease patient's concern about their well being and f/u w/Jose Alejandro regarding next steps. Will continue to follow for needs.    Duyen Ordaz, KEYONA, LMSW    Case Management Department  [facility]   Please notify patient that the order is placed

## 2024-07-16 NOTE — SUBJECTIVE & OBJECTIVE
Interval History: PEC'd yesterday due to persistent threat to leave AMA with plan to go down the stairwell. Unable to express consequences of returning home alone. Evaluation by psych today with agreement of continued PEC.     Review of Systems   Constitutional: Negative.    HENT: Negative.     Respiratory: Negative.     Cardiovascular: Negative.    Gastrointestinal: Negative.    Genitourinary: Negative.    Musculoskeletal: Negative.    Neurological: Negative.    Psychiatric/Behavioral:  Positive for confusion.      Objective:     Vital Signs (Most Recent):  Temp: 98.2 °F (36.8 °C) (07/16/24 1114)  Pulse: 83 (07/16/24 1114)  Resp: 18 (07/16/24 1114)  BP: 129/66 (07/16/24 1114)  SpO2: 98 % (07/16/24 1114) Vital Signs (24h Range):  Temp:  [97.9 °F (36.6 °C)-98.2 °F (36.8 °C)] 98.2 °F (36.8 °C)  Pulse:  [74-83] 83  Resp:  [16-18] 18  SpO2:  [98 %-99 %] 98 %  BP: (129-153)/(66-77) 129/66     Weight: 49.9 kg (110 lb)  Body mass index is 20.12 kg/m².    Intake/Output Summary (Last 24 hours) at 7/16/2024 1534  Last data filed at 7/15/2024 1753  Gross per 24 hour   Intake 360 ml   Output --   Net 360 ml         Physical Exam  Vitals reviewed.   Constitutional:       Appearance: Normal appearance.   HENT:      Head: Normocephalic and atraumatic.   Cardiovascular:      Rate and Rhythm: Normal rate.   Pulmonary:      Effort: Pulmonary effort is normal. No respiratory distress.   Abdominal:      Palpations: Abdomen is soft.   Musculoskeletal:      Cervical back: No rigidity.      Right lower leg: No edema.      Left lower leg: No edema.   Skin:     General: Skin is warm.   Neurological:      Mental Status: She is alert. She is disoriented.      Comments: Oriented to person and place. Cannot correctly state the reason she is in the hospital             Significant Labs: All pertinent labs within the past 24 hours have been reviewed.  None    Significant Imaging: I have reviewed all pertinent imaging results/findings within the  past 24 hours.

## 2024-07-16 NOTE — ASSESSMENT & PLAN NOTE
"78-year-old lady here with encephalopathy, secondary to worsening dementia.  At baseline, she is normally "authoritative" demanding things, speaking about Gnosticist acts.  However unclear her mental status as her son does not keep close contact with her.  He does not really know if her mentation has gotten progressively worse or what is her baseline.  Normally she is able to go to the [place] daily to see her horse.  Overall workup for AMS was only notable for a leukocytosis with granulocytes with no clear source or systemic response.     Extensive workup for AMS has been negative. Neurology suspects her underlying dementia is driving her presentation. Patient very resistant to discharging to SNF or any facility. Per our team and Psychiatry patient does not show decision making capacity. Son prefers SNF or facility placement. Per psychiatry, PEC evaluation for psych hospital admission is not warranted as they believe this is not a psychiatric condition but rather neuro-cognitive decline. However, patient threatened to leave AMA on 7/15 so she was PEC'd.     Plan:  - Per psych she does not have capacity but consulted psych again on 7/15 given new PEC status  - psych agrees with continuation of PEC for continued display of lacking ability to back decision regarding disposition  - F/u with Neuro recs: suspect dementia, refer for behavioral neurology outpatient  - Rivastigmine 4.6 mg patch  - Coordinate with CM for placement in NH due to lack of capacity, organizing options for NH with memory unit or secure perez  "
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
## 2024-07-17 PROBLEM — R45.1 AGITATION: Status: ACTIVE | Noted: 2024-07-17

## 2024-07-17 PROBLEM — D72.829 LEUKOCYTOSIS: Status: ACTIVE | Noted: 2024-07-17

## 2024-07-17 LAB
BASOPHILS # BLD AUTO: 0.06 K/UL (ref 0–0.2)
BASOPHILS NFR BLD: 0.5 % (ref 0–1.9)
DIFFERENTIAL METHOD BLD: ABNORMAL
EOSINOPHIL # BLD AUTO: 0.2 K/UL (ref 0–0.5)
EOSINOPHIL NFR BLD: 1.9 % (ref 0–8)
ERYTHROCYTE [DISTWIDTH] IN BLOOD BY AUTOMATED COUNT: 13.3 % (ref 11.5–14.5)
HCT VFR BLD AUTO: 39.9 % (ref 37–48.5)
HGB BLD-MCNC: 12.9 G/DL (ref 12–16)
IMM GRANULOCYTES # BLD AUTO: 0.05 K/UL (ref 0–0.04)
IMM GRANULOCYTES NFR BLD AUTO: 0.4 % (ref 0–0.5)
LYMPHOCYTES # BLD AUTO: 1.7 K/UL (ref 1–4.8)
LYMPHOCYTES NFR BLD: 15.3 % (ref 18–48)
MCH RBC QN AUTO: 32 PG (ref 27–31)
MCHC RBC AUTO-ENTMCNC: 32.3 G/DL (ref 32–36)
MCV RBC AUTO: 99 FL (ref 82–98)
MONOCYTES # BLD AUTO: 0.8 K/UL (ref 0.3–1)
MONOCYTES NFR BLD: 6.7 % (ref 4–15)
NEUTROPHILS # BLD AUTO: 8.4 K/UL (ref 1.8–7.7)
NEUTROPHILS NFR BLD: 75.2 % (ref 38–73)
NRBC BLD-RTO: 0 /100 WBC
PLATELET # BLD AUTO: 309 K/UL (ref 150–450)
PMV BLD AUTO: 10.6 FL (ref 9.2–12.9)
RBC # BLD AUTO: 4.03 M/UL (ref 4–5.4)
WBC # BLD AUTO: 11.17 K/UL (ref 3.9–12.7)

## 2024-07-17 PROCEDURE — 85025 COMPLETE CBC W/AUTO DIFF WBC: CPT | Performed by: STUDENT IN AN ORGANIZED HEALTH CARE EDUCATION/TRAINING PROGRAM

## 2024-07-17 PROCEDURE — 11000001 HC ACUTE MED/SURG PRIVATE ROOM

## 2024-07-17 PROCEDURE — 25000003 PHARM REV CODE 250

## 2024-07-17 PROCEDURE — 99232 SBSQ HOSP IP/OBS MODERATE 35: CPT | Mod: GC,,, | Performed by: PSYCHIATRY & NEUROLOGY

## 2024-07-17 PROCEDURE — 36415 COLL VENOUS BLD VENIPUNCTURE: CPT | Performed by: STUDENT IN AN ORGANIZED HEALTH CARE EDUCATION/TRAINING PROGRAM

## 2024-07-17 RX ADMIN — RIVASTIGMINE 1 PATCH: 4.6 PATCH, EXTENDED RELEASE TRANSDERMAL at 09:07

## 2024-07-17 NOTE — ASSESSMENT & PLAN NOTE
Labs most consistent with dehydration, no overt signs of infection and mentation appears to be her new baseline.   - Encourage oral hydration and repeat CBC today, if no improvement try IVFs first followed by infectious workup.

## 2024-07-17 NOTE — PROGRESS NOTES
[facility] ([address] Medicine  Progress Note    Patient Name: Mohini Dougherty  MRN: [id number]  Patient Class: IP- Inpatient   Admission Date: 6/24/2024  Length of Stay: 22 days  Attending Physician: Tobin Schilling, *  Primary Care Provider: Edwar Castaneda II, MD        Subjective:     Principal Problem:Dementia without behavioral disturbance        HPI:  See H&P.     Overview/Hospital Course:  Pt admitted to [facility] for encephalopathy workup.  Collateral from son strongly suggest Dementia.  Psych and Neurology consulted for assistance.  Brain imaging suggest dementia but no acute findings such as stroke. Metabolic workup largely negative.  She has no active infection, no electrolyte derangements, TSH wnl, RPR negative, cardiac causes ruled out, UDS negative, HIV negative, hepatitis negative, VBG negative for hypercapnia. UA showed no signs of infection, given hx of UTIs we treated with IV CTX and saw no improvement.  Hospital course c/b continued attempts to leave hospital and she was PECed on 7/15.  Appreciate psych assistance.     Interval History: Seen sitting today comfortable.  She remains pleasantly confused and was talking to the sitter.  She continues with lack of insight into the medical teams concerns for her self care at home.  Discussing case with legal to further clarify son's confusion on placement in NH.      Review of Systems   Constitutional: Negative.    HENT: Negative.     Respiratory: Negative.     Cardiovascular: Negative.    Gastrointestinal: Negative.    Genitourinary: Negative.    Musculoskeletal: Negative.    Neurological: Negative.    Psychiatric/Behavioral:  Positive for confusion.      Objective:     Vital Signs (Most Recent):  Temp: 98 °F (36.7 °C) (07/17/24 1137)  Pulse: 104 (07/17/24 1137)  Resp: 16 (07/17/24 1137)  BP: (!) 148/78 (07/17/24 1137)  SpO2: (!) 94 % (07/17/24 1137) Vital Signs (24h Range):  Temp:  [98 °F (36.7 °C)-98.4 °F (36.9 °C)] 98 °F  (36.7 °C)  Pulse:  [] 104  Resp:  [16-18] 16  SpO2:  [94 %-98 %] 94 %  BP: (115-148)/(64-91) 148/78     Weight: 49.9 kg (110 lb)  Body mass index is 20.12 kg/m².  No intake or output data in the 24 hours ending 07/17/24 1339        Physical Exam  Vitals reviewed.   Constitutional:       Appearance: Normal appearance.   HENT:      Head: Normocephalic and atraumatic.   Cardiovascular:      Rate and Rhythm: Normal rate.   Pulmonary:      Effort: Pulmonary effort is normal. No respiratory distress.   Abdominal:      Palpations: Abdomen is soft.   Musculoskeletal:      Cervical back: No rigidity.      Right lower leg: No edema.      Left lower leg: No edema.   Skin:     General: Skin is warm.   Neurological:      Mental Status: She is alert. She is disoriented.      Comments: Oriented to person and place. Cannot correctly state the reason she is in the hospital             Significant Labs: All pertinent labs within the past 24 hours have been reviewed.  None    Significant Imaging: I have reviewed all pertinent imaging results/findings within the past 24 hours.    Assessment/Plan:      * Dementia without behavioral disturbance  78-year-old lady here with encephalopathy, secondary to worsening dementia.  Clinically her presentation is not concering for acute sepsis, or stroke.        Extensive workup for AMS has been negative. Neurology suspects her underlying dementia is driving her presentation. Patient very resistant to discharging to SNF or any facility. Per our team and Psychiatry the patient does not show decision making capacity. Son prefers SNF or facility placement. However, patient threatened and attempted to leave AMA on 7/15 so she was PEC'd.     Plan:  - Continue PEC and obtain CEC.  Discuss geripsych  - PRN zyprexa.     Leukocytosis  Labs most consistent with dehydration, no overt signs of infection and mentation appears to be her new baseline.   - Encourage oral hydration and repeat CBC today, if no  improvement try IVFs first followed by infectious workup.     Agitation  PRN zyprexa and continue PEC.  Hold physical restraints unless absolutely needed.         VTE Risk Mitigation (From admission, onward)      None            Discharge Planning   MARVEL:      Code Status: Full Code   Is the patient medically ready for discharge?:     Reason for patient still in hospital (select all that apply): Patient trending condition and Pending disposition  Discharge Plan A: New Nursing Home placement - [facility] care facility   Discharge Delays: (!) Patient and Family Barriers        Tobin Schilling MD  Department of [facility] Medicine   [facility] Surg ([place]-[facility])
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
## 2024-07-17 NOTE — ASSESSMENT & PLAN NOTE
78-year-old lady here with encephalopathy, secondary to worsening dementia.  Clinically her presentation is not concering for acute sepsis, or stroke.        Extensive workup for AMS has been negative. Neurology suspects her underlying dementia is driving her presentation. Patient very resistant to discharging to SNF or any facility. Per our team and Psychiatry the patient does not show decision making capacity. Son prefers SNF or facility placement. However, patient threatened and attempted to leave AMA on 7/15 so she was PEC'd.     Plan:  - Continue PEC and obtain CEC.  Discuss geripsych  - PRN zyprexa.

## 2024-07-17 NOTE — SUBJECTIVE & OBJECTIVE
Interval History: Seen sitting today comfortable.  She remains pleasantly confused and was talking to the sitter.  She continues with lack of insight into the medical teams concerns for her self care at home.  Discussing case with legal to further clarify son's confusion on placement in NH.      Review of Systems   Constitutional: Negative.    HENT: Negative.     Respiratory: Negative.     Cardiovascular: Negative.    Gastrointestinal: Negative.    Genitourinary: Negative.    Musculoskeletal: Negative.    Neurological: Negative.    Psychiatric/Behavioral:  Positive for confusion.      Objective:     Vital Signs (Most Recent):  Temp: 98 °F (36.7 °C) (07/17/24 1137)  Pulse: 104 (07/17/24 1137)  Resp: 16 (07/17/24 1137)  BP: (!) 148/78 (07/17/24 1137)  SpO2: (!) 94 % (07/17/24 1137) Vital Signs (24h Range):  Temp:  [98 °F (36.7 °C)-98.4 °F (36.9 °C)] 98 °F (36.7 °C)  Pulse:  [] 104  Resp:  [16-18] 16  SpO2:  [94 %-98 %] 94 %  BP: (115-148)/(64-91) 148/78     Weight: 49.9 kg (110 lb)  Body mass index is 20.12 kg/m².  No intake or output data in the 24 hours ending 07/17/24 1339        Physical Exam  Vitals reviewed.   Constitutional:       Appearance: Normal appearance.   HENT:      Head: Normocephalic and atraumatic.   Cardiovascular:      Rate and Rhythm: Normal rate.   Pulmonary:      Effort: Pulmonary effort is normal. No respiratory distress.   Abdominal:      Palpations: Abdomen is soft.   Musculoskeletal:      Cervical back: No rigidity.      Right lower leg: No edema.      Left lower leg: No edema.   Skin:     General: Skin is warm.   Neurological:      Mental Status: She is alert. She is disoriented.      Comments: Oriented to person and place. Cannot correctly state the reason she is in the hospital             Significant Labs: All pertinent labs within the past 24 hours have been reviewed.  None    Significant Imaging: I have reviewed all pertinent imaging results/findings within the past 24 hours.

## 2024-07-18 PROCEDURE — 93005 ELECTROCARDIOGRAM TRACING: CPT

## 2024-07-18 PROCEDURE — 93010 ELECTROCARDIOGRAM REPORT: CPT | Mod: ,,, | Performed by: INTERNAL MEDICINE

## 2024-07-18 PROCEDURE — 11000001 HC ACUTE MED/SURG PRIVATE ROOM

## 2024-07-18 PROCEDURE — 99232 SBSQ HOSP IP/OBS MODERATE 35: CPT | Mod: GC,,, | Performed by: PSYCHIATRY & NEUROLOGY

## 2024-07-18 PROCEDURE — 25000003 PHARM REV CODE 250

## 2024-07-18 RX ADMIN — RIVASTIGMINE 1 PATCH: 4.6 PATCH, EXTENDED RELEASE TRANSDERMAL at 09:07
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
## 2024-07-18 NOTE — PROGRESS NOTES
"CONSULTATION LIAISON PSYCHIATRY PROGRESS NOTE    Patient Name: Mohini Dougherty  MRN: [id number]  Patient Class: IP- Inpatient  Admission Date: 6/24/2024  Attending Physician: Tobin Schilling, *      SUBJECTIVE:   Mohini Dougherty is a 78 y.o. female with no known psychiatric history or significant medical history who presents with AMS and is admitted since 6/24 for Encephalopathy. Patient reportedly found in neighbors driveway and noted to be confused by son and police.     Psychiatry consulted for "non-redirectable agitation, fighting staff, recs for behavioral control medications."     No acute events overnight and no PRNs required for behavioral reasons. Patient denies any new concerns since yesterday. She reports sleeping and eating well. Denies any side effects from medications. Patient remains oriented to person, place, and time, but disoriented/unable to appreciate the risks and benefits of her current situation. She continues to deny any suicidal ideation, intent, or plan.      OBJECTIVE:    Mental Status Exam:  General Appearance: appears stated age, well developed and nourished, adequately groomed and appropriately dressed, in no acute distress  Behavior: appropriate eye-contact, redirectable  Involuntary Movements and Motor Activity: no abnormal involuntary movements noted; no tics, no tremors, no akathisia, no dystonia, no evidence of tardive dyskinesia; no psychomotor agitation or retardation  Gait and Station: unable to assess - patient lying down or seated  Speech and Language: normal rate, rhythm, volume, tone, and pitch  Mood: "alright"  Affect: constricted, appropriate  Thought Process and Associations: illogical, perseverative on her dogs   Thought Content and Perceptions:: no suicidal or homicidal ideation, no auditory or visual hallucinations, no paranoid ideation, no ideas of reference, no evidence of delusions or psychosis  Sensorium and Orientation:  oriented to person and year, " Obstructive sleep apnea (ILANA) is a condition that makes it hard for you to breathe when you  sleep. People with ILANA often experience the following:   Snoring or making gasping noises when they sleep   Are tired when they wake up or during the day, even if sleep all night   Waking up with headaches   Having trouble focusing on tasks    Next Steps  A sleep test is required to diagnose sleep apnea. People of all ages can have it. ILANA is  most common among men older than age 40; women older than age 50; those who are  overweight; people with high blood pressure; men with a neck size greater than 17 inches; and  women with a neck size greater than 16 inches.    Risks  If ILANA goes untreated, the long-term health risks can include:   High blood pressure   Heart attack   Stroke   Pre-diabetes/Diabetes   Depression    Treatments  There are a variety of treatment options for ILANA patients. Continuous positive airway  pressure, or CPAP for short, lets a stream of air flow from a machine, through tubing to a mask  you wear while you sleep. This flow of air keeps your throat open so that you can breathe  freely and not snore. Another option might be an oral appliance, a mouth guard that holds  your bottom jaw forward and keeps your tongue from blocking your airway while you sleep. If  neither of these options are right for you, your advanced practice clinician will work with you  to find a treatment option will meet your needs.    Even with these treatments, there are other things you can do to improve your ILANA, including  weight loss, quitting smoking and not drinking alcohol.     disoriented to month, place, and situation  Recent and Remote Memory: impaired  Attention and Concentration: impaired  Fund of Knowledge:  limited, correctly identifies the , incorrectly identifies the last five Presidents of the [place] (in order)   Insight: poor awareness of illness  Judgment: impaired due to neurocognitive disorder, behavior is inappropriate to the circumstances, noncompliant with health provider's recommendations and instructions       ASSESSMENT & RECOMMENDATIONS   Unspecified Major Neurocognitive Disorder     Dementia  PSYCH MEDICATIONS  PRN: Zyprexa 2.5 mg PO/IM q8h for non-redirectable agitation  Please obtain EKGs to monitor QTC interval while receiving Zyprexa     DELIRIUM  DELIRIUM BEHAVIOR MANAGEMENT  PLEASE utilize CHEMICAL restraints with PRN meds first for agitation. Minimize use of PHYSICAL restraints OR have periods of being out of physical restraints if possible.  Keep window shades open and room lit during day and room dim at night in order to promote normal sleep-wake cycles  Encourage family at bedside. [place] patient often to situation, location, date.  Continue to Limit or Discontinue use of Narcotics, Benzos and Anti-cholinergic medications as they may worsen delirium.  Continue medical workup for causative etiology of Delirium.      RISK ASSESSMENT  Continue PEC at this time as patient is unable to leave AMA due to lacking capacity to make decisions on disposition     FOLLOW UP  Will follow up while in house     DISPOSITION - once medically cleared:   Defer to medical team, patient does NOT require psychiatric inpatient hospitalization at this time    Please contact ON CALL psychiatry service (24/7) for any acute issues that may arise.      Tobin Ortega MD  [facility] Psychiatry,  PGY-4        --------------------------------------------------------------------------------------------------------------------------------------------------------------------------------------------------------------------------------------    CONTINUED OBJECTIVE clinical data & findings reviewed and noted for above decision making    Current Medications:   Scheduled Meds:    rivastigmine  1 patch Transdermal Daily     PRN Meds:   Current Facility-Administered Medications:     acetaminophen, 650 mg, Oral, Q8H PRN    acetaminophen, 650 mg, Oral, Q4H PRN    glucagon (human recombinant), 1 mg, Intramuscular, PRN    glucose, 16 g, Oral, PRN    glucose, 24 g, Oral, PRN    melatonin, 6 mg, Oral, Nightly PRN    naloxone, 0.02 mg, Intravenous, PRN    OLANZapine zydis, 5 mg, Oral, Q8H PRN    polyethylene glycol, 17 g, Oral, PRN    sodium chloride 0.9%, 2 mL, Intravenous, Q12H PRN    Allergies:   Review of patient's allergies indicates:  No Known Allergies    Vitals  Vitals:    07/18/24 0720   BP: (!) 169/83   Pulse:    Resp: 17   Temp: 97.6 °F (36.4 °C)       Labs/Imaging/Studies:  No results found for this or any previous visit (from the past 24 hour(s)).    Imaging Results              MRI Brain W WO Contrast (Final result)  Result time 06/25/24 04:11:58      Final result by Hugh Godwin MD (06/25/24 04:11:58)                   Impression:      Motion artifact slightly limits examination.    No acute intracranial process specifically no acute intracranial hemorrhage or acute infarct.    Electronically signed by resident: Bethany Novak  Date:    06/25/2024  Time:    03:41    Electronically signed by: Hugh Godwin MD  Date:    06/25/2024  Time:    04:11               Narrative:    EXAMINATION:  MRI BRAIN W WO CONTRAST    CLINICAL HISTORY:  Mental status change, unknown cause;    TECHNIQUE:  Multiplanar multisequence MR imaging of the brain was performed before and after the administration of 5 mL Gadavist  intravenous contrast.    COMPARISON:  CT head 06/24/2024.    FINDINGS:  Motion artifact slightly limits examination.    Mild generalized cerebral atrophy compensatory enlargement of the ventricles and subarachnoid spaces.  Few scattered punctate foci of T2/FLAIR signal hyperintensity in the supratentorial white matter without corresponding diffusion signal abnormality enhancement which is nonspecific and may be sequela of chronic small vessel ischemic change.    No diffusion restriction to indicate acute infarction.  No intraparenchymal hemorrhage, edema or mass.No abnormal postcontrast parenchymal or leptomeningeal enhancement.    Ventricles are stable in size and configuration for age.  No hydrocephalus or midline shift.  Basal cisterns are patent.    No extra-axial blood or fluid collections.    The T2 skull base flow voids are preserved.    Bone marrow signal intensity unremarkable.    Fluid signal in the sphenoid sinus, otherwise the paranasal sinuses are unremarkable mastoid air cells are unremarkable.                                       X-Ray Abdomen AP 1 View (Final result)  Result time 06/25/24 01:08:27      Final result by Hugh Godwin MD (06/25/24 01:08:27)                   Impression:      No unexpected metallic foreign body identified in the field of view.      Electronically signed by: Hugh Godwin MD  Date:    06/25/2024  Time:    01:08               Narrative:    EXAMINATION:  XR ABDOMEN AP 1 VIEW    CLINICAL HISTORY:  for MRI scan;    TECHNIQUE:  Single AP View of the abdomen was performed.    COMPARISON:  None.    FINDINGS:  Cardiac wires overlie the chest and abdomen.  Bowel gas pattern is unremarkable.  Calcifications overlie the pelvis.  No unexpected metallic foreign body identified in the field of view.                                       CT Head Without Contrast (Final result)  Result time 06/24/24 17:49:41      Final result by Mane Hsieh MD (06/24/24 17:49:41)                    Impression:      No acute intracranial process.  Additional evaluation with MRI of the brain may be obtained, as clinically warranted.    Changes of chronic vessel ischemic disease and cerebral volume loss.      Electronically signed by: Mane Hsieh MD  Date:    06/24/2024  Time:    17:49               Narrative:    EXAMINATION:  CT HEAD WITHOUT CONTRAST    CLINICAL HISTORY:  Mental status change, unknown cause;    TECHNIQUE:  Low dose axial images were obtained through the head.  Coronal and sagittal reformations were also performed. Contrast was not administered.    COMPARISON:  04/15/2024.    FINDINGS:  The subcutaneous tissues are unremarkable.  The bony calvarium is intact.  The paranasal sinuses are unremarkable.  The mastoid air cells are clear.  The orbits and intraorbital contents are within normal limits.    The craniocervical junction is intact.  The sellar and parasellar structures are unremarkable.  There is no evidence of intracranial hemorrhage.  The ventricles and sulci are prominent, consistent cerebral volume loss.  There are hypodensities within the periventricular and subcortical white matter.  The gray-white differentiation is maintained.  There is no dense vessel sign.  There is no evidence of mass effect.                                       X-Ray Chest AP Portable (Final result)  Result time 06/24/24 17:59:50      Final result by Jarrett Stewart MD (06/24/24 17:59:50)                   Impression:      1. Chronic appearing interstitial findings, no large focal consolidation.  Correlation with any history of COPD/emphysema.      Electronically signed by: Jarrett Stewart MD  Date:    06/24/2024  Time:    17:59               Narrative:    EXAMINATION:  XR CHEST AP PORTABLE    CLINICAL HISTORY:  AMS;    TECHNIQUE:  Single frontal view of the chest was performed.    COMPARISON:  04/15/2024    FINDINGS:  The cardiomediastinal silhouette is not enlarged.  There is no pleural effusion.   The trachea is midline.  The lungs are symmetrically expanded bilaterally with coarse interstitial attenuation bilaterally, similar to the previous examination..  No large focal consolidation seen.  There is no pneumothorax.  The osseous structures are remarkable for degenerative change..

## 2024-07-19 LAB
OHS QRS DURATION: 72 MS
OHS QTC CALCULATION: 434 MS

## 2024-07-19 PROCEDURE — 11000001 HC ACUTE MED/SURG PRIVATE ROOM

## 2024-07-19 PROCEDURE — 25000003 PHARM REV CODE 250

## 2024-07-19 PROCEDURE — 99232 SBSQ HOSP IP/OBS MODERATE 35: CPT | Mod: ,,, | Performed by: PSYCHIATRY & NEUROLOGY

## 2024-07-19 RX ADMIN — RIVASTIGMINE 1 PATCH: 4.6 PATCH, EXTENDED RELEASE TRANSDERMAL at 09:07

## 2024-07-19 NOTE — SUBJECTIVE & OBJECTIVE
Interval History: Seen sitting today comfortable.  She remains pleasantly confused but became agitated when discussing placement.  She is attempting to pay her credit card bills and it unclear who she is disclosing her personal info to.  Discussed removal of cell phone with charge. Will not recommend paper scrubs she does not display SI or physical agitation.  Discussed patient with son on the ph one for > 1 hour.  He is agreeable to me calling him.     Review of Systems   Constitutional: Negative.    HENT: Negative.     Respiratory: Negative.     Cardiovascular: Negative.    Gastrointestinal: Negative.    Genitourinary: Negative.    Musculoskeletal: Negative.    Neurological: Negative.    Psychiatric/Behavioral:  Positive for confusion.      Objective:     Vital Signs (Most Recent):  Temp: 97.6 °F (36.4 °C) (07/18/24 0720)  Pulse: 82 (07/17/24 2048)  Resp: 17 (07/18/24 0720)  BP: (!) 169/83 (07/18/24 0720)  SpO2: 98 % (07/17/24 2048) Vital Signs (24h Range):  Temp:  [97.6 °F (36.4 °C)-98.5 °F (36.9 °C)] 97.6 °F (36.4 °C)  Pulse:  [82] 82  Resp:  [17-18] 17  SpO2:  [98 %] 98 %  BP: (115-169)/(63-83) 169/83     Weight: 49.9 kg (110 lb)  Body mass index is 20.12 kg/m².  No intake or output data in the 24 hours ending 07/18/24 1914        Physical Exam  Vitals reviewed.   Constitutional:       Appearance: Normal appearance.   HENT:      Head: Normocephalic and atraumatic.   Cardiovascular:      Rate and Rhythm: Normal rate.   Pulmonary:      Effort: Pulmonary effort is normal. No respiratory distress.   Abdominal:      Palpations: Abdomen is soft.   Musculoskeletal:      Cervical back: No rigidity.      Right lower leg: No edema.      Left lower leg: No edema.   Skin:     General: Skin is warm.   Neurological:      Mental Status: She is alert. She is disoriented.      Comments: Oriented to person and place. Cannot correctly state the reason she is in the hospital             Significant Labs: All pertinent labs within  the past 24 hours have been reviewed.  None    Significant Imaging: I have reviewed all pertinent imaging results/findings within the past 24 hours.

## 2024-07-19 NOTE — PROGRESS NOTES
"CONSULTATION LIAISON PSYCHIATRY PROGRESS NOTE    Patient Name: Mohini Dougherty  MRN: [id number]  Patient Class: IP- Inpatient  Admission Date: 6/24/2024  Attending Physician: Tobin Schilling, *      SUBJECTIVE:   Mohini Dougherty is a 78 y.o. female with no known psychiatric history or significant medical history who presents with AMS and is admitted since 6/24 for Encephalopathy. Patient reportedly found in neighbors driveway and noted to be confused by son and police.     Psychiatry consulted for "non-redirectable agitation, fighting staff, recs for behavioral control medications."     No acute events overnight and no PRNs required for behavioral reasons. Patient states she slept well and ate breakfast this morning without complications. She denies any pain or physical complaints. She denies any suicidal ideation, intent, or plan.        OBJECTIVE:    Mental Status Exam:  General Appearance: appears stated age, well developed and nourished, adequately groomed and appropriately dressed, in no acute distress  Behavior: appropriate eye-contact, redirectable  Involuntary Movements and Motor Activity: no abnormal involuntary movements noted; no tics, no tremors, no akathisia, no dystonia, no evidence of tardive dyskinesia; no psychomotor agitation or retardation  Gait and Station: unable to assess - patient lying down or seated  Speech and Language: normal rate, rhythm, volume, tone, and pitch  Mood: "ok"  Affect: constricted, appropriate  Thought Process and Associations: illogical, perseverative on her dogs   Thought Content and Perceptions:: no suicidal or homicidal ideation, no auditory or visual hallucinations, no paranoid ideation, no ideas of reference, no evidence of delusions or psychosis  Sensorium and Orientation:  oriented to person and year, disoriented to month, place, and situation  Recent and Remote Memory: impaired  Attention and Concentration: impaired  Fund of Knowledge:  limited, correctly " identifies the , incorrectly identifies the last five Presidents of the [place] (in order)   Insight: poor awareness of illness  Judgment: impaired due to neurocognitive disorder, behavior is inappropriate to the circumstances, noncompliant with health provider's recommendations and instructions       ASSESSMENT & RECOMMENDATIONS   Unspecified Major Neurocognitive Disorder     Dementia  PSYCH MEDICATIONS  PRN: Zyprexa 2.5 mg PO/IM q8h for non-redirectable agitation  Please obtain EKGs to monitor QTC interval while receiving Zyprexa     DELIRIUM  DELIRIUM BEHAVIOR MANAGEMENT  PLEASE utilize CHEMICAL restraints with PRN meds first for agitation. Minimize use of PHYSICAL restraints OR have periods of being out of physical restraints if possible.  Keep window shades open and room lit during day and room dim at night in order to promote normal sleep-wake cycles  Encourage family at bedside. [place] patient often to situation, location, date.  Continue to Limit or Discontinue use of Narcotics, Benzos and Anti-cholinergic medications as they may worsen delirium.  Continue medical workup for causative etiology of Delirium.      RISK ASSESSMENT  Continue PEC at this time as patient is unable to leave AMA due to lacking capacity to make decisions on disposition     FOLLOW UP  Will follow up while in house     DISPOSITION - once medically cleared:   Defer to medical team, patient does NOT require psychiatric inpatient hospitalization at this time    Please contact ON CALL psychiatry service (24/7) for any acute issues that may arise.      Tobin Ortega MD  [facility], PGY-4        --------------------------------------------------------------------------------------------------------------------------------------------------------------------------------------------------------------------------------------    CONTINUED OBJECTIVE clinical data & findings reviewed and noted for  above decision making    Current Medications:   Scheduled Meds:    rivastigmine  1 patch Transdermal Daily     PRN Meds:   Current Facility-Administered Medications:     acetaminophen, 650 mg, Oral, Q8H PRN    acetaminophen, 650 mg, Oral, Q4H PRN    glucagon (human recombinant), 1 mg, Intramuscular, PRN    glucose, 16 g, Oral, PRN    glucose, 24 g, Oral, PRN    melatonin, 6 mg, Oral, Nightly PRN    naloxone, 0.02 mg, Intravenous, PRN    OLANZapine zydis, 5 mg, Oral, Q8H PRN    polyethylene glycol, 17 g, Oral, PRN    sodium chloride 0.9%, 2 mL, Intravenous, Q12H PRN    Allergies:   Review of patient's allergies indicates:  No Known Allergies    Vitals  Vitals:    07/18/24 1934   BP: (!) 124/58   Pulse: 88   Resp: 18   Temp: 98.8 °F (37.1 °C)       Labs/Imaging/Studies:  No results found for this or any previous visit (from the past 24 hour(s)).    Imaging Results              MRI Brain W WO Contrast (Final result)  Result time 06/25/24 04:11:58      Final result by Hugh Godwin MD (06/25/24 04:11:58)                   Impression:      Motion artifact slightly limits examination.    No acute intracranial process specifically no acute intracranial hemorrhage or acute infarct.    Electronically signed by resident: Bethany Novak  Date:    06/25/2024  Time:    03:41    Electronically signed by: Hugh Godwin MD  Date:    06/25/2024  Time:    04:11               Narrative:    EXAMINATION:  MRI BRAIN W WO CONTRAST    CLINICAL HISTORY:  Mental status change, unknown cause;    TECHNIQUE:  Multiplanar multisequence MR imaging of the brain was performed before and after the administration of 5 mL Gadavist intravenous contrast.    COMPARISON:  CT head 06/24/2024.    FINDINGS:  Motion artifact slightly limits examination.    Mild generalized cerebral atrophy compensatory enlargement of the ventricles and subarachnoid spaces.  Few scattered punctate foci of T2/FLAIR signal hyperintensity in the supratentorial white matter  without corresponding diffusion signal abnormality enhancement which is nonspecific and may be sequela of chronic small vessel ischemic change.    No diffusion restriction to indicate acute infarction.  No intraparenchymal hemorrhage, edema or mass.No abnormal postcontrast parenchymal or leptomeningeal enhancement.    Ventricles are stable in size and configuration for age.  No hydrocephalus or midline shift.  Basal cisterns are patent.    No extra-axial blood or fluid collections.    The T2 skull base flow voids are preserved.    Bone marrow signal intensity unremarkable.    Fluid signal in the sphenoid sinus, otherwise the paranasal sinuses are unremarkable mastoid air cells are unremarkable.                                       X-Ray Abdomen AP 1 View (Final result)  Result time 06/25/24 01:08:27      Final result by Hugh Godwin MD (06/25/24 01:08:27)                   Impression:      No unexpected metallic foreign body identified in the field of view.      Electronically signed by: Hugh Godwin MD  Date:    06/25/2024  Time:    01:08               Narrative:    EXAMINATION:  XR ABDOMEN AP 1 VIEW    CLINICAL HISTORY:  for MRI scan;    TECHNIQUE:  Single AP View of the abdomen was performed.    COMPARISON:  None.    FINDINGS:  Cardiac wires overlie the chest and abdomen.  Bowel gas pattern is unremarkable.  Calcifications overlie the pelvis.  No unexpected metallic foreign body identified in the field of view.                                       CT Head Without Contrast (Final result)  Result time 06/24/24 17:49:41      Final result by Mane Hsieh MD (06/24/24 17:49:41)                   Impression:      No acute intracranial process.  Additional evaluation with MRI of the brain may be obtained, as clinically warranted.    Changes of chronic vessel ischemic disease and cerebral volume loss.      Electronically signed by: Mane Hsieh MD  Date:    06/24/2024  Time:    17:49               Narrative:     EXAMINATION:  CT HEAD WITHOUT CONTRAST    CLINICAL HISTORY:  Mental status change, unknown cause;    TECHNIQUE:  Low dose axial images were obtained through the head.  Coronal and sagittal reformations were also performed. Contrast was not administered.    COMPARISON:  04/15/2024.    FINDINGS:  The subcutaneous tissues are unremarkable.  The bony calvarium is intact.  The paranasal sinuses are unremarkable.  The mastoid air cells are clear.  The orbits and intraorbital contents are within normal limits.    The craniocervical junction is intact.  The sellar and parasellar structures are unremarkable.  There is no evidence of intracranial hemorrhage.  The ventricles and sulci are prominent, consistent cerebral volume loss.  There are hypodensities within the periventricular and subcortical white matter.  The gray-white differentiation is maintained.  There is no dense vessel sign.  There is no evidence of mass effect.                                       X-Ray Chest AP Portable (Final result)  Result time 06/24/24 17:59:50      Final result by Jarrett Stewart MD (06/24/24 17:59:50)                   Impression:      1. Chronic appearing interstitial findings, no large focal consolidation.  Correlation with any history of COPD/emphysema.      Electronically signed by: Jarrett Stewart MD  Date:    06/24/2024  Time:    17:59               Narrative:    EXAMINATION:  XR CHEST AP PORTABLE    CLINICAL HISTORY:  AMS;    TECHNIQUE:  Single frontal view of the chest was performed.    COMPARISON:  04/15/2024    FINDINGS:  The cardiomediastinal silhouette is not enlarged.  There is no pleural effusion.  The trachea is midline.  The lungs are symmetrically expanded bilaterally with coarse interstitial attenuation bilaterally, similar to the previous examination..  No large focal consolidation seen.  There is no pneumothorax.  The osseous structures are remarkable for degenerative change..

## 2024-07-19 NOTE — PLAN OF CARE
NICHOLAS called [facility] [phone], spoke with Karlee who states they still have memory care beds available.  She states that memory care unit is locked, pt's can't get out without a code.  However, if her BIMS level is high enough, she would be permitted to leave facility if she requests.  This CM doesn't know pt's BIMS level; CM will reach out to MD to get clarification.  NICHOLAS requested Karlee to call son and explain their policies/procedures regarding pt's ability to check herself out of a memory care facility.  Karlee states she will call.    3:10 PM  CM saw pt in room, offered to make a call with pt to Deanne with [facility] to check on pt's dogs, Aaliyah and Ben.  Pt agrees.  CM called [facility] [phone] on speaker phone, spoke with  who states Deanne is gone for the day, she will be back Monday, there is nobody else who can speak with pt about her dogs at this time.    3:20 PM  Per Dr. Minerva Brewer, pt's BIMS score is 12.  NICHOLAS notified Karlee with [facility] via CP.    MEGHAN CantuN, BS, RN, CCM

## 2024-07-19 NOTE — CARE UPDATE
Talked to son smita for 1 hour today.  He is agreeable to me calling him for futher medical updates.  Patient's mentation remains the same and he is agreeable to NH but needs clarification regarding the legality of his rights, financial liability, and ability for his mother to check herself out of a memory unit.  Will continue PEC at this time however she requires no psychiatric stabilization.  I discussed this personally with psych.  Will reach out to legal regarding need for PEC in order to prevent AMA as she lacks capacity.   She does not have capacity for complex medical or financial decisions.  However, she has demonstrated capacity to name her POA or provide financial statements as her answers to these questions are consistent and demonstrate an understanding these task.  Awaiting further updates from legal/CM.  Medically she is cleared, PEC is only to prevent AMA.         Tobin Schilling M.D.  [facility] Medicine  Pager [phone]

## 2024-07-19 NOTE — ASSESSMENT & PLAN NOTE
78-year-old lady here with encephalopathy, secondary to worsening dementia.  Clinically her presentation is not concering for acute sepsis, or stroke.        Extensive workup for AMS has been negative. Neurology suspects her underlying dementia is driving her presentation. Patient very resistant to discharging to SNF or any facility. Per our team and Psychiatry the patient does not show decision making capacity. Son prefers SNF or facility placement. However, patient threatened and attempted to leave AMA on 7/15 so she was PEC'd.  PEC is to prevent AMA but she does not require further psychological stabilization, discuss with legal need for PEC regarding capacity to leave AMA.     Plan:  - Continue  CEC.  Discuss geripsych  - PRN zyprexa.

## 2024-07-20 PROCEDURE — 25000003 PHARM REV CODE 250

## 2024-07-20 PROCEDURE — 11000001 HC ACUTE MED/SURG PRIVATE ROOM

## 2024-07-20 PROCEDURE — 99232 SBSQ HOSP IP/OBS MODERATE 35: CPT | Mod: ,,, | Performed by: PSYCHIATRY & NEUROLOGY

## 2024-07-20 RX ADMIN — RIVASTIGMINE 1 PATCH: 4.6 PATCH, EXTENDED RELEASE TRANSDERMAL at 09:07

## 2024-07-20 NOTE — PROGRESS NOTES
"CONSULTATION LIAISON PSYCHIATRY PROGRESS NOTE    Patient Name: Mohini Dougherty  MRN: [id number]  Patient Class: IP- Inpatient  Admission Date: 6/24/2024  Attending Physician: Tobin Schilling, *      SUBJECTIVE:   Mohini Dougherty is a 78 y.o. female with no known psychiatric history or significant medical history who presents with AMS and is admitted since 6/24 for Encephalopathy. Patient reportedly found in neighbors driveway and noted to be confused by son and police.     Psychiatry consulted for "non-redirectable agitation, fighting staff, recs for behavioral control medications."     No acute events overnight and no PRNs required for behavioral reasons. Recounts that she was dehydrated prior to coming to the hospital. Denies having a strong relationship with her son, stating "he lives his own life." States that she wants to have "people to come check in on me at my own apartment" upon discharge. Is seen with friends from her [employer] today. Denies SI/HI/AH.       OBJECTIVE:    Mental Status Exam:  General Appearance: appears stated age, well developed and nourished, adequately groomed and appropriately dressed, in no acute distress  Behavior: appropriate eye-contact, redirectable  Involuntary Movements and Motor Activity: no abnormal involuntary movements noted; no tics, no tremors, no akathisia, no dystonia, no evidence of tardive dyskinesia; no psychomotor agitation or retardation  Gait and Station: intact, normal gait and station, ambulates without assistance  Speech and Language: normal rate, rhythm, volume, tone, and pitch  Mood: "fine"  Affect: constricted, appropriate  Thought Process and Associations: illogical, perseverative on her dogs and horses on the [facility]  Thought Content and Perceptions:: no suicidal or homicidal ideation, no auditory or visual hallucinations, no paranoid ideation, no ideas of reference, no evidence of delusions or psychosis  Sensorium and Orientation:  oriented to " person place time, disoriented to situation and severity  Recent and Remote Memory: impaired  Attention and Concentration: impaired  Fund of Knowledge:  limited, correctly identifies the , incorrectly identifies the last five Presidents of the [place] (in order)   Insight: poor awareness of illness  Judgment: impaired due to neurocognitive disorder, behavior is inappropriate to the circumstances, noncompliant with health provider's recommendations and instructions       ASSESSMENT & RECOMMENDATIONS   Unspecified Major Neurocognitive Disorder     Dementia  PSYCH MEDICATIONS  PRN: Zyprexa 2.5 mg PO/IM q8h for non-redirectable agitation  Please obtain EKGs to monitor QTC interval while receiving Zyprexa     DELIRIUM  DELIRIUM BEHAVIOR MANAGEMENT  PLEASE utilize CHEMICAL restraints with PRN meds first for agitation. Minimize use of PHYSICAL restraints OR have periods of being out of physical restraints if possible.  Keep window shades open and room lit during day and room dim at night in order to promote normal sleep-wake cycles  Encourage family at bedside. [place] patient often to situation, location, date.  Continue to Limit or Discontinue use of Narcotics, Benzos and Anti-cholinergic medications as they may worsen delirium.  Continue medical workup for causative etiology of Delirium.      RISK ASSESSMENT  Continue PEC at this time as patient is unable to leave AMA due to lacking capacity to make decisions on disposition     FOLLOW UP  Will follow up while in house     DISPOSITION - once medically cleared:   Defer to medical team, patient does NOT require psychiatric inpatient hospitalization at this time    Please contact ON CALL psychiatry service (24/7) for any acute issues that may arise.      Beny Delgado MD  [facility]  PGY-2          --------------------------------------------------------------------------------------------------------------------------------------------------------------------------------------------------------------------------------------    CONTINUED OBJECTIVE clinical data & findings reviewed and noted for above decision making    Current Medications:   Scheduled Meds:    rivastigmine  1 patch Transdermal Daily     PRN Meds:   Current Facility-Administered Medications:     acetaminophen, 650 mg, Oral, Q8H PRN    acetaminophen, 650 mg, Oral, Q4H PRN    glucagon (human recombinant), 1 mg, Intramuscular, PRN    glucose, 16 g, Oral, PRN    glucose, 24 g, Oral, PRN    melatonin, 6 mg, Oral, Nightly PRN    naloxone, 0.02 mg, Intravenous, PRN    OLANZapine zydis, 5 mg, Oral, Q8H PRN    polyethylene glycol, 17 g, Oral, PRN    sodium chloride 0.9%, 2 mL, Intravenous, Q12H PRN    Allergies:   Review of patient's allergies indicates:  No Known Allergies    Vitals  Vitals:    07/20/24 1503   BP: 124/78   Pulse: 93   Resp: 18   Temp: 98 °F (36.7 °C)       Labs/Imaging/Studies:  No results found for this or any previous visit (from the past 24 hour(s)).    Imaging Results              MRI Brain W WO Contrast (Final result)  Result time 06/25/24 04:11:58      Final result by Hugh Godwin MD (06/25/24 04:11:58)                   Impression:      Motion artifact slightly limits examination.    No acute intracranial process specifically no acute intracranial hemorrhage or acute infarct.    Electronically signed by resident: Bethany Novak  Date:    06/25/2024  Time:    03:41    Electronically signed by: Hugh Godwin MD  Date:    06/25/2024  Time:    04:11               Narrative:    EXAMINATION:  MRI BRAIN W WO CONTRAST    CLINICAL HISTORY:  Mental status change, unknown cause;    TECHNIQUE:  Multiplanar multisequence MR imaging of the brain was performed before and after the administration of 5 mL Gadavist  intravenous contrast.    COMPARISON:  CT head 06/24/2024.    FINDINGS:  Motion artifact slightly limits examination.    Mild generalized cerebral atrophy compensatory enlargement of the ventricles and subarachnoid spaces.  Few scattered punctate foci of T2/FLAIR signal hyperintensity in the supratentorial white matter without corresponding diffusion signal abnormality enhancement which is nonspecific and may be sequela of chronic small vessel ischemic change.    No diffusion restriction to indicate acute infarction.  No intraparenchymal hemorrhage, edema or mass.No abnormal postcontrast parenchymal or leptomeningeal enhancement.    Ventricles are stable in size and configuration for age.  No hydrocephalus or midline shift.  Basal cisterns are patent.    No extra-axial blood or fluid collections.    The T2 skull base flow voids are preserved.    Bone marrow signal intensity unremarkable.    Fluid signal in the sphenoid sinus, otherwise the paranasal sinuses are unremarkable mastoid air cells are unremarkable.                                       X-Ray Abdomen AP 1 View (Final result)  Result time 06/25/24 01:08:27      Final result by Hugh Godwin MD (06/25/24 01:08:27)                   Impression:      No unexpected metallic foreign body identified in the field of view.      Electronically signed by: Hugh Godwin MD  Date:    06/25/2024  Time:    01:08               Narrative:    EXAMINATION:  XR ABDOMEN AP 1 VIEW    CLINICAL HISTORY:  for MRI scan;    TECHNIQUE:  Single AP View of the abdomen was performed.    COMPARISON:  None.    FINDINGS:  Cardiac wires overlie the chest and abdomen.  Bowel gas pattern is unremarkable.  Calcifications overlie the pelvis.  No unexpected metallic foreign body identified in the field of view.                                       CT Head Without Contrast (Final result)  Result time 06/24/24 17:49:41      Final result by Mane Hsieh MD (06/24/24 17:49:41)                    Impression:      No acute intracranial process.  Additional evaluation with MRI of the brain may be obtained, as clinically warranted.    Changes of chronic vessel ischemic disease and cerebral volume loss.      Electronically signed by: Mane Hsieh MD  Date:    06/24/2024  Time:    17:49               Narrative:    EXAMINATION:  CT HEAD WITHOUT CONTRAST    CLINICAL HISTORY:  Mental status change, unknown cause;    TECHNIQUE:  Low dose axial images were obtained through the head.  Coronal and sagittal reformations were also performed. Contrast was not administered.    COMPARISON:  04/15/2024.    FINDINGS:  The subcutaneous tissues are unremarkable.  The bony calvarium is intact.  The paranasal sinuses are unremarkable.  The mastoid air cells are clear.  The orbits and intraorbital contents are within normal limits.    The craniocervical junction is intact.  The sellar and parasellar structures are unremarkable.  There is no evidence of intracranial hemorrhage.  The ventricles and sulci are prominent, consistent cerebral volume loss.  There are hypodensities within the periventricular and subcortical white matter.  The gray-white differentiation is maintained.  There is no dense vessel sign.  There is no evidence of mass effect.                                       X-Ray Chest AP Portable (Final result)  Result time 06/24/24 17:59:50      Final result by Jarrett Stewart MD (06/24/24 17:59:50)                   Impression:      1. Chronic appearing interstitial findings, no large focal consolidation.  Correlation with any history of COPD/emphysema.      Electronically signed by: Jarrett Stewart MD  Date:    06/24/2024  Time:    17:59               Narrative:    EXAMINATION:  XR CHEST AP PORTABLE    CLINICAL HISTORY:  AMS;    TECHNIQUE:  Single frontal view of the chest was performed.    COMPARISON:  04/15/2024    FINDINGS:  The cardiomediastinal silhouette is not enlarged.  There is no pleural effusion.   The trachea is midline.  The lungs are symmetrically expanded bilaterally with coarse interstitial attenuation bilaterally, similar to the previous examination..  No large focal consolidation seen.  There is no pneumothorax.  The osseous structures are remarkable for degenerative change..
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
## 2024-07-20 NOTE — SUBJECTIVE & OBJECTIVE
Interval History: NAEON. Patient received her cell phone back yesterday. Denies specific symptoms. Eager to leave.    Review of Systems   Constitutional: Negative.    HENT: Negative.     Respiratory: Negative.     Cardiovascular: Negative.    Gastrointestinal: Negative.    Genitourinary: Negative.    Musculoskeletal: Negative.    Neurological: Negative.    Psychiatric/Behavioral:  Positive for confusion.      Objective:     Vital Signs (Most Recent):  Temp: 98.2 °F (36.8 °C) (07/19/24 1939)  Pulse: 87 (07/19/24 1939)  Resp: 16 (07/19/24 1939)  BP: 112/65 (07/19/24 1939)  SpO2: 97 % (07/19/24 1939) Vital Signs (24h Range):  Temp:  [98.2 °F (36.8 °C)] 98.2 °F (36.8 °C)  Pulse:  [87] 87  Resp:  [16] 16  SpO2:  [97 %] 97 %  BP: (112)/(65) 112/65     Weight: 49.9 kg (110 lb)  Body mass index is 20.12 kg/m².    Intake/Output Summary (Last 24 hours) at 7/19/2024 2040  Last data filed at 7/19/2024 2029  Gross per 24 hour   Intake 960 ml   Output 5 ml   Net 955 ml         Physical Exam  Vitals reviewed.   Constitutional:       Appearance: Normal appearance.   HENT:      Head: Normocephalic and atraumatic.   Cardiovascular:      Rate and Rhythm: Normal rate.   Pulmonary:      Effort: Pulmonary effort is normal. No respiratory distress.   Abdominal:      Palpations: Abdomen is soft.   Musculoskeletal:      Cervical back: No rigidity.      Right lower leg: No edema.      Left lower leg: No edema.   Skin:     General: Skin is warm.   Neurological:      Mental Status: She is alert. She is disoriented.      Comments: Oriented to person and place. Cannot correctly state the reason she is in the hospital             Significant Labs: All pertinent labs within the past 24 hours have been reviewed.    Significant Imaging: I have reviewed all pertinent imaging results/findings within the past 24 hours.

## 2024-07-20 NOTE — SUBJECTIVE & OBJECTIVE
Interval History: NAEON. Eager to leave the hospital.    Review of Systems   Constitutional: Negative.    HENT: Negative.     Respiratory: Negative.     Cardiovascular: Negative.    Gastrointestinal: Negative.    Genitourinary: Negative.    Musculoskeletal: Negative.    Neurological: Negative.    Psychiatric/Behavioral:  Positive for confusion.      Objective:     Vital Signs (Most Recent):  Temp: 98 °F (36.7 °C) (07/20/24 1503)  Pulse: 93 (07/20/24 1503)  Resp: 18 (07/20/24 1503)  BP: 124/78 (07/20/24 1503)  SpO2: 98 % (07/20/24 1503) Vital Signs (24h Range):  Temp:  [98 °F (36.7 °C)-98.2 °F (36.8 °C)] 98 °F (36.7 °C)  Pulse:  [87-95] 93  Resp:  [16-18] 18  SpO2:  [97 %-99 %] 98 %  BP: (112-133)/(65-78) 124/78     Weight: 49.9 kg (110 lb)  Body mass index is 20.12 kg/m².    Intake/Output Summary (Last 24 hours) at 7/20/2024 1523  Last data filed at 7/20/2024 1425  Gross per 24 hour   Intake 1200 ml   Output 7 ml   Net 1193 ml         Physical Exam  Vitals reviewed.   Constitutional:       Appearance: Normal appearance.   HENT:      Head: Normocephalic and atraumatic.   Cardiovascular:      Rate and Rhythm: Normal rate.   Pulmonary:      Effort: Pulmonary effort is normal. No respiratory distress.   Abdominal:      Palpations: Abdomen is soft.   Musculoskeletal:      Cervical back: No rigidity.   Skin:     General: Skin is warm.   Neurological:      Mental Status: She is alert. She is disoriented.      Comments: Oriented to person and place. Cannot correctly state the reason she is in the hospital             Significant Labs: All pertinent labs within the past 24 hours have been reviewed.    Significant Imaging: I have reviewed all pertinent imaging results/findings within the past 24 hours.

## 2024-07-21 PROCEDURE — 25000003 PHARM REV CODE 250

## 2024-07-21 PROCEDURE — 99231 SBSQ HOSP IP/OBS SF/LOW 25: CPT | Mod: ,,, | Performed by: PSYCHIATRY & NEUROLOGY

## 2024-07-21 PROCEDURE — 11000001 HC ACUTE MED/SURG PRIVATE ROOM

## 2024-07-21 PROCEDURE — 94761 N-INVAS EAR/PLS OXIMETRY MLT: CPT

## 2024-07-21 RX ADMIN — RIVASTIGMINE 1 PATCH: 4.6 PATCH, EXTENDED RELEASE TRANSDERMAL at 10:07
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
## 2024-07-21 NOTE — PROGRESS NOTES
"CONSULTATION LIAISON PSYCHIATRY PROGRESS NOTE    Patient Name: Mohini Dougherty  MRN: [id number]  Patient Class: IP- Inpatient  Admission Date: 6/24/2024  Attending Physician: Tobin Schilling, *      SUBJECTIVE:   Mohini Dougherty is a 78 y.o. female with no known psychiatric history or significant medical history who presents with AMS and is admitted since 6/24 for Encephalopathy. Patient reportedly found in neighbors driveway and noted to be confused by son and police.     Psychiatry consulted for "non-redirectable agitation, fighting staff, recs for behavioral control medications."     No acute events overnight and no PRNs required for behavioral reasons. Seen after medical team had long discussion about reason for legal status/NH dispo. Patient ruminates on doctors violating her rights and states that "we all answer in the end". Discusses things she would rather be doing at home and frustration with being hospitalized for three weeks. Denies SI/HI/AH.       OBJECTIVE:    Mental Status Exam:  General Appearance: appears stated age, well developed and nourished, adequately groomed and appropriately dressed, in no acute distress  Behavior: appropriate eye-contact, redirectable  Involuntary Movements and Motor Activity: no abnormal involuntary movements noted; no tics, no tremors, no akathisia, no dystonia, no evidence of tardive dyskinesia; no psychomotor agitation or retardation  Gait and Station: intact, normal gait and station, ambulates without assistance  Speech and Language: normal rate, rhythm, volume, tone, and pitch  Mood: "frustrated"  Affect: constricted, appropriate, irritable  Thought Process and Associations: illogical, perseverative on her dogs and horses on the [facility]  Thought Content and Perceptions:: no suicidal or homicidal ideation, no auditory or visual hallucinations, no paranoid ideation, no ideas of reference, no evidence of delusions or psychosis  Sensorium and Orientation:  " oriented to person place time, disoriented to situation and severity  Recent and Remote Memory: impaired  Attention and Concentration: impaired  Fund of Knowledge:  limited, correctly identifies the , incorrectly identifies the last five Presidents of the [place] (in order)   Insight: poor awareness of illness in setting of neurocognitive disorder  Judgment: impaired due to neurocognitive disorder, behavior is inappropriate to the circumstances, noncompliant with health provider's recommendations and instructions       ASSESSMENT & RECOMMENDATIONS   Unspecified Major Neurocognitive Disorder     Dementia  PSYCH MEDICATIONS  PRN: Zyprexa 2.5 mg PO/IM q8h for non-redirectable agitation  Please obtain EKGs to monitor QTC interval while receiving Zyprexa     DELIRIUM  DELIRIUM BEHAVIOR MANAGEMENT  PLEASE utilize CHEMICAL restraints with PRN meds first for agitation. Minimize use of PHYSICAL restraints OR have periods of being out of physical restraints if possible.  Keep window shades open and room lit during day and room dim at night in order to promote normal sleep-wake cycles  Encourage family at bedside. [place] patient often to situation, location, date.  Continue to Limit or Discontinue use of Narcotics, Benzos and Anti-cholinergic medications as they may worsen delirium.  Continue medical workup for causative etiology of Delirium.      RISK ASSESSMENT  Continue CEC at this time as patient is unable to leave AMA due to lacking capacity to make decisions on disposition     FOLLOW UP  Will follow up while in house     DISPOSITION - once medically cleared:   Defer to medical team, patient does NOT require psychiatric inpatient hospitalization at this time    Please contact ON CALL psychiatry service (24/7) for any acute issues that may arise.      Hugh Chaves IV, MD  [facility]  PGY-2          --------------------------------------------------------------------------------------------------------------------------------------------------------------------------------------------------------------------------------------    CONTINUED OBJECTIVE clinical data & findings reviewed and noted for above decision making    Current Medications:   Scheduled Meds:    rivastigmine  1 patch Transdermal Daily     PRN Meds:   Current Facility-Administered Medications:     acetaminophen, 650 mg, Oral, Q8H PRN    acetaminophen, 650 mg, Oral, Q4H PRN    glucagon (human recombinant), 1 mg, Intramuscular, PRN    glucose, 16 g, Oral, PRN    glucose, 24 g, Oral, PRN    melatonin, 6 mg, Oral, Nightly PRN    naloxone, 0.02 mg, Intravenous, PRN    OLANZapine zydis, 5 mg, Oral, Q8H PRN    polyethylene glycol, 17 g, Oral, PRN    sodium chloride 0.9%, 2 mL, Intravenous, Q12H PRN    Allergies:   Review of patient's allergies indicates:  No Known Allergies    Vitals  Vitals:    07/21/24 1600   BP: 111/71   Pulse: 82   Resp:    Temp: 98.3 °F (36.8 °C)       Labs/Imaging/Studies:  No results found for this or any previous visit (from the past 24 hour(s)).    Imaging Results              MRI Brain W WO Contrast (Final result)  Result time 06/25/24 04:11:58      Final result by Hugh Godwin MD (06/25/24 04:11:58)                   Impression:      Motion artifact slightly limits examination.    No acute intracranial process specifically no acute intracranial hemorrhage or acute infarct.    Electronically signed by resident: Bethany Novak  Date:    06/25/2024  Time:    03:41    Electronically signed by: Hugh Godwin MD  Date:    06/25/2024  Time:    04:11               Narrative:    EXAMINATION:  MRI BRAIN W WO CONTRAST    CLINICAL HISTORY:  Mental status change, unknown cause;    TECHNIQUE:  Multiplanar multisequence MR imaging of the brain was performed before and after the administration of 5 mL Gadavist  intravenous contrast.    COMPARISON:  CT head 06/24/2024.    FINDINGS:  Motion artifact slightly limits examination.    Mild generalized cerebral atrophy compensatory enlargement of the ventricles and subarachnoid spaces.  Few scattered punctate foci of T2/FLAIR signal hyperintensity in the supratentorial white matter without corresponding diffusion signal abnormality enhancement which is nonspecific and may be sequela of chronic small vessel ischemic change.    No diffusion restriction to indicate acute infarction.  No intraparenchymal hemorrhage, edema or mass.No abnormal postcontrast parenchymal or leptomeningeal enhancement.    Ventricles are stable in size and configuration for age.  No hydrocephalus or midline shift.  Basal cisterns are patent.    No extra-axial blood or fluid collections.    The T2 skull base flow voids are preserved.    Bone marrow signal intensity unremarkable.    Fluid signal in the sphenoid sinus, otherwise the paranasal sinuses are unremarkable mastoid air cells are unremarkable.                                       X-Ray Abdomen AP 1 View (Final result)  Result time 06/25/24 01:08:27      Final result by Hugh Godwin MD (06/25/24 01:08:27)                   Impression:      No unexpected metallic foreign body identified in the field of view.      Electronically signed by: Hugh Godwin MD  Date:    06/25/2024  Time:    01:08               Narrative:    EXAMINATION:  XR ABDOMEN AP 1 VIEW    CLINICAL HISTORY:  for MRI scan;    TECHNIQUE:  Single AP View of the abdomen was performed.    COMPARISON:  None.    FINDINGS:  Cardiac wires overlie the chest and abdomen.  Bowel gas pattern is unremarkable.  Calcifications overlie the pelvis.  No unexpected metallic foreign body identified in the field of view.                                       CT Head Without Contrast (Final result)  Result time 06/24/24 17:49:41      Final result by Mane Hsieh MD (06/24/24 17:49:41)                    Impression:      No acute intracranial process.  Additional evaluation with MRI of the brain may be obtained, as clinically warranted.    Changes of chronic vessel ischemic disease and cerebral volume loss.      Electronically signed by: Mane Hsieh MD  Date:    06/24/2024  Time:    17:49               Narrative:    EXAMINATION:  CT HEAD WITHOUT CONTRAST    CLINICAL HISTORY:  Mental status change, unknown cause;    TECHNIQUE:  Low dose axial images were obtained through the head.  Coronal and sagittal reformations were also performed. Contrast was not administered.    COMPARISON:  04/15/2024.    FINDINGS:  The subcutaneous tissues are unremarkable.  The bony calvarium is intact.  The paranasal sinuses are unremarkable.  The mastoid air cells are clear.  The orbits and intraorbital contents are within normal limits.    The craniocervical junction is intact.  The sellar and parasellar structures are unremarkable.  There is no evidence of intracranial hemorrhage.  The ventricles and sulci are prominent, consistent cerebral volume loss.  There are hypodensities within the periventricular and subcortical white matter.  The gray-white differentiation is maintained.  There is no dense vessel sign.  There is no evidence of mass effect.                                       X-Ray Chest AP Portable (Final result)  Result time 06/24/24 17:59:50      Final result by Jarrett Stewart MD (06/24/24 17:59:50)                   Impression:      1. Chronic appearing interstitial findings, no large focal consolidation.  Correlation with any history of COPD/emphysema.      Electronically signed by: Jarrett Stewart MD  Date:    06/24/2024  Time:    17:59               Narrative:    EXAMINATION:  XR CHEST AP PORTABLE    CLINICAL HISTORY:  AMS;    TECHNIQUE:  Single frontal view of the chest was performed.    COMPARISON:  04/15/2024    FINDINGS:  The cardiomediastinal silhouette is not enlarged.  There is no pleural effusion.   The trachea is midline.  The lungs are symmetrically expanded bilaterally with coarse interstitial attenuation bilaterally, similar to the previous examination..  No large focal consolidation seen.  There is no pneumothorax.  The osseous structures are remarkable for degenerative change..

## 2024-07-21 NOTE — SUBJECTIVE & OBJECTIVE
Interval History: NAEON. Long discussion today about patient being ready to leave hospital. Stated that she needed to go get key made somewhere nearby. Discussed that I cannot have her leave but would suggest having family help with this request. Patient reiterated desire to leave hospital and return home. Patient then handed me piece of paper that states that she wanted to leave AMA and stated that nothing was legally keeping her here. Discussed that a PEC is in place because multiple doctors have evaluated her and determined that we do not feel she is safe to return home alone due to worsening memory and that we are working everyday to determine a safe discharge plan.    Review of Systems   Constitutional: Negative.    HENT: Negative.     Respiratory: Negative.     Cardiovascular: Negative.    Gastrointestinal: Negative.    Genitourinary: Negative.    Musculoskeletal: Negative.    Neurological: Negative.    Psychiatric/Behavioral:  Positive for confusion.      Objective:     Vital Signs (Most Recent):  Temp: 97.6 °F (36.4 °C) (07/21/24 1221)  Pulse: 68 (07/21/24 1221)  Resp: 18 (07/20/24 1503)  BP: (!) 142/74 (07/21/24 1221)  SpO2: 98 % (07/21/24 1221) Vital Signs (24h Range):  Temp:  [97.6 °F (36.4 °C)-98 °F (36.7 °C)] 97.6 °F (36.4 °C)  Pulse:  [] 68  SpO2:  [96 %-98 %] 98 %  BP: (142-148)/(65-74) 142/74     Weight: 49.9 kg (110 lb)  Body mass index is 20.12 kg/m².    Intake/Output Summary (Last 24 hours) at 7/21/2024 1503  Last data filed at 7/20/2024 2111  Gross per 24 hour   Intake 240 ml   Output --   Net 240 ml         Physical Exam  Vitals reviewed.   Constitutional:       Appearance: Normal appearance.   HENT:      Head: Normocephalic and atraumatic.   Cardiovascular:      Rate and Rhythm: Normal rate.   Pulmonary:      Effort: Pulmonary effort is normal. No respiratory distress.   Abdominal:      Palpations: Abdomen is soft.   Musculoskeletal:      Cervical back: No rigidity.   Skin:     General:  Skin is warm.   Neurological:      Mental Status: She is alert. She is disoriented.      Comments: Oriented to person and place. Cannot correctly state the reason she is in the hospital             Significant Labs: All pertinent labs within the past 24 hours have been reviewed.    Significant Imaging: I have reviewed all pertinent imaging results/findings within the past 24 hours.

## 2024-07-22 PROCEDURE — 99231 SBSQ HOSP IP/OBS SF/LOW 25: CPT | Mod: ,,, | Performed by: PSYCHIATRY & NEUROLOGY

## 2024-07-22 PROCEDURE — 25000003 PHARM REV CODE 250

## 2024-07-22 PROCEDURE — 11000001 HC ACUTE MED/SURG PRIVATE ROOM

## 2024-07-22 RX ADMIN — RIVASTIGMINE 1 PATCH: 4.6 PATCH, EXTENDED RELEASE TRANSDERMAL at 08:07

## 2024-07-22 NOTE — PLAN OF CARE
[facility] - Med Surg ([facility])  Discharge Reassessment    Primary Care Provider: Edwar Castaneda II, MD    Expected Discharge Date: 7/24/2024    Reassessment (most recent)       Discharge Reassessment - 07/22/24 1414          Discharge Reassessment    Assessment Type Discharge Planning Reassessment (P)      Did the patient's condition or plan change since previous assessment? No (P)      Discharge Plan discussed with: Patient (P)      Communicated MARVEL with patient/caregiver Date not available/Unable to determine (P)      Discharge Plan A New Nursing Home placement - longterm care facility (P)      Discharge Plan B New Nursing Home placement - longterm care facility (P)      DME Needed Upon Discharge  none (P)      Transition of Care Barriers Mental illness;No family/friends to help (P)      Why the patient remains in the hospital Placement issues (P)         Post-Acute Status    Post-Acute Authorization Placement (P)      Post-Acute Placement Status Referrals Sent (P)      Coverage [facility] (P)      Discharge Delays Patient and Family Barriers (P)                  CM spoke with pt in room to attempt to build rapport with patient.  Offered to call Deanne COWART, to check on her dogs.  Pt states she checked on them this morning and was told they are doing fine.      CM called Karlee at [facility], informed that pt's BIMS score is 12.  Karlee states that with that score, pt would be able to check herself out of facility if she wanted to. Medical team notified.    MARTA Cantu, BS, RN, CCM

## 2024-07-22 NOTE — SUBJECTIVE & OBJECTIVE
Interval History: NAEON. Patient says that her son has given away her horse and she is very upset about this. Would like to leave the hospital as soon as possible so she can take care of her animals. Again she states that she refuses to go anywhere that won't allow her to bring her animals.     Review of Systems   Constitutional: Negative.    HENT: Negative.     Respiratory: Negative.     Cardiovascular: Negative.    Gastrointestinal: Negative.    Genitourinary: Negative.    Musculoskeletal: Negative.    Neurological: Negative.    Psychiatric/Behavioral:  Positive for confusion.      Objective:     Vital Signs (Most Recent):  Temp: 98.1 °F (36.7 °C) (07/22/24 0826)  Pulse: 103 (07/22/24 0826)  Resp: 18 (07/22/24 0826)  BP: 129/60 (07/22/24 0826)  SpO2: 97 % (07/22/24 0826) Vital Signs (24h Range):  Temp:  [97.4 °F (36.3 °C)-98.1 °F (36.7 °C)] 98.1 °F (36.7 °C)  Pulse:  [] 103  Resp:  [16-18] 18  SpO2:  [97 %-98 %] 97 %  BP: (114-129)/(60-75) 129/60     Weight: 49.9 kg (110 lb)  Body mass index is 20.12 kg/m².    Intake/Output Summary (Last 24 hours) at 7/22/2024 1620  Last data filed at 7/22/2024 1515  Gross per 24 hour   Intake 680 ml   Output --   Net 680 ml         Physical Exam  Vitals and nursing note reviewed.   Constitutional:       Appearance: Normal appearance.   HENT:      Head: Normocephalic and atraumatic.   Cardiovascular:      Rate and Rhythm: Normal rate.   Pulmonary:      Effort: Pulmonary effort is normal. No respiratory distress.   Abdominal:      Palpations: Abdomen is soft.   Musculoskeletal:      Cervical back: No rigidity.   Skin:     General: Skin is warm.   Neurological:      Mental Status: She is alert. She is disoriented.      Comments: Oriented to person and place. Cannot correctly state the reason she is in the hospital             Significant Labs: All pertinent labs within the past 24 hours have been reviewed.    Significant Imaging: I have reviewed all pertinent imaging  results/findings within the past 24 hours.

## 2024-07-22 NOTE — PROGRESS NOTES
"CONSULTATION LIAISON PSYCHIATRY PROGRESS NOTE    Patient Name: Mohini Dougherty  MRN: [id number]  Patient Class: IP- Inpatient  Admission Date: 6/24/2024  Attending Physician: Danyell Toscano MD      SUBJECTIVE:   Mohini Dougherty is a 78 y.o. female with no known psychiatric history or significant medical history who presents with AMS and is admitted since 6/24 for Encephalopathy. Patient reportedly found in neighbors driveway and noted to be confused by son and police.     Psychiatry consulted for "non-redirectable agitation, fighting staff, recs for behavioral control medications."     No acute events overnight and no PRNs required for behavioral reasons. Patient denies any new complaints or changes in mood. Denies SI/HI/AH.       OBJECTIVE:    Mental Status Exam:  General Appearance: appears stated age, well developed and nourished, adequately groomed and appropriately dressed, in no acute distress  Behavior: appropriate eye-contact, redirectable  Involuntary Movements and Motor Activity: no abnormal involuntary movements noted; no tics, no tremors, no akathisia, no dystonia, no evidence of tardive dyskinesia; no psychomotor agitation or retardation  Gait and Station: intact, normal gait and station, ambulates without assistance  Speech and Language: normal rate, rhythm, volume, tone, and pitch  Mood: "frustrated"  Affect: constricted, appropriate, irritable  Thought Process and Associations: illogical, perseverative on her dogs and horses on the [facility]  Thought Content and Perceptions:: no suicidal or homicidal ideation, no auditory or visual hallucinations, no paranoid ideation, no ideas of reference, no evidence of delusions or psychosis  Sensorium and Orientation:  oriented to person place time, disoriented to situation and severity  Recent and Remote Memory: impaired  Attention and Concentration: impaired  Fund of Knowledge:  limited, correctly identifies the , incorrectly " identifies the last five Presidents of the [place] (in order)   Insight: poor awareness of illness in setting of neurocognitive disorder  Judgment: impaired due to neurocognitive disorder, behavior is inappropriate to the circumstances, noncompliant with health provider's recommendations and instructions       ASSESSMENT & RECOMMENDATIONS   Unspecified Major Neurocognitive Disorder     Dementia  PSYCH MEDICATIONS  PRN: Zyprexa 2.5 mg PO/IM q8h for non-redirectable agitation  Please obtain EKGs to monitor QTC interval while receiving Zyprexa     DELIRIUM  DELIRIUM BEHAVIOR MANAGEMENT  PLEASE utilize CHEMICAL restraints with PRN meds first for agitation. Minimize use of PHYSICAL restraints OR have periods of being out of physical restraints if possible.  Keep window shades open and room lit during day and room dim at night in order to promote normal sleep-wake cycles  Encourage family at bedside. Bradley patient often to situation, location, date.  Continue to Limit or Discontinue use of Narcotics, Benzos and Anti-cholinergic medications as they may worsen delirium.  Continue medical workup for causative etiology of Delirium.      RISK ASSESSMENT  Continue CEC at this time as patient is unable to leave AMA due to lacking capacity to make decisions on disposition     FOLLOW UP  Will sign off, please contact with any further questions or concerns     DISPOSITION - once medically cleared:   Defer to medical team, patient does NOT require psychiatric inpatient hospitalization at this time    Please contact ON CALL psychiatry service (24/7) for any acute issues that may arise.        Tobin Ortega MD  [facility], PGY-4          --------------------------------------------------------------------------------------------------------------------------------------------------------------------------------------------------------------------------------------    CONTINUED OBJECTIVE clinical data & findings  reviewed and noted for above decision making    Current Medications:   Scheduled Meds:    rivastigmine  1 patch Transdermal Daily     PRN Meds:   Current Facility-Administered Medications:     acetaminophen, 650 mg, Oral, Q8H PRN    acetaminophen, 650 mg, Oral, Q4H PRN    glucagon (human recombinant), 1 mg, Intramuscular, PRN    glucose, 16 g, Oral, PRN    glucose, 24 g, Oral, PRN    melatonin, 6 mg, Oral, Nightly PRN    naloxone, 0.02 mg, Intravenous, PRN    OLANZapine zydis, 5 mg, Oral, Q8H PRN    polyethylene glycol, 17 g, Oral, PRN    sodium chloride 0.9%, 2 mL, Intravenous, Q12H PRN    Allergies:   Review of patient's allergies indicates:  No Known Allergies    Vitals  Vitals:    07/22/24 0826   BP: 129/60   Pulse: 103   Resp: 18   Temp: 98.1 °F (36.7 °C)       Labs/Imaging/Studies:  No results found for this or any previous visit (from the past 24 hour(s)).    Imaging Results              MRI Brain W WO Contrast (Final result)  Result time 06/25/24 04:11:58      Final result by Hugh Godwni MD (06/25/24 04:11:58)                   Impression:      Motion artifact slightly limits examination.    No acute intracranial process specifically no acute intracranial hemorrhage or acute infarct.    Electronically signed by resident: Bethany Novak  Date:    06/25/2024  Time:    03:41    Electronically signed by: Hugh Godwin MD  Date:    06/25/2024  Time:    04:11               Narrative:    EXAMINATION:  MRI BRAIN W WO CONTRAST    CLINICAL HISTORY:  Mental status change, unknown cause;    TECHNIQUE:  Multiplanar multisequence MR imaging of the brain was performed before and after the administration of 5 mL Gadavist intravenous contrast.    COMPARISON:  CT head 06/24/2024.    FINDINGS:  Motion artifact slightly limits examination.    Mild generalized cerebral atrophy compensatory enlargement of the ventricles and subarachnoid spaces.  Few scattered punctate foci of T2/FLAIR signal hyperintensity in the  supratentorial white matter without corresponding diffusion signal abnormality enhancement which is nonspecific and may be sequela of chronic small vessel ischemic change.    No diffusion restriction to indicate acute infarction.  No intraparenchymal hemorrhage, edema or mass.No abnormal postcontrast parenchymal or leptomeningeal enhancement.    Ventricles are stable in size and configuration for age.  No hydrocephalus or midline shift.  Basal cisterns are patent.    No extra-axial blood or fluid collections.    The T2 skull base flow voids are preserved.    Bone marrow signal intensity unremarkable.    Fluid signal in the sphenoid sinus, otherwise the paranasal sinuses are unremarkable mastoid air cells are unremarkable.                                       X-Ray Abdomen AP 1 View (Final result)  Result time 06/25/24 01:08:27      Final result by Hugh Godwin MD (06/25/24 01:08:27)                   Impression:      No unexpected metallic foreign body identified in the field of view.      Electronically signed by: Hugh Godwin MD  Date:    06/25/2024  Time:    01:08               Narrative:    EXAMINATION:  XR ABDOMEN AP 1 VIEW    CLINICAL HISTORY:  for MRI scan;    TECHNIQUE:  Single AP View of the abdomen was performed.    COMPARISON:  None.    FINDINGS:  Cardiac wires overlie the chest and abdomen.  Bowel gas pattern is unremarkable.  Calcifications overlie the pelvis.  No unexpected metallic foreign body identified in the field of view.                                       CT Head Without Contrast (Final result)  Result time 06/24/24 17:49:41      Final result by Mane Hsieh MD (06/24/24 17:49:41)                   Impression:      No acute intracranial process.  Additional evaluation with MRI of the brain may be obtained, as clinically warranted.    Changes of chronic vessel ischemic disease and cerebral volume loss.      Electronically signed by: Mane Hsieh  MD  Date:    06/24/2024  Time:    17:49               Narrative:    EXAMINATION:  CT HEAD WITHOUT CONTRAST    CLINICAL HISTORY:  Mental status change, unknown cause;    TECHNIQUE:  Low dose axial images were obtained through the head.  Coronal and sagittal reformations were also performed. Contrast was not administered.    COMPARISON:  04/15/2024.    FINDINGS:  The subcutaneous tissues are unremarkable.  The bony calvarium is intact.  The paranasal sinuses are unremarkable.  The mastoid air cells are clear.  The orbits and intraorbital contents are within normal limits.    The craniocervical junction is intact.  The sellar and parasellar structures are unremarkable.  There is no evidence of intracranial hemorrhage.  The ventricles and sulci are prominent, consistent cerebral volume loss.  There are hypodensities within the periventricular and subcortical white matter.  The gray-white differentiation is maintained.  There is no dense vessel sign.  There is no evidence of mass effect.                                       X-Ray Chest AP Portable (Final result)  Result time 06/24/24 17:59:50      Final result by Jarrett Stewart MD (06/24/24 17:59:50)                   Impression:      1. Chronic appearing interstitial findings, no large focal consolidation.  Correlation with any history of COPD/emphysema.      Electronically signed by: Jarrett Stewart MD  Date:    06/24/2024  Time:    17:59               Narrative:    EXAMINATION:  XR CHEST AP PORTABLE    CLINICAL HISTORY:  AMS;    TECHNIQUE:  Single frontal view of the chest was performed.    COMPARISON:  04/15/2024    FINDINGS:  The cardiomediastinal silhouette is not enlarged.  There is no pleural effusion.  The trachea is midline.  The lungs are symmetrically expanded bilaterally with coarse interstitial attenuation bilaterally, similar to the previous examination..  No large focal consolidation seen.  There is no pneumothorax.  The osseous structures are  remarkable for degenerative change..

## 2024-07-22 NOTE — PLAN OF CARE
Problem: Adult Inpatient Plan of Care  Goal: Plan of Care Review  Outcome: Progressing  Goal: Patient-Specific Goal (Individualized)  Outcome: Progressing  Goal: Absence of Hospital-Acquired Illness or Injury  Outcome: Progressing  Goal: Optimal Comfort and Wellbeing  Outcome: Progressing  Goal: Readiness for Transition of Care  Outcome: Progressing     Problem: Wound  Goal: Optimal Coping  Outcome: Progressing  Goal: Optimal Functional Ability  Outcome: Progressing  Goal: Absence of Infection Signs and Symptoms  Outcome: Progressing  Goal: Improved Oral Intake  Outcome: Progressing  Goal: Optimal Pain Control and Function  Outcome: Progressing  Goal: Skin Health and Integrity  Outcome: Progressing  Goal: Optimal Wound Healing  Outcome: Progressing     Problem: Skin Injury Risk Increased  Goal: Skin Health and Integrity  Outcome: Progressing     Problem: Confusion Chronic  Goal: Optimal Cognitive Function  Outcome: Progressing     Problem: Fall Injury Risk  Goal: Absence of Fall and Fall-Related Injury  Outcome: Progressing     Problem: Delirium  Goal: Optimal Coping  Outcome: Progressing  Goal: Improved Behavioral Control  Outcome: Progressing  Goal: Improved Attention and Thought Clarity  Outcome: Progressing  Goal: Improved Sleep  Outcome: Progressing   Pt AAO X 4; able to express needs.  NO C/o pain .  Safety,  Fall and Delirium precautions maintained.  Sitter at the bedside.   IV removed intact.  OK to keep IV out at this time.  Bed in low position,  call  light in reach.
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
## 2024-07-23 ENCOUNTER — TELEPHONE (OUTPATIENT)
Dept: NEUROLOGY | Facility: CLINIC | Age: 79
End: 2024-07-23
Payer: MEDICARE

## 2024-07-23 PROCEDURE — 11000001 HC ACUTE MED/SURG PRIVATE ROOM

## 2024-07-23 PROCEDURE — 25000003 PHARM REV CODE 250

## 2024-07-23 RX ADMIN — RIVASTIGMINE 1 PATCH: 4.6 PATCH, EXTENDED RELEASE TRANSDERMAL at 10:07

## 2024-07-23 NOTE — TELEPHONE ENCOUNTER
----- Message from Lisbeth Lee MD sent at 7/3/2024  8:15 AM CDT -----  Regarding: Hospital Follow-Up  Justen Saleh,    This patient is admitted and needs follow-up for dementia diagnosis. Can be scheduled with whoever. Please call son to schedule, thanks!    Lisbeth

## 2024-07-23 NOTE — PLAN OF CARE
CM called [facility] [phone], spoke with Darlin regarding their check out policy on memory care unit.  She states that if pt is on memory care unit, they cannot check themselves out.  She states that pt's family member, Jose Alejandro, can sign paperwork on pt's behalf even though he's not POA.  They do not currently have any memory care beds available; they might have some in 2-3 weeks.  Leadership Alexia Gaytan and medical team notified.    NICHOLAS sent new NH referral in  per Darlin's request.    MEGHAN CantuN, BS, RN, CCM

## 2024-07-23 NOTE — PLAN OF CARE
Problem: Adult Inpatient Plan of Care  Goal: Plan of Care Review  Outcome: Progressing  Goal: Patient-Specific Goal (Individualized)  Outcome: Progressing  Goal: Absence of Hospital-Acquired Illness or Injury  Outcome: Progressing  Goal: Optimal Comfort and Wellbeing  Outcome: Progressing  Goal: Readiness for Transition of Care  Outcome: Progressing     Problem: Wound  Goal: Optimal Coping  Outcome: Progressing  Goal: Optimal Functional Ability  Outcome: Progressing  Goal: Absence of Infection Signs and Symptoms  Outcome: Progressing  Goal: Improved Oral Intake  Outcome: Progressing  Goal: Optimal Pain Control and Function  Outcome: Progressing  Goal: Skin Health and Integrity  Outcome: Progressing  Goal: Optimal Wound Healing  Outcome: Progressing    Pt tolerated meds and meals , ok per dr for no IV site , sitter at bedside throughout shift with supervision maintained , vss , no distress
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
## 2024-07-23 NOTE — SUBJECTIVE & OBJECTIVE
"Interval History: NAEON. Eager to return home.     Update from CM:   "CM called [facility] [phone], spoke with Darlin regarding their check out policy on memory care unit.  She states that if pt is on memory care unit, they cannot check themselves out.  She states that pt's family member, Jose Alejandro, can sign paperwork on pt's behalf even though he's not POA.  They do not currently have any memory care beds available; they might have some in 2-3 weeks.  Leadership Alexia Gaytan and medical team notified."    Review of Systems   Constitutional: Negative.    HENT: Negative.     Respiratory: Negative.     Cardiovascular: Negative.    Gastrointestinal: Negative.    Genitourinary: Negative.    Musculoskeletal: Negative.    Neurological: Negative.    Psychiatric/Behavioral:  Positive for confusion.      Objective:     Vital Signs (Most Recent):  Temp: 98.3 °F (36.8 °C) (07/23/24 0736)  Pulse: 97 (07/23/24 0736)  Resp: 18 (07/23/24 0736)  BP: (!) 115/57 (07/23/24 0736)  SpO2: 98 % (07/23/24 0736) Vital Signs (24h Range):  Temp:  [98.1 °F (36.7 °C)-98.3 °F (36.8 °C)] 98.3 °F (36.8 °C)  Pulse:  [85-97] 97  Resp:  [16-18] 18  SpO2:  [95 %-98 %] 98 %  BP: (106-115)/(57-69) 115/57     Weight: 49.9 kg (110 lb)  Body mass index is 20.12 kg/m².    Intake/Output Summary (Last 24 hours) at 7/23/2024 1557  Last data filed at 7/22/2024 2027  Gross per 24 hour   Intake 540 ml   Output --   Net 540 ml         Physical Exam  Vitals and nursing note reviewed.   Constitutional:       Appearance: Normal appearance.   HENT:      Head: Normocephalic and atraumatic.   Cardiovascular:      Rate and Rhythm: Normal rate.   Pulmonary:      Effort: Pulmonary effort is normal. No respiratory distress.   Abdominal:      Palpations: Abdomen is soft.   Musculoskeletal:      Cervical back: No rigidity.   Skin:     General: Skin is warm.   Neurological:      Mental Status: She is alert. She is disoriented.      Comments: Oriented to person and " place. Cannot correctly state the reason she is in the hospital

## 2024-07-24 PROCEDURE — 11000001 HC ACUTE MED/SURG PRIVATE ROOM

## 2024-07-24 PROCEDURE — 25000003 PHARM REV CODE 250

## 2024-07-24 RX ADMIN — RIVASTIGMINE 1 PATCH: 4.6 PATCH, EXTENDED RELEASE TRANSDERMAL at 08:07
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
## 2024-07-24 NOTE — SUBJECTIVE & OBJECTIVE
Interval History: NAEON. Again patient is adamant about wanting to return home to her animals. She has no specific complaints in regards to pain or physical discomfort. She was sitting up eating breakfast. In the afternoon patient was seen signing blank checks for her friends from [employer] (?for her car). Checks were ultimately returned back to patient then secured by staff. Patient was not happy about this and said she did not want her checks anymore.    Review of Systems   Constitutional: Negative.    HENT: Negative.     Respiratory: Negative.     Cardiovascular: Negative.    Gastrointestinal: Negative.    Genitourinary: Negative.    Musculoskeletal: Negative.    Neurological: Negative.    Psychiatric/Behavioral:  Positive for confusion.      Objective:     Vital Signs (Most Recent):  Temp: 98.3 °F (36.8 °C) (07/24/24 0737)  Pulse: 91 (07/24/24 0737)  Resp: 18 (07/24/24 0737)  BP: 107/72 (07/24/24 0737)  SpO2: 98 % (07/24/24 0737) Vital Signs (24h Range):  Temp:  [98 °F (36.7 °C)-98.3 °F (36.8 °C)] 98.3 °F (36.8 °C)  Pulse:  [87-91] 91  Resp:  [15-18] 18  SpO2:  [95 %-98 %] 98 %  BP: (107-116)/(67-72) 107/72     Weight: 49.9 kg (110 lb)  Body mass index is 20.12 kg/m².    Intake/Output Summary (Last 24 hours) at 7/24/2024 1553  Last data filed at 7/24/2024 0024  Gross per 24 hour   Intake 150 ml   Output --   Net 150 ml         Physical Exam  Vitals and nursing note reviewed.   Constitutional:       Appearance: Normal appearance.   HENT:      Head: Normocephalic and atraumatic.   Cardiovascular:      Rate and Rhythm: Normal rate.   Pulmonary:      Effort: Pulmonary effort is normal. No respiratory distress.   Abdominal:      Palpations: Abdomen is soft.   Musculoskeletal:      Cervical back: No rigidity.   Skin:     General: Skin is warm.   Neurological:      Mental Status: She is alert. She is disoriented.      Comments: Oriented to person and place. Cannot correctly state the reason she is in the hospital              Significant Labs: All pertinent labs within the past 24 hours have been reviewed.    Significant Imaging: I have reviewed all pertinent imaging results/findings within the past 24 hours.

## 2024-07-24 NOTE — PLAN OF CARE
"Per CP, [facility] is no longer able to accept this pt d/t they can no longer meet her needs.  CM called [facility] [phone] for further clarification.  Judie, admissions coordinator, states that they no longer have space in their memory care unit; they will put her on the waiting list, but there are 4 people ahead of her.  Leadership and medical team notified.    12:23 PM  Per nurse Sandra, pt is in room signing things with some people.  CM went in pt room, met David who states that he and 2 other people who were there stating that they were friends from the [employer].  They were getting pt to sign a check that would "get her car out of hock".  CM instructed that pt's son Jose Alejandro was legal next of kin, he was the only one who can make decisions on pt's behalf, even though he is not legal POA, due to he is legal next of kin.      CM discussed with austin Carmona, who states that pt's [employer] friends cannot get her to sign checks d/t pt's son Jose Alejandro is the only one who can help her with that.      CM returned to pt's room, called David and friends from [employer] out of room, instructed that they cannot get pt to sign checks - only Jose Alejandro can do that in person.  They v/u, state that they will return the check to pt.    12:41 PM  CM returned to pt's room, witnessed that check had been returned.    1:51 PM  CM went to pt's room with nurses Destiney and Amairani.  For pt's safety, checks were collected from this CEC'd patient.  Pt was very angry, stating "Take them all.  I'm not going to feel bad any more, because we all answer to the lord.  You all are the ones who should feel bad."  Destiney sealed pt's checks in an envelope, and states they will be locked up.    Leadership, MD team, and legal were informed via email.    3:21 PM  CM called Jose Alejandro Dougherty [phone] to update him on pt's care.  LVM requesting call back.    Duyen Fam, MEGHANN, BS, RN, CCM      "

## 2024-07-24 NOTE — PLAN OF CARE
Problem: Adult Inpatient Plan of Care  Goal: Plan of Care Review  Outcome: Progressing  Goal: Patient-Specific Goal (Individualized)  Outcome: Progressing  Goal: Absence of Hospital-Acquired Illness or Injury  Outcome: Progressing  Goal: Optimal Comfort and Wellbeing  Outcome: Progressing  Goal: Readiness for Transition of Care  Outcome: Progressing     Problem: Adult Inpatient Plan of Care  Goal: Plan of Care Review  Outcome: Progressing     Problem: Adult Inpatient Plan of Care  Goal: Patient-Specific Goal (Individualized)  Outcome: Progressing     Problem: Adult Inpatient Plan of Care  Goal: Absence of Hospital-Acquired Illness or Injury  Outcome: Progressing     Problem: Adult Inpatient Plan of Care  Goal: Optimal Comfort and Wellbeing  Outcome: Progressing     Problem: Adult Inpatient Plan of Care  Goal: Readiness for Transition of Care  Outcome: Progressing  PRN medication effective. Explained plan of care, verbalized understanding. Educated pt .on new medicine . No injury during shift, Side rails up x 2, call light by bedside.

## 2024-07-25 PROCEDURE — 11000001 HC ACUTE MED/SURG PRIVATE ROOM

## 2024-07-25 PROCEDURE — 25000003 PHARM REV CODE 250

## 2024-07-25 RX ADMIN — RIVASTIGMINE 1 PATCH: 4.6 PATCH, EXTENDED RELEASE TRANSDERMAL at 09:07

## 2024-07-25 NOTE — SUBJECTIVE & OBJECTIVE
Interval History: NAEON. Patient slept well. Eating breakfast watching TV this morning.     Review of Systems   Constitutional: Negative.    HENT: Negative.     Respiratory: Negative.     Cardiovascular: Negative.    Gastrointestinal: Negative.    Genitourinary: Negative.    Musculoskeletal: Negative.    Neurological: Negative.    Psychiatric/Behavioral:  Positive for confusion.      Objective:     Vital Signs (Most Recent):  Temp: 98.3 °F (36.8 °C) (07/25/24 1554)  Pulse: 84 (07/25/24 1554)  Resp: 18 (07/25/24 1554)  BP: 113/64 (07/25/24 1554)  SpO2: 98 % (07/25/24 1554) Vital Signs (24h Range):  Temp:  [97.7 °F (36.5 °C)-98.5 °F (36.9 °C)] 98.3 °F (36.8 °C)  Pulse:  [81-88] 84  Resp:  [18] 18  SpO2:  [97 %-100 %] 98 %  BP: (113-127)/(55-69) 113/64     Weight: 49.9 kg (110 lb)  Body mass index is 20.12 kg/m².  No intake or output data in the 24 hours ending 07/25/24 1651      Physical Exam  Vitals and nursing note reviewed.   Constitutional:       Appearance: Normal appearance.   HENT:      Head: Normocephalic and atraumatic.   Cardiovascular:      Rate and Rhythm: Normal rate.   Pulmonary:      Effort: Pulmonary effort is normal. No respiratory distress.   Abdominal:      Palpations: Abdomen is soft.   Musculoskeletal:      Cervical back: No rigidity.   Skin:     General: Skin is warm.   Neurological:      Mental Status: She is alert. She is disoriented.      Comments: Oriented to person and place. Cannot correctly state the reason she is in the hospital             Significant Labs: All pertinent labs within the past 24 hours have been reviewed.    Significant Imaging: I have reviewed all pertinent imaging results/findings within the past 24 hours.

## 2024-07-25 NOTE — PHARMACY MED REC
"Admission Medication History     The home medication history was taken by Julianna Grover.    You may go to "Admission" then "Reconcile Home Medications" tabs to review and/or act upon these items.     The home medication list has been updated by the Pharmacy department.   Please read ALL comments highlighted in yellow.   Please address this information as you see fit.    Feel free to contact us if you have any questions or require assistance.        Medications listed below were obtained from: Patient/family and Analytic software- [employer]  No medications prior to admission.           Julianna Grover  [id number]                 .          "

## 2024-07-25 NOTE — PROGRESS NOTES
" Medical Nutrition Therapy        Reason for Assessment: RD follow-up  Dx: Encephalopathy   Medical Hx: -      General Info: Per RN documentation, pt continues to tolerate diet w/ % PO intake. Per chart review, pt w/ UBW of 110# x 4 years. Pt appears thin, however no indicators of malnutrition noted.      Current Diet: Regular  % intake of meals: %     Ht: 5'2"  Wt: 50kg   BMI: 20.1     Labs: Reviewed  Meds: Reviewed     Overall Physical Appearance: Thin     Level of Risk: Low     Nutrition Dx: No nutrition diagnosis at this time.     RD follow-up? Yes     Thanks!  "
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
## 2024-07-25 NOTE — PLAN OF CARE
Problem: Adult Inpatient Plan of Care  Goal: Plan of Care Review  Outcome: Progressing     Problem: Skin Injury Risk Increased  Goal: Skin Health and Integrity  Outcome: Progressing     Problem: Confusion Chronic  Goal: Optimal Cognitive Function  Outcome: Progressing     Problem: Fall Injury Risk  Goal: Absence of Fall and Fall-Related Injury  Outcome: Progressing     Problem: Delirium  Goal: Improved Behavioral Control  Outcome: Progressing

## 2024-07-26 PROCEDURE — 99231 SBSQ HOSP IP/OBS SF/LOW 25: CPT | Mod: GC,,, | Performed by: PSYCHIATRY & NEUROLOGY

## 2024-07-26 PROCEDURE — 11000001 HC ACUTE MED/SURG PRIVATE ROOM

## 2024-07-26 PROCEDURE — 25000003 PHARM REV CODE 250

## 2024-07-26 RX ADMIN — RIVASTIGMINE 1 PATCH: 4.6 PATCH, EXTENDED RELEASE TRANSDERMAL at 08:07

## 2024-07-26 NOTE — SUBJECTIVE & OBJECTIVE
Interval History: NAEON. Patient continues on CEC to prevent AMA. Patient appears in good spirits this AM, talking with sitter and watching TV.    Review of Systems   Constitutional: Negative.    HENT: Negative.     Respiratory: Negative.     Cardiovascular: Negative.    Gastrointestinal: Negative.    Genitourinary: Negative.    Musculoskeletal: Negative.    Neurological: Negative.    Psychiatric/Behavioral:  Positive for confusion.      Objective:     Vital Signs (Most Recent):  Temp: 98.8 °F (37.1 °C) (07/26/24 1147)  Pulse: 78 (07/26/24 1147)  Resp: 18 (07/26/24 1147)  BP: 130/76 (07/26/24 1147)  SpO2: 99 % (07/26/24 1147) Vital Signs (24h Range):  Temp:  [97.5 °F (36.4 °C)-98.8 °F (37.1 °C)] 98.8 °F (37.1 °C)  Pulse:  [78-93] 78  Resp:  [17-18] 18  SpO2:  [97 %-99 %] 99 %  BP: (101-131)/(67-76) 130/76     Weight: 49.9 kg (110 lb)  Body mass index is 20.12 kg/m².  No intake or output data in the 24 hours ending 07/26/24 1608      Physical Exam  Vitals and nursing note reviewed.   Constitutional:       Appearance: Normal appearance.   HENT:      Head: Normocephalic and atraumatic.   Cardiovascular:      Rate and Rhythm: Normal rate.   Pulmonary:      Effort: Pulmonary effort is normal. No respiratory distress.   Abdominal:      Palpations: Abdomen is soft.   Musculoskeletal:      Cervical back: No rigidity.   Skin:     General: Skin is warm.   Neurological:      Mental Status: She is alert. She is disoriented.      Comments: Oriented to person and place. Cannot correctly state the reason she is in the hospital             Significant Labs: All pertinent labs within the past 24 hours have been reviewed.      Significant Imaging: I have reviewed all pertinent imaging results/findings within the past 24 hours.
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
## 2024-07-26 NOTE — ASSESSMENT & PLAN NOTE
78-year-old lady here with encephalopathy, secondary to worsening dementia.  Clinically her presentation is not concering for acute sepsis, or stroke.        Extensive workup for AMS has been negative. Neurology suspects her underlying dementia is driving her presentation. Patient very resistant to discharging to SNF or any facility. Per our team and Psychiatry the patient does not show decision making capacity. Son prefers SNF or facility placement. However, patient threatened and attempted to leave AMA on 7/15 so she was PEC'd.  PEC is to prevent AMA but she does not require further psychological stabilization, discuss with legal need for PEC regarding capacity to leave AMA.     Plan:  - Continue  CEC.  Discuss geripsych  - PRN zyprexa.    Azithromycin Pregnancy And Lactation Text: This medication is considered safe during pregnancy and is also secreted in breast milk.
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
## 2024-07-27 PROCEDURE — 25000003 PHARM REV CODE 250

## 2024-07-27 PROCEDURE — 11000001 HC ACUTE MED/SURG PRIVATE ROOM

## 2024-07-27 RX ADMIN — RIVASTIGMINE 1 PATCH: 4.6 PATCH, EXTENDED RELEASE TRANSDERMAL at 09:07

## 2024-07-27 NOTE — SUBJECTIVE & OBJECTIVE
Interval History: NAEON. Patient lying in bed watching TV. She has been going on walks in the hospital with her sitter. No specific complaints other than eager to leave the hospital.     Review of Systems   Constitutional: Negative.    HENT: Negative.     Respiratory: Negative.     Cardiovascular: Negative.    Gastrointestinal: Negative.    Genitourinary: Negative.    Musculoskeletal: Negative.    Neurological: Negative.    Psychiatric/Behavioral:  Positive for confusion.      Objective:     Vital Signs (Most Recent):  Temp: 98.5 °F (36.9 °C) (07/27/24 0731)  Pulse: 108 (07/27/24 0731)  Resp: 17 (07/27/24 0731)  BP: 130/71 (07/27/24 0731)  SpO2: 98 % (07/27/24 0731) Vital Signs (24h Range):  Temp:  [98.2 °F (36.8 °C)-99.5 °F (37.5 °C)] 98.5 °F (36.9 °C)  Pulse:  [] 108  Resp:  [17-18] 17  SpO2:  [98 %-99 %] 98 %  BP: (117-130)/(56-76) 130/71     Weight: 49.9 kg (110 lb)  Body mass index is 20.12 kg/m².  No intake or output data in the 24 hours ending 07/27/24 0959      Physical Exam  Vitals reviewed.   Constitutional:       Appearance: Normal appearance.   HENT:      Head: Normocephalic and atraumatic.   Cardiovascular:      Rate and Rhythm: Normal rate.   Pulmonary:      Effort: Pulmonary effort is normal. No respiratory distress.   Abdominal:      Palpations: Abdomen is soft.   Musculoskeletal:      Cervical back: No rigidity.   Skin:     General: Skin is warm.   Neurological:      Mental Status: She is alert. She is disoriented.      Comments: Oriented to person and place. Cannot correctly state the reason she is in the hospital             Significant Labs: All pertinent labs within the past 24 hours have been reviewed.    Significant Imaging: I have reviewed all pertinent imaging results/findings within the past 24 hours.

## 2024-07-27 NOTE — PLAN OF CARE
Problem: Adult Inpatient Plan of Care  Goal: Plan of Care Review  Outcome: Progressing     Problem: Confusion Chronic  Goal: Optimal Cognitive Function  Outcome: Progressing     Problem: Fall Injury Risk  Goal: Absence of Fall and Fall-Related Injury  Outcome: Progressing     Problem: Delirium  Goal: Improved Behavioral Control  Outcome: Progressing  Goal: Improved Attention and Thought Clarity  Outcome: Progressing
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
## 2024-07-28 PROCEDURE — 11000001 HC ACUTE MED/SURG PRIVATE ROOM

## 2024-07-28 PROCEDURE — 25000003 PHARM REV CODE 250

## 2024-07-28 RX ADMIN — RIVASTIGMINE 1 PATCH: 4.6 PATCH, EXTENDED RELEASE TRANSDERMAL at 10:07

## 2024-07-28 NOTE — SUBJECTIVE & OBJECTIVE
Interval History: NAEON. Today on rounds we saw her talking to her Mos[employer] friends. She seemed to be in a good mood. Says she will call her son but has had difficulty reaching him.     Review of Systems   Constitutional: Negative.    HENT: Negative.     Respiratory: Negative.     Cardiovascular: Negative.    Gastrointestinal: Negative.    Genitourinary: Negative.    Musculoskeletal: Negative.    Neurological: Negative.    Psychiatric/Behavioral:  Positive for confusion.      Objective:     Vital Signs (Most Recent):  Temp: 98.4 °F (36.9 °C) (07/28/24 1430)  Pulse: 88 (07/28/24 1430)  Resp: 18 (07/28/24 1430)  BP: 121/61 (07/28/24 1430)  SpO2: 98 % (07/27/24 1931) Vital Signs (24h Range):  Temp:  [98.3 °F (36.8 °C)-98.4 °F (36.9 °C)] 98.4 °F (36.9 °C)  Pulse:  [88-91] 88  Resp:  [18] 18  SpO2:  [98 %] 98 %  BP: (100-121)/(61-67) 121/61     Weight: 49.9 kg (110 lb)  Body mass index is 20.12 kg/m².  No intake or output data in the 24 hours ending 07/28/24 1526      Physical Exam  Vitals reviewed.   Constitutional:       Appearance: Normal appearance.   HENT:      Head: Normocephalic and atraumatic.   Cardiovascular:      Rate and Rhythm: Normal rate.   Pulmonary:      Effort: Pulmonary effort is normal. No respiratory distress.   Abdominal:      Palpations: Abdomen is soft.   Musculoskeletal:      Cervical back: No rigidity.   Skin:     General: Skin is warm.   Neurological:      Mental Status: She is alert. She is disoriented.      Comments: Oriented to person and place. Cannot correctly state the reason she is in the hospital             Significant Labs: All pertinent labs within the past 24 hours have been reviewed.    Significant Imaging: I have reviewed all pertinent imaging results/findings within the past 24 hours.
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
## 2024-07-28 NOTE — PLAN OF CARE
Problem: Wound  Goal: Absence of Infection Signs and Symptoms  7/28/2024 0642 by Asiya Rizvi LPN  Outcome: Progressing     Problem: Skin Injury Risk Increased  Goal: Skin Health and Integrity  7/28/2024 0644 by Asiya Rizvi LPN  Outcome: Progressing  7/28/2024 0642 by Asiya Rizvi LPN  Outcome: Progressing     Problem: Fall Injury Risk  Goal: Absence of Fall and Fall-Related Injury  7/28/2024 0644 by Asiya Rizvi LPN  Outcome: Progressing  7/28/2024 0642 by Asiya Rizvi LPN  Outcome: Progressing     Problem: Delirium  Goal: Optimal Coping  7/28/2024 0644 by Asiya Rizvi LPN  Outcome: Progressing  7/28/2024 0642 by Asiya Rizvi LPN  Outcome: Progressing
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
## 2024-07-29 PROCEDURE — 25000003 PHARM REV CODE 250

## 2024-07-29 PROCEDURE — 11000001 HC ACUTE MED/SURG PRIVATE ROOM

## 2024-07-29 RX ADMIN — RIVASTIGMINE 1 PATCH: 4.6 PATCH, EXTENDED RELEASE TRANSDERMAL at 09:07

## 2024-07-29 NOTE — SUBJECTIVE & OBJECTIVE
Interval History: NAEON. Slept well and finished her breakfast. Concerned about her hospital bill expenses and asking who to contact. Then she got agitated as she remembered that her checks were removed by hospital staff. Otherwise she has no specific complaints in regards to symptoms/pain.     Review of Systems   Constitutional: Negative.    HENT: Negative.     Respiratory: Negative.     Cardiovascular: Negative.    Gastrointestinal: Negative.    Genitourinary: Negative.    Musculoskeletal: Negative.    Neurological: Negative.    Psychiatric/Behavioral:  Positive for confusion.      Objective:     Vital Signs (Most Recent):  Temp: 96.5 °F (35.8 °C) (07/29/24 0726)  Pulse: 78 (07/29/24 0726)  Resp: 17 (07/29/24 0726)  BP: 117/75 (07/29/24 0726)  SpO2: 99 % (07/29/24 0726) Vital Signs (24h Range):  Temp:  [96.5 °F (35.8 °C)-98.4 °F (36.9 °C)] 96.5 °F (35.8 °C)  Pulse:  [78-88] 78  Resp:  [17-18] 17  SpO2:  [98 %-99 %] 99 %  BP: (117-123)/(61-78) 117/75     Weight: 49.9 kg (110 lb)  Body mass index is 20.12 kg/m².  No intake or output data in the 24 hours ending 07/29/24 0925      Physical Exam  Vitals reviewed.   Constitutional:       Appearance: Normal appearance.   HENT:      Head: Normocephalic and atraumatic.   Cardiovascular:      Rate and Rhythm: Normal rate.   Pulmonary:      Effort: Pulmonary effort is normal. No respiratory distress.   Abdominal:      Palpations: Abdomen is soft.   Musculoskeletal:      Cervical back: No rigidity.   Skin:     General: Skin is warm.   Neurological:      Mental Status: She is alert. She is disoriented.      Comments: Oriented to person and place. Cannot correctly state the reason she is in the hospital             Significant Labs: All pertinent labs within the past 24 hours have been reviewed.    Significant Imaging: I have reviewed all pertinent imaging results/findings within the past 24 hours.

## 2024-07-29 NOTE — PLAN OF CARE
Problem: Adult Inpatient Plan of Care  Goal: Plan of Care Review  Outcome: Progressing  Goal: Patient-Specific Goal (Individualized)  Outcome: Progressing  Goal: Absence of Hospital-Acquired Illness or Injury  Outcome: Progressing  Goal: Optimal Comfort and Wellbeing  Outcome: Progressing  Goal: Readiness for Transition of Care  Outcome: Progressing     Problem: Wound  Goal: Optimal Coping  Outcome: Progressing  Goal: Optimal Functional Ability  Outcome: Progressing  Goal: Absence of Infection Signs and Symptoms  Outcome: Progressing  Goal: Improved Oral Intake  Outcome: Progressing  Pt progressing towards goals. No distress notice. No falls or injuries during shift. Bed in lowest position, call light within reach, belonging at bedside. Safety precaution maintain.Plan of Care reviewed. Pt verbalized understanding.        Goal: Optimal Pain Control and Function  Outcome: Progressing  Goal: Skin Health and Integrity  Outcome: Progressing  Goal: Optimal Wound Healing  Outcome: Progressing
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
## 2024-07-29 NOTE — PLAN OF CARE
"[facility] - Med Surg ([facility])  Discharge Reassessment      [id number]  SW notified that patient had some visitors that wanted to meet w/her. SW reached out to  leadership at this time to gain further clarification regarding patient's visitor policy given the complexity of patient's case. Per  leadership, CEC is still in place and patient is able to have visitors; but visitors are not allowed to provide patient with belongings and are not allowed to have patient sign over any funds to them.     SW met w/patient in room along with Hugh Ulrich, Janki Ulrich, and David Nicholson ([phone]). Per Janki, her and her brother, Hugh, have been taking care of patient's cats and wanted to visit patient today as they are worried about her. They purchased a plant from the hospital gift shop and a pecan praline dessert from the [employer] to give to patient today. Per Janki, Hugh is patient's neighbor and lives two doors down from patient. Per Hugh, he sees patient's son visit her but never more than 5-10 minutes at a time. 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 Per David, he is a former  and has helped patient with certain things over the years including financials. Per David, he tried to assist patient with getting her car "out of hock" last week; but previous CM wouldn't allow him to leave with a check signed by patient. SW explained that all financial matters must go through patient's son. David verbalized understanding and stated that he is aware of patient's relationship w/her son. Per David, he has encouraged patient to have her son speak with David, so that he can assist with patient's care, but that patient has had difficulty reaching her son.     1449  SW called [facility] to f/u regarding long-term placement in their memory care unit. Per Admissions, they spoke with previous CM last week and informed CM that they are no longer " able to meet patient's needs.     1455  [facility] called [facility] to inquire if they have a [facility] care unit. Per Admissions, they do not have a memory care unit at this time; however, they know that [facility], [facility], [facility], and [facility] all have memory care units.    1536  SW called patient's son, Jose Alejandro Dougherty ([phone]), to discuss d/c plans. SW left VM informing patient's son that [facility] is no longer able to accept patient and requested permission to send additional NH referrals. SW also informed patient's son via VM that David Nicholson is willing to assist with d/c plans and requested permission to speak with David moving forward regarding patient's care. Awaiting response. Will continue to follow for needs.    Primary Care Provider: Edwar Castaneda II, MD    Expected Discharge Date:     Reassessment (most recent)       Discharge Reassessment - 07/29/24 1442          Discharge Reassessment    Assessment Type Discharge Planning Reassessment     Did the patient's condition or plan change since previous assessment? Yes     Discharge Plan discussed with: Patient;Adult children     Name(s) and Number(s) Jose Alejandro Dougherty (Son) [phone]     Communicated MARVEL with patient/caregiver Yes     Discharge Plan A New Nursing Home placement - [facility]     Discharge Plan B New Nursing Home placement - penitentiary care facility     DME Needed Upon Discharge  other (see comments)   TBD    Transition of Care Barriers Social;Nursing Home rejection;Mental illness        Post-Acute Status    Post-Acute Authorization Placement     Post-Acute Placement Status Discharge Plan Changed     Coverage MEDICARE - MEDICARE PART A & B -     Discharge Delays Patient and Family Barriers                   Discharge Plan A and Plan B have been determined by review of patient's clinical status, future medical and therapeutic needs, and coverage/benefits for post-acute care  in coordination with multidisciplinary team members.    KEYONA Noel, LMSW    Case Management Department  [facility]

## 2024-07-29 NOTE — NURSING
Pt is AAOx2. Disorientated to situation and time, sitter at bedside pt stable, No distress noted. Call light in reach. Bed in low locked position.Side rails x2. Belongings at bedside.   Pt free of fall and injuries Questions and concerns voiced and answered.    Plan of care continues.

## 2024-07-30 PROCEDURE — 25000003 PHARM REV CODE 250

## 2024-07-30 PROCEDURE — 11000001 HC ACUTE MED/SURG PRIVATE ROOM

## 2024-07-30 RX ADMIN — RIVASTIGMINE 1 PATCH: 4.6 PATCH, EXTENDED RELEASE TRANSDERMAL at 09:07

## 2024-07-30 NOTE — PLAN OF CARE
07/30/24 1525   Post-Acute Status   Post-Acute Authorization Placement   Discharge Delays (!) Patient and Family Barriers   Discharge Plan   Discharge Plan A New Nursing Home placement - [facility]     Updated on NH/ICF Placement:       [facility] Phone: [phone]   [address] [address] 6/27/2024 15:34 (CT)  6/27/2024 17:33 (CT)  -  6/28/2024 15:37 (CT)  6/28/2024 15:36 (CT)  Response: Yes, willing to accept patient  Waiting for recipient    Response Notes   [facility] Phone: [phone] [address] [place] [address] 6/27/2024 15:34 (CT)  7/23/2024 16:20 (CT)  -  6/28/2024 8:58 (CT)  7/24/2024 10:00 (CT)  Response: No, unable to accept patient  Reason: Other (see comments)  Comments: We are no longer able to accommodate her needs.  Waiting for you     [facility] Phone: [phone]   [address] [place] [address] 6/27/2024 15:34 (CT)  6/27/2024 17:33 (CT)  -  6/28/2024 11:19 (CT)  6/28/2024 11:19 (CT)  Response: No, unable to accept patient  Reason: No Available Bed  Waiting for you     [facility] Phone: [phone] [address] [address] 6/27/2024 15:34 (CT)  6/27/2024 17:33 (CT)  -  6/27/2024 15:37 (CT)  7/1/2024 15:06 (CT)  Response: Referral Received  Comments: clinically approved.snf to home? ok to call family? Deanne Zavala         3:33 PM   SW faxed referral  to NH with memory care units. [facility] via [employer] for review to the following:    [facility] Phone: [phone] [address] [place] [address] -  7/30/2024 17:32 (CT)  -  -  -  -       [facility] Phone: [phone] [address] [place] [address] -  7/30/2024 17:32 (CT)  -             SW will continue to follow patient.           Qi Morgan LMSW   - [facility]
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
## 2024-07-30 NOTE — PLAN OF CARE
Problem: Adult Inpatient Plan of Care  Goal: Plan of Care Review  7/29/2024 2333 by Annette Skinner RN  Outcome: Progressing  7/29/2024 2333 by Annette Skinner RN  Outcome: Progressing  Goal: Patient-Specific Goal (Individualized)  Outcome: Progressing  Goal: Absence of Hospital-Acquired Illness or Injury  Outcome: Progressing  Goal: Optimal Comfort and Wellbeing  Outcome: Progressing  Goal: Readiness for Transition of Care  Outcome: Progressing     Problem: Wound  Goal: Optimal Coping  Outcome: Progressing  Goal: Optimal Functional Ability  Outcome: Progressing  Goal: Absence of Infection Signs and Symptoms  Outcome: Progressing  Goal: Improved Oral Intake  Outcome: Progressing  Goal: Optimal Pain Control and Function  Outcome: Progressing  Goal: Skin Health and Integrity  Outcome: Progressing  Goal: Optimal Wound Healing  Outcome: Progressing     Problem: Skin Injury Risk Increased  Goal: Skin Health and Integrity  Outcome: Progressing     Problem: Fall Injury Risk  Goal: Absence of Fall and Fall-Related Injury  Outcome: Progressing     Problem: Delirium  Goal: Optimal Coping  Outcome: Progressing  Goal: Improved Behavioral Control  Outcome: Progressing  Goal: Improved Attention and Thought Clarity  Outcome: Progressing  Goal: Improved Sleep  Outcome: Progressing     Problem: Confusion Chronic  Goal: Optimal Cognitive Function  Outcome: Progressing

## 2024-07-30 NOTE — PLAN OF CARE
Problem: Adult Inpatient Plan of Care  Goal: Plan of Care Review  Outcome: Progressing  Flowsheets (Taken 7/30/2024 5573)  Plan of Care Reviewed With: patient  Goal: Patient-Specific Goal (Individualized)  Outcome: Progressing  Goal: Absence of Hospital-Acquired Illness or Injury  Outcome: Progressing  Goal: Optimal Comfort and Wellbeing  Outcome: Progressing  Goal: Readiness for Transition of Care  Outcome: Progressing

## 2024-07-31 PROCEDURE — 25000003 PHARM REV CODE 250

## 2024-07-31 PROCEDURE — 11000001 HC ACUTE MED/SURG PRIVATE ROOM

## 2024-07-31 RX ADMIN — RIVASTIGMINE 1 PATCH: 4.6 PATCH, EXTENDED RELEASE TRANSDERMAL at 09:07
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
## 2024-07-31 NOTE — SUBJECTIVE & OBJECTIVE
Interval History: NAEON. Planning for new [facility] NH per CM.     Review of Systems   Constitutional: Negative.    HENT: Negative.     Respiratory: Negative.     Cardiovascular: Negative.    Gastrointestinal: Negative.    Genitourinary: Negative.    Musculoskeletal: Negative.    Neurological: Negative.    Psychiatric/Behavioral:  Positive for confusion.      Objective:     Vital Signs (Most Recent):  Temp: 98.6 °F (37 °C) (07/31/24 0845)  Pulse: 88 (07/31/24 0845)  Resp: 18 (07/31/24 0845)  BP: 118/77 (07/31/24 0845)  SpO2: 98 % (07/31/24 0845) Vital Signs (24h Range):  Temp:  [98.1 °F (36.7 °C)-98.6 °F (37 °C)] 98.6 °F (37 °C)  Pulse:  [81-88] 88  Resp:  [18] 18  SpO2:  [98 %] 98 %  BP: (118-134)/(65-77) 118/77     Weight: 49.9 kg (110 lb)  Body mass index is 20.12 kg/m².  No intake or output data in the 24 hours ending 07/31/24 1448      Physical Exam  Vitals reviewed.   Constitutional:       Appearance: Normal appearance.   HENT:      Head: Normocephalic and atraumatic.   Cardiovascular:      Rate and Rhythm: Normal rate.   Pulmonary:      Effort: Pulmonary effort is normal. No respiratory distress.   Abdominal:      Palpations: Abdomen is soft.   Musculoskeletal:      Cervical back: No rigidity.   Skin:     General: Skin is warm.   Neurological:      Mental Status: She is alert. She is disoriented.      Comments: Oriented to person and place. Cannot correctly state the reason she is in the hospital             Significant Labs: All pertinent labs within the past 24 hours have been reviewed.    Significant Imaging: I have reviewed all pertinent imaging results/findings within the past 24 hours.

## 2024-07-31 NOTE — PLAN OF CARE
[facility] - [facility] ([facility])  Discharge Reassessment    Primary Care Provider: Edwar Castaneda II, MD    Expected Discharge Date:     Reassessment (most recent)       Discharge Reassessment - 07/31/24 0946          Discharge Reassessment    Assessment Type Discharge Planning Reassessment (P)      Did the patient's condition or plan change since previous assessment? No (P)      Discharge Plan discussed with: Patient (P)      Communicated MARVEL with patient/caregiver Date not available/Unable to determine (P)      Discharge Plan A New Nursing Home placement - halfway care facility (P)      Discharge Plan B New Nursing Home placement - halfway care facility (P)      DME Needed Upon Discharge  none (P)      Transition of Care Barriers Social;Nursing Home rejection;Mental illness (P)      Why the patient remains in the hospital Placement issues (P)         Post-Acute Status    Post-Acute Authorization Placement (P)      Post-Acute Placement Status Referrals Sent (P)      Coverage Medicare AB (P)      Discharge Delays Patient and Family Barriers (P)                  CM spoke with pt in room.  DC plan is new halfway NH placement.  No DME needed.  Pt insists that one check can be returned to her so she can get her car paid for.  CM instructed that since people were in her room the other day who weren't her legal next of kin trying to write checks for her, her checks had to be taken and locked in a safe place.  Checks can  only be signed with legal next of kin, son Jose Alejandro, present.  Pt states that Jose Alejandro is not willing to be involved. CM emailed leadership Eugenia with this information.      MEGHAN CantuN, BS, RN, CCM

## 2024-08-01 PROCEDURE — 11000001 HC ACUTE MED/SURG PRIVATE ROOM

## 2024-08-01 PROCEDURE — 25000003 PHARM REV CODE 250

## 2024-08-01 RX ADMIN — RIVASTIGMINE 1 PATCH: 4.6 PATCH, EXTENDED RELEASE TRANSDERMAL at 09:08

## 2024-08-01 NOTE — PLAN OF CARE
Problem: Adult Inpatient Plan of Care  Goal: Plan of Care Review  Outcome: Progressing  Flowsheets (Taken 7/31/2024 1931)  Plan of Care Reviewed With: patient  Goal: Patient-Specific Goal (Individualized)  Outcome: Progressing  Goal: Absence of Hospital-Acquired Illness or Injury  Outcome: Progressing  Goal: Optimal Comfort and Wellbeing  Outcome: Progressing  Goal: Readiness for Transition of Care  Outcome: Progressing

## 2024-08-01 NOTE — SUBJECTIVE & OBJECTIVE
Interval History: NAEON. No specific complaints, other than eager to return home.     Review of Systems   Constitutional: Negative.    HENT: Negative.     Respiratory: Negative.     Cardiovascular: Negative.    Gastrointestinal: Negative.    Genitourinary: Negative.    Musculoskeletal: Negative.    Neurological: Negative.    Psychiatric/Behavioral:  Positive for confusion.      Objective:     Vital Signs (Most Recent):  Temp: 98 °F (36.7 °C) (08/01/24 0833)  Pulse: 81 (08/01/24 0833)  Resp: 18 (07/31/24 1925)  BP: 123/72 (08/01/24 0833)  SpO2: 99 % (08/01/24 0833) Vital Signs (24h Range):  Temp:  [98 °F (36.7 °C)-98.3 °F (36.8 °C)] 98 °F (36.7 °C)  Pulse:  [81-92] 81  Resp:  [18] 18  SpO2:  [96 %-99 %] 99 %  BP: (119-123)/(72-75) 123/72     Weight: 49.9 kg (110 lb)  Body mass index is 20.12 kg/m².    Intake/Output Summary (Last 24 hours) at 8/1/2024 1456  Last data filed at 8/1/2024 0505  Gross per 24 hour   Intake 360 ml   Output --   Net 360 ml         Physical Exam  Vitals reviewed.   Constitutional:       Appearance: Normal appearance.   HENT:      Head: Normocephalic and atraumatic.   Cardiovascular:      Rate and Rhythm: Normal rate.   Pulmonary:      Effort: Pulmonary effort is normal. No respiratory distress.   Abdominal:      Palpations: Abdomen is soft.   Musculoskeletal:      Cervical back: No rigidity.   Skin:     General: Skin is warm.   Neurological:      Mental Status: She is alert. She is disoriented.      Comments: Oriented to person and place. Cannot correctly state the reason she is in the hospital             Significant Labs: All pertinent labs within the past 24 hours have been reviewed.    Significant Imaging: I have reviewed all pertinent imaging results/findings within the past 24 hours.
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
## 2024-08-01 NOTE — PROGRESS NOTES
"Medical Nutrition Therapy        Reason for Assessment: RD follow-up  Dx: Encephalopathy   Medical Hx: -      General Info: Spoke w/ pt at bedside, pt reports a good appetite and continues to tolerate diet w/ % PO intake. Pt had no additional questions for me today. Per chart review, pt w/ UBW of 110# x 4 years. Pt appears thin, however no indicators of malnutrition noted.      Current Diet: Regular  % intake of meals: %     Ht: 5'2"  Wt: 50kg   BMI: 20.1     Labs: Reviewed  Meds: Reviewed     Overall Physical Appearance: Thin     Level of Risk: Low     Nutrition Dx: dementia without behavioral disturbance     RD follow-up? Yes  "

## 2024-08-01 NOTE — PLAN OF CARE
"Per Candace Feliz's email yesterday 7/31/24, "I just heard back from  for Jose Alejandro. I'm of the impression Jose Alejandro is unavailable as per the statutory language and we can move forward with the friend assuming the patient understands what she is signing re: POA."    CM spoke with pt's friend David Nicholson in [facility], explained that per legal, it is possible that we can move forward with pt appointing him as POA if pt agrees and understands what she is signing.  David provided his phone number [phone].    MEGHAN CantuN, BS, RN, CCM       "

## 2024-08-02 PROCEDURE — 25000003 PHARM REV CODE 250

## 2024-08-02 PROCEDURE — 11000001 HC ACUTE MED/SURG PRIVATE ROOM

## 2024-08-02 RX ADMIN — RIVASTIGMINE 1 PATCH: 4.6 PATCH, EXTENDED RELEASE TRANSDERMAL at 08:08

## 2024-08-02 NOTE — SUBJECTIVE & OBJECTIVE
Interval History: NAEON. Eager to return home. Worried about her animals, in particular her horse and 2 dogs.     Review of Systems   Constitutional: Negative.    HENT: Negative.     Respiratory: Negative.     Cardiovascular: Negative.    Gastrointestinal: Negative.    Genitourinary: Negative.    Musculoskeletal: Negative.    Neurological: Negative.    Psychiatric/Behavioral:  Positive for confusion.      Objective:     Vital Signs (Most Recent):  Temp: 98.5 °F (36.9 °C) (08/02/24 0809)  Pulse: 81 (08/02/24 0809)  Resp: 18 (08/02/24 0809)  BP: 135/82 (08/02/24 0809)  SpO2: 99 % (08/02/24 0809) Vital Signs (24h Range):  Temp:  [98.5 °F (36.9 °C)-98.8 °F (37.1 °C)] 98.5 °F (36.9 °C)  Pulse:  [81-86] 81  Resp:  [18] 18  SpO2:  [98 %-99 %] 99 %  BP: (118-135)/(53-82) 135/82     Weight: 49.9 kg (110 lb)  Body mass index is 20.12 kg/m².  No intake or output data in the 24 hours ending 08/02/24 1330      Physical Exam  Vitals reviewed.   Constitutional:       Appearance: Normal appearance.   HENT:      Head: Normocephalic and atraumatic.   Cardiovascular:      Rate and Rhythm: Normal rate.   Pulmonary:      Effort: Pulmonary effort is normal. No respiratory distress.   Abdominal:      Palpations: Abdomen is soft.   Musculoskeletal:      Cervical back: No rigidity.   Skin:     General: Skin is warm.   Neurological:      Mental Status: She is alert. She is disoriented.      Comments: Oriented to person and place.             Significant Labs: All pertinent labs within the past 24 hours have been reviewed.    Significant Imaging: I have reviewed all pertinent imaging results/findings within the past 24 hours.

## 2024-08-02 NOTE — PLAN OF CARE
Problem: Adult Inpatient Plan of Care  Goal: Plan of Care Review  Outcome: Progressing  Flowsheets (Taken 8/1/2024 1951)  Plan of Care Reviewed With: patient  Goal: Patient-Specific Goal (Individualized)  Outcome: Progressing  Goal: Absence of Hospital-Acquired Illness or Injury  Outcome: Progressing  Goal: Optimal Comfort and Wellbeing  Outcome: Progressing  Goal: Readiness for Transition of Care  Outcome: Progressing

## 2024-08-02 NOTE — ASSESSMENT & PLAN NOTE
78-year-old lady here with encephalopathy, secondary to worsening dementia.  Clinically her presentation is not concering for acute sepsis, or stroke.        Extensive workup for AMS has been negative. Neurology suspects her underlying dementia is driving her presentation. Patient very resistant to discharging to SNF or any facility. Per our team and Psychiatry the patient does not show decision making capacity. Son prefers SNF or facility placement. However, patient threatened and attempted to leave AMA on 7/15 so she was PEC'd.  PEC is to prevent AMA but she does not require further psychological stabilization, discuss with legal need for PEC regarding capacity to leave AMA.     Plan:  - CEC  on . Patient has situational capacity.   - Will consult psych for further eval. Appreciate assistance.   - PRN zyprexa.

## 2024-08-02 NOTE — PLAN OF CARE
CM called [facility] [employer] [phone] to see if once pt has new POA, if they would be able to  3 mos bank statements for purpose of admitting pt to a nursing home.  Phone rang and rang, no answer.  CM to follow.    9:05 AM  CM spoke with William at [facility] [employer], she states that pt's POA is able to come by and  bank statements with ID.  CM informed legal Candace Feliz, and leadership Alexia Gaytan via email.    11:13 AM  Per Alexia, to get POA, David Nicholson needs to bring a mobile notary to get paperwork signed.  MD needs to write a note that pt currently has not decompensated and is able to make that decision.  CM requested Dr. Quintanilla to put in note attesting to pt's current capacity level.  CM called David Nicholson [phone] to instruct; LVM requesting call back.    11:53 AM  Pt's 89 .  CM faxed [facility] to state.    1:57 PM  CM sent updated NH referrals seeking NH placement with memory care to the following facilities: [facility], [facility], [facility]    NICHOLAS spoke with David Nicholson, informed that we are ready to proceed with him getting POA document for pt.  He will need to get mobile notary and come to hospital to get document signed with patient.  David v/u, states he can be by Monday or Tuesday to complete that process.    3:40 PM  NICHOLAS spoke with David in [facility], instructed that mobile notary will have needed POA forms.    Duyen Fam, MEGHANN, BS, RN, CCM       Keystone Flap Text: The defect edges were debeveled with a #15 scalpel blade.  Given the location of the defect, shape of the defect a keystone flap was deemed most appropriate.  Using a sterile surgical marker, an appropriate keystone flap was drawn incorporating the defect, outlining the appropriate donor tissue and placing the expected incisions within the relaxed skin tension lines where possible. The area thus outlined was incised deep to adipose tissue with a #15 scalpel blade.  The skin margins were undermined to an appropriate distance in all directions around the primary defect and laterally outward around the flap utilizing iris scissors.
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
## 2024-08-03 PROCEDURE — 25000003 PHARM REV CODE 250

## 2024-08-03 PROCEDURE — 11000001 HC ACUTE MED/SURG PRIVATE ROOM

## 2024-08-03 RX ADMIN — RIVASTIGMINE 1 PATCH: 4.6 PATCH, EXTENDED RELEASE TRANSDERMAL at 08:08

## 2024-08-03 NOTE — SUBJECTIVE & OBJECTIVE
Interval History: NAEO    Review of Systems   Constitutional: Negative.    HENT: Negative.     Respiratory: Negative.     Cardiovascular: Negative.    Gastrointestinal: Negative.    Genitourinary: Negative.    Musculoskeletal: Negative.    Neurological: Negative.    Psychiatric/Behavioral:  Positive for confusion.      Objective:     Vital Signs (Most Recent):  Temp: 97.6 °F (36.4 °C) (08/03/24 1210)  Pulse: 89 (08/03/24 1210)  Resp: 18 (08/03/24 1210)  BP: 136/63 (08/03/24 1210)  SpO2: 99 % (08/03/24 1210) Vital Signs (24h Range):  Temp:  [97.6 °F (36.4 °C)-98 °F (36.7 °C)] 97.6 °F (36.4 °C)  Pulse:  [80-89] 89  Resp:  [18-19] 18  SpO2:  [95 %-99 %] 99 %  BP: (116-136)/(63-74) 136/63     Weight: 49.9 kg (110 lb)  Body mass index is 20.12 kg/m².    Intake/Output Summary (Last 24 hours) at 8/3/2024 1409  Last data filed at 8/3/2024 0815  Gross per 24 hour   Intake 340 ml   Output --   Net 340 ml         Physical Exam  Vitals reviewed.   Constitutional:       Appearance: Normal appearance.   HENT:      Head: Normocephalic and atraumatic.   Cardiovascular:      Rate and Rhythm: Normal rate.   Pulmonary:      Effort: Pulmonary effort is normal. No respiratory distress.   Abdominal:      Palpations: Abdomen is soft.   Musculoskeletal:      Cervical back: No rigidity.   Skin:     General: Skin is warm.   Neurological:      Mental Status: She is alert. She is disoriented.      Comments: Oriented to person and place.             Significant Labs: All pertinent labs within the past 24 hours have been reviewed.    Significant Imaging: I have reviewed all pertinent imaging results/findings within the past 24 hours.
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
## 2024-08-04 PROCEDURE — 11000001 HC ACUTE MED/SURG PRIVATE ROOM

## 2024-08-04 PROCEDURE — 25000003 PHARM REV CODE 250

## 2024-08-04 RX ADMIN — RIVASTIGMINE 1 PATCH: 4.6 PATCH, EXTENDED RELEASE TRANSDERMAL at 09:08

## 2024-08-04 NOTE — PLAN OF CARE
Problem: Adult Inpatient Plan of Care  Goal: Plan of Care Review  Outcome: Progressing  Goal: Patient-Specific Goal (Individualized)  Outcome: Progressing  Goal: Absence of Hospital-Acquired Illness or Injury  Outcome: Progressing  Goal: Optimal Comfort and Wellbeing  Outcome: Progressing  Goal: Readiness for Transition of Care  Outcome: Progressing     Problem: Skin Injury Risk Increased  Goal: Skin Health and Integrity  Outcome: Progressing     Problem: Confusion Chronic  Goal: Optimal Cognitive Function  Outcome: Progressing     Problem: Fall Injury Risk  Goal: Absence of Fall and Fall-Related Injury  Outcome: Progressing     Problem: Delirium  Goal: Optimal Coping  Outcome: Progressing  Goal: Improved Behavioral Control  Outcome: Progressing  Goal: Improved Attention and Thought Clarity  Outcome: Progressing  Goal: Improved Sleep  Outcome: Progressing     Sitter at bedside, pt pleasantly confused. Patient remains free of falls, and verbalizes understanding in fall prevention and safety, but needs reinforcement. Safety measures remain in place. Reinforced fall prevention and safety education. Call light and personal belongings within reach, bed in lowest position with wheels locked, side rails up x2, Instructed to call with any needs or complaints, verbalized understanding. Continue current plan of care.

## 2024-08-04 NOTE — SUBJECTIVE & OBJECTIVE
Interval History: NAEON.     Review of Systems   Constitutional: Negative.    HENT: Negative.     Respiratory: Negative.     Cardiovascular: Negative.    Gastrointestinal: Negative.    Genitourinary: Negative.    Musculoskeletal: Negative.    Neurological: Negative.    Psychiatric/Behavioral:  Positive for confusion.      Objective:     Vital Signs (Most Recent):  Temp: 98 °F (36.7 °C) (08/04/24 0842)  Pulse: 79 (08/04/24 0842)  Resp: 18 (08/04/24 0842)  BP: (!) 141/93 (08/04/24 0842)  SpO2: 98 % (08/04/24 0842) Vital Signs (24h Range):  Temp:  [97.9 °F (36.6 °C)-98 °F (36.7 °C)] 98 °F (36.7 °C)  Pulse:  [79-88] 79  Resp:  [18-19] 18  SpO2:  [94 %-98 %] 98 %  BP: (119-141)/(56-93) 141/93     Weight: 49.9 kg (110 lb)  Body mass index is 20.12 kg/m².    Intake/Output Summary (Last 24 hours) at 8/4/2024 1234  Last data filed at 8/3/2024 1825  Gross per 24 hour   Intake 200 ml   Output --   Net 200 ml         Physical Exam  Vitals and nursing note reviewed.   Constitutional:       Appearance: Normal appearance.   HENT:      Head: Normocephalic and atraumatic.   Cardiovascular:      Rate and Rhythm: Normal rate.   Pulmonary:      Effort: Pulmonary effort is normal. No respiratory distress.   Abdominal:      Palpations: Abdomen is soft.   Musculoskeletal:      Cervical back: No rigidity.   Skin:     General: Skin is warm.   Neurological:      Mental Status: She is alert. She is disoriented.      Comments: Oriented to person and place.             Significant Labs: All pertinent labs within the past 24 hours have been reviewed.    Significant Imaging: I have reviewed all pertinent imaging results/findings within the past 24 hours.
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
## 2024-08-04 NOTE — PLAN OF CARE
Problem: Adult Inpatient Plan of Care  Goal: Plan of Care Review  Outcome: Progressing  Goal: Patient-Specific Goal (Individualized)  Outcome: Progressing  Goal: Absence of Hospital-Acquired Illness or Injury  Outcome: Progressing  Goal: Optimal Comfort and Wellbeing  Outcome: Progressing  Goal: Readiness for Transition of Care  Outcome: Progressing     Problem: Skin Injury Risk Increased  Goal: Skin Health and Integrity  Outcome: Progressing     Problem: Fall Injury Risk  Goal: Absence of Fall and Fall-Related Injury  Outcome: Progressing     Problem: Delirium  Goal: Optimal Coping  Outcome: Progressing  Goal: Improved Behavioral Control  Outcome: Progressing  Goal: Improved Attention and Thought Clarity  Outcome: Progressing  Goal: Improved Sleep  Outcome: Progressing     Problem: Confusion Chronic  Goal: Optimal Cognitive Function  Outcome: Progressing

## 2024-08-05 PROCEDURE — 25000003 PHARM REV CODE 250

## 2024-08-05 PROCEDURE — 11000001 HC ACUTE MED/SURG PRIVATE ROOM

## 2024-08-05 RX ADMIN — RIVASTIGMINE 1 PATCH: 4.6 PATCH, EXTENDED RELEASE TRANSDERMAL at 08:08

## 2024-08-05 NOTE — PLAN OF CARE
Asim Cortez - Med Surg ([facility]-16)  Discharge Reassessment    Primary Care Provider: Edwar Castaneda II, MD    Expected Discharge Date:     Reassessment (most recent)       Discharge Reassessment - 08/05/24 1205          Discharge Reassessment    Assessment Type Discharge Planning Reassessment     Did the patient's condition or plan change since previous assessment? No     Discharge Plan discussed with: --   Friend David Nicholson    Name(s) and Number(s) David Nicholson, friend, [phone] (P)      Communicated MARVEL with patient/caregiver Date not available/Unable to determine (P)      Discharge Plan A New Nursing Home placement - longterm care facility (P)      Discharge Plan B New Nursing Home placement - longterm care facility (P)      DME Needed Upon Discharge  none (P)      Transition of Care Barriers Nursing Home rejection;Social;Mental illness (P)      Why the patient remains in the hospital Placement issues (P)         Post-Acute Status    Post-Acute Authorization Placement (P)      Post-Acute Placement Status Referrals Sent (P)      Coverage Medicare AB (P)      Discharge Delays Patient and Family Barriers (P)                  NICHOLAS spoke with pt's friend David Nicholson [phone].   He states he is no longer willing to be POA.  He spoke with pt this morning, thought he would be able to convince her to go to NH upon d/c, but pt adamantly refuses, stating she wants to go home and be with her animals.  David states that pt's home is uninhabitable, he went there.  Pt has an idea of staying in the hospital for the amount of time it would take to get her home fixed.  This is not a realistic plan.  CM notified  leadership and medical team.    3:29 PM  NICHOLAS spoke with Dr. Schilling, he states he spoke with pt, she is insistent on going home, which is uninhabitable; this is not a realistic plan.  He is going to consult legal team about pt's not having POA.  He requests that pt be seen by pet therapy.  NICHOLAS called  [facility], spoke with Jose, who said she would have therapy dog see patient.    4:22 PM  CM spoke with pt in room.  Pt wants to know what a good place to call to see about selling her mother's home.  CM instructed that it's not within CM scope to advise on real estate.  CM instructed that MD won't d/c patient to anywhere but a NH.  Pt states she is not going to a NH.    MARTA Cantu, BS, RN, [facility]      Discharge Plan A and Plan B have been determined by review of patient's clinical status, future medical and therapeutic needs, and coverage/benefits for post-acute care in coordination with multidisciplinary team members.

## 2024-08-05 NOTE — SUBJECTIVE & OBJECTIVE
Interval History: NAEON.     Review of Systems   Constitutional: Negative.    HENT: Negative.     Respiratory: Negative.     Cardiovascular: Negative.    Gastrointestinal: Negative.    Genitourinary: Negative.    Musculoskeletal: Negative.    Neurological: Negative.    Psychiatric/Behavioral:  Positive for confusion.      Objective:     Vital Signs (Most Recent):  Temp: 98.1 °F (36.7 °C) (08/05/24 0801)  Pulse: 107 (08/05/24 0801)  Resp: 18 (08/05/24 0801)  BP: 117/80 (08/05/24 0801)  SpO2: 98 % (08/05/24 0801) Vital Signs (24h Range):  Temp:  [98.1 °F (36.7 °C)-98.2 °F (36.8 °C)] 98.1 °F (36.7 °C)  Pulse:  [] 107  Resp:  [18] 18  SpO2:  [96 %-98 %] 98 %  BP: (109-117)/(68-80) 117/80     Weight: 49.9 kg (110 lb)  Body mass index is 20.12 kg/m².    Intake/Output Summary (Last 24 hours) at 8/5/2024 1221  Last data filed at 8/5/2024 0852  Gross per 24 hour   Intake 590 ml   Output 400 ml   Net 190 ml         Physical Exam  Vitals and nursing note reviewed.   Constitutional:       Appearance: Normal appearance.   HENT:      Head: Normocephalic and atraumatic.   Cardiovascular:      Rate and Rhythm: Normal rate.   Pulmonary:      Effort: Pulmonary effort is normal. No respiratory distress.   Abdominal:      Palpations: Abdomen is soft.   Musculoskeletal:      Cervical back: No rigidity.   Skin:     General: Skin is warm.   Neurological:      Mental Status: She is alert. She is disoriented.      Comments: Oriented to person and place. Situational awareness              Significant Labs: All pertinent labs within the past 24 hours have been reviewed.    Significant Imaging: I have reviewed all pertinent imaging results/findings within the past 24 hours.
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
## 2024-08-06 LAB
ALBUMIN SERPL BCP-MCNC: 3.9 G/DL (ref 3.5–5.2)
ALP SERPL-CCNC: 91 U/L (ref 55–135)
ALT SERPL W/O P-5'-P-CCNC: 41 U/L (ref 10–44)
ANION GAP SERPL CALC-SCNC: 10 MMOL/L (ref 8–16)
AST SERPL-CCNC: 32 U/L (ref 10–40)
BASOPHILS # BLD AUTO: 0.07 K/UL (ref 0–0.2)
BASOPHILS NFR BLD: 0.7 % (ref 0–1.9)
BILIRUB SERPL-MCNC: 0.7 MG/DL (ref 0.1–1)
BUN SERPL-MCNC: 23 MG/DL (ref 8–23)
CALCIUM SERPL-MCNC: 9.7 MG/DL (ref 8.7–10.5)
CHLORIDE SERPL-SCNC: 106 MMOL/L (ref 95–110)
CO2 SERPL-SCNC: 25 MMOL/L (ref 23–29)
CREAT SERPL-MCNC: 0.9 MG/DL (ref 0.5–1.4)
DIFFERENTIAL METHOD BLD: ABNORMAL
EOSINOPHIL # BLD AUTO: 0.3 K/UL (ref 0–0.5)
EOSINOPHIL NFR BLD: 3 % (ref 0–8)
ERYTHROCYTE [DISTWIDTH] IN BLOOD BY AUTOMATED COUNT: 13.3 % (ref 11.5–14.5)
EST. GFR  (NO RACE VARIABLE): >60 ML/MIN/1.73 M^2
FERRITIN SERPL-MCNC: 183 NG/ML (ref 20–300)
GLUCOSE SERPL-MCNC: 93 MG/DL (ref 70–110)
HCT VFR BLD AUTO: 42.1 % (ref 37–48.5)
HGB BLD-MCNC: 13.4 G/DL (ref 12–16)
IMM GRANULOCYTES # BLD AUTO: 0.03 K/UL (ref 0–0.04)
IMM GRANULOCYTES NFR BLD AUTO: 0.3 % (ref 0–0.5)
IRON SERPL-MCNC: 90 UG/DL (ref 30–160)
LYMPHOCYTES # BLD AUTO: 2 K/UL (ref 1–4.8)
LYMPHOCYTES NFR BLD: 19.3 % (ref 18–48)
MAGNESIUM SERPL-MCNC: 2.3 MG/DL (ref 1.6–2.6)
MCH RBC QN AUTO: 32.1 PG (ref 27–31)
MCHC RBC AUTO-ENTMCNC: 31.8 G/DL (ref 32–36)
MCV RBC AUTO: 101 FL (ref 82–98)
MONOCYTES # BLD AUTO: 0.5 K/UL (ref 0.3–1)
MONOCYTES NFR BLD: 5 % (ref 4–15)
NEUTROPHILS # BLD AUTO: 7.6 K/UL (ref 1.8–7.7)
NEUTROPHILS NFR BLD: 71.7 % (ref 38–73)
NRBC BLD-RTO: 0 /100 WBC
PHOSPHATE SERPL-MCNC: 3.8 MG/DL (ref 2.7–4.5)
PLATELET # BLD AUTO: 284 K/UL (ref 150–450)
PMV BLD AUTO: 10.5 FL (ref 9.2–12.9)
POTASSIUM SERPL-SCNC: 4.5 MMOL/L (ref 3.5–5.1)
PROT SERPL-MCNC: 6.8 G/DL (ref 6–8.4)
RBC # BLD AUTO: 4.18 M/UL (ref 4–5.4)
SATURATED IRON: 21 % (ref 20–50)
SODIUM SERPL-SCNC: 141 MMOL/L (ref 136–145)
TOTAL IRON BINDING CAPACITY: 419 UG/DL (ref 250–450)
TRANSFERRIN SERPL-MCNC: 283 MG/DL (ref 200–375)
WBC # BLD AUTO: 10.55 K/UL (ref 3.9–12.7)

## 2024-08-06 PROCEDURE — 11000001 HC ACUTE MED/SURG PRIVATE ROOM

## 2024-08-06 PROCEDURE — 36415 COLL VENOUS BLD VENIPUNCTURE: CPT

## 2024-08-06 PROCEDURE — 83540 ASSAY OF IRON: CPT

## 2024-08-06 PROCEDURE — 85025 COMPLETE CBC W/AUTO DIFF WBC: CPT

## 2024-08-06 PROCEDURE — 83735 ASSAY OF MAGNESIUM: CPT

## 2024-08-06 PROCEDURE — 80053 COMPREHEN METABOLIC PANEL: CPT

## 2024-08-06 PROCEDURE — 25000003 PHARM REV CODE 250

## 2024-08-06 PROCEDURE — 84100 ASSAY OF PHOSPHORUS: CPT

## 2024-08-06 PROCEDURE — 82728 ASSAY OF FERRITIN: CPT

## 2024-08-06 RX ADMIN — RIVASTIGMINE 1 PATCH: 4.6 PATCH, EXTENDED RELEASE TRANSDERMAL at 09:08

## 2024-08-06 RX ADMIN — THERA TABS 1 TABLET: TAB at 09:08

## 2024-08-06 NOTE — PLAN OF CARE
CM spoke with pt in room.  She requests assistance with finding a real estate agency.  CM helped pt find a real estate agency online, did not recommend anything specific, instructed pt that CM scope of practice does not include recommending real estate agency.    MEGHAN CantuN, BS, RN, CCM

## 2024-08-06 NOTE — SUBJECTIVE & OBJECTIVE
Interval History: NAEON. Watching the [employer] on TV this morning.     Review of Systems   Constitutional: Negative.    HENT: Negative.     Respiratory: Negative.     Cardiovascular: Negative.    Gastrointestinal: Negative.    Genitourinary: Negative.    Musculoskeletal: Negative.    Neurological: Negative.    Psychiatric/Behavioral:  Positive for confusion.      Objective:     Vital Signs (Most Recent):  Temp: 97.8 °F (36.6 °C) (08/06/24 0744)  Pulse: 80 (08/06/24 0744)  Resp: 18 (08/06/24 0744)  BP: 120/82 (08/06/24 0744)  SpO2: 98 % (08/06/24 0744) Vital Signs (24h Range):  Temp:  [97.8 °F (36.6 °C)-98.1 °F (36.7 °C)] 97.8 °F (36.6 °C)  Pulse:  [80-85] 80  Resp:  [18] 18  SpO2:  [95 %-98 %] 98 %  BP: (120-130)/(76-82) 120/82     Weight: 49.9 kg (110 lb)  Body mass index is 20.12 kg/m².    Intake/Output Summary (Last 24 hours) at 8/6/2024 1656  Last data filed at 8/6/2024 1400  Gross per 24 hour   Intake 720 ml   Output --   Net 720 ml         Physical Exam  Vitals and nursing note reviewed.   Constitutional:       Appearance: Normal appearance.   HENT:      Head: Normocephalic and atraumatic.   Cardiovascular:      Rate and Rhythm: Normal rate.   Pulmonary:      Effort: Pulmonary effort is normal. No respiratory distress.   Abdominal:      Palpations: Abdomen is soft.   Musculoskeletal:      Cervical back: No rigidity.   Skin:     General: Skin is warm.   Neurological:      Mental Status: She is alert. She is disoriented.      Comments: Oriented to person and place.             Significant Labs: All pertinent labs within the past 24 hours have been reviewed.    Significant Imaging: I have reviewed all pertinent imaging results/findings within the past 24 hours.

## 2024-08-06 NOTE — PLAN OF CARE
Asim Cortez - [facility] Surg ([facility]-16)  Discharge Reassessment    Primary Care Provider: Edwar Castaneda II, MD    Expected Discharge Date:     Reassessment (most recent)       Discharge Reassessment - 08/06/24 1255          Discharge Reassessment    Assessment Type Discharge Planning Reassessment (P)      Did the patient's condition or plan change since previous assessment? No (P)      Discharge Plan discussed with: Patient (P)    Pt's friend, David Nicholson    Name(s) and Number(s) David Nicholson, friend, [phone] (P)      Communicated MARVEL with patient/caregiver Date not available/Unable to determine (P)      Discharge Plan A New Nursing Home placement - long-term care facility (P)      Discharge Plan B Assisted Living (P)      DME Needed Upon Discharge  none (P)      Transition of Care Barriers Nursing Home rejection;Social;Mental illness (P)      Why the patient remains in the hospital Placement issues (P)         Post-Acute Status    Post-Acute Authorization Placement (P)      Post-Acute Placement Status Referrals Sent (P)      Coverage Medicare AB (P)      Discharge Delays Patient and Family Barriers (P)                  CM spoke with pt and her friend, David Nicholson, in room.  We discussed pt's options for post d/c.  CM reiterated that MD would not d/c pt to home alone with her dogs, MD will release to NH only.  David explained to pt that her options are limited; he's found an alternative to NH; assisted living at [facility] in [place] where she could have her dogs.  They have facilities for $4000/month minimum, and they have Alzheimer's/dementia care.      David asked that if he was appointed POA, could he take her out of the hospital to visit her horse, go to bank, then come back to the hospital.  CM instructed that it's not allowed to leave the hospital unless pt is d/c'd.  CM instructed that pursuing assisted living MIGHT be a possibility; more details would be needed.  David informed pt that he couldn't go  any further with seeing if patient could be admitted to assisted living facility because he doesn't have any legal right to do that, unless she gives her permission for him to act on her behalf.  Pt wants to know if agreement to let David act on her behalf could be revoked if she wanted it revoked.  CM instructed that she could revoke it if MD's determined that she had capacity at that time.      Pt kept insisting that she wants a second opinion.  CM instructed that while she's admitted to [facility], [facility] are responsible for her care.  Once she is d/c'd, she could seek a second opinion; [facility] MD's would no longer be following her once she's d/c'd from facility.    David agrees to call CM with any further developments.    NICHOLAS discussed possible d/c plan of assisted living with supervisor Alexia.    3:20 PM  CM called in [employer] to State.  CM faxed [facility] to state.  CM uploaded [facility] to .    MARTA Cantu, BS, RN, CCM      Discharge Plan A and Plan B have been determined by review of patient's clinical status, future medical and therapeutic needs, and coverage/benefits for post-acute care in coordination with multidisciplinary team members.
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
## 2024-08-07 PROCEDURE — 11000001 HC ACUTE MED/SURG PRIVATE ROOM

## 2024-08-07 PROCEDURE — 25000003 PHARM REV CODE 250

## 2024-08-07 RX ADMIN — THERA TABS 1 TABLET: TAB at 10:08

## 2024-08-07 RX ADMIN — RIVASTIGMINE 1 PATCH: 4.6 PATCH, EXTENDED RELEASE TRANSDERMAL at 10:08

## 2024-08-07 NOTE — SUBJECTIVE & OBJECTIVE
Interval History: NAEON. [facility] no longer able to accept patient. SW called son and left a VM informing him of this and requesting permission to send out more referrals.     Review of Systems   Constitutional: Negative.    HENT: Negative.     Respiratory: Negative.     Cardiovascular: Negative.    Gastrointestinal: Negative.    Genitourinary: Negative.    Musculoskeletal: Negative.    Neurological: Negative.    Psychiatric/Behavioral:  Positive for confusion.      Objective:     Vital Signs (Most Recent):  Temp: 97.8 °F (36.6 °C) (07/29/24 2028)  Pulse: 90 (07/29/24 2028)  Resp: 18 (07/29/24 2028)  BP: (!) 110/59 (07/29/24 2028)  SpO2: 97 % (07/29/24 2028) Vital Signs (24h Range):  Temp:  [96.5 °F (35.8 °C)-97.8 °F (36.6 °C)] 97.8 °F (36.6 °C)  Pulse:  [78-90] 90  Resp:  [17-18] 18  SpO2:  [97 %-99 %] 97 %  BP: (110-117)/(59-75) 110/59     Weight: 49.9 kg (110 lb)  Body mass index is 20.12 kg/m².  No intake or output data in the 24 hours ending 07/30/24 0634      Physical Exam  Vitals reviewed.   Constitutional:       Appearance: Normal appearance.   HENT:      Head: Normocephalic and atraumatic.   Cardiovascular:      Rate and Rhythm: Normal rate.   Pulmonary:      Effort: Pulmonary effort is normal. No respiratory distress.   Abdominal:      Palpations: Abdomen is soft.   Musculoskeletal:      Cervical back: No rigidity.   Skin:     General: Skin is warm.   Neurological:      Mental Status: She is alert. She is disoriented.      Comments: Oriented to person and place. Cannot correctly state the reason she is in the hospital             Significant Labs: All pertinent labs within the past 24 hours have been reviewed.    Significant Imaging: I have reviewed all pertinent imaging results/findings within the past 24 hours.   all Sesay ( [phone])

## 2024-08-07 NOTE — PLAN OF CARE
NICHOLAS spoke with David Nicholson [phone].  David Nicholson is willing to participate in her care again.  He states he's gotten pt to agree to pursue assisted living with memory care that accepts animals.  He's looking at 2 different facilities.  He is currently looking for an atty who will help him set up the POA.    NICHOLAS notifed leadership Eugenia MD, and legal via email.    MARTA Cantu, BS, RN, [facility]
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
## 2024-08-07 NOTE — SUBJECTIVE & OBJECTIVE
Interval History: NAEON.     Review of Systems   Constitutional: Negative.    HENT: Negative.     Respiratory: Negative.     Cardiovascular: Negative.    Gastrointestinal: Negative.    Genitourinary: Negative.    Musculoskeletal: Negative.    Neurological: Negative.    Psychiatric/Behavioral:  Positive for confusion.      Objective:     Vital Signs (Most Recent):  Temp: 98.2 °F (36.8 °C) (08/07/24 1543)  Pulse: 94 (08/07/24 1543)  Resp: 18 (08/07/24 1543)  BP: 115/74 (08/07/24 1543)  SpO2: 97 % (08/07/24 1543) Vital Signs (24h Range):  Temp:  [97.9 °F (36.6 °C)-98.3 °F (36.8 °C)] 98.2 °F (36.8 °C)  Pulse:  [84-94] 94  Resp:  [18] 18  SpO2:  [96 %-98 %] 97 %  BP: (109-120)/(70-76) 115/74     Weight: 49.9 kg (110 lb)  Body mass index is 20.12 kg/m².    Intake/Output Summary (Last 24 hours) at 8/7/2024 1651  Last data filed at 8/6/2024 1827  Gross per 24 hour   Intake 240 ml   Output --   Net 240 ml         Physical Exam  Vitals and nursing note reviewed.   Constitutional:       Appearance: Normal appearance.   HENT:      Head: Normocephalic and atraumatic.   Cardiovascular:      Rate and Rhythm: Normal rate.   Pulmonary:      Effort: Pulmonary effort is normal. No respiratory distress.   Abdominal:      Palpations: Abdomen is soft.   Musculoskeletal:      Cervical back: No rigidity.   Skin:     General: Skin is warm.   Neurological:      Mental Status: She is alert. She is disoriented.      Comments: Oriented to person and place.             Significant Labs: All pertinent labs within the past 24 hours have been reviewed.    Significant Imaging: I have reviewed all pertinent imaging results/findings within the past 24 hours.

## 2024-08-08 PROCEDURE — 25000003 PHARM REV CODE 250

## 2024-08-08 PROCEDURE — 11000001 HC ACUTE MED/SURG PRIVATE ROOM

## 2024-08-08 RX ADMIN — RIVASTIGMINE 1 PATCH: 4.6 PATCH, EXTENDED RELEASE TRANSDERMAL at 10:08

## 2024-08-08 RX ADMIN — THERA TABS 1 TABLET: TAB at 10:08

## 2024-08-08 NOTE — SUBJECTIVE & OBJECTIVE
Interval History: NAEON. Patient watching the news this morning. Talked about the [place] Olympics. She enjoys the swimming events.     Review of Systems   Constitutional: Negative.    HENT: Negative.     Respiratory: Negative.     Cardiovascular: Negative.    Gastrointestinal: Negative.    Genitourinary: Negative.    Musculoskeletal: Negative.    Neurological: Negative.    Psychiatric/Behavioral:  Positive for confusion.      Objective:     Vital Signs (Most Recent):  Temp: 97.9 °F (36.6 °C) (08/08/24 0730)  Pulse: 70 (08/08/24 0730)  Resp: 18 (08/08/24 0730)  BP: 137/84 (08/08/24 0730)  SpO2: 99 % (08/08/24 0730) Vital Signs (24h Range):  Temp:  [97.9 °F (36.6 °C)-98.3 °F (36.8 °C)] 97.9 °F (36.6 °C)  Pulse:  [70-94] 70  Resp:  [18] 18  SpO2:  [97 %-99 %] 99 %  BP: (103-137)/(66-84) 137/84     Weight: 49.9 kg (110 lb)  Body mass index is 20.12 kg/m².  No intake or output data in the 24 hours ending 08/08/24 1144      Physical Exam  Vitals and nursing note reviewed.   Constitutional:       Appearance: Normal appearance.   HENT:      Head: Normocephalic and atraumatic.   Cardiovascular:      Rate and Rhythm: Normal rate.   Pulmonary:      Effort: Pulmonary effort is normal. No respiratory distress.   Abdominal:      Palpations: Abdomen is soft.   Musculoskeletal:      Cervical back: No rigidity.   Skin:     General: Skin is warm.   Neurological:      Mental Status: She is alert. She is disoriented.      Comments: Oriented to person and place.             Significant Labs: All pertinent labs within the past 24 hours have been reviewed.    Significant Imaging: I have reviewed all pertinent imaging results/findings within the past 24 hours.

## 2024-08-08 NOTE — PROGRESS NOTES
"    Medical Nutrition Therapy        Reason for Assessment: RD follow-up/LOS  Dx: Encephalopathy   Medical Hx: -      General Info: Spoke w/ pt at bedside, pt reports a good appetite and continues to tolerate diet w 100% PO intake. Pt had no additional questions for me today. Per chart review, pt w/ UBW of 110# x 4 years. Pt appears thin, however no indicators of malnutrition noted.      Current Diet: Regular  % intake of meals:100%     Ht: 5'2"  Wt: 50kg   BMI: 20.1     Labs: Reviewed  Meds: Reviewed     Overall Physical Appearance: Thin     Level of Risk: Low     Nutrition Dx: dementia without behavioral disturbance     RD follow-up? Yes  "
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
## 2024-08-09 PROCEDURE — 25000003 PHARM REV CODE 250

## 2024-08-09 PROCEDURE — 11000001 HC ACUTE MED/SURG PRIVATE ROOM

## 2024-08-09 RX ADMIN — RIVASTIGMINE 1 PATCH: 4.6 PATCH, EXTENDED RELEASE TRANSDERMAL at 09:08

## 2024-08-09 RX ADMIN — THERA TABS 1 TABLET: TAB at 09:08

## 2024-08-09 NOTE — SUBJECTIVE & OBJECTIVE
Interval History: NAEON. Doing well, eating breakfast and watching TV this morning.     Review of Systems   Constitutional: Negative.    HENT: Negative.     Respiratory: Negative.     Cardiovascular: Negative.    Gastrointestinal: Negative.    Genitourinary: Negative.    Musculoskeletal: Negative.    Neurological: Negative.    Psychiatric/Behavioral:  Positive for confusion.      Objective:     Vital Signs (Most Recent):  Temp: 98.3 °F (36.8 °C) (08/09/24 0805)  Pulse: 87 (08/09/24 0805)  Resp: 16 (08/09/24 0805)  BP: 136/85 (08/09/24 0805)  SpO2: 95 % (08/09/24 0805) Vital Signs (24h Range):  Temp:  [98.3 °F (36.8 °C)-98.4 °F (36.9 °C)] 98.3 °F (36.8 °C)  Pulse:  [84-87] 87  Resp:  [16] 16  SpO2:  [95 %-96 %] 95 %  BP: (115-136)/(71-85) 136/85     Weight: 49.9 kg (110 lb)  Body mass index is 20.12 kg/m².    Intake/Output Summary (Last 24 hours) at 8/9/2024 0902  Last data filed at 8/8/2024 1216  Gross per 24 hour   Intake 240 ml   Output --   Net 240 ml         Physical Exam  Vitals and nursing note reviewed.   Constitutional:       Appearance: Normal appearance.   HENT:      Head: Normocephalic and atraumatic.   Cardiovascular:      Rate and Rhythm: Normal rate.   Pulmonary:      Effort: Pulmonary effort is normal. No respiratory distress.   Abdominal:      Palpations: Abdomen is soft.   Musculoskeletal:      Cervical back: No rigidity.   Skin:     General: Skin is warm.   Neurological:      Mental Status: She is alert. She is disoriented.      Comments: Oriented to person and place.             Significant Labs: All pertinent labs within the past 24 hours have been reviewed.    Significant Imaging: I have reviewed all pertinent imaging results/findings within the past 24 hours.

## 2024-08-09 NOTE — PLAN OF CARE
Problem: Adult Inpatient Plan of Care  Goal: Plan of Care Review  8/9/2024 0009 by Jose Sloan LPN  Outcome: Progressing  8/9/2024 0009 by Jose Sloan LPN  Outcome: Not Progressing  Goal: Patient-Specific Goal (Individualized)  8/9/2024 0009 by Jose Sloan LPN  Outcome: Progressing  8/9/2024 0009 by Jose Sloan LPN  Outcome: Not Progressing  Goal: Absence of Hospital-Acquired Illness or Injury  8/9/2024 0009 by Jose Sloan LPN  Outcome: Progressing  8/9/2024 0009 by Jose Sloan LPN  Outcome: Not Progressing  Goal: Optimal Comfort and Wellbeing  8/9/2024 0009 by Jose Sloan LPN  Outcome: Progressing  8/9/2024 0009 by Jose Sloan LPN  Outcome: Not Progressing  Goal: Readiness for Transition of Care  8/9/2024 0009 by Jose Sloan LPN  Outcome: Progressing  8/9/2024 0009 by Jose Sloan LPN  Outcome: Not Progressing     Problem: Skin Injury Risk Increased  Goal: Skin Health and Integrity  8/9/2024 0009 by Jose Sloan LPN  Outcome: Progressing  8/9/2024 0009 by Jose Sloan LPN  Outcome: Not Progressing     Problem: Fall Injury Risk  Goal: Absence of Fall and Fall-Related Injury  8/9/2024 0009 by Jose Sloan LPN  Outcome: Progressing  8/9/2024 0009 by Jose Sloan LPN  Outcome: Not Progressing     Problem: Delirium  Goal: Optimal Coping  8/9/2024 0009 by Jose Sloan LPN  Outcome: Progressing  8/9/2024 0009 by Jose Sloan LPN  Outcome: Not Progressing  Goal: Improved Behavioral Control  8/9/2024 0009 by Jose Sloan LPN  Outcome: Progressing  8/9/2024 0009 by Jose Sloan LPN  Outcome: Not Progressing  Goal: Improved Attention and Thought Clarity  8/9/2024 0009 by Jose Sloan LPN  Outcome: Progressing  8/9/2024 0009 by Jose Sloan LPN  Outcome: Not Progressing  Goal: Improved Sleep  8/9/2024 0009 by Jose Sloan LPN  Outcome: Progressing  8/9/2024 0009 by Jose Sloan LPN  Outcome: Not  Progressing     Problem: Confusion Chronic  Goal: Optimal Cognitive Function  8/9/2024 0009 by Jose Sloan LPN  Outcome: Progressing  8/9/2024 0009 by Jose Sloan LPN  Outcome: Not Progressing

## 2024-08-09 NOTE — PLAN OF CARE
CM called David Nicholson [phone], [facility] requesting call back regarding plan for having him designated as pt's POA.    MEGHAN CantuN, BS, RN, CCM
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
## 2024-08-10 PROCEDURE — 11000001 HC ACUTE MED/SURG PRIVATE ROOM

## 2024-08-10 PROCEDURE — 25000003 PHARM REV CODE 250

## 2024-08-10 RX ADMIN — RIVASTIGMINE 1 PATCH: 4.6 PATCH, EXTENDED RELEASE TRANSDERMAL at 09:08

## 2024-08-10 RX ADMIN — THERA TABS 1 TABLET: TAB at 09:08
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
## 2024-08-10 NOTE — PLAN OF CARE
Problem: Adult Inpatient Plan of Care  Goal: Absence of Hospital-Acquired Illness or Injury  Outcome: Progressing  Intervention: Identify and Manage Fall Risk  Flowsheets (Taken 8/10/2024 1702)  Safety Promotion/Fall Prevention:   assistive device/personal item within reach   Fall Risk reviewed with patient/family   diversional activities provided   Fall Risk signage in place   family expresses understanding of fall risk and prevention   high risk medications identified   in recliner, wheels locked   instructed to call staff for mobility   lighting adjusted   medications reviewed   muscle strengthening facilitated   nonskid shoes/socks when out of bed   patient expresses understanding of fall risk and prevention   room near unit station   supervised activity   side rails raised x 2  Intervention: Prevent Skin Injury  Flowsheets (Taken 8/10/2024 1702)  Body Position: position changed independently  Skin Protection:   incontinence pads utilized   pulse oximeter probe site changed  Device Skin Pressure Protection:   absorbent pad utilized/changed   skin-to-skin areas padded  Intervention: Prevent and Manage VTE (Venous Thromboembolism) Risk  Flowsheets (Taken 8/10/2024 1702)  VTE Prevention/Management:   remove, assess skin, and reapply sequential compression device   ambulation promoted   bleeding precations maintained   bleeding risk assessed   bleeding risk factor(s) identified, provider notified   remove, assess skin, and reapply compression stockings   dorsiflexion/plantar flexion performed   fluids promoted   ROM (active) performed  Intervention: Prevent Infection  Flowsheets (Taken 8/10/2024 1702)  Infection Prevention:   cohorting utilized   environmental surveillance performed   rest/sleep promoted   single patient room provided   equipment surfaces disinfected   hand hygiene promoted  Goal: Optimal Comfort and Wellbeing  Outcome: Progressing  Intervention: Monitor Pain and Promote Comfort  Flowsheets (Taken  8/10/2024 1702)  Pain Management Interventions:   around-the-clock dosing utilized   awakened for pain meds per patient request   care clustered   diversional activity provided   medication offered   pain management plan reviewed with patient/caregiver   pillow support provided   position adjusted   relaxation techniques promoted   quiet environment facilitated   prescribed exercises encouraged   premedicated for activity   warm blanket provided  Intervention: Provide Person-Centered Care  Flowsheets (Taken 8/10/2024 1702)  Trust Relationship/Rapport:   care explained   questions encouraged   choices provided   reassurance provided   emotional support provided   thoughts/feelings acknowledged   empathic listening provided   questions answered     Problem: Skin Injury Risk Increased  Goal: Skin Health and Integrity  Outcome: Progressing  Intervention: Optimize Skin Protection  Flowsheets (Taken 8/10/2024 1702)  Pressure Reduction Techniques:   frequent weight shift encouraged   heels elevated off bed   positioned off wounds   pressure points protected   weight shift assistance provided  Pressure Reduction Devices:   foam padding utilized   positioning supports utilized  Skin Protection:   incontinence pads utilized   pulse oximeter probe site changed  Activity Management: Ambulated -L4  Head of Bed (HOB) Positioning: HOB elevated  Intervention: Promote and Optimize Oral Intake  Flowsheets (Taken 8/10/2024 1702)  Oral Nutrition Promotion:   adaptive equipment use encouraged   rest periods promoted   social interaction promoted   physical activity promoted  Nutrition Interventions:   frequent small meals provided   meal set-up provided   supplemental drinks provided   referred to nutrition assistant/tech   meals from home/family encouraged   supplemental foods provided     Problem: Fall Injury Risk  Goal: Absence of Fall and Fall-Related Injury  Outcome: Progressing  Intervention: Identify and Manage  Contributors  Flowsheets (Taken 8/10/2024 1702)  Self-Care Promotion:   independence encouraged   adaptive equipment use encouraged   BADL personal objects within reach   BADL personal routines maintained   meal set-up provided  Medication Review/Management:   medications reviewed   high-risk medications identified   provider consulted   pharmacy consulted  Intervention: Promote Injury-Free Environment  Flowsheets (Taken 8/10/2024 1702)  Safety Promotion/Fall Prevention:   assistive device/personal item within reach   Fall Risk reviewed with patient/family   diversional activities provided   Fall Risk signage in place   family expresses understanding of fall risk and prevention   high risk medications identified   in recliner, wheels locked   instructed to call staff for mobility   lighting adjusted   medications reviewed   muscle strengthening facilitated   nonskid shoes/socks when out of bed   patient expresses understanding of fall risk and prevention   room near unit station   supervised activity   side rails raised x 2

## 2024-08-10 NOTE — SUBJECTIVE & OBJECTIVE
Interval History: NAEON.     Review of Systems   Constitutional: Negative.    HENT: Negative.     Respiratory: Negative.     Cardiovascular: Negative.    Gastrointestinal: Negative.    Genitourinary: Negative.    Musculoskeletal: Negative.    Neurological: Negative.    Psychiatric/Behavioral:  Positive for confusion.      Objective:     Vital Signs (Most Recent):  Temp: 98.3 °F (36.8 °C) (08/10/24 0831)  Pulse: 87 (08/10/24 0831)  Resp: 18 (08/09/24 1945)  BP: 120/79 (08/10/24 0831)  SpO2: 98 % (08/10/24 0831) Vital Signs (24h Range):  Temp:  [98.3 °F (36.8 °C)-98.5 °F (36.9 °C)] 98.3 °F (36.8 °C)  Pulse:  [87-88] 87  Resp:  [18] 18  SpO2:  [97 %-98 %] 98 %  BP: (114-120)/(73-79) 120/79     Weight: 49.9 kg (110 lb)  Body mass index is 20.12 kg/m².  No intake or output data in the 24 hours ending 08/10/24 1106        Physical Exam  Vitals and nursing note reviewed.   Constitutional:       Appearance: Normal appearance.   HENT:      Head: Normocephalic and atraumatic.   Cardiovascular:      Rate and Rhythm: Normal rate.   Pulmonary:      Effort: Pulmonary effort is normal. No respiratory distress.   Abdominal:      Palpations: Abdomen is soft.   Musculoskeletal:      Cervical back: No rigidity.   Skin:     General: Skin is warm.   Neurological:      Mental Status: She is alert. She is disoriented.      Comments: Oriented to person and place.             Significant Labs: All pertinent labs within the past 24 hours have been reviewed.    Significant Imaging: I have reviewed all pertinent imaging results/findings within the past 24 hours.

## 2024-08-11 LAB
ALBUMIN SERPL BCP-MCNC: 3.5 G/DL (ref 3.5–5.2)
ALP SERPL-CCNC: 78 U/L (ref 55–135)
ALT SERPL W/O P-5'-P-CCNC: 23 U/L (ref 10–44)
ANION GAP SERPL CALC-SCNC: 9 MMOL/L (ref 8–16)
AST SERPL-CCNC: 22 U/L (ref 10–40)
BASOPHILS # BLD AUTO: 0.06 K/UL (ref 0–0.2)
BASOPHILS NFR BLD: 0.7 % (ref 0–1.9)
BILIRUB SERPL-MCNC: 0.6 MG/DL (ref 0.1–1)
BUN SERPL-MCNC: 21 MG/DL (ref 8–23)
CALCIUM SERPL-MCNC: 9.1 MG/DL (ref 8.7–10.5)
CHLORIDE SERPL-SCNC: 108 MMOL/L (ref 95–110)
CO2 SERPL-SCNC: 23 MMOL/L (ref 23–29)
CREAT SERPL-MCNC: 0.9 MG/DL (ref 0.5–1.4)
DIFFERENTIAL METHOD BLD: ABNORMAL
EOSINOPHIL # BLD AUTO: 0.4 K/UL (ref 0–0.5)
EOSINOPHIL NFR BLD: 4 % (ref 0–8)
ERYTHROCYTE [DISTWIDTH] IN BLOOD BY AUTOMATED COUNT: 13.4 % (ref 11.5–14.5)
EST. GFR  (NO RACE VARIABLE): >60 ML/MIN/1.73 M^2
GLUCOSE SERPL-MCNC: 81 MG/DL (ref 70–110)
HCT VFR BLD AUTO: 38.2 % (ref 37–48.5)
HGB BLD-MCNC: 12 G/DL (ref 12–16)
IMM GRANULOCYTES # BLD AUTO: 0.02 K/UL (ref 0–0.04)
IMM GRANULOCYTES NFR BLD AUTO: 0.2 % (ref 0–0.5)
LYMPHOCYTES # BLD AUTO: 1.7 K/UL (ref 1–4.8)
LYMPHOCYTES NFR BLD: 19.4 % (ref 18–48)
MCH RBC QN AUTO: 31.5 PG (ref 27–31)
MCHC RBC AUTO-ENTMCNC: 31.4 G/DL (ref 32–36)
MCV RBC AUTO: 100 FL (ref 82–98)
MONOCYTES # BLD AUTO: 0.5 K/UL (ref 0.3–1)
MONOCYTES NFR BLD: 5.9 % (ref 4–15)
NEUTROPHILS # BLD AUTO: 6.1 K/UL (ref 1.8–7.7)
NEUTROPHILS NFR BLD: 69.8 % (ref 38–73)
NRBC BLD-RTO: 0 /100 WBC
PLATELET # BLD AUTO: 270 K/UL (ref 150–450)
PMV BLD AUTO: 10.2 FL (ref 9.2–12.9)
POTASSIUM SERPL-SCNC: 4.1 MMOL/L (ref 3.5–5.1)
PROT SERPL-MCNC: 5.8 G/DL (ref 6–8.4)
RBC # BLD AUTO: 3.81 M/UL (ref 4–5.4)
SODIUM SERPL-SCNC: 140 MMOL/L (ref 136–145)
WBC # BLD AUTO: 8.75 K/UL (ref 3.9–12.7)

## 2024-08-11 PROCEDURE — 36415 COLL VENOUS BLD VENIPUNCTURE: CPT

## 2024-08-11 PROCEDURE — 25000003 PHARM REV CODE 250

## 2024-08-11 PROCEDURE — 80053 COMPREHEN METABOLIC PANEL: CPT

## 2024-08-11 PROCEDURE — 11000001 HC ACUTE MED/SURG PRIVATE ROOM

## 2024-08-11 PROCEDURE — 85025 COMPLETE CBC W/AUTO DIFF WBC: CPT

## 2024-08-11 RX ADMIN — THERA TABS 1 TABLET: TAB at 09:08

## 2024-08-11 RX ADMIN — RIVASTIGMINE 1 PATCH: 4.6 PATCH, EXTENDED RELEASE TRANSDERMAL at 09:08

## 2024-08-11 NOTE — PLAN OF CARE
Problem: Adult Inpatient Plan of Care  Goal: Absence of Hospital-Acquired Illness or Injury  Outcome: Progressing  Intervention: Identify and Manage Fall Risk  Flowsheets (Taken 8/11/2024 1815)  Safety Promotion/Fall Prevention:   assistive device/personal item within reach   commode/urinal/bedpan at bedside   diversional activities provided   Fall Risk reviewed with patient/family   Fall Risk signage in place   high risk medications identified   in recliner, wheels locked   instructed to call staff for mobility   lighting adjusted   medications reviewed   muscle strengthening facilitated   nonskid shoes/socks when out of bed   patient expresses understanding of fall risk and prevention   side rails raised x 2   room near unit station   /camera at bedside   supervised activity  Intervention: Prevent Skin Injury  Flowsheets (Taken 8/11/2024 1815)  Body Position: position changed independently  Skin Protection:   pulse oximeter probe site changed   incontinence pads utilized  Device Skin Pressure Protection: absorbent pad utilized/changed  Intervention: Prevent and Manage VTE (Venous Thromboembolism) Risk  Flowsheets (Taken 8/11/2024 1815)  VTE Prevention/Management:   ambulation promoted   bleeding precations maintained   bleeding risk assessed   bleeding risk factor(s) identified, provider notified   dorsiflexion/plantar flexion performed   fluids promoted   ROM (active) performed  Intervention: Prevent Infection  Flowsheets (Taken 8/11/2024 1815)  Infection Prevention:   cohorting utilized   environmental surveillance performed   rest/sleep promoted   single patient room provided   equipment surfaces disinfected   hand hygiene promoted  Goal: Optimal Comfort and Wellbeing  Outcome: Progressing  Intervention: Monitor Pain and Promote Comfort  Flowsheets (Taken 8/11/2024 1815)  Pain Management Interventions:   around-the-clock dosing utilized   awakened for pain meds per patient request   care  clustered   diversional activity provided   medication offered   medication offered but refused   pain management plan reviewed with patient/caregiver   pillow support provided   position adjusted   relaxation techniques promoted   quiet environment facilitated   prescribed exercises encouraged   premedicated for activity   warm blanket provided  Intervention: Provide Person-Centered Care  Flowsheets (Taken 8/11/2024 1815)  Trust Relationship/Rapport:   care explained   questions encouraged   reassurance provided   choices provided   emotional support provided   thoughts/feelings acknowledged   empathic listening provided   questions answered     Problem: Skin Injury Risk Increased  Goal: Skin Health and Integrity  Outcome: Progressing  Intervention: Optimize Skin Protection  Flowsheets (Taken 8/11/2024 1815)  Pressure Reduction Techniques: rest period provided between sit times  Skin Protection:   pulse oximeter probe site changed   incontinence pads utilized  Activity Management: Ambulated -L4  Head of Bed (HOB) Positioning: HOB elevated  Intervention: Promote and Optimize Oral Intake  Flowsheets (Taken 8/11/2024 1815)  Oral Nutrition Promotion:   adaptive equipment use encouraged   rest periods promoted   social interaction promoted   physical activity promoted  Nutrition Interventions:   diet adjusted   frequent small meals provided   meal set-up provided   supplemental drinks provided   referred to nutrition assistant/tech   supplemental foods provided   referred to dietitian   diet liberalized     Problem: Confusion Chronic  Goal: Optimal Cognitive Function  Outcome: Progressing  Intervention: Minimize Injury Risk and Provide Safety  Flowsheets (Taken 8/11/2024 1815)  Enhanced Safety Measures:   room near unit station    at bedside  Intervention: Minimize and Manage Confusion  Flowsheets (Taken 8/11/2024 1815)  Environment Familiarity/Consistency:   daily routine followed   familiar objects  from home provided   personal clothing/items utilized  Reorientation Measures:   clock in view   calendar in view   reorientation provided   familiar social contact encouraged   glasses use encouraged  Self-Care Promotion:   independence encouraged   BADL personal objects within reach   BADL personal routines maintained   meal set-up provided   adaptive equipment use encouraged  Sensory Stimulation Regulation:   auditory stimulation provided   care clustered   music on   television on   tactile stimulation provided   visual stimulation provided   quiet environment promoted  Environmental Support:   comfort object encouraged   distractions minimized   rooming-in facilitated   rest periods encouraged   personal routine supported   caregiver consistency promoted   calm environment promoted   environmental consistency promoted

## 2024-08-11 NOTE — PLAN OF CARE
Fall precaution maintained this shift call bell in reach. Bed in low position. Instructed pt to call for assistance. No acute distress noted over night. Vs stable. All concerns addressed and answered.     Problem: Adult Inpatient Plan of Care  Goal: Plan of Care Review  Outcome: Progressing  Goal: Patient-Specific Goal (Individualized)  Outcome: Progressing  Goal: Absence of Hospital-Acquired Illness or Injury  Outcome: Progressing  Goal: Optimal Comfort and Wellbeing  Outcome: Progressing  Goal: Readiness for Transition of Care  Outcome: Progressing     Problem: Skin Injury Risk Increased  Goal: Skin Health and Integrity  Outcome: Progressing     Problem: Fall Injury Risk  Goal: Absence of Fall and Fall-Related Injury  Outcome: Progressing     Problem: Delirium  Goal: Optimal Coping  Outcome: Progressing  Goal: Improved Behavioral Control  Outcome: Progressing  Goal: Improved Attention and Thought Clarity  Outcome: Progressing  Goal: Improved Sleep  Outcome: Progressing     Problem: Confusion Chronic  Goal: Optimal Cognitive Function  Outcome: Progressing
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
## 2024-08-11 NOTE — ASSESSMENT & PLAN NOTE
78-year-old lady here with encephalopathy, secondary to worsening dementia.  Clinically her presentation is not concering for acute sepsis, or stroke.        Extensive workup for AMS has been negative. Neurology suspects her underlying dementia is driving her presentation. Patient very resistant to discharging to SNF or any facility. Per our team and Psychiatry the patient does not show decision making capacity. Son prefers SNF or facility placement. However, patient threatened and attempted to leave AMA on 7/15 so she was PEC'd.  PEC is to prevent AMA but she does not require further psychological stabilization, discuss with legal need for PEC regarding capacity to leave AMA.     Plan:  - CEC  on . Patient has situational capacity. Planning for friend David to be her MPOA.   - Will consult psych for further eval. Appreciate assistance.   - PRN zyprexa.

## 2024-08-11 NOTE — SUBJECTIVE & OBJECTIVE
Interval History: NAEON.     Review of Systems   Constitutional: Negative.    HENT: Negative.     Respiratory: Negative.     Cardiovascular: Negative.    Gastrointestinal: Negative.    Genitourinary: Negative.    Musculoskeletal: Negative.    Neurological: Negative.    Psychiatric/Behavioral:  Positive for confusion.      Objective:     Vital Signs (Most Recent):  Temp: 97.8 °F (36.6 °C) (08/11/24 0800)  Pulse: 74 (08/11/24 0800)  Resp: 18 (08/11/24 0800)  BP: 122/69 (08/11/24 0800)  SpO2: 98 % (08/11/24 0800) Vital Signs (24h Range):  Temp:  [97.2 °F (36.2 °C)-98.8 °F (37.1 °C)] 97.8 °F (36.6 °C)  Pulse:  [70-87] 74  Resp:  [18-20] 18  SpO2:  [95 %-98 %] 98 %  BP: (106-126)/(62-74) 122/69     Weight: 49.9 kg (110 lb)  Body mass index is 20.12 kg/m².    Intake/Output Summary (Last 24 hours) at 8/11/2024 1210  Last data filed at 8/11/2024 0614  Gross per 24 hour   Intake 120 ml   Output --   Net 120 ml         Physical Exam  Vitals and nursing note reviewed.   Constitutional:       Appearance: Normal appearance.   HENT:      Head: Normocephalic and atraumatic.   Cardiovascular:      Rate and Rhythm: Normal rate.   Pulmonary:      Effort: Pulmonary effort is normal. No respiratory distress.   Abdominal:      Palpations: Abdomen is soft.   Musculoskeletal:      Cervical back: No rigidity.   Skin:     General: Skin is warm.   Neurological:      Mental Status: She is alert. She is disoriented.      Comments: Oriented to person and place.             Significant Labs: All pertinent labs within the past 24 hours have been reviewed.    Significant Imaging: I have reviewed all pertinent imaging results/findings within the past 24 hours.

## 2024-08-12 LAB — POCT GLUCOSE: 125 MG/DL (ref 70–110)

## 2024-08-12 PROCEDURE — 25000003 PHARM REV CODE 250

## 2024-08-12 PROCEDURE — 11000001 HC ACUTE MED/SURG PRIVATE ROOM

## 2024-08-12 RX ADMIN — THERA TABS 1 TABLET: TAB at 09:08

## 2024-08-12 RX ADMIN — RIVASTIGMINE 1 PATCH: 4.6 PATCH, EXTENDED RELEASE TRANSDERMAL at 09:08

## 2024-08-12 NOTE — PLAN OF CARE
[facility] ([facility])  Discharge Reassessment    Primary Care Provider: Edwar Castaneda II, MD    Expected Discharge Date:     Reassessment (most recent)       Discharge Reassessment - 08/12/24 1251          Discharge Reassessment    Assessment Type Discharge Planning Reassessment (P)      Did the patient's condition or plan change since previous assessment? No (P)      Discharge Plan discussed with: Patient;Caregiver (P)      Name(s) and Number(s) David Nicholson, friend [phone] (P)      Communicated MARVEL with patient/caregiver Date not available/Unable to determine (P)      Discharge Plan A Assisted Living (P)      Discharge Plan B New Nursing Home placement - assisted care facility (P)      DME Needed Upon Discharge  none (P)      Transition of Care Barriers Nursing Home rejection;Social (P)      Why the patient remains in the hospital Placement issues (P)         Post-Acute Status    Post-Acute Authorization Placement (P)      Post-Acute Placement Status Referrals Sent (P)      Coverage [facility] (P)      Discharge Delays Patient and Family Barriers (P)                  NICHOLAS spoke with pt's friend, David Nicholson, who has agreed to be pt's POA for the purpose of helping her to get to assisted living upon d/c.  He states he had a mobile notary, but they backed out on him.  He wants to know when [facility]'s notary is available.  NICHOLAS called [employer], they state that notary available on Thursdays only, and only for [facility] MD's and APPs for [facility] related business.  NICHOLAS instructed David in this.  He agrees to get a mobile notary.  He requests information in writing about what are patient's restrictions d/t her mental status (for example can she drive, does she have to go to assisted living?)  NICHOLAS requested this from pt's current medical team.    2:35 PM  CM received VM from David Nicholson, he states he will be coming by hospital this evening at 6:30 with a notary to get POA signed.  NICHOLAS  called David back, instructed that anyone can act as witness, nurses will be present at hospital.    MARTA Cantu, BS, RN, CCM      Discharge Plan A and Plan B have been determined by review of patient's clinical status, future medical and therapeutic needs, and coverage/benefits for post-acute care in coordination with multidisciplinary team members.
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
## 2024-08-12 NOTE — PLAN OF CARE
David at bedside requesting signature to witness POA, pending presence of notary services.  Patient unable to recall last 4 digits of SSN. Channeled concern to  Ubaldo, information provided with patient's approval.    Problem: Adult Inpatient Plan of Care  Goal: Plan of Care Review  Outcome: Progressing  Goal: Patient-Specific Goal (Individualized)  Outcome: Progressing  Goal: Absence of Hospital-Acquired Illness or Injury  Outcome: Progressing  Goal: Optimal Comfort and Wellbeing  Outcome: Progressing  Goal: Readiness for Transition of Care  Outcome: Progressing     Problem: Skin Injury Risk Increased  Goal: Skin Health and Integrity  Outcome: Progressing     Problem: Confusion Chronic  Goal: Optimal Cognitive Function  Outcome: Progressing

## 2024-08-12 NOTE — SUBJECTIVE & OBJECTIVE
Interval History: Pt's friend David planning on getting mobile notary to sign POA paperwork. Will like written documentation about future requirements.     Review of Systems   Constitutional: Negative.    HENT: Negative.     Respiratory: Negative.     Cardiovascular: Negative.    Gastrointestinal: Negative.    Genitourinary: Negative.    Musculoskeletal: Negative.    Neurological: Negative.    Psychiatric/Behavioral:  Positive for confusion.      Objective:     Vital Signs (Most Recent):  Temp: 97.9 °F (36.6 °C) (08/12/24 1534)  Pulse: 79 (08/12/24 1534)  Resp: 18 (08/12/24 0848)  BP: 125/62 (08/12/24 1534)  SpO2: 100 % (08/12/24 1534) Vital Signs (24h Range):  Temp:  [97.9 °F (36.6 °C)-98.5 °F (36.9 °C)] 97.9 °F (36.6 °C)  Pulse:  [79-87] 79  Resp:  [18] 18  SpO2:  [96 %-100 %] 100 %  BP: (105-125)/(62-76) 125/62     Weight: 49.9 kg (110 lb)  Body mass index is 20.12 kg/m².  No intake or output data in the 24 hours ending 08/12/24 1617      Physical Exam  Vitals and nursing note reviewed.   Constitutional:       Appearance: Normal appearance.   HENT:      Head: Normocephalic and atraumatic.   Cardiovascular:      Rate and Rhythm: Normal rate.   Pulmonary:      Effort: Pulmonary effort is normal. No respiratory distress.   Abdominal:      Palpations: Abdomen is soft.   Musculoskeletal:      Cervical back: No rigidity.   Skin:     General: Skin is warm.   Neurological:      Mental Status: She is alert. She is disoriented.      Comments: Oriented to person and place.             Significant Labs: All pertinent labs within the past 24 hours have been reviewed.    Significant Imaging: I have reviewed all pertinent imaging results/findings within the past 24 hours.

## 2024-08-13 LAB — POCT GLUCOSE: 172 MG/DL (ref 70–110)

## 2024-08-13 PROCEDURE — 25000003 PHARM REV CODE 250

## 2024-08-13 PROCEDURE — 11000001 HC ACUTE MED/SURG PRIVATE ROOM

## 2024-08-13 RX ADMIN — RIVASTIGMINE 1 PATCH: 4.6 PATCH, EXTENDED RELEASE TRANSDERMAL at 08:08

## 2024-08-13 RX ADMIN — THERA TABS 1 TABLET: TAB at 08:08

## 2024-08-13 NOTE — SUBJECTIVE & OBJECTIVE
Interval History: ongoing search for nursing home placement per David    Review of Systems   Constitutional: Negative.    HENT: Negative.     Respiratory: Negative.     Cardiovascular: Negative.    Gastrointestinal: Negative.    Genitourinary: Negative.    Musculoskeletal: Negative.    Neurological: Negative.    Psychiatric/Behavioral:  Positive for confusion.      Objective:     Vital Signs (Most Recent):  Temp: 97.7 °F (36.5 °C) (08/13/24 0732)  Pulse: 79 (08/13/24 0732)  Resp: 18 (08/13/24 0732)  BP: 119/80 (08/13/24 0732)  SpO2: 98 % (08/13/24 0732) Vital Signs (24h Range):  Temp:  [97.7 °F (36.5 °C)-98.7 °F (37.1 °C)] 97.7 °F (36.5 °C)  Pulse:  [79-89] 79  Resp:  [16-18] 18  SpO2:  [97 %-98 %] 98 %  BP: (119-122)/(72-80) 119/80     Weight: 49.9 kg (110 lb)  Body mass index is 20.12 kg/m².    Intake/Output Summary (Last 24 hours) at 8/13/2024 1700  Last data filed at 8/13/2024 1226  Gross per 24 hour   Intake 720 ml   Output --   Net 720 ml         Physical Exam  Vitals and nursing note reviewed.   Constitutional:       Appearance: Normal appearance.   HENT:      Head: Normocephalic and atraumatic.   Cardiovascular:      Rate and Rhythm: Normal rate.   Pulmonary:      Effort: Pulmonary effort is normal. No respiratory distress.   Abdominal:      Palpations: Abdomen is soft.   Musculoskeletal:      Cervical back: No rigidity.   Skin:     General: Skin is warm.   Neurological:      Mental Status: She is alert. She is disoriented.      Comments: Oriented to person and place.             Significant Labs: All pertinent labs within the past 24 hours have been reviewed.  Recent Lab Results       None            Significant Imaging: I have reviewed all pertinent imaging results/findings within the past 24 hours.

## 2024-08-13 NOTE — PLAN OF CARE
[facility] - Med Surg ([facility])  Discharge Reassessment    Primary Care Provider: Edwar Castaneda II, MD    Expected Discharge Date:     Reassessment (most recent)       Discharge Reassessment - 08/13/24 1350          Discharge Reassessment    Assessment Type Discharge Planning Reassessment (P)      Did the patient's condition or plan change since previous assessment? No (P)      Discharge Plan discussed with: Caregiver (P)      Name(s) and Number(s) JAYA Huang, [phone] (P)      Communicated MARVEL with patient/caregiver Date not available/Unable to determine (P)      Discharge Plan A Assisted Living (P)      Discharge Plan B New Nursing Home placement - residential care facility (P)      DME Needed Upon Discharge  none (P)      Transition of Care Barriers Nursing Home rejection;Social (P)      Why the patient remains in the hospital Placement issues (P)         Post-Acute Status    Post-Acute Authorization Placement (P)      Post-Acute Placement Status Referrals Sent (P)      Coverage [facility] (P)      Discharge Delays Patient and Family Barriers (P)                  NICHOLAS spoke with David Nicholson in [facility].  He provided POA documentation.  NICHOLAS made a copy, put copy in pt's physical chart to be scanned in to pt's electronic chart.    MEGHAN CantuN, BS, RN, CCM
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
## 2024-08-13 NOTE — MEDICARE RECERTIFICATION
MEDICARE INPATIENT  PHYSICIAN RECERTIFICATION  OF MEDICAL NECESSITY        2nd Recertification                  I certify that this patient's continued medical needs require  placement to care home NH with memory care unit. Working on establishing new POA (Patient's friend David from [employer]) who is attempting to find suitable placement for patient. Patient is unsafe to discharge home as lacks decision making capacity .  I estimate that the patient will be in the hospital for another 2 days.  Post-acute planning is in process, and currently the patient will likely be discharged to  care home NH.

## 2024-08-14 PROCEDURE — 11000001 HC ACUTE MED/SURG PRIVATE ROOM

## 2024-08-14 PROCEDURE — 25000003 PHARM REV CODE 250

## 2024-08-14 RX ADMIN — RIVASTIGMINE 1 PATCH: 4.6 PATCH, EXTENDED RELEASE TRANSDERMAL at 08:08

## 2024-08-14 RX ADMIN — THERA TABS 1 TABLET: TAB at 08:08

## 2024-08-14 NOTE — PLAN OF CARE
NICHOLAS spoke with David Dimasin.  He states that POA paperwork will need to be redone; the bank had some issues with it.  This will be done tomorrow evening.  He states that he had a difficult time getting someone to sign as witness previously; nurses weren't willing to sign.  CM called spiritual care, left message requesting info about whether someone from spiritual care could come by tomorrow evening to sign as a witness. Per supervisor Rowena, anyone can sign POA as witness.  NICHOLAS plans to discuss with charge nurse tomorrow to alert nurses that they can sign as a witness.    3:11 PM  CM called in [employer].  CM obtained POA information for [facility].    Gil Bharat  [address] [place] [id number]    [phone]    [email or web address]    [facility] is pending MD signature.    3:21 PM  NICHOLAS spoke with Ree at [facility] [phone]; she states someone from [facility] care will be available tomorrow at 6:30 to sign as a witness.  CM informed David, provided him phone number for [facility].  David states that tech in room has agreed to sign as a witness as well.    3:40 PM  MD signed [facility].  CM faxed [facility] to state.    MEGHAN CantuN, BS, RN, CCM

## 2024-08-14 NOTE — CHAPLAIN
"   24 2723   Clinical Encounter Type   Visit Type Follow-up   Visit Category General Rounding   Visited With Patient;Health care provider   Length of Visit 40 Minutes   [employer] Encounters   Spiritual Resources Requested Prayer   Patient Spiritual Encounters   Care Provided Compassionate presence;Reflective listening;Life review/ reflection;Explored pt/family concerns;Prayer support   Patient Coping Accepting;Open/discussion;Living with uncertainty;Using shelbie/ community resources   Comments - Patient Pt. expressed, "I'm still here, just like an old piece of cotton." She reported that they've found a place for her to live that will accept her 2 dogs, and her friend David has been helping. "I'm not going anywhere w/o my dogs." Pt. reflected that her spirits are "pretty much ok." She is most worried abt her horse, Casa, "that my son got rid of." Pt. asked me to pray for Casa. "The Lord's gonna help me." Pt. expressed, "I'm very calm" and seemed to be accepting of her circumstance. She hasn't spoken to her son in a long while. "Maybe my son thought I ." "Maybe he doesn't want to talk to me." Pt. has been @ [facility] for 50 days, and "now I know what it feels like to be in skilled nursing." Pt. is grateful for the team on [facility] and thanked me for the support.       "

## 2024-08-14 NOTE — PLAN OF CARE
Problem: Adult Inpatient Plan of Care  Goal: Plan of Care Review  Outcome: Progressing  Goal: Patient-Specific Goal (Individualized)  Outcome: Progressing  Goal: Absence of Hospital-Acquired Illness or Injury  Outcome: Progressing  Goal: Readiness for Transition of Care  Outcome: Progressing     Problem: Skin Injury Risk Increased  Goal: Skin Health and Integrity  Outcome: Progressing     Problem: Confusion Chronic  Goal: Optimal Cognitive Function  Outcome: Progressing

## 2024-08-15 LAB
OHS QRS DURATION: 72 MS
OHS QTC CALCULATION: 444 MS

## 2024-08-15 PROCEDURE — 11000001 HC ACUTE MED/SURG PRIVATE ROOM

## 2024-08-15 PROCEDURE — 25000003 PHARM REV CODE 250

## 2024-08-15 RX ADMIN — RIVASTIGMINE 1 PATCH: 4.6 PATCH, EXTENDED RELEASE TRANSDERMAL at 11:08

## 2024-08-15 RX ADMIN — THERA TABS 1 TABLET: TAB at 11:08
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
## 2024-08-15 NOTE — PLAN OF CARE
142 still not obtained for d/c plan B of SNF with memory care unit.  Per state, question #10 needs to be corrected.  CM corrected question and resubmitted via fax.    12:09 PM  CM uploaded 142 to .  CM updated [facility] of pt's current status in CP.    Pt's CG friend David has stated he is planning to come this evening at 6:30 to get new POA paperwork completed.  He was concerned that he wouldn't be able to get someone to sign as a witness.  CM requested nurse Sonia to sign as a witness this evening.    2:15 PM  NICHOLAS spoke with Flory at [facility] [phone], updated on pt's condition: she has new POA, seeking assisted living; assisted NH is plan B.    MEGHAN CantuN, BS, RN, CCM

## 2024-08-15 NOTE — SUBJECTIVE & OBJECTIVE
Interval History: pending dispo    Review of Systems   Constitutional: Negative.    HENT: Negative.     Respiratory: Negative.     Cardiovascular: Negative.    Gastrointestinal: Negative.    Genitourinary: Negative.    Musculoskeletal: Negative.    Neurological: Negative.    Psychiatric/Behavioral:  Positive for confusion.      Objective:     Vital Signs (Most Recent):  Temp: 97.9 °F (36.6 °C) (08/15/24 0738)  Pulse: 73 (08/15/24 0738)  Resp: 18 (08/14/24 2043)  BP: (!) 146/85 (08/15/24 0738)  SpO2: 96 % (08/15/24 0738) Vital Signs (24h Range):  Temp:  [97.9 °F (36.6 °C)-98 °F (36.7 °C)] 97.9 °F (36.6 °C)  Pulse:  [73-75] 73  Resp:  [18] 18  SpO2:  [96 %-98 %] 96 %  BP: (113-146)/(71-85) 146/85     Weight: 49.9 kg (110 lb)  Body mass index is 20.12 kg/m².    Intake/Output Summary (Last 24 hours) at 8/15/2024 1456  Last data filed at 8/14/2024 2330  Gross per 24 hour   Intake 480 ml   Output --   Net 480 ml         Physical Exam  Vitals and nursing note reviewed.   Constitutional:       Appearance: Normal appearance.   HENT:      Head: Normocephalic and atraumatic.   Cardiovascular:      Rate and Rhythm: Normal rate.   Pulmonary:      Effort: Pulmonary effort is normal. No respiratory distress.   Abdominal:      Palpations: Abdomen is soft.   Musculoskeletal:      Cervical back: No rigidity.   Skin:     General: Skin is warm.   Neurological:      Mental Status: She is alert. She is disoriented.      Comments: Oriented to person and place.             Significant Labs: All pertinent labs within the past 24 hours have been reviewed.  Recent Lab Results         08/15/24  0819        QRS Duration 72       OHS QTC Calculation 444               Significant Imaging: I have reviewed all pertinent imaging results/findings within the past 24 hours.

## 2024-08-15 NOTE — PLAN OF CARE
Problem: Adult Inpatient Plan of Care  Goal: Plan of Care Review  Outcome: Progressing  Goal: Patient-Specific Goal (Individualized)  Outcome: Progressing  Goal: Absence of Hospital-Acquired Illness or Injury  Outcome: Progressing  Goal: Optimal Comfort and Wellbeing  Outcome: Progressing  Goal: Readiness for Transition of Care  Outcome: Progressing     Problem: Skin Injury Risk Increased  Goal: Skin Health and Integrity  Outcome: Progressing  Intervention: Promote and Optimize Oral Intake  Flowsheets (Taken 8/15/2024 1131)  Nutrition Interventions: food preferences provided     Problem: Confusion Chronic  Goal: Optimal Cognitive Function  Outcome: Progressing  Intervention: Minimize and Manage Confusion  Flowsheets (Taken 8/15/2024 1131)  Environment Familiarity/Consistency: daily routine followed  Self-Care Promotion: independence encouraged  Sensory Stimulation Regulation: care clustered  Environmental Support: environmental consistency promoted     Problem: Fall Injury Risk  Goal: Absence of Fall and Fall-Related Injury  Outcome: Progressing  Intervention: Identify and Manage Contributors  Flowsheets (Taken 8/15/2024 1131)  Self-Care Promotion: independence encouraged     Problem: Delirium  Goal: Optimal Coping  Outcome: Progressing  Intervention: Optimize Psychosocial Adjustment to Delirium  Flowsheets (Taken 8/15/2024 1131)  Supportive Measures: active listening utilized  Goal: Improved Behavioral Control  Outcome: Progressing  Intervention: Prevent and Manage Agitation  Flowsheets (Taken 8/15/2024 1131)  Environment Familiarity/Consistency: daily routine followed  Goal: Improved Attention and Thought Clarity  Outcome: Progressing  Intervention: Maximize Cognitive Function  Flowsheets (Taken 8/15/2024 1131)  Sensory Stimulation Regulation: care clustered  Goal: Improved Sleep  Outcome: Progressing  Intervention: Promote Sleep  Flowsheets (Taken 8/15/2024 1131)  Sleep/Rest Enhancement: consistent schedule  promoted

## 2024-08-15 NOTE — PLAN OF CARE
Problem: Adult Inpatient Plan of Care  Goal: Plan of Care Review  Outcome: Progressing  Goal: Optimal Comfort and Wellbeing  Outcome: Progressing     Problem: Skin Injury Risk Increased  Goal: Skin Health and Integrity  Outcome: Progressing     Problem: Fall Injury Risk  Goal: Absence of Fall and Fall-Related Injury  Outcome: Progressing     Problem: Delirium  Goal: Optimal Coping  Outcome: Progressing  Goal: Improved Behavioral Control  Outcome: Progressing  Goal: Improved Attention and Thought Clarity  Outcome: Progressing  Goal: Improved Sleep  Outcome: Progressing     Problem: Confusion Chronic  Goal: Optimal Cognitive Function  Outcome: Progressing

## 2024-08-15 NOTE — SUBJECTIVE & OBJECTIVE
Interval History: Awaiting nursing home placement    Review of Systems   Constitutional: Negative.    HENT: Negative.     Respiratory: Negative.     Cardiovascular: Negative.    Gastrointestinal: Negative.    Genitourinary: Negative.    Musculoskeletal: Negative.    Neurological: Negative.    Psychiatric/Behavioral:  Positive for confusion.      Objective:     Vital Signs (Most Recent):  Temp: 98.1 °F (36.7 °C) (08/14/24 0800)  Pulse: 100 (08/14/24 0924)  Resp: 18 (08/14/24 0800)  BP: (!) 110/54 (08/14/24 0800)  SpO2: 98 % (08/14/24 0924) Vital Signs (24h Range):  Temp:  [98.1 °F (36.7 °C)] 98.1 °F (36.7 °C)  Pulse:  [] 100  Resp:  [18] 18  SpO2:  [98 %] 98 %  BP: (110)/(54) 110/54     Weight: 49.9 kg (110 lb)  Body mass index is 20.12 kg/m².    Intake/Output Summary (Last 24 hours) at 8/14/2024 1951  Last data filed at 8/14/2024 1732  Gross per 24 hour   Intake 730 ml   Output --   Net 730 ml         Physical Exam  Vitals and nursing note reviewed.   Constitutional:       Appearance: Normal appearance.   HENT:      Head: Normocephalic and atraumatic.   Cardiovascular:      Rate and Rhythm: Normal rate.   Pulmonary:      Effort: Pulmonary effort is normal. No respiratory distress.   Abdominal:      Palpations: Abdomen is soft.   Musculoskeletal:      Cervical back: No rigidity.   Skin:     General: Skin is warm.   Neurological:      Mental Status: She is alert. She is disoriented.      Comments: Oriented to person and place.             Significant Labs: All pertinent labs within the past 24 hours have been reviewed.  Recent Lab Results         08/13/24 2021        POCT Glucose 172               Significant Imaging: I have reviewed all pertinent imaging results/findings within the past 24 hours.
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
## 2024-08-16 PROCEDURE — 25000003 PHARM REV CODE 250

## 2024-08-16 PROCEDURE — 11000001 HC ACUTE MED/SURG PRIVATE ROOM

## 2024-08-16 RX ADMIN — RIVASTIGMINE 1 PATCH: 4.6 PATCH, EXTENDED RELEASE TRANSDERMAL at 10:08

## 2024-08-16 RX ADMIN — THERA TABS 1 TABLET: TAB at 10:08
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
## 2024-08-16 NOTE — PLAN OF CARE
Spoke with David to discuss discharge planning. David states that he has the POA and will get bank statements on Monday 8/19/2024. He also states that he is looking at [facility] assisted living facility due to them allowing patient's pets but is not sure if patient can afford to live at that facility. Informed David that assisted living facilities are expensive and discuss a plan B of patient discharging to a nursing home. David agreed to look at long-term facilities this weekend. Sent David a list of facilities from [email or web address] () to follow up on Monday.      MARIA DEL CARMEN Willett  Case Management  [phone]

## 2024-08-16 NOTE — SUBJECTIVE & OBJECTIVE
Interval History: Awaiting RAHEEM to sign POA documents     Review of Systems   Constitutional: Negative.    HENT: Negative.     Respiratory: Negative.     Cardiovascular: Negative.    Gastrointestinal: Negative.    Genitourinary: Negative.    Musculoskeletal: Negative.    Neurological: Negative.    Psychiatric/Behavioral:  Positive for confusion.      Objective:     Vital Signs (Most Recent):  Temp: 98 °F (36.7 °C) (08/16/24 0815)  Pulse: 86 (08/16/24 0815)  Resp: 18 (08/16/24 0815)  BP: 138/69 (08/16/24 0815)  SpO2: 99 % (08/16/24 0815) Vital Signs (24h Range):  Temp:  [97.8 °F (36.6 °C)-98 °F (36.7 °C)] 98 °F (36.7 °C)  Pulse:  [82-87] 86  Resp:  [18] 18  SpO2:  [96 %-99 %] 99 %  BP: (119-138)/(69-77) 138/69     Weight: 49.9 kg (110 lb)  Body mass index is 20.12 kg/m².    Intake/Output Summary (Last 24 hours) at 8/16/2024 0859  Last data filed at 8/16/2024 0006  Gross per 24 hour   Intake 240 ml   Output --   Net 240 ml         Physical Exam  Vitals and nursing note reviewed.   Constitutional:       Appearance: Normal appearance.   HENT:      Head: Normocephalic and atraumatic.   Cardiovascular:      Rate and Rhythm: Normal rate.   Pulmonary:      Effort: Pulmonary effort is normal. No respiratory distress.   Abdominal:      Palpations: Abdomen is soft.   Musculoskeletal:      Cervical back: No rigidity.   Skin:     General: Skin is warm.   Neurological:      Mental Status: She is alert. She is disoriented.      Comments: Oriented to person and place.             Significant Labs: All pertinent labs within the past 24 hours have been reviewed.    Significant Imaging: I have reviewed all pertinent imaging results/findings within the past 24 hours.

## 2024-08-16 NOTE — PLAN OF CARE
NICHOLAS spoke with David Nicholson [phone], he states POA was completed last night.  He will not be able to provide in person to CM until Monday.  He agrees to email POA docs to CM, will not be able to do until later today.  CM provided email address.    NICHOLAS discussed case with supervisor Rowena, informed that David completed POA last night, will email POA later today.    MARTA Cantu, BS, RN, [facility]       rabies shot

## 2024-08-17 PROCEDURE — 11000001 HC ACUTE MED/SURG PRIVATE ROOM

## 2024-08-17 PROCEDURE — 25000003 PHARM REV CODE 250

## 2024-08-17 RX ADMIN — RIVASTIGMINE 1 PATCH: 4.6 PATCH, EXTENDED RELEASE TRANSDERMAL at 10:08

## 2024-08-17 RX ADMIN — THERA TABS 1 TABLET: TAB at 10:08

## 2024-08-17 NOTE — PLAN OF CARE
Problem: Adult Inpatient Plan of Care  Goal: Plan of Care Review  Outcome: Progressing  Goal: Patient-Specific Goal (Individualized)  Outcome: Progressing  Goal: Absence of Hospital-Acquired Illness or Injury  Outcome: Progressing  Goal: Optimal Comfort and Wellbeing  Outcome: Progressing  Goal: Readiness for Transition of Care  Outcome: Progressing     Problem: Skin Injury Risk Increased  Goal: Skin Health and Integrity  Outcome: Progressing     Problem: Fall Injury Risk  Goal: Absence of Fall and Fall-Related Injury  Outcome: Progressing     Problem: Confusion Chronic  Goal: Optimal Cognitive Function  Outcome: Progressing
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
## 2024-08-17 NOTE — PLAN OF CARE
Problem: Adult Inpatient Plan of Care  Goal: Plan of Care Review  Outcome: Progressing  Goal: Optimal Comfort and Wellbeing  Outcome: Progressing     Problem: Fall Injury Risk  Goal: Absence of Fall and Fall-Related Injury  Outcome: Progressing     Problem: Delirium  Goal: Optimal Coping  Outcome: Progressing  Goal: Improved Behavioral Control  Outcome: Progressing  Goal: Improved Attention and Thought Clarity  Outcome: Progressing  Goal: Improved Sleep  Outcome: Progressing     Problem: Confusion Chronic  Goal: Optimal Cognitive Function  Outcome: Progressing

## 2024-08-18 PROCEDURE — 11000001 HC ACUTE MED/SURG PRIVATE ROOM

## 2024-08-18 PROCEDURE — 25000003 PHARM REV CODE 250

## 2024-08-18 RX ADMIN — RIVASTIGMINE 1 PATCH: 4.6 PATCH, EXTENDED RELEASE TRANSDERMAL at 08:08

## 2024-08-18 RX ADMIN — THERA TABS 1 TABLET: TAB at 08:08

## 2024-08-18 NOTE — PLAN OF CARE
Pt disoriented to situation. VS stable. Pt slept peacefully for most of the shift. Sitter in place. No adverse events noted during this shift. No new complaints reported.      Problem: Adult Inpatient Plan of Care  Goal: Plan of Care Review  Outcome: Progressing  Goal: Patient-Specific Goal (Individualized)  Outcome: Progressing  Goal: Absence of Hospital-Acquired Illness or Injury  Outcome: Progressing  Goal: Optimal Comfort and Wellbeing  Outcome: Progressing  Goal: Readiness for Transition of Care  Outcome: Progressing     Problem: Skin Injury Risk Increased  Goal: Skin Health and Integrity  Outcome: Progressing     Problem: Fall Injury Risk  Goal: Absence of Fall and Fall-Related Injury  Outcome: Progressing     Problem: Delirium  Goal: Optimal Coping  Outcome: Progressing  Goal: Improved Behavioral Control  Outcome: Progressing  Goal: Improved Attention and Thought Clarity  Outcome: Progressing  Goal: Improved Sleep  Outcome: Progressing     Problem: Confusion Chronic  Goal: Optimal Cognitive Function  Outcome: Progressing

## 2024-08-18 NOTE — SUBJECTIVE & OBJECTIVE
Interval History: Awaiting nursing home location per [facility]    Review of Systems   Constitutional: Negative.    HENT: Negative.     Respiratory: Negative.     Cardiovascular: Negative.    Gastrointestinal: Negative.    Genitourinary: Negative.    Musculoskeletal: Negative.    Neurological: Negative.    Psychiatric/Behavioral:  Positive for confusion.      Objective:     Vital Signs (Most Recent):  Temp: 97.8 °F (36.6 °C) (08/18/24 0815)  Pulse: 80 (08/18/24 0815)  Resp: 18 (08/17/24 2018)  BP: 131/76 (08/18/24 0815)  SpO2: 100 % (08/18/24 0815) Vital Signs (24h Range):  Temp:  [97.8 °F (36.6 °C)-98.1 °F (36.7 °C)] 97.8 °F (36.6 °C)  Pulse:  [80-81] 80  Resp:  [18] 18  SpO2:  [97 %-100 %] 100 %  BP: (131-138)/(76-83) 131/76     Weight: 49.9 kg (110 lb)  Body mass index is 20.12 kg/m².  No intake or output data in the 24 hours ending 08/18/24 0920      Physical Exam  Vitals and nursing note reviewed.   Constitutional:       Appearance: Normal appearance.   HENT:      Head: Normocephalic and atraumatic.   Cardiovascular:      Rate and Rhythm: Normal rate.   Pulmonary:      Effort: Pulmonary effort is normal. No respiratory distress.   Abdominal:      Palpations: Abdomen is soft.   Musculoskeletal:      Cervical back: No rigidity.   Skin:     General: Skin is warm.   Neurological:      Mental Status: She is alert. She is disoriented.      Comments: Oriented to person and place.             Significant Labs: All pertinent labs within the past 24 hours have been reviewed.  Recent Lab Results       None            Significant Imaging: I have reviewed all pertinent imaging results/findings within the past 24 hours.
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
## 2024-08-19 PROCEDURE — 25000003 PHARM REV CODE 250

## 2024-08-19 PROCEDURE — 11000001 HC ACUTE MED/SURG PRIVATE ROOM

## 2024-08-19 RX ADMIN — RIVASTIGMINE 1 PATCH: 4.6 PATCH, EXTENDED RELEASE TRANSDERMAL at 11:08

## 2024-08-19 RX ADMIN — THERA TABS 1 TABLET: TAB at 11:08

## 2024-08-19 NOTE — PLAN OF CARE
Pt disoriented to situation. VS stable - BP soft this morning. Pt slept peacefully for most of the shift. Sitter in place. No adverse events noted during this shift. No new complaints rep     Problem: Adult Inpatient Plan of Care  Goal: Plan of Care Review  Outcome: Progressing  Goal: Patient-Specific Goal (Individualized)  Outcome: Progressing  Goal: Absence of Hospital-Acquired Illness or Injury  Outcome: Progressing  Goal: Optimal Comfort and Wellbeing  Outcome: Progressing  Goal: Readiness for Transition of Care  Outcome: Progressing     Problem: Skin Injury Risk Increased  Goal: Skin Health and Integrity  Outcome: Progressing     Problem: Fall Injury Risk  Goal: Absence of Fall and Fall-Related Injury  Outcome: Progressing     Problem: Delirium  Goal: Optimal Coping  Outcome: Progressing  Goal: Improved Behavioral Control  Outcome: Progressing  Goal: Improved Attention and Thought Clarity  Outcome: Progressing  Goal: Improved Sleep  Outcome: Progressing     Problem: Confusion Chronic  Goal: Optimal Cognitive Function  Outcome: Progressing

## 2024-08-19 NOTE — PLAN OF CARE
CM received new Durable Mandate for [employer] POA.  CM put in pt's paper chart to be uploaded to electronic chart.    MEGHAN CantuN, BS, RN, CCM

## 2024-08-19 NOTE — SUBJECTIVE & OBJECTIVE
Interval History: Planned conversation with David today about placement    Review of Systems   Constitutional: Negative.    HENT: Negative.     Respiratory: Negative.     Cardiovascular: Negative.    Gastrointestinal: Negative.    Genitourinary: Negative.    Musculoskeletal: Negative.    Neurological: Negative.    Psychiatric/Behavioral:  Positive for confusion.      Objective:     Vital Signs (Most Recent):  Temp: 98 °F (36.7 °C) (08/19/24 0500)  Pulse: 67 (08/19/24 0500)  Resp: 18 (08/18/24 2010)  BP: 110/72 (08/19/24 0600)  SpO2: 98 % (08/19/24 0500) Vital Signs (24h Range):  Temp:  [98 °F (36.7 °C)-98.1 °F (36.7 °C)] 98 °F (36.7 °C)  Pulse:  [67-82] 67  Resp:  [18] 18  SpO2:  [98 %-99 %] 98 %  BP: ()/(54-78) 110/72     Weight: 49.9 kg (110 lb)  Body mass index is 20.12 kg/m².    Intake/Output Summary (Last 24 hours) at 8/19/2024 1402  Last data filed at 8/19/2024 0630  Gross per 24 hour   Intake 1230 ml   Output --   Net 1230 ml         Physical Exam  Vitals and nursing note reviewed.   Constitutional:       Appearance: Normal appearance.   HENT:      Head: Normocephalic and atraumatic.   Cardiovascular:      Rate and Rhythm: Normal rate.   Pulmonary:      Effort: Pulmonary effort is normal. No respiratory distress.   Abdominal:      Palpations: Abdomen is soft.   Musculoskeletal:      Cervical back: No rigidity.   Skin:     General: Skin is warm.   Neurological:      Mental Status: She is alert. She is disoriented.      Comments: Oriented to person and place.             Significant Labs: All pertinent labs within the past 24 hours have been reviewed.    Significant Imaging: I have reviewed all pertinent imaging results/findings within the past 24 hours.
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
## 2024-08-20 PROCEDURE — 25000003 PHARM REV CODE 250

## 2024-08-20 PROCEDURE — 11000001 HC ACUTE MED/SURG PRIVATE ROOM

## 2024-08-20 RX ADMIN — RIVASTIGMINE 1 PATCH: 4.6 PATCH, EXTENDED RELEASE TRANSDERMAL at 10:08

## 2024-08-20 RX ADMIN — THERA TABS 1 TABLET: TAB at 10:08

## 2024-08-20 NOTE — ASSESSMENT & PLAN NOTE
78-year-old lady here with encephalopathy, secondary to worsening dementia.  Clinically her presentation is not concering for acute sepsis, or stroke.        Extensive workup for AMS has been negative. Neurology suspects her underlying dementia is driving her presentation. Patient very resistant to discharging to SNF or any facility. Per our team and Psychiatry the patient does not show decision making capacity. Son prefers SNF or facility placement. However, patient threatened and attempted to leave AMA on 7/15 so she was PEC'd.  PEC is to prevent AMA but she does not require further psychological stabilization, discuss with legal need for PEC regarding capacity to leave AMA.     Plan:  - CEC  on . Patient has situational capacity. Friend David now MPOA.   - Will consult psych for further eval. Appreciate assistance.   - PRN zyprexa.
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
## 2024-08-20 NOTE — SUBJECTIVE & OBJECTIVE
Interval History: awaiting placement updates from [facility]    Review of Systems   Constitutional: Negative.    HENT: Negative.     Respiratory: Negative.     Cardiovascular: Negative.    Gastrointestinal: Negative.    Genitourinary: Negative.    Musculoskeletal: Negative.    Neurological: Negative.    Psychiatric/Behavioral:  Positive for confusion.      Objective:     Vital Signs (Most Recent):  Temp: 98 °F (36.7 °C) (08/20/24 0817)  Pulse: 81 (08/20/24 0817)  Resp: 18 (08/20/24 0817)  BP: 115/77 (08/20/24 0817)  SpO2: 99 % (08/20/24 0817) Vital Signs (24h Range):  Temp:  [98 °F (36.7 °C)-98.5 °F (36.9 °C)] 98 °F (36.7 °C)  Pulse:  [81-85] 81  Resp:  [18] 18  SpO2:  [96 %-99 %] 99 %  BP: (115-117)/(71-77) 115/77     Weight: 49.9 kg (110 lb)  Body mass index is 20.12 kg/m².  No intake or output data in the 24 hours ending 08/20/24 1404      Physical Exam  Vitals and nursing note reviewed.   Constitutional:       Appearance: Normal appearance.   HENT:      Head: Normocephalic and atraumatic.   Cardiovascular:      Rate and Rhythm: Normal rate.   Pulmonary:      Effort: Pulmonary effort is normal. No respiratory distress.   Abdominal:      Palpations: Abdomen is soft.   Musculoskeletal:      Cervical back: No rigidity.   Skin:     General: Skin is warm.   Neurological:      Mental Status: She is alert. She is disoriented.      Comments: Oriented to person and place.             Significant Labs: All pertinent labs within the past 24 hours have been reviewed.    Significant Imaging: I have reviewed all pertinent imaging results/findings within the past 24 hours.

## 2024-08-20 NOTE — PLAN OF CARE
CM called David Nicholson [phone] to ask if he had financials, if so could he provide copy to CM, and does pt have enough money to pursue Assisted Living.  If not, we would have to proceed with NH placement.  LVM requesting call back.    11:26 AM  Dr. Schilling requests hospital records for David so he can see that pt has been evaluated by multiple MD's.  CM emailed David several different MD notes from several different MD's.    12:57 PM  Per David via email, the bank has not approved his POA yet, so he has no financial information on this pt yet.    1:21 PM  CM discussed case with supervisor Rowena. CM informed of information from David via email.  Rowena asked if David has access to pt's home, and if so, could he possibly get one bank statement?  CM to follow.    2:48 PM  Per nurse Schulz, pt wants to make a complaint to someone higher up.  CM spoke with pt in room, provided pt advocacy number.  Pt c/o she doesn't want to be here in hospital anymore, doesn't understand why she's still in hospital.  NICHOLAS instructed we are still seeking a safe d/c disposition, we're still waiting on financial information to proceed.  Pt did not v/u, continued arguing with CM about how it's injust that she's still here in hospital.  She states she will call patient advocacy.    MEGHAN CantuN, BS, RN, CCM

## 2024-08-21 PROCEDURE — 25000003 PHARM REV CODE 250

## 2024-08-21 PROCEDURE — 11000001 HC ACUTE MED/SURG PRIVATE ROOM

## 2024-08-21 RX ADMIN — RIVASTIGMINE 1 PATCH: 4.6 PATCH, EXTENDED RELEASE TRANSDERMAL at 10:08

## 2024-08-21 RX ADMIN — THERA TABS 1 TABLET: TAB at 10:08

## 2024-08-21 NOTE — PLAN OF CARE
Problem: Adult Inpatient Plan of Care  Goal: Plan of Care Review  Outcome: Progressing  Goal: Patient-Specific Goal (Individualized)  Outcome: Progressing  Goal: Absence of Hospital-Acquired Illness or Injury  Outcome: Progressing  Goal: Optimal Comfort and Wellbeing  Outcome: Progressing  Goal: Readiness for Transition of Care  Outcome: Progressing     Problem: Skin Injury Risk Increased  Goal: Skin Health and Integrity  Outcome: Progressing     Problem: Fall Injury Risk  Goal: Absence of Fall and Fall-Related Injury  Outcome: Progressing     Problem: Delirium  Goal: Optimal Coping  Outcome: Progressing  Goal: Improved Behavioral Control  Outcome: Progressing  Goal: Improved Attention and Thought Clarity  Outcome: Progressing  Goal: Improved Sleep  Outcome: Progressing

## 2024-08-21 NOTE — PLAN OF CARE
CM emailed pt's POA, David Nicholson, requested update on pt's POA status at bank in order to receive financial statements for Assisted Living/NH placement.  Response pending.    4:39 PM  David asked CM to come to pt room and witness their conversation because pt had accused him of asking the MD to write a letter saying that she could not drive.  David states he requested a letter from the MD that said what she could and could not do.  David and pt continued to argue.  By the end, David said he wouldn't be involved with pt if she didn't trust him.  CM instructed David in pt's disease process of dementia; this can cause lack of insight and paranoia; pt doesn't have the capacity to understand the further ramifications of not having David act as her POA.  CM requested David to consider remaining as pt's POA.  David states that he will consider it.  David also informed CM that [facility] had accepted pt.    MARTA Cantu, BS, RN, [facility]
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
## 2024-08-21 NOTE — SUBJECTIVE & OBJECTIVE
Interval History: Pending dispo      Objective:     Vital Signs (Most Recent):  Temp: 98.2 °F (36.8 °C) (08/21/24 0444)  Pulse: 78 (08/21/24 0444)  Resp: 17 (08/21/24 0444)  BP: (!) 150/88 (08/21/24 0444)  SpO2: 96 % (08/21/24 0444) Vital Signs (24h Range):  Temp:  [98.2 °F (36.8 °C)] 98.2 °F (36.8 °C)  Pulse:  [78] 78  Resp:  [17] 17  SpO2:  [96 %] 96 %  BP: (150)/(88) 150/88     Weight: 49.9 kg (110 lb)  Body mass index is 20.12 kg/m².  No intake or output data in the 24 hours ending 08/21/24 1657      Physical Exam  Vitals and nursing note reviewed.   Constitutional:       Appearance: Normal appearance.   HENT:      Head: Normocephalic and atraumatic.   Cardiovascular:      Rate and Rhythm: Normal rate.   Pulmonary:      Effort: Pulmonary effort is normal. No respiratory distress.   Abdominal:      Palpations: Abdomen is soft.   Musculoskeletal:      Cervical back: No rigidity.   Skin:     General: Skin is warm.   Neurological:      Mental Status: She is alert. She is disoriented.      Comments: Oriented to person and place.             Significant Labs: All pertinent labs within the past 24 hours have been reviewed.    Significant Imaging: I have reviewed all pertinent imaging results/findings within the past 24 hours.

## 2024-08-22 PROCEDURE — 25000003 PHARM REV CODE 250

## 2024-08-22 PROCEDURE — 11000001 HC ACUTE MED/SURG PRIVATE ROOM

## 2024-08-22 RX ADMIN — RIVASTIGMINE 1 PATCH: 4.6 PATCH, EXTENDED RELEASE TRANSDERMAL at 08:08

## 2024-08-22 RX ADMIN — THERA TABS 1 TABLET: TAB at 08:08
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
## 2024-08-22 NOTE — PLAN OF CARE
Problem: Fall Injury Risk  Goal: Absence of Fall and Fall-Related Injury  Outcome: Progressing     Problem: Confusion Chronic  Goal: Optimal Cognitive Function  Outcome: Progressing   No significant changes noted. Sitter at the bedside. Bed locked and in lowest position. Call light in reach.

## 2024-08-22 NOTE — PLAN OF CARE
Problem: Adult Inpatient Plan of Care  Goal: Plan of Care Review  Outcome: Progressing  Goal: Patient-Specific Goal (Individualized)  Outcome: Progressing  Goal: Absence of Hospital-Acquired Illness or Injury  Outcome: Progressing  Goal: Optimal Comfort and Wellbeing  Outcome: Progressing  Goal: Readiness for Transition of Care  Outcome: Progressing     Problem: Confusion Chronic  Goal: Optimal Cognitive Function  Outcome: Progressing     Problem: Fall Injury Risk  Goal: Absence of Fall and Fall-Related Injury  Outcome: Progressing     Problem: Delirium  Goal: Optimal Coping  Outcome: Progressing  Goal: Improved Behavioral Control  Outcome: Progressing  Goal: Improved Attention and Thought Clarity  Outcome: Progressing  Goal: Improved Sleep  Outcome: Progressing

## 2024-08-22 NOTE — PLAN OF CARE
CM called JAYA Hernandez [phone] to discuss pt's d/c plan.  LVM requesting call back.    MEGHAN CantuN, BS, RN, CCM

## 2024-08-23 PROCEDURE — 25000003 PHARM REV CODE 250

## 2024-08-23 PROCEDURE — 11000001 HC ACUTE MED/SURG PRIVATE ROOM

## 2024-08-23 RX ADMIN — RIVASTIGMINE 1 PATCH: 4.6 PATCH, EXTENDED RELEASE TRANSDERMAL at 08:08

## 2024-08-23 RX ADMIN — THERA TABS 1 TABLET: TAB at 08:08

## 2024-08-23 NOTE — PLAN OF CARE
CM emailed JAYA Huang, to f/u on status of pt's information to get her admitted to Assisted Living or NH.  Response pending.    MEGHAN CantuN, BS, RN, CCM

## 2024-08-23 NOTE — PLAN OF CARE
Problem: Skin Injury Risk Increased  Goal: Skin Health and Integrity  Outcome: Progressing     Problem: Fall Injury Risk  Goal: Absence of Fall and Fall-Related Injury  Outcome: Progressing      No significant changes noted. Sitter at the bedside. Bed locked and in lowest position. Call light in reach.

## 2024-08-23 NOTE — PLAN OF CARE
[facility] - Med Surg ([facility])  Discharge Reassessment    Primary Care Provider: Edwar Castaneda II, MD    Expected Discharge Date:     Reassessment (most recent)       Discharge Reassessment - 08/23/24 1039          Discharge Reassessment    Assessment Type Discharge Planning Reassessment (P)      Did the patient's condition or plan change since previous assessment? No (P)      Discharge Plan discussed with: POA (P)      Name(s) and Number(s) JAYA Huang, [phone] (P)      Communicated MARVEL with patient/caregiver Date not available/Unable to determine (P)      Discharge Plan A Assisted Living (P)      Discharge Plan B New Nursing Home placement - residential care facility (P)      DME Needed Upon Discharge  none (P)      Transition of Care Barriers Other (see comments) (P)    dementia    Why the patient remains in the hospital Placement issues (P)         Post-Acute Status    Post-Acute Authorization Placement (P)      Post-Acute Placement Status Referrals Sent (P)      Coverage [facility] (P)      Discharge Delays Patient and Family Barriers (P)                  Per David's email,     I am working on her finances over the weekend and hopefully can meet with the [employer] on Monday.    MEGHAN CantuN, BS, RN, CCM

## 2024-08-24 PROCEDURE — 11000001 HC ACUTE MED/SURG PRIVATE ROOM

## 2024-08-24 PROCEDURE — 25000003 PHARM REV CODE 250

## 2024-08-24 RX ADMIN — THERA TABS 1 TABLET: TAB at 08:08

## 2024-08-24 RX ADMIN — RIVASTIGMINE 1 PATCH: 4.6 PATCH, EXTENDED RELEASE TRANSDERMAL at 08:08

## 2024-08-24 NOTE — PLAN OF CARE
Problem: Adult Inpatient Plan of Care  Goal: Plan of Care Review  Outcome: Progressing  Goal: Patient-Specific Goal (Individualized)  Outcome: Progressing  Goal: Absence of Hospital-Acquired Illness or Injury  Outcome: Progressing  Goal: Optimal Comfort and Wellbeing  Outcome: Progressing  Goal: Readiness for Transition of Care  Outcome: Progressing     Problem: Skin Injury Risk Increased  Goal: Skin Health and Integrity  Outcome: Progressing     Problem: Confusion Chronic  Goal: Optimal Cognitive Function  Outcome: Progressing
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
## 2024-08-24 NOTE — SUBJECTIVE & OBJECTIVE
Interval History: Attempted to call David today, went to voicemail.     Review of Systems   Constitutional: Negative.    HENT: Negative.     Respiratory: Negative.     Cardiovascular: Negative.    Gastrointestinal: Negative.    Genitourinary: Negative.    Musculoskeletal: Negative.    Neurological: Negative.    Psychiatric/Behavioral:  Positive for confusion.      Objective:     Vital Signs (Most Recent):  Temp: 98.4 °F (36.9 °C) (08/24/24 1043)  Pulse: 79 (08/24/24 1043)  Resp: 12 (08/24/24 1043)  BP: 106/70 (08/24/24 1043)  SpO2: 98 % (08/24/24 1043) Vital Signs (24h Range):  Temp:  [98 °F (36.7 °C)-98.4 °F (36.9 °C)] 98.4 °F (36.9 °C)  Pulse:  [79-81] 79  Resp:  [12-18] 12  SpO2:  [97 %-98 %] 98 %  BP: (106-117)/(70-76) 106/70     Weight: 49.9 kg (110 lb)  Body mass index is 20.12 kg/m².  No intake or output data in the 24 hours ending 08/24/24 1340      Physical Exam  Vitals and nursing note reviewed.   Constitutional:       Appearance: Normal appearance.   HENT:      Head: Normocephalic and atraumatic.   Cardiovascular:      Rate and Rhythm: Normal rate.   Pulmonary:      Effort: Pulmonary effort is normal. No respiratory distress.   Abdominal:      Palpations: Abdomen is soft.   Musculoskeletal:      Cervical back: No rigidity.   Skin:     General: Skin is warm.   Neurological:      Mental Status: She is alert. She is disoriented.      Comments: Oriented to person and place.             Significant Labs: All pertinent labs within the past 24 hours have been reviewed.  Recent Lab Results       None            Significant Imaging: I have reviewed all pertinent imaging results/findings within the past 24 hours.

## 2024-08-24 NOTE — SUBJECTIVE & OBJECTIVE
Interval History: Ongoing discussions with RAHEEM    Review of Systems   Constitutional: Negative.    HENT: Negative.     Respiratory: Negative.     Cardiovascular: Negative.    Gastrointestinal: Negative.    Genitourinary: Negative.    Musculoskeletal: Negative.    Neurological: Negative.    Psychiatric/Behavioral:  Positive for confusion.      Objective:     Vital Signs (Most Recent):  Temp: 98 °F (36.7 °C) (08/23/24 1951)  Pulse: 81 (08/23/24 1951)  Resp: 18 (08/23/24 1951)  BP: 117/76 (08/23/24 1951)  SpO2: 97 % (08/23/24 1951) Vital Signs (24h Range):  Temp:  [98 °F (36.7 °C)-98.5 °F (36.9 °C)] 98 °F (36.7 °C)  Pulse:  [81] 81  Resp:  [17-18] 18  SpO2:  [97 %] 97 %  BP: (113-117)/(71-76) 117/76     Weight: 49.9 kg (110 lb)  Body mass index is 20.12 kg/m².  No intake or output data in the 24 hours ending 08/23/24 2037      Physical Exam  Vitals and nursing note reviewed.   Constitutional:       Appearance: Normal appearance.   HENT:      Head: Normocephalic and atraumatic.   Cardiovascular:      Rate and Rhythm: Normal rate.   Pulmonary:      Effort: Pulmonary effort is normal. No respiratory distress.   Abdominal:      Palpations: Abdomen is soft.   Musculoskeletal:      Cervical back: No rigidity.   Skin:     General: Skin is warm.   Neurological:      Mental Status: She is alert. She is disoriented.      Comments: Oriented to person and place.             Significant Labs: All pertinent labs within the past 24 hours have been reviewed.    Significant Imaging: I have reviewed all pertinent imaging results/findings within the past 24 hours.

## 2024-08-24 NOTE — PLAN OF CARE
Problem: Adult Inpatient Plan of Care  Goal: Plan of Care Review  Outcome: Progressing  Goal: Patient-Specific Goal (Individualized)  Outcome: Progressing  Goal: Absence of Hospital-Acquired Illness or Injury  Outcome: Progressing  Goal: Optimal Comfort and Wellbeing  Outcome: Progressing  Goal: Readiness for Transition of Care  Outcome: Progressing     Problem: Skin Injury Risk Increased  Goal: Skin Health and Integrity  Outcome: Progressing     Problem: Fall Injury Risk  Goal: Absence of Fall and Fall-Related Injury  Outcome: Progressing     Problem: Delirium  Goal: Optimal Coping  Outcome: Progressing  Goal: Improved Behavioral Control  Outcome: Progressing  Goal: Improved Attention and Thought Clarity  Outcome: Progressing  Goal: Improved Sleep  Outcome: Progressing     Problem: Confusion Chronic  Goal: Optimal Cognitive Function  Outcome: Progressing    Pt had an uneventful night. VSS. Pt denied any pain or discomfort Sitter remains at bedside  Pt is free of falls and injuries. Bed in lowest position and call light within reach. Safety maintained
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
## 2024-08-25 PROCEDURE — 11000001 HC ACUTE MED/SURG PRIVATE ROOM

## 2024-08-25 PROCEDURE — 25000003 PHARM REV CODE 250

## 2024-08-25 RX ADMIN — THERA TABS 1 TABLET: TAB at 08:08

## 2024-08-25 RX ADMIN — RIVASTIGMINE 1 PATCH: 4.6 PATCH, EXTENDED RELEASE TRANSDERMAL at 08:08
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
## 2024-08-25 NOTE — PLAN OF CARE
Problem: Adult Inpatient Plan of Care  Goal: Plan of Care Review  Outcome: Progressing  Goal: Patient-Specific Goal (Individualized)  Outcome: Progressing  Goal: Absence of Hospital-Acquired Illness or Injury  Outcome: Progressing  Goal: Optimal Comfort and Wellbeing  Outcome: Progressing  Goal: Readiness for Transition of Care  Outcome: Progressing     Problem: Skin Injury Risk Increased  Goal: Skin Health and Integrity  Outcome: Progressing     Problem: Fall Injury Risk  Goal: Absence of Fall and Fall-Related Injury  Outcome: Progressing     Problem: Delirium  Goal: Optimal Coping  Outcome: Progressing  Goal: Improved Behavioral Control  Outcome: Progressing  Goal: Improved Attention and Thought Clarity  Outcome: Progressing  Goal: Improved Sleep  Outcome: Progressing     Problem: Confusion Chronic  Goal: Optimal Cognitive Function  Outcome: Progressing    Pt had an uneventful night. VSS. Pt denied any pain or discomfort  Pt is free of falls and injuries. Sitter remains at bedside for safety precautions Bed in lowest position and call light within reach. Safety maintained

## 2024-08-25 NOTE — SUBJECTIVE & OBJECTIVE
Interval History: NAEON. Pending dispo      Objective:     Vital Signs (Most Recent):  Temp: 97.4 °F (36.3 °C) (08/25/24 0740)  Pulse: 87 (08/25/24 0740)  Resp: 19 (08/25/24 0740)  BP: 116/72 (08/25/24 0740)  SpO2: 95 % (08/25/24 0740) Vital Signs (24h Range):  Temp:  [97.4 °F (36.3 °C)-98.1 °F (36.7 °C)] 97.4 °F (36.3 °C)  Pulse:  [70-87] 87  Resp:  [18-19] 19  SpO2:  [95 %-97 %] 95 %  BP: (116-120)/(72-77) 116/72     Weight: 49.9 kg (110 lb)  Body mass index is 20.12 kg/m².    Intake/Output Summary (Last 24 hours) at 8/25/2024 1421  Last data filed at 8/24/2024 2000  Gross per 24 hour   Intake 120 ml   Output --   Net 120 ml         Physical Exam  Vitals and nursing note reviewed.   Constitutional:       Appearance: Normal appearance.   HENT:      Head: Normocephalic and atraumatic.   Cardiovascular:      Rate and Rhythm: Normal rate.   Pulmonary:      Effort: Pulmonary effort is normal. No respiratory distress.   Abdominal:      Palpations: Abdomen is soft.   Musculoskeletal:      Cervical back: No rigidity.   Skin:     General: Skin is warm.   Neurological:      Mental Status: She is alert. She is disoriented.      Comments: Oriented to person and place.             Significant Labs: All pertinent labs within the past 24 hours have been reviewed.    Significant Imaging: I have reviewed all pertinent imaging results/findings within the past 24 hours.

## 2024-08-26 PROCEDURE — 25000003 PHARM REV CODE 250

## 2024-08-26 PROCEDURE — 11000001 HC ACUTE MED/SURG PRIVATE ROOM

## 2024-08-26 RX ADMIN — RIVASTIGMINE 1 PATCH: 4.6 PATCH, EXTENDED RELEASE TRANSDERMAL at 09:08

## 2024-08-26 RX ADMIN — THERA TABS 1 TABLET: TAB at 09:08

## 2024-08-26 NOTE — PLAN OF CARE
NICHOLAS received letter from David, he states he is resigning as her  in fact.  Supervisor Rowena notified.    4:20 PM  CM spoke with pt in room.  She wants to f/u on possibly getting HH after d/c, and wants to know when she will be d/c'd.  CM instructed that CM will check with MD about that.    MARTA Cantu, BS, RN, CCM

## 2024-08-26 NOTE — ASSESSMENT & PLAN NOTE
78-year-old lady here with encephalopathy, secondary to worsening dementia.  Clinically her presentation is not concering for acute sepsis, or stroke.        Extensive workup for AMS has been negative. Neurology suspects her underlying dementia is driving her presentation. Patient very resistant to discharging to SNF or any facility. Per our team and Psychiatry the patient does not show decision making capacity. Son prefers SNF or facility placement. However, patient threatened and attempted to leave AMA on 7/15 so she was PEC'd.  PEC is to prevent AMA but she does not require further psychological stabilization, discuss with legal need for PEC regarding capacity to leave AMA.     Plan:  - CEC  on . Patient has situational capacity. Friend David resigned as MPOA.   - LUIZA franks.

## 2024-08-26 NOTE — PLAN OF CARE
Problem: Adult Inpatient Plan of Care  Goal: Plan of Care Review  8/26/2024 0618 by Gabriela George RN  Outcome: Progressing  8/26/2024 0618 by Gabriela George RN  Outcome: Progressing  Goal: Patient-Specific Goal (Individualized)  8/26/2024 0618 by Gabriela George RN  Outcome: Progressing  8/26/2024 0618 by Gabriela George RN  Outcome: Progressing  Goal: Absence of Hospital-Acquired Illness or Injury  8/26/2024 0618 by Gabriela George RN  Outcome: Progressing  8/26/2024 0618 by Gabriela George RN  Outcome: Progressing  Goal: Optimal Comfort and Wellbeing  8/26/2024 0618 by Gabriela George RN  Outcome: Progressing  8/26/2024 0618 by Gabriela George RN  Outcome: Progressing  Goal: Readiness for Transition of Care  8/26/2024 0618 by Gabriela George RN  Outcome: Progressing  8/26/2024 0618 by Gabriela George RN  Outcome: Progressing      Pt had an uneventful night. VSS. Pt denied any pain or discomfort  Sitter remains at bedside for safety .  Pt is free of falls and injuries. Bed in lowest position and call light within reach. Safety maintained
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
## 2024-08-26 NOTE — SUBJECTIVE & OBJECTIVE
Interval History: NAEON. Pending dispo. Friend David resigned as .     Review of Systems   Unable to perform ROS: Dementia     Objective:     Vital Signs (Most Recent):  Temp: 98.7 °F (37.1 °C) (08/26/24 0730)  Pulse: 79 (08/26/24 0730)  Resp: 17 (08/26/24 0730)  BP: 113/69 (08/26/24 0730)  SpO2: 97 % (08/26/24 0730) Vital Signs (24h Range):  Temp:  [98.2 °F (36.8 °C)-98.7 °F (37.1 °C)] 98.7 °F (37.1 °C)  Pulse:  [79-90] 79  Resp:  [17-18] 17  SpO2:  [97 %-99 %] 97 %  BP: (113-143)/(69-85) 113/69     Weight: 49.9 kg (110 lb)  Body mass index is 20.12 kg/m².    Intake/Output Summary (Last 24 hours) at 8/26/2024 1357  Last data filed at 8/25/2024 2000  Gross per 24 hour   Intake 120 ml   Output --   Net 120 ml         Physical Exam  Constitutional:       General: She is not in acute distress.     Appearance: Normal appearance. She is not ill-appearing or diaphoretic.   HENT:      Head: Normocephalic and atraumatic.      Right Ear: External ear normal.      Left Ear: External ear normal.      Nose: Nose normal.      Mouth/Throat:      Mouth: Mucous membranes are moist.   Eyes:      Conjunctiva/sclera: Conjunctivae normal.   Cardiovascular:      Rate and Rhythm: Normal rate and regular rhythm.   Neurological:      Mental Status: She is alert.      Comments: Patient wants to return to home and refurbish it. States that she has an eye doctor apt today on [address] and will not see any other doctor. No evidence of any apt on Epic. Oriented to self and place.              Significant Labs: All pertinent labs within the past 24 hours have been reviewed.    Significant Imaging: I have reviewed all pertinent imaging results/findings within the past 24 hours.

## 2024-08-27 PROCEDURE — 11000001 HC ACUTE MED/SURG PRIVATE ROOM

## 2024-08-27 PROCEDURE — 25000003 PHARM REV CODE 250

## 2024-08-27 RX ADMIN — RIVASTIGMINE 1 PATCH: 4.6 PATCH, EXTENDED RELEASE TRANSDERMAL at 08:08

## 2024-08-27 RX ADMIN — THERA TABS 1 TABLET: TAB at 08:08
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
## 2024-08-27 NOTE — PLAN OF CARE
CM requested that Dr. Greene call pt's POA David to explain pt's disease process of dementia and how that may affect her thinking.    Per Dr. Greene, she called David and left a message.    MEGHAN CantuN, BS, RN, CCM

## 2024-08-27 NOTE — SUBJECTIVE & OBJECTIVE
Interval History: NAEON.     Review of Systems   Unable to perform ROS: Dementia     Objective:     Vital Signs (Most Recent):  Temp: 98.1 °F (36.7 °C) (08/26/24 2019)  Pulse: 89 (08/26/24 2019)  Resp: 17 (08/26/24 2019)  BP: 117/75 (08/26/24 2019)  SpO2: 98 % (08/26/24 2019) Vital Signs (24h Range):  Temp:  [98.1 °F (36.7 °C)] 98.1 °F (36.7 °C)  Pulse:  [89] 89  Resp:  [17] 17  SpO2:  [98 %] 98 %  BP: (117)/(75) 117/75     Weight: 49.9 kg (110 lb)  Body mass index is 20.12 kg/m².  No intake or output data in the 24 hours ending 08/27/24 1511      Physical Exam  Constitutional:       Appearance: Normal appearance. She is not ill-appearing.   HENT:      Head: Normocephalic and atraumatic.      Right Ear: External ear normal.      Left Ear: External ear normal.      Nose: Nose normal.      Mouth/Throat:      Mouth: Mucous membranes are moist.   Eyes:      Conjunctiva/sclera: Conjunctivae normal.   Pulmonary:      Effort: Pulmonary effort is normal. No respiratory distress.   Neurological:      General: No focal deficit present.      Mental Status: She is alert. She is disoriented.      Comments: Oriented to person and place.    Psychiatric:      Comments: Patient wishes to return home. Frustrated with current situation.              Significant Labs: All pertinent labs within the past 24 hours have been reviewed.    Significant Imaging: I have reviewed all pertinent imaging results/findings within the past 24 hours.

## 2024-08-28 PROCEDURE — 25000003 PHARM REV CODE 250

## 2024-08-28 PROCEDURE — 11000001 HC ACUTE MED/SURG PRIVATE ROOM

## 2024-08-28 RX ADMIN — RIVASTIGMINE 1 PATCH: 4.6 PATCH, EXTENDED RELEASE TRANSDERMAL at 08:08

## 2024-08-28 RX ADMIN — THERA TABS 1 TABLET: TAB at 08:08

## 2024-08-28 NOTE — PLAN OF CARE
[facility] Medicine Telemetry  Discharge Reassessment    Primary Care Provider: Edwar Castaneda II, MD    Expected Discharge Date:     Reassessment (most recent)       Discharge Reassessment - 08/28/24 0919          Discharge Reassessment    Assessment Type Discharge Planning Reassessment (P)      Did the patient's condition or plan change since previous assessment? No (P)      Discharge Plan discussed with: POA (P)      Name(s) and Number(s) David Nicholson [phone] (P)      Communicated MARVEL with patient/caregiver Date not available/Unable to determine (P)      Discharge Plan A Assisted Living (P)      Discharge Plan B New Nursing Home placement - correction care facility (P)      DME Needed Upon Discharge  none (P)      Transition of Care Barriers Social;No family/friends to help (P)      Why the patient remains in the hospital Placement issues (P)         Post-Acute Status    Post-Acute Authorization Placement (P)      Post-Acute Placement Status Referrals Sent (P)      Coverage [facility] (P)      Discharge Delays Post-Acute Set-up (P)                  NICHOLAS emailed David NicholsonJAYA on file who recently stated he no longer wished to serve as her POA.  NICHOLSA requested 3 months of bank statements so we could proceed with placement for pt.  Response pending.  Supervisors Kristine Hale, and Rowena cc'd in to email. Response pending.    9:51 AM  CM spoke with pt in room.  Pt c/o she wants to go home, is being kept here for too long.  She wants to know what tests were done to determine she has dementia and can't be d/c'd to home.  NICHOLAS instructed pt to discuss with MD when MD rounds.    Duyen Fam, MEGHANN, BS, RN, CCM

## 2024-08-28 NOTE — PLAN OF CARE
Problem: Fall Injury Risk  Goal: Absence of Fall and Fall-Related Injury  Outcome: Progressing     Problem: Confusion Chronic  Goal: Optimal Cognitive Function  Outcome: Progressing      No significant changes noted. Pt upset that she is still in the hospital and states that she would like to go home. Sitter at the bedside. Bed locked and in lowest position. Call light in reach.

## 2024-08-28 NOTE — SUBJECTIVE & OBJECTIVE
Interval History: NAEON. Dispo pending.     Review of Systems   Unable to perform ROS: Dementia     Objective:     Vital Signs (Most Recent):  Temp: 98.3 °F (36.8 °C) (08/28/24 0745)  Pulse: 85 (08/28/24 0745)  Resp: 18 (08/28/24 0745)  BP: 123/81 (08/28/24 0745)  SpO2: 99 % (08/28/24 0745) Vital Signs (24h Range):  Temp:  [98.3 °F (36.8 °C)-98.9 °F (37.2 °C)] 98.3 °F (36.8 °C)  Pulse:  [77-85] 85  Resp:  [18] 18  SpO2:  [96 %-99 %] 99 %  BP: (106-123)/(65-81) 123/81     Weight: 49.9 kg (110 lb)  Body mass index is 20.12 kg/m².  No intake or output data in the 24 hours ending 08/28/24 1745      Physical Exam  Constitutional:       Appearance: Normal appearance. She is not ill-appearing.   HENT:      Head: Normocephalic and atraumatic.      Right Ear: External ear normal.      Left Ear: External ear normal.      Nose: Nose normal.      Mouth/Throat:      Mouth: Mucous membranes are moist.   Eyes:      Conjunctiva/sclera: Conjunctivae normal.   Cardiovascular:      Rate and Rhythm: Normal rate and regular rhythm.      Heart sounds: Normal heart sounds.   Pulmonary:      Effort: Pulmonary effort is normal.      Breath sounds: Normal breath sounds.   Skin:     General: Skin is warm and dry.   Neurological:      Mental Status: She is alert. She is disoriented.      Comments: Oriented to self and place. Frustrated with difficulty discharging.              Significant Labs: All pertinent labs within the past 24 hours have been reviewed.    Significant Imaging: I have reviewed all pertinent imaging results/findings within the past 24 hours.
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
## 2024-08-29 PROCEDURE — 11000001 HC ACUTE MED/SURG PRIVATE ROOM

## 2024-08-29 PROCEDURE — 25000003 PHARM REV CODE 250

## 2024-08-29 RX ADMIN — THERA TABS 1 TABLET: TAB at 11:08

## 2024-08-29 RX ADMIN — RIVASTIGMINE 1 PATCH: 4.6 PATCH, EXTENDED RELEASE TRANSDERMAL at 11:08

## 2024-08-29 NOTE — PLAN OF CARE
Problem: Adult Inpatient Plan of Care  Goal: Plan of Care Review  Outcome: Progressing     Problem: Confusion Chronic  Goal: Optimal Cognitive Function  Outcome: Progressing     Problem: Fall Injury Risk  Goal: Absence of Fall and Fall-Related Injury  Outcome: Progressing     Problem: Delirium  Goal: Improved Behavioral Control  Outcome: Progressing

## 2024-08-29 NOTE — PLAN OF CARE
Pt's new d/c plan is NH with memory unit.  CM sent blast referrals to the following facilities via CP:    [facility], [facility], [facility], [facility], [facility], [facility], [facility], [facility], [facility], [facility], [facility], [facility], [facility], [facility], [facility], [facility],  [facility], [facility].    3:54 PM  Per CP, [facility], [facility], [facility], and [facility] have accepted pt.    MEGHAN CantuN, BS, RN, CCM

## 2024-08-30 LAB
ALBUMIN SERPL BCP-MCNC: 3.7 G/DL (ref 3.5–5.2)
ALP SERPL-CCNC: 101 U/L (ref 55–135)
ALT SERPL W/O P-5'-P-CCNC: 18 U/L (ref 10–44)
ANION GAP SERPL CALC-SCNC: 17 MMOL/L (ref 8–16)
AST SERPL-CCNC: 26 U/L (ref 10–40)
BASOPHILS # BLD AUTO: 0.09 K/UL (ref 0–0.2)
BASOPHILS NFR BLD: 0.8 % (ref 0–1.9)
BILIRUB SERPL-MCNC: 0.4 MG/DL (ref 0.1–1)
BUN SERPL-MCNC: 27 MG/DL (ref 8–23)
CA-I BLDV-SCNC: 1.02 MMOL/L (ref 1.06–1.42)
CALCIUM SERPL-MCNC: 8.9 MG/DL (ref 8.7–10.5)
CHLORIDE SERPL-SCNC: 106 MMOL/L (ref 95–110)
CO2 SERPL-SCNC: 16 MMOL/L (ref 23–29)
CREAT SERPL-MCNC: 1.1 MG/DL (ref 0.5–1.4)
DIFFERENTIAL METHOD BLD: ABNORMAL
EOSINOPHIL # BLD AUTO: 0.5 K/UL (ref 0–0.5)
EOSINOPHIL NFR BLD: 4.2 % (ref 0–8)
ERYTHROCYTE [DISTWIDTH] IN BLOOD BY AUTOMATED COUNT: 13.2 % (ref 11.5–14.5)
EST. GFR  (NO RACE VARIABLE): 52.1 ML/MIN/1.73 M^2
GLUCOSE SERPL-MCNC: 124 MG/DL (ref 70–110)
HCT VFR BLD AUTO: 38.1 % (ref 37–48.5)
HGB BLD-MCNC: 12.2 G/DL (ref 12–16)
IMM GRANULOCYTES # BLD AUTO: 0.05 K/UL (ref 0–0.04)
IMM GRANULOCYTES NFR BLD AUTO: 0.4 % (ref 0–0.5)
LYMPHOCYTES # BLD AUTO: 2.8 K/UL (ref 1–4.8)
LYMPHOCYTES NFR BLD: 25.1 % (ref 18–48)
MAGNESIUM SERPL-MCNC: 2.1 MG/DL (ref 1.6–2.6)
MCH RBC QN AUTO: 32.2 PG (ref 27–31)
MCHC RBC AUTO-ENTMCNC: 32 G/DL (ref 32–36)
MCV RBC AUTO: 101 FL (ref 82–98)
MONOCYTES # BLD AUTO: 0.8 K/UL (ref 0.3–1)
MONOCYTES NFR BLD: 7 % (ref 4–15)
NEUTROPHILS # BLD AUTO: 7.1 K/UL (ref 1.8–7.7)
NEUTROPHILS NFR BLD: 62.5 % (ref 38–73)
NRBC BLD-RTO: 0 /100 WBC
PHOSPHATE SERPL-MCNC: 4.6 MG/DL (ref 2.7–4.5)
PLATELET # BLD AUTO: 243 K/UL (ref 150–450)
PMV BLD AUTO: 10 FL (ref 9.2–12.9)
POCT GLUCOSE: 125 MG/DL (ref 70–110)
POTASSIUM SERPL-SCNC: 3.6 MMOL/L (ref 3.5–5.1)
PROT SERPL-MCNC: 6.3 G/DL (ref 6–8.4)
RBC # BLD AUTO: 3.79 M/UL (ref 4–5.4)
SODIUM SERPL-SCNC: 139 MMOL/L (ref 136–145)
WBC # BLD AUTO: 11.31 K/UL (ref 3.9–12.7)

## 2024-08-30 PROCEDURE — 83605 ASSAY OF LACTIC ACID: CPT | Performed by: INTERNAL MEDICINE

## 2024-08-30 PROCEDURE — 27000221 HC OXYGEN, UP TO 24 HOURS

## 2024-08-30 PROCEDURE — 84100 ASSAY OF PHOSPHORUS: CPT

## 2024-08-30 PROCEDURE — 94761 N-INVAS EAR/PLS OXIMETRY MLT: CPT

## 2024-08-30 PROCEDURE — 36415 COLL VENOUS BLD VENIPUNCTURE: CPT | Performed by: INTERNAL MEDICINE

## 2024-08-30 PROCEDURE — 85025 COMPLETE CBC W/AUTO DIFF WBC: CPT

## 2024-08-30 PROCEDURE — 82330 ASSAY OF CALCIUM: CPT

## 2024-08-30 PROCEDURE — 25000003 PHARM REV CODE 250

## 2024-08-30 PROCEDURE — 99900035 HC TECH TIME PER 15 MIN (STAT)

## 2024-08-30 PROCEDURE — 80053 COMPREHEN METABOLIC PANEL: CPT

## 2024-08-30 PROCEDURE — 83735 ASSAY OF MAGNESIUM: CPT

## 2024-08-30 PROCEDURE — 11000001 HC ACUTE MED/SURG PRIVATE ROOM

## 2024-08-30 RX ORDER — LORAZEPAM 2 MG/ML
1 INJECTION INTRAMUSCULAR EVERY 10 MIN PRN
Status: DISCONTINUED | OUTPATIENT
Start: 2024-08-30 | End: 2024-10-18

## 2024-08-30 RX ADMIN — THERA TABS 1 TABLET: TAB at 08:08

## 2024-08-30 RX ADMIN — RIVASTIGMINE 1 PATCH: 4.6 PATCH, EXTENDED RELEASE TRANSDERMAL at 08:08

## 2024-08-30 NOTE — PLAN OF CARE
[facility] Medicine Telemetry  Discharge Reassessment    Primary Care Provider: Edwar Castaneda II, MD    Expected Discharge Date:     Reassessment (most recent)       Discharge Reassessment - 08/30/24 1346          Discharge Reassessment    Assessment Type Discharge Planning Reassessment (P)      Did the patient's condition or plan change since previous assessment? Yes (P)      Discharge Plan discussed with: Patient (P)      Communicated MARVEL with patient/caregiver Date not available/Unable to determine (P)      Discharge Plan A New Nursing Home placement - halfway care facility (P)      Discharge Plan B New Nursing Home placement - halfway care facility (P)      DME Needed Upon Discharge  none (P)      Transition of Care Barriers Social;No family/friends to help (P)      Why the patient remains in the hospital Placement issues (P)         Post-Acute Status    Post-Acute Authorization Placement (P)      Post-Acute Placement Status Referrals Sent (P)      Coverage [facility] (P)      Discharge Delays Post-Acute Set-up (P)                  CM spoke with pt in room, offered some fresh clothes from the clothes closet if she would like some.  Pt states she has enough clothes for now, doesn't need any.    MEGHAN CantuN, BS, RN, CCM      Discharge Plan A and Plan B have been determined by review of patient's clinical status, future medical and therapeutic needs, and coverage/benefits for post-acute care in coordination with multidisciplinary team members.

## 2024-08-30 NOTE — SUBJECTIVE & OBJECTIVE
Interval History: NAEON. Dispo pending. Patient has no complaints.     Review of Systems   Unable to perform ROS: Dementia (dementia)     Objective:     Vital Signs (Most Recent):  Temp: 98.2 °F (36.8 °C) (08/29/24 1752)  Pulse: 86 (08/29/24 1752)  Resp: 18 (08/29/24 1752)  BP: 125/77 (08/29/24 1752)  SpO2: 98 % (08/29/24 1752) Vital Signs (24h Range):  Temp:  [98.2 °F (36.8 °C)-99.1 °F (37.3 °C)] 98.2 °F (36.8 °C)  Pulse:  [80-86] 86  Resp:  [17-18] 18  SpO2:  [96 %-98 %] 98 %  BP: (104-125)/(67-77) 125/77     Weight: 49.9 kg (110 lb)  Body mass index is 20.12 kg/m².    Intake/Output Summary (Last 24 hours) at 8/29/2024 1900  Last data filed at 8/29/2024 1852  Gross per 24 hour   Intake 720 ml   Output --   Net 720 ml         Physical Exam  Constitutional:       General: She is not in acute distress.     Appearance: Normal appearance.   HENT:      Head: Normocephalic and atraumatic.      Right Ear: External ear normal.      Left Ear: External ear normal.      Nose: Nose normal.      Mouth/Throat:      Mouth: Mucous membranes are moist.   Eyes:      Conjunctiva/sclera: Conjunctivae normal.   Cardiovascular:      Rate and Rhythm: Normal rate and regular rhythm.      Heart sounds: Normal heart sounds.   Pulmonary:      Effort: Pulmonary effort is normal.      Breath sounds: Normal breath sounds.   Neurological:      Mental Status: She is alert. She is disoriented.      Comments: Oriented to person and place.   Psychiatric:         Mood and Affect: Mood normal.         Behavior: Behavior normal.         Thought Content: Thought content normal.         Judgment: Judgment normal.             Significant Labs: All pertinent labs within the past 24 hours have been reviewed.    Significant Imaging: I have reviewed all pertinent imaging results/findings within the past 24 hours.

## 2024-08-30 NOTE — SUBJECTIVE & OBJECTIVE
Interval History: No acute events overnight, afebrile, hemodynamically stable.     Patient requesting to go to [facility] this morning.    Per CM note, [facility] and [facility] have accepted pt.         Review of Systems   Unable to perform ROS: Dementia     Objective:     Vital Signs (Most Recent):  Temp: 98.6 °F (37 °C) (08/29/24 1927)  Pulse: 83 (08/29/24 1927)  Resp: 18 (08/29/24 1927)  BP: 109/71 (08/29/24 1927)  SpO2: 98 % (08/29/24 1927) Vital Signs (24h Range):  Temp:  [98.2 °F (36.8 °C)-98.6 °F (37 °C)] 98.6 °F (37 °C)  Pulse:  [83-86] 83  Resp:  [18] 18  SpO2:  [98 %] 98 %  BP: (109-125)/(71-77) 109/71     Weight: 49.9 kg (110 lb)  Body mass index is 20.12 kg/m².    Intake/Output Summary (Last 24 hours) at 8/30/2024 1413  Last data filed at 8/30/2024 1244  Gross per 24 hour   Intake 960 ml   Output --   Net 960 ml         Physical Exam  Constitutional:       General: She is not in acute distress.     Appearance: Normal appearance.   HENT:      Head: Normocephalic and atraumatic.      Right Ear: External ear normal.      Left Ear: External ear normal.      Nose: Nose normal.      Mouth/Throat:      Mouth: Mucous membranes are moist.   Eyes:      Conjunctiva/sclera: Conjunctivae normal.   Cardiovascular:      Rate and Rhythm: Normal rate and regular rhythm.      Heart sounds: Normal heart sounds.   Pulmonary:      Effort: Pulmonary effort is normal.      Breath sounds: Normal breath sounds.   Neurological:      Mental Status: She is alert. She is disoriented.      Comments: Oriented to person and place.   Psychiatric:         Mood and Affect: Mood normal.         Behavior: Behavior normal.         Thought Content: Thought content normal.         Judgment: Judgment normal.
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
## 2024-08-31 PROBLEM — R56.9 SEIZURE: Status: ACTIVE | Noted: 2024-08-31

## 2024-08-31 LAB
ALBUMIN SERPL BCP-MCNC: 3.6 G/DL (ref 3.5–5.2)
ALP SERPL-CCNC: 72 U/L (ref 55–135)
ALT SERPL W/O P-5'-P-CCNC: 19 U/L (ref 10–44)
ANION GAP SERPL CALC-SCNC: 9 MMOL/L (ref 8–16)
AST SERPL-CCNC: 25 U/L (ref 10–40)
BASOPHILS # BLD AUTO: 0.07 K/UL (ref 0–0.2)
BASOPHILS NFR BLD: 0.6 % (ref 0–1.9)
BILIRUB SERPL-MCNC: 0.7 MG/DL (ref 0.1–1)
BUN SERPL-MCNC: 18 MG/DL (ref 8–23)
CALCIUM SERPL-MCNC: 8.9 MG/DL (ref 8.7–10.5)
CHLORIDE SERPL-SCNC: 108 MMOL/L (ref 95–110)
CK SERPL-CCNC: 118 U/L (ref 20–180)
CO2 SERPL-SCNC: 23 MMOL/L (ref 23–29)
CREAT SERPL-MCNC: 0.9 MG/DL (ref 0.5–1.4)
DIFFERENTIAL METHOD BLD: ABNORMAL
EOSINOPHIL # BLD AUTO: 0.4 K/UL (ref 0–0.5)
EOSINOPHIL NFR BLD: 3.3 % (ref 0–8)
ERYTHROCYTE [DISTWIDTH] IN BLOOD BY AUTOMATED COUNT: 13.1 % (ref 11.5–14.5)
EST. GFR  (NO RACE VARIABLE): >60 ML/MIN/1.73 M^2
GLUCOSE SERPL-MCNC: 80 MG/DL (ref 70–110)
HCT VFR BLD AUTO: 37.4 % (ref 37–48.5)
HGB BLD-MCNC: 11.9 G/DL (ref 12–16)
IMM GRANULOCYTES # BLD AUTO: 0.02 K/UL (ref 0–0.04)
IMM GRANULOCYTES NFR BLD AUTO: 0.2 % (ref 0–0.5)
LACTATE SERPL-SCNC: 1.8 MMOL/L (ref 0.5–2.2)
LYMPHOCYTES # BLD AUTO: 1.6 K/UL (ref 1–4.8)
LYMPHOCYTES NFR BLD: 14.4 % (ref 18–48)
MAGNESIUM SERPL-MCNC: 2.1 MG/DL (ref 1.6–2.6)
MCH RBC QN AUTO: 31.3 PG (ref 27–31)
MCHC RBC AUTO-ENTMCNC: 31.8 G/DL (ref 32–36)
MCV RBC AUTO: 98 FL (ref 82–98)
MONOCYTES # BLD AUTO: 0.7 K/UL (ref 0.3–1)
MONOCYTES NFR BLD: 6.2 % (ref 4–15)
NEUTROPHILS # BLD AUTO: 8.6 K/UL (ref 1.8–7.7)
NEUTROPHILS NFR BLD: 75.3 % (ref 38–73)
NRBC BLD-RTO: 0 /100 WBC
PHOSPHATE SERPL-MCNC: 3.5 MG/DL (ref 2.7–4.5)
PLATELET # BLD AUTO: 255 K/UL (ref 150–450)
PMV BLD AUTO: 10.4 FL (ref 9.2–12.9)
POTASSIUM SERPL-SCNC: 3.5 MMOL/L (ref 3.5–5.1)
PROT SERPL-MCNC: 5.9 G/DL (ref 6–8.4)
RBC # BLD AUTO: 3.8 M/UL (ref 4–5.4)
SODIUM SERPL-SCNC: 140 MMOL/L (ref 136–145)
WBC # BLD AUTO: 11.41 K/UL (ref 3.9–12.7)

## 2024-08-31 PROCEDURE — 84100 ASSAY OF PHOSPHORUS: CPT | Performed by: STUDENT IN AN ORGANIZED HEALTH CARE EDUCATION/TRAINING PROGRAM

## 2024-08-31 PROCEDURE — 36415 COLL VENOUS BLD VENIPUNCTURE: CPT | Performed by: STUDENT IN AN ORGANIZED HEALTH CARE EDUCATION/TRAINING PROGRAM

## 2024-08-31 PROCEDURE — 11000001 HC ACUTE MED/SURG PRIVATE ROOM

## 2024-08-31 PROCEDURE — 95720 EEG PHY/QHP EA INCR W/VEEG: CPT | Mod: ,,, | Performed by: PSYCHIATRY & NEUROLOGY

## 2024-08-31 PROCEDURE — 83735 ASSAY OF MAGNESIUM: CPT | Performed by: STUDENT IN AN ORGANIZED HEALTH CARE EDUCATION/TRAINING PROGRAM

## 2024-08-31 PROCEDURE — 82550 ASSAY OF CK (CPK): CPT | Performed by: STUDENT IN AN ORGANIZED HEALTH CARE EDUCATION/TRAINING PROGRAM

## 2024-08-31 PROCEDURE — 80053 COMPREHEN METABOLIC PANEL: CPT | Performed by: STUDENT IN AN ORGANIZED HEALTH CARE EDUCATION/TRAINING PROGRAM

## 2024-08-31 PROCEDURE — 85025 COMPLETE CBC W/AUTO DIFF WBC: CPT | Performed by: STUDENT IN AN ORGANIZED HEALTH CARE EDUCATION/TRAINING PROGRAM

## 2024-08-31 PROCEDURE — 99233 SBSQ HOSP IP/OBS HIGH 50: CPT | Mod: ,,, | Performed by: PSYCHIATRY & NEUROLOGY
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
## 2024-08-31 NOTE — SIGNIFICANT EVENT
Patient witnessed a having seizures around 2100 and rapids were called. Seizure lasted approximately 3 minutes. Patient had jerking movements in her right leg and left arm. Patient was noted to be in a post-ictal state. Upon arrival to room, patient was noted to be laying flat in bed, spontaneously breathing on 2L NC. [place] and MICU at bedside. Vitals were stable. Labs - CBC, CMP, CK, magnesium, phosphorus, calcium were ordered. STAT CT head was ordered. Spoke with Neuro crit for a STAT EEG.

## 2024-08-31 NOTE — PLAN OF CARE
She is still waiting for placement. David is not her POA anymore. She had a seizure last night. She is on the EEG machine. She is in no distress.

## 2024-08-31 NOTE — ASSESSMENT & PLAN NOTE
78 year old woman with osteoporosis, suspected dementia. Had event of left arm and right leg shaking overnight 8/30-8/31 lasting 3 minutes. No prior seizure history. No provoking factor identified on labs or by history. CTH was negative for acute process. Low suspicion that rivastigmine triggered seizure, but not opposed to holding medication. Patient with first unprovoked lifetime seizure, which on its own does not warrant starting an antiepileptic medication. However, if her EEG were to demonstrate underlying tendency toward seizure, then would start AED.    Recommendations:  - routine EEG  - f/u cap EEG results  - seizure precautions  - PRN ativan for GTC >5 minutes    Neurology will continue to follow. Please call with questions.

## 2024-08-31 NOTE — SUBJECTIVE & OBJECTIVE
History reviewed. No pertinent past medical history.    History reviewed. No pertinent surgical history.    Review of patient's allergies indicates:  No Known Allergies    Current Neurological Medications: none    No current facility-administered medications on file prior to encounter.     No current outpatient medications on file prior to encounter.     Family History    None       Tobacco Use    Smoking status: Never    Smokeless tobacco: Never   Substance and Sexual Activity    Alcohol use: Never    Drug use: Not on file    Sexual activity: Not on file     Review of Systems   Constitutional:  Negative for diaphoresis and fever.   HENT:  Negative for nosebleeds and rhinorrhea.    Eyes:  Negative for discharge and redness.   Respiratory:  Negative for cough and wheezing.    Cardiovascular:  Negative for leg swelling.   Gastrointestinal:  Negative for abdominal distention and vomiting.   Genitourinary:  Negative for difficulty urinating and hematuria.   Neurological:  Positive for seizures. Negative for tremors and facial asymmetry.     Objective:     Vital Signs (Most Recent):  Temp: 98.8 °F (37.1 °C) (08/31/24 1602)  Pulse: 80 (08/31/24 1602)  Resp: 16 (08/31/24 1602)  BP: 124/69 (08/31/24 1602)  SpO2: 98 % (08/31/24 1602) Vital Signs (24h Range):  Temp:  [98.1 °F (36.7 °C)-98.8 °F (37.1 °C)] 98.8 °F (37.1 °C)  Pulse:  [72-92] 80  Resp:  [16-20] 16  SpO2:  [95 %-99 %] 98 %  BP: (111-137)/(68-76) 124/69     Weight: 49.9 kg (110 lb)  Body mass index is 20.12 kg/m².     Physical Exam  Vitals and nursing note reviewed.   Constitutional:       General: She is not in acute distress.  HENT:      Head: Normocephalic.      Comments: EEG cap in place     Nose: Nose normal. No rhinorrhea.   Eyes:      General:         Right eye: No discharge.         Left eye: No discharge.      Extraocular Movements: EOM normal.      Conjunctiva/sclera: Conjunctivae normal.   Pulmonary:      Effort: Pulmonary effort is normal. No  respiratory distress.   Skin:     General: Skin is warm and dry.   Neurological:      Mental Status: She is alert and oriented to person, place, and time.      Coordination: Finger-Nose-Finger Test normal.      Deep Tendon Reflexes:      Reflex Scores:       Bicep reflexes are 3+ on the right side and 3+ on the left side.       Brachioradialis reflexes are 3+ on the right side and 3+ on the left side.       Patellar reflexes are 3+ on the right side and 3+ on the left side.  Psychiatric:         Speech: Speech normal.          NEUROLOGICAL EXAMINATION:     MENTAL STATUS   Oriented to person, place, and time.   Speech: speech is normal   Level of consciousness: alert    CRANIAL NERVES     CN III, IV, VI   Extraocular motions are normal.     CN VII   Facial expression full, symmetric.     CN VIII   Hearing: intact    CN XI   Right trapezius strength: normal  Left trapezius strength: normal    CN XII   Tongue: not atrophic  Tongue deviation: none    MOTOR EXAM   Muscle bulk: normal    Strength   Right deltoid: 4/5  Left deltoid: 4/5  Right biceps: 4/5  Left biceps: 4/5  Right triceps: 4/5  Left triceps: 4/5  Right iliopsoas: 4/5  Left iliopsoas: 4/5  Right quadriceps: 4/5  Left quadriceps: 4/5  Right hamstrin/5  Left hamstrin/5    REFLEXES     Reflexes   Right brachioradialis: 3+  Left brachioradialis: 3+  Right biceps: 3+  Left biceps: 3+  Right patellar: 3+  Left patellar: 3+  Right ankle clonus: absent  Left ankle clonus: absent    SENSORY EXAM   Light touch normal.     GAIT AND COORDINATION      Coordination   Finger to nose coordination: normal    Tremor   Resting tremor: absent  Action tremor: absent      Significant Labs: All pertinent lab results from the past 24 hours have been reviewed.    Significant Imaging: I have reviewed all pertinent imaging results/findings within the past 24 hours.

## 2024-08-31 NOTE — CODE/ RAPID DOCUMENTATION
"RAPID RESPONSE NURSE NOTE        Admit Date: 2024  LOS: 67  Code Status: Full Code   Date of Consult: 2024  : 1945  Age: 78 y.o.  Weight:   Wt Readings from Last 1 Encounters:   24 49.9 kg (110 lb)     Sex: female  Race: White   Bed: [address] [place] [address] A:   MRN: [id number]  Time Rapid Response Team page Received:   Time Rapid Response Team at Bedside:   Time Rapid Response Team left Bedside:   Was the patient discharged from an ICU this admission? No   Was the patient discharged from a PACU within last 24 hours? No   Did the patient receive conscious sedation/general anesthesia in last 24 hours? No  Was the patient in the ED within the past 24 hours? No  Was the patient on NIPPV within the past 24 hours? No   Did this progress into an ARC or CPA: No  Attending Physician: Helena Barrientos MD  Primary Service: [facility] MED 5       SITUATION    Notified by pager.  Reason for alert: Seizure  Called to evaluate the patient for Neuro.    BACKGROUND     Why is the patient in the hospital?: Dementia without behavioral disturbance    Patient has no past medical history on file.    Last Vitals:  Temp: 98.1 °F (36.7 °C) (2003)  Pulse: 90 (2119)  Resp: 18 (2113)  BP: 137/76 (2119)  SpO2: 98 % (2119)    24 Hours Vitals Range:  Temp:  [98.1 °F (36.7 °C)-98.5 °F (36.9 °C)]   Pulse:  [87-92]   Resp:  [17-20]   BP: (111-137)/(68-76)   SpO2:  [96 %-99 %]     Labs:  Recent Labs     24   WBC 11.31   HGB 12.2   HCT 38.1          Recent Labs     24      K 3.6      CO2 16*   BUN 27*   CREATININE 1.1   *   PHOS 4.6*   MG 2.1        No results for input(s): "PH", "PCO2", "PO2", "HCO3", "POCSATURATED", "BE" in the last 72 hours.     ASSESSMENT     Physical Exam  Constitutional:       Comments: Pt appeared post-ictal   Eyes:      Pupils: Pupils are equal, round, and reactive to light.   Cardiovascular:      Rate and Rhythm: Normal rate. "   Pulmonary:      Effort: Pulmonary effort is normal. Tachypnea present.   Genitourinary:     Comments: Pt had incontinence episode during seizure-like activity   Skin:     General: Skin is warm and dry.      Capillary Refill: Capillary refill takes 2 to 3 seconds.   Neurological:      Mental Status: She is lethargic.      GCS: GCS eye subscore is 3. GCS verbal subscore is 3. GCS motor subscore is 5.     RRT paged to room d/t witnessed seizure like activity by pt sitter. Sitter described the episode as a rhythmic movement involving the pt's left arm and right leg that lasted a few minutes. Sitter reports she turned the pt on to her left side during the episode and called for the bedside RN to the room. Upon RRT arrival to room, pt appeared to be post-ictal. Eyes opened to tactile stimuli, pt maintaining own airway and has E/UL respirations. Pt placed on 2L O2 via NC by bedside RN prior to RRT arrival. Pt's O2 maintained between %. RRN obtained repeat blood work ordered by MD at this time. RRT transported pt to and from CT with continuous cardiac monitor, pulse ox, and BP. During transport to CT pt became fully awake, asking staff where she is going, and attempting to get out of bed. Pt has hx of dementia and appears to be back at baseline of Aox2/ GCS 13    INTERVENTIONS    The patient was seen for Neurological problem. Staff concerns included seizure-like activity. The following interventions were performed: POCT blood glucose, CMP, CBC, Mag, phos, lactic acid, and CT head were all order by primary team. Cbg was obtained by bedside RN prior to paging rapid response.     RECOMMENDATIONS    We recommend: Follow up with blood work and CT. Maintain seizure and aspiration precautions. Follow up with neurology recs/orders    PROVIDER ESCALATION    Orders received and case discussed with Dr. Hicks .    Primary team arrival time: 2115    Disposition: Remain in room 1142.    FOLLOW UP    Bedside RNCoral   updated on plan of care. Instructed to call the Rapid Response Nurse, Raghavendra Egan RN at [phone] for additional questions or concerns.

## 2024-08-31 NOTE — CODE/ RAPID DOCUMENTATION
"Medical Emergency Team Consult Note  Critical Care Medicine    CC: Dementia without behavioral disturbance  Date: 08/31/2024  Admit Date: 6/24/2024  Hospital Length of Stay: 67  MRN: [id number]  The patient location is: Bed 1142/1142 A  Dx: Dementia without behavioral disturbance  Code Status: Full Code   Chart Reviewed: 08/31/2024, 3:24 AM      SUBJECTIVE:     HPI:  Mohini Dougherty has no past medical history on file.    Significant Events: Called urgently to the bedside for a YULISSA to evaluate the patient altered mental status and possible seizure.      OBJECTIVE:     Physical Exam  Vitals and nursing note reviewed.   Constitutional:       Appearance: She is ill-appearing.   HENT:      Mouth/Throat:      Mouth: Mucous membranes are moist.      Pharynx: Oropharynx is clear.   Eyes:      Extraocular Movements: Extraocular movements intact.      Pupils: Pupils are equal, round, and reactive to light.      Comments: 4+ PERRL bilaterally   Cardiovascular:      Rate and Rhythm: Normal rate and regular rhythm.      Pulses: Normal pulses.      Heart sounds: Normal heart sounds.   Pulmonary:      Effort: Pulmonary effort is normal. Tachypnea present.      Breath sounds: Normal breath sounds.   Abdominal:      General: Abdomen is flat.      Palpations: Abdomen is soft.   Musculoskeletal:         General: Normal range of motion.      Cervical back: Normal range of motion.      Comments: Withdraws all extremities to noxious stimuli   Skin:     General: Skin is warm and dry.      Capillary Refill: Capillary refill takes less than 2 seconds.   Neurological:      General: No focal deficit present.      Mental Status: She is easily aroused.      GCS: GCS eye subscore is 3. GCS verbal subscore is 4. GCS motor subscore is 5.         Last VS: /76   Pulse 90   Temp 98.1 °F (36.7 °C) (Oral)   Resp 18   Ht 5' 2" (1.575 m)   Wt 49.9 kg (110 lb)   SpO2 98%   Breastfeeding No   BMI 20.12 kg/m²     24H Vital Sign Range:    Temp:  " "[98.1 °F (36.7 °C)-98.5 °F (36.9 °C)]   Pulse:  [87-92]   Resp:  [17-20]   BP: (111-137)/(68-76)   SpO2:  [96 %-99 %]     Level of Consciousness (AVPU): alert      Intake/Output Summary (Last 24 hours) at 2024 0324  Last data filed at 2024 1906  Gross per 24 hour   Intake 720 ml   Output --   Net 720 ml       Recent Labs     24  2136   WBC 11.31   HGB 12.2   HCT 38.1          Recent Labs     24      K 3.6      CO2 16*   BUN 27*   CREATININE 1.1   *   PHOS 4.6*   MG 2.1        No results for input(s): "PH", "PCO2", "PO2", "HCO3", "POCSATURATED", "BE" in the last 72 hours.     Lab Results   Component Value Date    LACTATE 1.8 2024    LACTATE 0.8 2024         ASSESSMENT AND PLAN :     Acute Encephalopathy; Likely s/t seizures    - Rapid response called s/t seizure-like activity exhibited by the patient. Per report from personal tech, pt. With shaking/jerking of right leg and left arm x ~3 minutes. Upon exam, pt. Lethargic but able to awaken briefly to loud verbal stimuli. Withdraws all extremities equally to noxious stimuli. Pupils 4+ PERRL bilaterally. POCT CB. Remained HDS throughout.     Plan:  - F/u stat CTH  - Labs; CBC, CMP, CK, Mag, Phos  - EEG  - Frequent neuro checks  - Neurology consult    Primary team MD (team 5) updated at bedside. Also discussed case with [facility] attending, Dr. Sheffield. Attestation to follow.       Please contact [facility] with questions/concerns/deterioration of patient.       Uninterrupted Critical Care/Counseling Time (not including procedures): 30 minutes  Critical care was time spent personally by me on the following activities: development of treatment plan with patient or surrogate and bedside caregivers, discussions with consultants, evaluation of patient's response to treatment, examination of patient, ordering and performing treatments and interventions, ordering and review of laboratory studies, ordering and review of " radiographic studies, pulse oximetry, re-evaluation of patient's condition. This critical care time did not overlap with that of any other provider or involve time for any procedures.    Erna Cochran [facility]-BC  Pulmonary Critical Care  08/31/2024
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
## 2024-08-31 NOTE — NURSING
Called into room by patient's sitter stating patient seemed to be having seizure-like activity. Upon entering room, patient noted to be lying flat in bed, spontaneous breathing, staring but unable to tract and follow commands. Rapid response called at 2104. Patient placed on the portable bedside monitor. Vital signs WNL. Patient placed on 2L of NC. Suction set up at bedside. . RRT arrived to bedside at approximately 2108. Med team 5 called to bedside. Patient now able to speak and follow commands. Orders placed for labs and CT-head. Patient taken down to CT via RRT escort. Patient back up to unit at approximately 2145.

## 2024-08-31 NOTE — SUBJECTIVE & OBJECTIVE
Interval History: Overnight on 8/30 patient had a witnessed seizure for 3 minutes with jerking of the left arm and right leg, with post-ictal state. Labs with anion gap acidosis, otherwise no findings. Glucose 124. CMP, CBC, lactic acid, CPK WNL. Ionized calcium 1.02. EEG in process. CT head no evidence of acute intracranial abnormalities. Discontinued Rivastigmine. No complaints per patient.     Review of Systems   Unable to perform ROS: Dementia   Cardiovascular:  Negative for chest pain and leg swelling.   Gastrointestinal:  Negative for abdominal pain.   Neurological:  Negative for weakness, light-headedness and headaches.     Objective:     Vital Signs (Most Recent):  Temp: 98.5 °F (36.9 °C) (08/31/24 1149)  Pulse: 79 (08/31/24 1149)  Resp: 16 (08/31/24 1149)  BP: 125/72 (08/31/24 1149)  SpO2: 95 % (08/31/24 0818) Vital Signs (24h Range):  Temp:  [98.1 °F (36.7 °C)-98.5 °F (36.9 °C)] 98.5 °F (36.9 °C)  Pulse:  [72-92] 79  Resp:  [16-20] 16  SpO2:  [95 %-99 %] 95 %  BP: (111-137)/(68-76) 125/72     Weight: 49.9 kg (110 lb)  Body mass index is 20.12 kg/m².    Intake/Output Summary (Last 24 hours) at 8/31/2024 1335  Last data filed at 8/30/2024 1906  Gross per 24 hour   Intake 240 ml   Output --   Net 240 ml         Physical Exam  Constitutional:       Comments: EEG Cap in place   HENT:      Head: Normocephalic and atraumatic.      Right Ear: External ear normal.      Left Ear: External ear normal.      Nose: Nose normal.      Mouth/Throat:      Mouth: Mucous membranes are moist.   Eyes:      Conjunctiva/sclera: Conjunctivae normal.   Cardiovascular:      Rate and Rhythm: Normal rate and regular rhythm.      Heart sounds: Normal heart sounds. No murmur heard.  Pulmonary:      Effort: Pulmonary effort is normal. No respiratory distress.      Breath sounds: Normal breath sounds.   Abdominal:      General: Abdomen is flat.      Palpations: Abdomen is soft.   Musculoskeletal:      Right lower leg: No edema.      Left  lower leg: No edema.   Skin:     General: Skin is warm and dry.   Neurological:      Mental Status: She is alert. She is disoriented.      Comments: Oriented to person, place.              Significant Labs: All pertinent labs within the past 24 hours have been reviewed.    Significant Imaging: I have reviewed all pertinent imaging results/findings within the past 24 hours.

## 2024-08-31 NOTE — ASSESSMENT & PLAN NOTE
8/30 patient had a witnessed seizure for 3 minutes with jerking of the left arm and right leg, with post-ictal state. Labs with anion gap acidosis, otherwise no findings. Glucose 124. CMP, CBC, lactic acid, CPK WNL. Ionized calcium 1.02. EEG in process. CT head no evidence of acute intracranial abnormalities.     -Discontinued Rivastigmine 8/31  -Will evaluate need for AEDs  -F/u EEG

## 2024-08-31 NOTE — PLAN OF CARE
Problem: Adult Inpatient Plan of Care  Goal: Plan of Care Review  Outcome: Progressing     Problem: Confusion Chronic  Goal: Optimal Cognitive Function  Outcome: Progressing     Problem: Fall Injury Risk  Goal: Absence of Fall and Fall-Related Injury  Outcome: Progressing     Problem: Delirium  Goal: Improved Attention and Thought Clarity  Outcome: Progressing     Problem: Seizure, Active Management  Goal: Absence of Seizure/Seizure-Related Injury  Outcome: Progressing

## 2024-09-01 LAB
ALBUMIN SERPL BCP-MCNC: 4 G/DL (ref 3.5–5.2)
ALP SERPL-CCNC: 81 U/L (ref 55–135)
ALT SERPL W/O P-5'-P-CCNC: 20 U/L (ref 10–44)
ANION GAP SERPL CALC-SCNC: 11 MMOL/L (ref 8–16)
AST SERPL-CCNC: 29 U/L (ref 10–40)
BASOPHILS # BLD AUTO: 0.08 K/UL (ref 0–0.2)
BASOPHILS NFR BLD: 0.6 % (ref 0–1.9)
BILIRUB SERPL-MCNC: 0.6 MG/DL (ref 0.1–1)
BILIRUB UR QL STRIP: NEGATIVE
BUN SERPL-MCNC: 16 MG/DL (ref 8–23)
CALCIUM SERPL-MCNC: 9.4 MG/DL (ref 8.7–10.5)
CHLORIDE SERPL-SCNC: 107 MMOL/L (ref 95–110)
CLARITY UR REFRACT.AUTO: CLEAR
CO2 SERPL-SCNC: 22 MMOL/L (ref 23–29)
COLOR UR AUTO: YELLOW
CREAT SERPL-MCNC: 1 MG/DL (ref 0.5–1.4)
DIFFERENTIAL METHOD BLD: ABNORMAL
EOSINOPHIL # BLD AUTO: 0.3 K/UL (ref 0–0.5)
EOSINOPHIL NFR BLD: 2.1 % (ref 0–8)
ERYTHROCYTE [DISTWIDTH] IN BLOOD BY AUTOMATED COUNT: 13.1 % (ref 11.5–14.5)
EST. GFR  (NO RACE VARIABLE): 58.4 ML/MIN/1.73 M^2
GLUCOSE SERPL-MCNC: 104 MG/DL (ref 70–110)
GLUCOSE UR QL STRIP: NEGATIVE
HCT VFR BLD AUTO: 40 % (ref 37–48.5)
HGB BLD-MCNC: 12.9 G/DL (ref 12–16)
HGB UR QL STRIP: NEGATIVE
IMM GRANULOCYTES # BLD AUTO: 0.05 K/UL (ref 0–0.04)
IMM GRANULOCYTES NFR BLD AUTO: 0.4 % (ref 0–0.5)
KETONES UR QL STRIP: NEGATIVE
LEUKOCYTE ESTERASE UR QL STRIP: NEGATIVE
LYMPHOCYTES # BLD AUTO: 1.7 K/UL (ref 1–4.8)
LYMPHOCYTES NFR BLD: 12.5 % (ref 18–48)
MCH RBC QN AUTO: 31.9 PG (ref 27–31)
MCHC RBC AUTO-ENTMCNC: 32.3 G/DL (ref 32–36)
MCV RBC AUTO: 99 FL (ref 82–98)
MONOCYTES # BLD AUTO: 0.6 K/UL (ref 0.3–1)
MONOCYTES NFR BLD: 4.4 % (ref 4–15)
NEUTROPHILS # BLD AUTO: 11 K/UL (ref 1.8–7.7)
NEUTROPHILS NFR BLD: 80 % (ref 38–73)
NITRITE UR QL STRIP: NEGATIVE
NRBC BLD-RTO: 0 /100 WBC
PH UR STRIP: 5 [PH] (ref 5–8)
PLATELET # BLD AUTO: 285 K/UL (ref 150–450)
PMV BLD AUTO: 10.3 FL (ref 9.2–12.9)
POTASSIUM SERPL-SCNC: 4 MMOL/L (ref 3.5–5.1)
PROT SERPL-MCNC: 6.8 G/DL (ref 6–8.4)
PROT UR QL STRIP: NEGATIVE
RBC # BLD AUTO: 4.05 M/UL (ref 4–5.4)
SODIUM SERPL-SCNC: 140 MMOL/L (ref 136–145)
SP GR UR STRIP: 1.02 (ref 1–1.03)
URN SPEC COLLECT METH UR: NORMAL
WBC # BLD AUTO: 13.73 K/UL (ref 3.9–12.7)

## 2024-09-01 PROCEDURE — 81003 URINALYSIS AUTO W/O SCOPE: CPT

## 2024-09-01 PROCEDURE — 95816 EEG AWAKE AND DROWSY: CPT | Mod: 26,,, | Performed by: PSYCHIATRY & NEUROLOGY

## 2024-09-01 PROCEDURE — 95816 EEG AWAKE AND DROWSY: CPT

## 2024-09-01 PROCEDURE — 85025 COMPLETE CBC W/AUTO DIFF WBC: CPT

## 2024-09-01 PROCEDURE — 11000001 HC ACUTE MED/SURG PRIVATE ROOM

## 2024-09-01 PROCEDURE — 25000003 PHARM REV CODE 250

## 2024-09-01 PROCEDURE — 36415 COLL VENOUS BLD VENIPUNCTURE: CPT

## 2024-09-01 PROCEDURE — 80053 COMPREHEN METABOLIC PANEL: CPT

## 2024-09-01 RX ADMIN — THERA TABS 1 TABLET: TAB at 10:09

## 2024-09-01 NOTE — PROCEDURES
ProceduresEXTENDED  ELECTROENCEPHALOGRAM  REPORT    DATE OF SERVICE: 9/1/24  EEG NUMBER: FH   REQUESTED BY:  Ady  LOCATION OF SERVICE:  [facility] [place]    METHODOLOGY   Electroencephalographic (EEG) recording is with electrodes placed according to the International 10-20 placement system.  Thirty two (32) channels of digital signal (sampling rate of 512/sec) including T1 and T2 was simultaneously recorded from the scalp and may include  EKG, EMG, and/or eye monitors.  Recording band pass was 0.1 to 512 hz.  Digital video recording of the patient is simultaneously recorded with the EEG.  The patient is instructed report clinical symptoms which may occur during the recording session.  EEG and video recording is stored and archived in digital format.  Activation procedures which include photic stimulation, hyperventilation and instructing patients to perform simple task are done in selected patients.   The EEG is displayed on a monitor screen and can be reviewed using different montages.  Computer assisted analysis is employed to detect spike and electrographic seizure activity.   The entire record is submitted for computer analysis.  The entire recording is visually reviewed and the times identified by computer analysis as being spikes or seizures are reviewed again.  Compresses spectral analysis (CSA) is also performed on the activity recorded from each individual channel.  This is displayed as a power display of frequencies from 0 to 30 Hz over time.   The CSA is reviewed looking for asymmetries in power between homologous areas of the scalp and then compared with the original EEG recording.     [employer] software was also utilized in the review of this study.  This software suite analyzes the EEG recording in multiple domains.  Coherence and rhythmicity is computed to identify EEG sections which may contain organized seizures.  Each channel undergoes analysis to detect presence of spike and sharp waves which have special  and morphological characteristic of epileptic activity.  The routine EEG recording is converted from spacial into frequency domain.  This is then displayed comparing homologous areas to identify areas of significant asymmetry.  Algorithm to identify non-cortically generated artifact is used to separate eye movement, EMG and other artifact from the EEG.      RECORDING TIMES      EEG FINDINGS  The record shows a good  organization at rest, consisting of a 9 Hz posterior dominant rhythm with good  reactivity. There is mild bilateral beta activity. There is occasional mild diffuse theta range background slowing.    Drowsiness is characterized by attenuation of the background, vertex waves, and bilateral theta slowing.     Provocative maneuvers including hyperventilation and photic stimulation were not performed.     EKG recording shows a regular rhythm.    There is no push button or clinical event.    IMPRESSION:  Abnormal study due to mild  diffuse background slowing consistent with diffuse cerebral dysfunction and encephalopathy which may be on the basis of toxic, metabolic, or primary neuronal disorder.     Willie Weber MD

## 2024-09-01 NOTE — PROCEDURES
EEG    Date/Time: 6/24/2024 3:53 PM    Performed by: Willie Weber MD  Authorized by: Wisam Garsia DO      EXTENDED  ELECTROENCEPHALOGRAM  REPORT    DATE OF SERVICE: 8/30/24-9/1/24  EEG NUMBER: FH   REQUESTED BY:  Ady  LOCATION OF SERVICE:  [place]    METHODOLOGY   Electroencephalographic (EEG) recording is with electrodes placed according to the International 10-20 placement system.  Thirty two (32) channels of digital signal (sampling rate of 512/sec) including T1 and T2 was simultaneously recorded from the scalp and may include  EKG, EMG, and/or eye monitors.  Recording band pass was 0.1 to 512 hz.  Digital video recording of the patient is simultaneously recorded with the EEG.  The patient is instructed report clinical symptoms which may occur during the recording session.  EEG and video recording is stored and archived in digital format.  Activation procedures which include photic stimulation, hyperventilation and instructing patients to perform simple task are done in selected patients.   The EEG is displayed on a monitor screen and can be reviewed using different montages.  Computer assisted analysis is employed to detect spike and electrographic seizure activity.   The entire record is submitted for computer analysis.  The entire recording is visually reviewed and the times identified by computer analysis as being spikes or seizures are reviewed again.  Compresses spectral analysis (CSA) is also performed on the activity recorded from each individual channel.  This is displayed as a power display of frequencies from 0 to 30 Hz over time.   The CSA is reviewed looking for asymmetries in power between homologous areas of the scalp and then compared with the original EEG recording.     [employer] software was also utilized in the review of this study.  This software suite analyzes the EEG recording in multiple domains.  Coherence and rhythmicity is computed to identify EEG sections which may contain  organized seizures.  Each channel undergoes analysis to detect presence of spike and sharp waves which have special and morphological characteristic of epileptic activity.  The routine EEG recording is converted from spacial into frequency domain.  This is then displayed comparing homologous areas to identify areas of significant asymmetry.  Algorithm to identify non-cortically generated artifact is used to separate eye movement, EMG and other artifact from the EEG.      RECORDING TIMES  Start on 8/30/24 at 09:34   Stop on 9/1/24  at 01:17     A total of  15 hrs of EEG recording was obtained.    EEG FINDINGS  The record is extremely limited due to electrode artifact. The study shows a poor organization at rest with no posterior dominant rhythm with fair  reactivity. There is mild bilateral beta activity. There is continuous diffuse delta and theta range background slowing.    Drowsiness is characterized by attenuation of the background, vertex waves, and bilateral theta slowing.    Provocative maneuvers including hyperventilation and photic stimulation were not performed.     EKG recording shows a regular rhythm.    There is no push button or clinical event.    IMPRESSION:  Abnormal study due to moderate  diffuse background slowing consistent with diffuse cerebral dysfunction and encephalopathy which may be on the basis of toxic, metabolic, or primary neuronal disorder.     Willie Weber MD

## 2024-09-01 NOTE — PLAN OF CARE
Problem: Adult Inpatient Plan of Care  Goal: Plan of Care Review  Outcome: Progressing  Flowsheets (Taken 9/1/2024 0549)  Plan of Care Reviewed With: patient  Goal: Patient-Specific Goal (Individualized)  Outcome: Progressing  Goal: Absence of Hospital-Acquired Illness or Injury  Outcome: Progressing  Intervention: Prevent Skin Injury  Flowsheets (Taken 9/1/2024 0549)  Body Position: position changed independently  Device Skin Pressure Protection: adhesive use limited  Intervention: Prevent and Manage VTE (Venous Thromboembolism) Risk  Flowsheets (Taken 9/1/2024 0549)  VTE Prevention/Management:   bleeding precations maintained   bleeding risk assessed   ambulation promoted  Intervention: Prevent Infection  Flowsheets (Taken 9/1/2024 0549)  Infection Prevention: hand hygiene promoted  Goal: Optimal Comfort and Wellbeing  Outcome: Progressing  Intervention: Monitor Pain and Promote Comfort  Flowsheets (Taken 9/1/2024 0549)  Pain Management Interventions: care clustered  Intervention: Provide Person-Centered Care  Flowsheets (Taken 9/1/2024 0549)  Trust Relationship/Rapport:   care explained   thoughts/feelings acknowledged  Goal: Readiness for Transition of Care  Outcome: Progressing     Problem: Skin Injury Risk Increased  Goal: Skin Health and Integrity  Outcome: Progressing  Intervention: Optimize Skin Protection  Flowsheets (Taken 9/1/2024 0549)  Pressure Reduction Techniques: frequent weight shift encouraged     Problem: Confusion Chronic  Goal: Optimal Cognitive Function  Outcome: Progressing  Intervention: Minimize Injury Risk and Provide Safety  Flowsheets (Taken 9/1/2024 0549)  Enhanced Safety Measures:  at bedside  Intervention: Minimize and Manage Confusion  Flowsheets (Taken 9/1/2024 0549)  Environment Familiarity/Consistency:   daily routine followed   personal clothing/items utilized  Self-Care Promotion: independence encouraged  Sensory Stimulation Regulation:   care clustered   quiet  environment promoted  Family/Support System Care: self-care encouraged  Environmental Support: calm environment promoted     Problem: Fall Injury Risk  Goal: Absence of Fall and Fall-Related Injury  Outcome: Progressing  Intervention: Identify and Manage Contributors  Flowsheets (Taken 9/1/2024 0549)  Self-Care Promotion: independence encouraged     Problem: Delirium  Goal: Optimal Coping  Outcome: Progressing  Intervention: Optimize Psychosocial Adjustment to Delirium  Flowsheets (Taken 9/1/2024 0549)  Supportive Measures:   active listening utilized   verbalization of feelings encouraged  Family/Support System Care: self-care encouraged  Goal: Improved Behavioral Control  Outcome: Progressing  Intervention: Prevent and Manage Agitation  Flowsheets (Taken 9/1/2024 0549)  Environment Familiarity/Consistency:   daily routine followed   personal clothing/items utilized  Intervention: Minimize Safety Risk  Flowsheets (Taken 9/1/2024 0549)  Trust Relationship/Rapport:   care explained   thoughts/feelings acknowledged  Enhanced Safety Measures:  at bedside  Goal: Improved Attention and Thought Clarity  Outcome: Progressing  Intervention: Maximize Cognitive Function  Flowsheets (Taken 9/1/2024 0549)  Sensory Stimulation Regulation:   care clustered   quiet environment promoted  Goal: Improved Sleep  Outcome: Progressing  Intervention: Promote Sleep  Flowsheets (Taken 9/1/2024 0549)  Sleep/Rest Enhancement:   awakenings minimized   regular sleep/rest pattern promoted   relaxation techniques promoted   room darkened     Problem: Seizure, Active Management  Goal: Absence of Seizure/Seizure-Related Injury  Outcome: Progressing

## 2024-09-01 NOTE — SUBJECTIVE & OBJECTIVE
Interval History: NAEON. Patient HD, on room air. EEG resulted, no plan to start AEDs. Pending dispo.     Review of Systems   Unable to perform ROS: Dementia   Neurological:  Negative for dizziness, weakness and headaches.     Objective:     Vital Signs (Most Recent):  Temp: 98.1 °F (36.7 °C) (09/01/24 0818)  Pulse: (!) 115 (09/01/24 0821)  Resp: 17 (09/01/24 0818)  BP: 94/66 (09/01/24 0845)  SpO2: 98 % (09/01/24 0818) Vital Signs (24h Range):  Temp:  [98.1 °F (36.7 °C)-98.8 °F (37.1 °C)] 98.1 °F (36.7 °C)  Pulse:  [] 115  Resp:  [16-17] 17  SpO2:  [98 %] 98 %  BP: ()/(65-72) 94/66     Weight: 49.9 kg (110 lb)  Body mass index is 20.12 kg/m².  No intake or output data in the 24 hours ending 09/01/24 1110      Physical Exam  Constitutional:       General: She is not in acute distress.     Appearance: Normal appearance.   HENT:      Head: Normocephalic and atraumatic.      Right Ear: External ear normal.      Left Ear: External ear normal.      Nose: Nose normal.      Mouth/Throat:      Mouth: Mucous membranes are moist.   Eyes:      Conjunctiva/sclera: Conjunctivae normal.   Cardiovascular:      Rate and Rhythm: Normal rate and regular rhythm.      Heart sounds: Normal heart sounds. No murmur heard.  Pulmonary:      Effort: Pulmonary effort is normal.      Breath sounds: Normal breath sounds.   Abdominal:      General: Abdomen is flat.   Musculoskeletal:      Right lower leg: No edema.      Left lower leg: No edema.   Skin:     General: Skin is warm and dry.   Neurological:      Mental Status: She is alert and oriented to person, place, and time.   Psychiatric:         Mood and Affect: Mood normal.         Behavior: Behavior normal.         Thought Content: Thought content normal.         Judgment: Judgment normal.             Significant Labs: All pertinent labs within the past 24 hours have been reviewed.    Significant Imaging: I have reviewed all pertinent imaging results/findings within the past 24  hours.

## 2024-09-01 NOTE — ASSESSMENT & PLAN NOTE
8/30 patient had a witnessed seizure for 3 minutes with jerking of the left arm and right leg, with post-ictal state. Labs with anion gap acidosis, otherwise no findings. Glucose 124. CMP, CBC, lactic acid, CPK WNL. Ionized calcium 1.02. CT head no evidence of acute intracranial abnormalities. No provoking factor identified in labs/history.  Per Neuro, low suspicion that rivastigmine triggered seizure, but not opposed to holding medication.     EEG: abnormal study due to moderate diffuse background slowing consistent with diffuse cerebral dysfunction and encephalopathy which may be on the basis of toxic, metabolic, or primary neuronal disorder.     - Discontinued Rivastigmine 8/31  - First unprovoked lifetime seizure- No current plans for AEDs  - Seizure precautions   - PRN Ativan for GTC>5 min
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
## 2024-09-02 PROCEDURE — 11000001 HC ACUTE MED/SURG PRIVATE ROOM

## 2024-09-02 PROCEDURE — 25000003 PHARM REV CODE 250

## 2024-09-02 RX ADMIN — THERA TABS 1 TABLET: TAB at 09:09

## 2024-09-02 NOTE — PLAN OF CARE
General Neurology Service - Plan of Care Note    78 year old woman with osteoporosis, suspected dementia. Had event of left arm and right leg shaking overnight 8/30-8/31 lasting 3 minutes. No prior seizure history. No provoking factor identified on labs or by history. CTH was negative for acute process. Patient with first unprovoked lifetime seizure, which on its own does not warrant starting an antiepileptic medication.     Obtained routine EEG for further evaluation of underlying seizure tendency, which was negative for epileptiform discharge. Does not require starting an AED at this time for single lifetime unprovoked seizure.    Neurology will sign off at this time. Please call with questions.

## 2024-09-02 NOTE — PLAN OF CARE
Problem: Adult Inpatient Plan of Care  Goal: Plan of Care Review  Outcome: Met  Goal: Patient-Specific Goal (Individualized)  Outcome: Met  Goal: Absence of Hospital-Acquired Illness or Injury  Outcome: Met  Goal: Optimal Comfort and Wellbeing  Outcome: Met  Goal: Readiness for Transition of Care  Outcome: Met     Problem: Skin Injury Risk Increased  Goal: Skin Health and Integrity  Outcome: Met     Problem: Confusion Chronic  Goal: Optimal Cognitive Function  Outcome: Met     Problem: Fall Injury Risk  Goal: Absence of Fall and Fall-Related Injury  Outcome: Met     Problem: Delirium  Goal: Optimal Coping  Outcome: Met  Goal: Improved Behavioral Control  Outcome: Met  Goal: Improved Attention and Thought Clarity  Outcome: Met  Goal: Improved Sleep  Outcome: Met     Problem: Seizure, Active Management  Goal: Absence of Seizure/Seizure-Related Injury  Outcome: Met

## 2024-09-02 NOTE — ASSESSMENT & PLAN NOTE
8/30 patient had a witnessed seizure for 3 minutes with jerking of the left arm and right leg, with post-ictal state. Labs with anion gap acidosis, otherwise no findings. Glucose 124. CMP, CBC, lactic acid, CPK WNL. Ionized calcium 1.02. CT head no evidence of acute intracranial abnormalities. No provoking factor identified in labs/history.  Per Neuro, low suspicion that rivastigmine triggered seizure, but not opposed to holding medication.     EEG: abnormal study due to moderate diffuse background slowing consistent with diffuse cerebral dysfunction and encephalopathy which may be on the basis of toxic, metabolic, or primary neuronal disorder.     - Discontinued Rivastigmine 8/31  - First unprovoked lifetime seizure- No plans for AEDs. Neuro signed off.   - Seizure precautions   - PRN Ativan for GTC>5 min

## 2024-09-02 NOTE — SUBJECTIVE & OBJECTIVE
Interval History: NAEON. Neurosurgery not recommending AEDs, signed off. Patient with no complaints this morning, pleasant mood. Pending dispo.     Review of Systems   Unable to perform ROS: Dementia   Gastrointestinal:  Negative for abdominal pain.   Neurological:  Negative for dizziness, seizures, weakness and light-headedness.     Objective:     Vital Signs (Most Recent):  Temp: 97.9 °F (36.6 °C) (09/02/24 0719)  Pulse: 84 (09/02/24 0719)  Resp: 17 (09/02/24 0719)  BP: 123/79 (09/02/24 0719)  SpO2: 97 % (09/02/24 0719) Vital Signs (24h Range):  Temp:  [97.9 °F (36.6 °C)-98.2 °F (36.8 °C)] 97.9 °F (36.6 °C)  Pulse:  [75-84] 84  Resp:  [17-20] 17  SpO2:  [96 %-97 %] 97 %  BP: (102-143)/(51-79) 123/79     Weight: 49.9 kg (110 lb)  Body mass index is 20.12 kg/m².    Intake/Output Summary (Last 24 hours) at 9/2/2024 0903  Last data filed at 9/2/2024 0617  Gross per 24 hour   Intake 120 ml   Output --   Net 120 ml         Physical Exam  Constitutional:       General: She is not in acute distress.     Appearance: Normal appearance. She is not ill-appearing.   HENT:      Head: Normocephalic and atraumatic.      Right Ear: External ear normal.      Left Ear: External ear normal.      Nose: Nose normal.      Mouth/Throat:      Mouth: Mucous membranes are moist.   Eyes:      Conjunctiva/sclera: Conjunctivae normal.   Cardiovascular:      Rate and Rhythm: Normal rate and regular rhythm.      Heart sounds: Normal heart sounds. No murmur heard.  Pulmonary:      Effort: Pulmonary effort is normal. No respiratory distress.      Breath sounds: Normal breath sounds.   Abdominal:      General: Abdomen is flat.      Palpations: Abdomen is soft.      Tenderness: There is no abdominal tenderness.   Musculoskeletal:      Right lower leg: No edema.      Left lower leg: No edema.   Skin:     General: Skin is warm and dry.   Neurological:      Mental Status: She is alert. She is disoriented.      Comments: Oriented to self, place    Psychiatric:         Mood and Affect: Mood normal.         Behavior: Behavior normal.         Thought Content: Thought content normal.         Judgment: Judgment normal.             Significant Labs: All pertinent labs within the past 24 hours have been reviewed.    Significant Imaging: I have reviewed all pertinent imaging results/findings within the past 24 hours.
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
## 2024-09-03 PROBLEM — R41.89 COGNITIVE AND BEHAVIORAL CHANGES: Status: ACTIVE | Noted: 2024-06-24

## 2024-09-03 PROBLEM — R46.89 COGNITIVE AND BEHAVIORAL CHANGES: Status: ACTIVE | Noted: 2024-06-24

## 2024-09-03 PROCEDURE — 11000001 HC ACUTE MED/SURG PRIVATE ROOM

## 2024-09-03 PROCEDURE — 25000003 PHARM REV CODE 250

## 2024-09-03 RX ADMIN — THERA TABS 1 TABLET: TAB at 09:09

## 2024-09-03 NOTE — MEDICARE RECERTIFICATION
MEDICARE INPATIENT  PHYSICIAN RECERTIFICATION  OF MEDICAL NECESSITY      3rd Recertification    I certify that this patient's continued medical needs require pending NH placement with memory care unit as patient has dementia and now unable to care for herself. Patient now without POA. She lacks capacity regarding decision to DC home. I estimate that the patient will be in the hospital for another 2 days.  Post-acute planning is in process, and currently the patient will likely be discharged to  Nursing home with Memory Care Unit .

## 2024-09-03 NOTE — SUBJECTIVE & OBJECTIVE
Interval History: NAEON. Vitals stable, on room air. Patient tearful this morning, wishes to be moved out of hospital and reunited with her dogs. Pending dispo.     Review of Systems   Unable to perform ROS: Dementia (No compliants today.)   Cardiovascular:  Negative for chest pain.   Gastrointestinal:  Negative for abdominal pain.   Neurological:  Negative for dizziness, seizures, weakness and light-headedness.     Objective:     Vital Signs (Most Recent):  Temp: 98 °F (36.7 °C) (09/03/24 0747)  Pulse: 86 (09/03/24 0747)  Resp: 14 (09/03/24 0747)  BP: 126/81 (09/03/24 0747)  SpO2: 99 % (09/03/24 0747) Vital Signs (24h Range):  Temp:  [98 °F (36.7 °C)-98.6 °F (37 °C)] 98 °F (36.7 °C)  Pulse:  [75-86] 86  Resp:  [14-18] 14  SpO2:  [97 %-99 %] 99 %  BP: (113-126)/(71-81) 126/81     Weight: 49.9 kg (110 lb)  Body mass index is 20.12 kg/m².  No intake or output data in the 24 hours ending 09/03/24 1446      Physical Exam  Constitutional:       General: She is not in acute distress.     Appearance: Normal appearance. She is not ill-appearing.   HENT:      Head: Normocephalic and atraumatic.      Right Ear: External ear normal.      Left Ear: External ear normal.      Nose: Nose normal.      Mouth/Throat:      Mouth: Mucous membranes are moist.   Eyes:      Conjunctiva/sclera: Conjunctivae normal.   Cardiovascular:      Rate and Rhythm: Normal rate and regular rhythm.      Heart sounds: Normal heart sounds.   Pulmonary:      Effort: Pulmonary effort is normal.      Breath sounds: Normal breath sounds.   Abdominal:      General: Abdomen is flat.      Palpations: Abdomen is soft.      Tenderness: There is no abdominal tenderness.   Musculoskeletal:      Right lower leg: No edema.      Left lower leg: No edema.   Skin:     General: Skin is warm and dry.   Neurological:      Mental Status: She is alert. She is disoriented.      Comments: Oriented to self, place.              Significant Labs: All pertinent labs within the  past 24 hours have been reviewed.    Significant Imaging: I have reviewed all pertinent imaging results/findings within the past 24 hours.
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
## 2024-09-04 PROBLEM — D72.829 LEUKOCYTOSIS: Status: RESOLVED | Noted: 2024-07-17 | Resolved: 2024-09-04

## 2024-09-04 PROCEDURE — 11000001 HC ACUTE MED/SURG PRIVATE ROOM

## 2024-09-04 PROCEDURE — 25000003 PHARM REV CODE 250

## 2024-09-04 RX ADMIN — THERA TABS 1 TABLET: TAB at 09:09
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
## 2024-09-04 NOTE — SUBJECTIVE & OBJECTIVE
"Interval History: No acute events overnight, afebrile, hemodynamically stable.       Review of Systems   Unable to perform ROS: Dementia (No compliants today.)   Cardiovascular:  Negative for chest pain.   Gastrointestinal:  Negative for diarrhea, nausea and vomiting.     Objective:     Vital Signs (Most Recent):  Temp: 98.5 °F (36.9 °C) (09/03/24 1939)  Pulse: 69 (09/04/24 1130)  Resp: 17 (09/04/24 1130)  BP: 118/67 (09/04/24 1130)  SpO2: 98 % (09/04/24 1130) Vital Signs (24h Range):  Temp:  [98.4 °F (36.9 °C)-98.5 °F (36.9 °C)] 98.5 °F (36.9 °C)  Pulse:  [69-81] 69  Resp:  [14-18] 17  SpO2:  [97 %-98 %] 98 %  BP: (109-122)/(61-76) 118/67     Weight: 49.9 kg (110 lb)  Body mass index is 20.12 kg/m².  No intake or output data in the 24 hours ending 09/04/24 1428      Physical Exam  Constitutional:       General: She is not in acute distress.     Appearance: Normal appearance. She is not ill-appearing.   HENT:      Head: Normocephalic and atraumatic.      Right Ear: External ear normal.      Left Ear: External ear normal.      Nose: Nose normal.      Mouth/Throat:      Mouth: Mucous membranes are moist.   Eyes:      Conjunctiva/sclera: Conjunctivae normal.   Cardiovascular:      Rate and Rhythm: Normal rate and regular rhythm.      Heart sounds: Normal heart sounds.   Pulmonary:      Effort: Pulmonary effort is normal.      Breath sounds: Normal breath sounds.   Abdominal:      General: Abdomen is flat.      Palpations: Abdomen is soft.      Tenderness: There is no abdominal tenderness.   Musculoskeletal:      Right lower leg: No edema.      Left lower leg: No edema.   Skin:     General: Skin is warm and dry.   Neurological:      Mental Status: She is alert.      Comments: Oriented to self, place.              Significant Labs: All pertinent labs within the past 24 hours have been reviewed.  CBC: No results for input(s): "WBC", "HGB", "HCT", "PLT" in the last 48 hours.  CMP: No results for input(s): "NA", "K", "CL", " ""CO2", "GLU", "BUN", "CREATININE", "CALCIUM", "PROT", "ALBUMIN", "BILITOT", "ALKPHOS", "AST", "ALT", "ANIONGAP", "EGFRNONAA" in the last 48 hours.    Invalid input(s): "ESTGFAFRICA"    Significant Imaging: I have reviewed all pertinent imaging results/findings within the past 24 hours.  "

## 2024-09-04 NOTE — PLAN OF CARE
[facility] - Internal Medicine Telemetry  Discharge Reassessment    Primary Care Provider: Edwar Castaneda II, MD    Expected Discharge Date:     Reassessment (most recent)       Discharge Reassessment - 09/04/24 1524          Discharge Reassessment    Assessment Type Discharge Planning Reassessment (P)      Did the patient's condition or plan change since previous assessment? No (P)      Discharge Plan discussed with: Patient (P)      Communicated MARVEL with patient/caregiver Date not available/Unable to determine (P)      Discharge Plan A New Nursing Home placement - [facility] care facility (P)      Discharge Plan B New Nursing Home placement - [facility] care facility (P)      DME Needed Upon Discharge  none (P)      Transition of Care Barriers Social;No family/friends to help (P)      Why the patient remains in the hospital Placement issues (P)         Post-Acute Status    Post-Acute Authorization Placement (P)      Post-Acute Placement Status Referrals Sent (P)      Coverage [facility] (P)      Discharge Delays Post-Acute Set-up (P)                  CM spoke with pt in room.  Offered resources to help keep her occupied like puzzle books, books, radio.  Pt declined, states she will just watch TV. Pt expressed concern about her animals.    CM sent updated MD note to [facility], [facility], and [facility].    MARTA Cantu, BS, RN, [facility]      Discharge Plan A and Plan B have been determined by review of patient's clinical status, future medical and therapeutic needs, and coverage/benefits for post-acute care in coordination with multidisciplinary team members.

## 2024-09-05 PROCEDURE — 11000001 HC ACUTE MED/SURG PRIVATE ROOM

## 2024-09-05 PROCEDURE — 25000003 PHARM REV CODE 250

## 2024-09-05 RX ADMIN — THERA TABS 1 TABLET: TAB at 08:09

## 2024-09-05 NOTE — SUBJECTIVE & OBJECTIVE
Interval History: NAEON. HD, on RA. Pending dispo.     Review of Systems   Unable to perform ROS: Dementia   Cardiovascular:  Negative for chest pain.   Gastrointestinal:  Negative for abdominal pain.   Neurological:  Negative for dizziness, seizures, weakness and light-headedness.     Objective:     Vital Signs (Most Recent):  Temp: 98 °F (36.7 °C) (09/05/24 0809)  Pulse: 73 (09/05/24 0809)  Resp: 19 (09/05/24 0809)  BP: 115/79 (09/05/24 0809)  SpO2: 98 % (09/05/24 0809) Vital Signs (24h Range):  Temp:  [98 °F (36.7 °C)-98.9 °F (37.2 °C)] 98 °F (36.7 °C)  Pulse:  [73-82] 73  Resp:  [17-19] 19  SpO2:  [96 %-98 %] 98 %  BP: (105-115)/(56-79) 115/79     Weight: 49.9 kg (110 lb)  Body mass index is 20.12 kg/m².  No intake or output data in the 24 hours ending 09/05/24 1649      Physical Exam  Constitutional:       General: She is not in acute distress.     Appearance: She is normal weight. She is not ill-appearing.   HENT:      Head: Normocephalic and atraumatic.      Right Ear: External ear normal.      Left Ear: External ear normal.      Nose: Nose normal.      Mouth/Throat:      Mouth: Mucous membranes are moist.   Eyes:      Conjunctiva/sclera: Conjunctivae normal.   Cardiovascular:      Pulses: Normal pulses.   Pulmonary:      Effort: Pulmonary effort is normal. No respiratory distress.   Abdominal:      General: Abdomen is flat.      Palpations: Abdomen is soft.   Musculoskeletal:      Right lower leg: No edema.      Left lower leg: No edema.   Skin:     General: Skin is warm and dry.   Neurological:      Mental Status: She is alert. She is disoriented.      Comments: Oriented to self and place.             Significant Labs: All pertinent labs within the past 24 hours have been reviewed.    Significant Imaging: I have reviewed all pertinent imaging results/findings within the past 24 hours.
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
## 2024-09-06 PROBLEM — H40.9 GLAUCOMA (INCREASED EYE PRESSURE): Status: ACTIVE | Noted: 2024-09-06

## 2024-09-06 PROCEDURE — 11000001 HC ACUTE MED/SURG PRIVATE ROOM

## 2024-09-06 PROCEDURE — 25000003 PHARM REV CODE 250

## 2024-09-06 RX ADMIN — THERA TABS 1 TABLET: TAB at 08:09

## 2024-09-06 NOTE — PROGRESS NOTES
"Medical Nutrition Therapy        Reason for Assessment: RD follow-up/LOS  Dx: Encephalopathy   Medical Hx: -      General Info: Spoke w/ pt at bedside, pt continues to tolerate diet w/ 75% PO intake.  Per chart review, pt w/ UBW of 110# x 4 years. Pt appears thin, however no indicators of malnutrition noted.      Current Diet: Regular  % intake of meals: 75%     Ht: 5'2"  Wt: 50kg   BMI: 20.1     Labs: Reviewed  Meds: Reviewed     Overall Physical Appearance: Thin     Level of Risk: Low     Nutrition Dx: dementia without behavioral disturbance     RD follow-up? Yes  "

## 2024-09-06 NOTE — NURSING
"Entered patients room to tell her about ophthalmology coming, patient stated, "if its not her primary ophthalmologist they dont need to come see her". she went on a rant about being in assisted and to put myself in her shoes saying that what If I couldn't leave, and she could be here til she dies. Patient getting notably upset and voice raising some, this nurse excused herself from room to answer a phone call from unit secretary. Personal sitter remains at bedside. Ophthalmologist went in right after and she refused to let him do anything. Primary team, , Charge nurse aware. Safety measures remain in place at all times, neuro checks q4h in place, WNL. Sitter at bedside consoling patient.    "

## 2024-09-06 NOTE — ASSESSMENT & PLAN NOTE
"Patient stated she had regular eye doctor that was taking care of her. States she previously was on drops and got laser treatment (Possibly SLT(?)). States she is no longer on eye drops. Patient denies eye pain, changes in vision, blurry vision.     Ophtho consulted. During interview, patient became visibly upset and yelling about being "locked in senior care". Interview terminated.      Unable to assess intraocular pressure. Patient is currently on neuro checks and cannot be dilated. Low suspicion for any acute event. Per chart review, cannot find any previous home eye meds.      Will re-consult ophthalmology if patient becomes more cooperative or acute concerns arise.   "
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
## 2024-09-06 NOTE — NURSING
Patient off the floor with personal sitter to mass on first floor hospital. No distress noted, left in stable condition. MD aware off KARSTEN for Mass.     0937- patient back to room, sitter at bedside, no distress noted.

## 2024-09-06 NOTE — SUBJECTIVE & OBJECTIVE
"Interval History: NAEON. Patient hemodynamically stable on room air. Patient in good spirits, looking forward to dog therapy. States that she has some postnasal drip, otherwise no health complaints. Pending dispo.   Patient has mentioned that she sees an ophthalmologist on 's for elevated eye pressures. Unable to find any evidence of ophtho intervention in the chart (no medications, visit notes, etc). Ophtho consulted to evaluate patient, but she declined exam, became visibly upset and yelling about being "locked in long term".      Review of Systems   Unable to perform ROS: Dementia   HENT:  Positive for postnasal drip.    Cardiovascular:  Negative for chest pain.   Gastrointestinal:  Negative for abdominal pain.     Objective:     Vital Signs (Most Recent):  Temp: 98.3 °F (36.8 °C) (09/06/24 0752)  Pulse: 74 (09/06/24 0752)  Resp: 18 (09/06/24 0752)  BP: 113/66 (09/06/24 0752)  SpO2: 96 % (09/06/24 0752) Vital Signs (24h Range):  Temp:  [98.3 °F (36.8 °C)] 98.3 °F (36.8 °C)  Pulse:  [74-82] 74  Resp:  [18] 18  SpO2:  [96 %] 96 %  BP: (104-113)/(63-66) 113/66     Weight: 49.9 kg (110 lb)  Body mass index is 20.12 kg/m².    Intake/Output Summary (Last 24 hours) at 9/6/2024 0923  Last data filed at 9/6/2024 0807  Gross per 24 hour   Intake 200 ml   Output --   Net 200 ml         Physical Exam  Constitutional:       General: She is not in acute distress.     Appearance: Normal appearance. She is not ill-appearing.   HENT:      Head: Normocephalic and atraumatic.      Right Ear: External ear normal.      Left Ear: External ear normal.      Nose: Nose normal.      Mouth/Throat:      Mouth: Mucous membranes are moist.   Eyes:      Conjunctiva/sclera: Conjunctivae normal.   Cardiovascular:      Rate and Rhythm: Normal rate and regular rhythm.      Heart sounds: Normal heart sounds. No murmur heard.  Pulmonary:      Effort: Pulmonary effort is normal. No respiratory distress.      Breath sounds: Normal breath sounds. "   Abdominal:      General: Abdomen is flat.      Palpations: Abdomen is soft.      Tenderness: There is no abdominal tenderness.   Musculoskeletal:      Right lower leg: No edema.      Left lower leg: No edema.   Skin:     General: Skin is warm and dry.   Neurological:      Mental Status: She is alert. She is disoriented.      Comments: Oriented to self and place.             Significant Labs: All pertinent labs within the past 24 hours have been reviewed.    Significant Imaging: I have reviewed all pertinent imaging results/findings within the past 24 hours.

## 2024-09-06 NOTE — CONSULTS
"Plan of Care  Date: 9/6/24     Ophthalmology consulted for "Elevated Eye Pressure". On attempting to see patient, patient declined examination. Per chart review, patient admitted for AMS and only oriented to self and place.     Patient stated she had regular eye doctor that was taking care of her. States she previously was on drops and got laser treatment (Possibly SLT(?)). States she is no longer on eye drops. Patient denies eye pain, changes in vision, blurry vision. During interview, patient became visibly upset and yelling about being "locked in group home". Interview terminated.     Unable to assess intraocular pressure. Patient is currently on neuro checks and cannot be dilated. Low suspicion for any acute event. Per chart review, cannot find any previous home eye meds.     Please re-consult ophthalmology if patient becomes more cooperative or acute concerns arise.     Gabo Garcia MD   [facility], PGY-2  "

## 2024-09-07 PROCEDURE — 11000001 HC ACUTE MED/SURG PRIVATE ROOM

## 2024-09-07 PROCEDURE — 25000003 PHARM REV CODE 250

## 2024-09-07 RX ADMIN — THERA TABS 1 TABLET: TAB at 09:09

## 2024-09-07 NOTE — ASSESSMENT & PLAN NOTE
"Patient stated she had regular eye doctor that was taking care of her. States she previously was on drops and got laser treatment (Possibly SLT(?)). States she is no longer on eye drops. Patient denies eye pain, changes in vision, blurry vision.     Ophtho consulted. During interview, patient became visibly upset and yelling about being "locked in snf". Interview terminated. Unable to assess intraocular pressure. Low suspicion for any acute event. Per chart review, cannot find any previous home eye meds.      - Will re-consult ophthalmology if patient becomes more cooperative or acute concerns arise.   "

## 2024-09-07 NOTE — PLAN OF CARE
Problem: Adult Inpatient Plan of Care  Goal: Plan of Care Review  9/7/2024 0838 by Rosanna Rojo LPN  Outcome: Progressing  9/7/2024 0838 by Rosanna Rojo LPN  Reactivated  Goal: Patient-Specific Goal (Individualized)  9/7/2024 0838 by Rosanna Rojo LPN  Outcome: Progressing  9/7/2024 0838 by Rosanna Rojo LPN  Reactivated  Goal: Absence of Hospital-Acquired Illness or Injury  9/7/2024 0838 by Rosanna Rojo LPN  Outcome: Progressing  9/7/2024 0838 by Rosanna Rojo LPN  Reactivated  Goal: Optimal Comfort and Wellbeing  9/7/2024 0838 by Rosanna Rojo LPN  Outcome: Progressing  9/7/2024 0838 by Rosanna Rojo LPN  Reactivated  Goal: Readiness for Transition of Care  9/7/2024 0838 by Rosanna Rojo LPN  Outcome: Progressing  9/7/2024 0838 by Rosanna Rojo LPN  Reactivated   Pt Alert and  able to express needs.  No c/o pain .   Sitter at the bedside.  No change in patient condition at this time.  Safety maintained.  Bed in low position,  call  light in reach.

## 2024-09-07 NOTE — SUBJECTIVE & OBJECTIVE
Interval History: NAEON. HD, on room air. Pending dispo.     Review of Systems   Unable to perform ROS: Dementia   Respiratory:  Negative for shortness of breath.    Cardiovascular:  Negative for chest pain.   Gastrointestinal:  Negative for abdominal pain.     Objective:     Vital Signs (Most Recent):  Temp: 98.1 °F (36.7 °C) (09/06/24 2134)  Pulse: 81 (09/06/24 2134)  Resp: 19 (09/06/24 2134)  BP: 113/80 (09/06/24 2134)  SpO2: 97 % (09/06/24 2134) Vital Signs (24h Range):  Temp:  [98.1 °F (36.7 °C)-98.3 °F (36.8 °C)] 98.1 °F (36.7 °C)  Pulse:  [74-81] 81  Resp:  [18-19] 19  SpO2:  [96 %-97 %] 97 %  BP: (113)/(66-80) 113/80     Weight: 49.9 kg (110 lb)  Body mass index is 20.12 kg/m².    Intake/Output Summary (Last 24 hours) at 9/7/2024 0648  Last data filed at 9/6/2024 1323  Gross per 24 hour   Intake 500 ml   Output --   Net 500 ml         Physical Exam  Constitutional:       General: She is not in acute distress.     Appearance: Normal appearance. She is not ill-appearing.   HENT:      Head: Normocephalic and atraumatic.      Right Ear: External ear normal.      Left Ear: External ear normal.      Nose: Nose normal.      Mouth/Throat:      Mouth: Mucous membranes are moist.   Eyes:      Conjunctiva/sclera: Conjunctivae normal.   Cardiovascular:      Rate and Rhythm: Normal rate and regular rhythm.      Heart sounds: Normal heart sounds. No murmur heard.  Pulmonary:      Effort: Pulmonary effort is normal. No respiratory distress.      Breath sounds: Normal breath sounds.   Abdominal:      General: Abdomen is flat.      Palpations: Abdomen is soft.   Musculoskeletal:      Right lower leg: No edema.      Left lower leg: No edema.   Skin:     General: Skin is warm and dry.   Neurological:      Mental Status: She is alert. She is disoriented.      Comments: Oriented to self, place             Significant Labs: All pertinent labs within the past 24 hours have been reviewed.    Significant Imaging: I have reviewed all  pertinent imaging results/findings within the past 24 hours.
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
## 2024-09-08 PROCEDURE — 25000003 PHARM REV CODE 250

## 2024-09-08 PROCEDURE — 11000001 HC ACUTE MED/SURG PRIVATE ROOM

## 2024-09-08 RX ADMIN — THERA TABS 1 TABLET: TAB at 10:09

## 2024-09-08 NOTE — SUBJECTIVE & OBJECTIVE
Interval History: NAEON. HD, on room air. Excited for dog therapy. Pending dispo.     Review of Systems   Unable to perform ROS: Dementia   Respiratory:  Negative for shortness of breath.    Cardiovascular:  Negative for chest pain.   Gastrointestinal:  Negative for abdominal pain.     Objective:     Vital Signs (Most Recent):  Temp: 98 °F (36.7 °C) (09/08/24 0930)  Pulse: 68 (09/08/24 0930)  Resp: 18 (09/08/24 0930)  BP: 118/69 (09/08/24 0930)  SpO2: 97 % (09/08/24 0930) Vital Signs (24h Range):  Temp:  [98 °F (36.7 °C)-98.2 °F (36.8 °C)] 98 °F (36.7 °C)  Pulse:  [68-79] 68  Resp:  [18] 18  SpO2:  [97 %] 97 %  BP: (117-118)/(66-69) 118/69     Weight: 49.9 kg (110 lb)  Body mass index is 20.12 kg/m².    Intake/Output Summary (Last 24 hours) at 9/8/2024 9010  Last data filed at 9/8/2024 0630  Gross per 24 hour   Intake 180 ml   Output --   Net 180 ml         Physical Exam  Constitutional:       General: She is not in acute distress.     Appearance: Normal appearance. She is not ill-appearing.   HENT:      Head: Normocephalic and atraumatic.      Right Ear: External ear normal.      Left Ear: External ear normal.      Nose: Nose normal.      Mouth/Throat:      Mouth: Mucous membranes are moist.   Eyes:      Conjunctiva/sclera: Conjunctivae normal.   Cardiovascular:      Rate and Rhythm: Normal rate and regular rhythm.      Heart sounds: Normal heart sounds. No murmur heard.  Pulmonary:      Effort: Pulmonary effort is normal. No respiratory distress.      Breath sounds: Normal breath sounds.   Abdominal:      General: Abdomen is flat.      Palpations: Abdomen is soft.   Musculoskeletal:      Right lower leg: No edema.      Left lower leg: No edema.   Skin:     General: Skin is warm and dry.   Neurological:      Mental Status: She is alert. She is disoriented.      Comments: Oriented to self, place             Significant Labs: All pertinent labs within the past 24 hours have been reviewed.    Significant Imaging: I  have reviewed all pertinent imaging results/findings within the past 24 hours.
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
## 2024-09-09 PROCEDURE — 25000003 PHARM REV CODE 250

## 2024-09-09 PROCEDURE — 11000001 HC ACUTE MED/SURG PRIVATE ROOM

## 2024-09-09 RX ADMIN — THERA TABS 1 TABLET: TAB at 09:09

## 2024-09-09 NOTE — SUBJECTIVE & OBJECTIVE
Interval History: NAEON. HD, on room air. Having some allergies but does not want any medicines. Otherwise no health complaints. Excited for dog therapy. Pending dispo.     Review of Systems   Unable to perform ROS: Dementia   Respiratory:  Negative for shortness of breath.    Cardiovascular:  Negative for chest pain.   Gastrointestinal:  Negative for abdominal pain.     Objective:     Vital Signs (Most Recent):  Temp: 98.5 °F (36.9 °C) (09/09/24 0814)  Pulse: 83 (09/09/24 0814)  Resp: 17 (09/09/24 0814)  BP: 105/66 (09/09/24 0814)  SpO2: 97 % (09/09/24 0814) Vital Signs (24h Range):  Temp:  [98.4 °F (36.9 °C)-98.5 °F (36.9 °C)] 98.5 °F (36.9 °C)  Pulse:  [76-83] 83  Resp:  [17-18] 17  SpO2:  [97 %-98 %] 97 %  BP: (105-112)/(66-70) 105/66     Weight: 49.9 kg (110 lb)  Body mass index is 20.12 kg/m².    Intake/Output Summary (Last 24 hours) at 9/9/2024 1117  Last data filed at 9/9/2024 0538  Gross per 24 hour   Intake 120 ml   Output 3 ml   Net 117 ml         Physical Exam  Constitutional:       General: She is not in acute distress.     Appearance: Normal appearance. She is not ill-appearing.   HENT:      Head: Normocephalic and atraumatic.      Right Ear: External ear normal.      Left Ear: External ear normal.      Nose: Nose normal.      Mouth/Throat:      Mouth: Mucous membranes are moist.   Eyes:      Conjunctiva/sclera: Conjunctivae normal.   Cardiovascular:      Rate and Rhythm: Normal rate and regular rhythm.      Heart sounds: Normal heart sounds. No murmur heard.  Pulmonary:      Effort: Pulmonary effort is normal. No respiratory distress.      Breath sounds: Normal breath sounds.   Abdominal:      General: Abdomen is flat.      Palpations: Abdomen is soft.   Musculoskeletal:      Right lower leg: No edema.      Left lower leg: No edema.   Skin:     General: Skin is warm and dry.   Neurological:      Mental Status: She is alert. She is disoriented.      Comments: Oriented to self, place              Significant Labs: All pertinent labs within the past 24 hours have been reviewed.    Significant Imaging: I have reviewed all pertinent imaging results/findings within the past 24 hours.

## 2024-09-09 NOTE — PLAN OF CARE
[facility] - Internal Medicine Telemetry  Discharge Reassessment    Primary Care Provider: Edwar Castaneda II, MD    Expected Discharge Date:     Reassessment (most recent)       Discharge Reassessment - 09/09/24 0952          Discharge Reassessment    Assessment Type Discharge Planning Reassessment (P)      Did the patient's condition or plan change since previous assessment? No (P)      Discharge Plan discussed with: Patient (P)      Communicated MARVEL with patient/caregiver Date not available/Unable to determine (P)      Discharge Plan A New Nursing Home placement - care home care facility (P)      Discharge Plan B New Nursing Home placement - care home care facility (P)      DME Needed Upon Discharge  none (P)      Transition of Care Barriers Social;No family/friends to help (P)      Why the patient remains in the hospital Placement issues (P)         Post-Acute Status    Post-Acute Authorization Placement (P)      Post-Acute Placement Status Referrals Sent (P)      Coverage [facility] (P)      Discharge Delays Post-Acute Set-up (P)                  CM spoke with pt in room.  Instructed that we are seeking new POA for her, since David won't agree to do this.  Pt states she spoke with MD Friday, who said he would come back and speak with her today; she wants to know who MD is for today.  CM instructed that Dr. Reese is assigned to her.  NICHOLAS sent updated MD progress note to [facility], [place] via CP.    MARTA Cantu, BS, RN, CCM      Discharge Plan A and Plan B have been determined by review of patient's clinical status, future medical and therapeutic needs, and coverage/benefits for post-acute care in coordination with multidisciplinary team members.
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
## 2024-09-10 PROCEDURE — 25000003 PHARM REV CODE 250

## 2024-09-10 PROCEDURE — 11000001 HC ACUTE MED/SURG PRIVATE ROOM

## 2024-09-10 RX ADMIN — THERA TABS 1 TABLET: TAB at 09:09
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
## 2024-09-10 NOTE — SUBJECTIVE & OBJECTIVE
"Interval History: No acute events overnight, afebrile, hemodynamically stable.     States that she did not see PET therapy yesterday    Review of Systems   Unable to perform ROS: Dementia     Objective:     Vital Signs (Most Recent):  Temp: 98.6 °F (37 °C) (09/10/24 0744)  Pulse: 75 (09/10/24 0744)  Resp: 18 (09/10/24 0900)  BP: 106/73 (09/10/24 0744)  SpO2: 98 % (09/10/24 0744) Vital Signs (24h Range):  Temp:  [98.6 °F (37 °C)] 98.6 °F (37 °C)  Pulse:  [75] 75  Resp:  [18] 18  SpO2:  [98 %] 98 %  BP: (106)/(73) 106/73     Weight: 49.9 kg (110 lb)  Body mass index is 20.12 kg/m².  No intake or output data in the 24 hours ending 09/10/24 1610      Physical Exam  Constitutional:       General: She is not in acute distress.     Appearance: Normal appearance. She is not ill-appearing.   HENT:      Head: Normocephalic and atraumatic.      Right Ear: External ear normal.      Left Ear: External ear normal.      Nose: Nose normal.      Mouth/Throat:      Mouth: Mucous membranes are moist.   Eyes:      Conjunctiva/sclera: Conjunctivae normal.   Cardiovascular:      Rate and Rhythm: Normal rate and regular rhythm.      Heart sounds: Normal heart sounds. No murmur heard.  Pulmonary:      Effort: Pulmonary effort is normal. No respiratory distress.      Breath sounds: Normal breath sounds.   Abdominal:      General: Abdomen is flat.      Palpations: Abdomen is soft.   Musculoskeletal:      Right lower leg: No edema.      Left lower leg: No edema.   Skin:     General: Skin is warm and dry.   Neurological:      Mental Status: She is alert. She is disoriented.      Comments: Oriented to self, place             Significant Labs: All pertinent labs within the past 24 hours have been reviewed.  CBC: No results for input(s): "WBC", "HGB", "HCT", "PLT" in the last 48 hours.  CMP: No results for input(s): "NA", "K", "CL", "CO2", "GLU", "BUN", "CREATININE", "CALCIUM", "PROT", "ALBUMIN", "BILITOT", "ALKPHOS", "AST", "ALT", "ANIONGAP", " ""EGFRNONAA" in the last 48 hours.    Invalid input(s): "ESTGFAFR[place]"    Significant Imaging: I have reviewed all pertinent imaging results/findings within the past 24 hours.  "

## 2024-09-11 PROCEDURE — 25000003 PHARM REV CODE 250

## 2024-09-11 PROCEDURE — 11000001 HC ACUTE MED/SURG PRIVATE ROOM

## 2024-09-11 RX ADMIN — THERA TABS 1 TABLET: TAB at 09:09

## 2024-09-11 NOTE — SUBJECTIVE & OBJECTIVE
Interval History: NAEON and VSS. Pending legal situation    Review of Systems   Constitutional:  Negative for chills and fever.   HENT:  Negative for ear pain and sinus pain.    Eyes:  Negative for pain.   Respiratory:  Negative for cough and shortness of breath.    Cardiovascular:  Negative for chest pain and leg swelling.   Gastrointestinal:  Negative for abdominal pain, constipation, diarrhea, nausea, rectal pain and vomiting.   Genitourinary:  Negative for dysuria and flank pain.   Musculoskeletal:  Negative for arthralgias, back pain, joint swelling, myalgias and neck pain.   Neurological:  Negative for weakness, numbness and headaches.   Psychiatric/Behavioral:  Negative for agitation, dysphoric mood and sleep disturbance. The patient is not nervous/anxious.      Objective:     Vital Signs (Most Recent):  Temp: 98.9 °F (37.2 °C) (09/11/24 0826)  Pulse: 86 (09/11/24 0826)  Resp: 18 (09/11/24 0826)  BP: 118/77 (09/11/24 0826)  SpO2: 97 % (09/11/24 0826) Vital Signs (24h Range):  Temp:  [98.7 °F (37.1 °C)-98.9 °F (37.2 °C)] 98.9 °F (37.2 °C)  Pulse:  [79-86] 86  Resp:  [18] 18  SpO2:  [96 %-97 %] 97 %  BP: (106-118)/(67-77) 118/77     Weight: 49.9 kg (110 lb)  Body mass index is 20.12 kg/m².  No intake or output data in the 24 hours ending 09/11/24 1311      Physical Exam  Constitutional:       General: She is not in acute distress.     Appearance: Normal appearance. She is not ill-appearing, toxic-appearing or diaphoretic.   HENT:      Head: Normocephalic and atraumatic.      Right Ear: External ear normal.      Left Ear: External ear normal.      Nose: Nose normal.      Mouth/Throat:      Mouth: Mucous membranes are moist.   Eyes:      General: No scleral icterus.        Right eye: No discharge.         Left eye: No discharge.      Extraocular Movements: Extraocular movements intact.   Cardiovascular:      Rate and Rhythm: Normal rate.      Heart sounds: Normal heart sounds. No murmur heard.     No friction  rub.   Pulmonary:      Effort: Pulmonary effort is normal. No respiratory distress.      Breath sounds: Normal breath sounds. No stridor. No wheezing, rhonchi or rales.   Abdominal:      General: Abdomen is flat. Bowel sounds are normal. There is no distension.   Musculoskeletal:         General: No swelling.      Cervical back: Normal range of motion.      Right lower leg: No edema.      Left lower leg: No edema.   Skin:     General: Skin is warm and dry.      Capillary Refill: Capillary refill takes less than 2 seconds.      Coloration: Skin is not jaundiced or pale.      Findings: No bruising.   Neurological:      General: No focal deficit present.      Mental Status: She is alert and oriented to person, place, and time.      Motor: No weakness.   Psychiatric:         Mood and Affect: Mood normal.         Behavior: Behavior normal.         Thought Content: Thought content normal.             Significant Labs: All pertinent labs within the past 24 hours have been reviewed.    Significant Imaging: I have reviewed all pertinent imaging results/findings within the past 24 hours.
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
## 2024-09-11 NOTE — PLAN OF CARE
Problem: Adult Inpatient Plan of Care  Goal: Plan of Care Review  Outcome: Progressing  Flowsheets (Taken 9/10/2024 1924)  Plan of Care Reviewed With: patient  Goal: Patient-Specific Goal (Individualized)  Outcome: Progressing  Goal: Absence of Hospital-Acquired Illness or Injury  Outcome: Progressing  Goal: Optimal Comfort and Wellbeing  Outcome: Progressing  Goal: Readiness for Transition of Care  Outcome: Progressing

## 2024-09-12 LAB
ALBUMIN SERPL BCP-MCNC: 3.8 G/DL (ref 3.5–5.2)
ALP SERPL-CCNC: 103 U/L (ref 55–135)
ALT SERPL W/O P-5'-P-CCNC: 30 U/L (ref 10–44)
ANION GAP SERPL CALC-SCNC: 17 MMOL/L (ref 8–16)
AST SERPL-CCNC: 31 U/L (ref 10–40)
BASOPHILS # BLD AUTO: 0.06 K/UL (ref 0–0.2)
BASOPHILS NFR BLD: 0.5 % (ref 0–1.9)
BILIRUB SERPL-MCNC: 0.4 MG/DL (ref 0.1–1)
BUN SERPL-MCNC: 29 MG/DL (ref 8–23)
CA-I BLDV-SCNC: 1.1 MMOL/L (ref 1.06–1.42)
CALCIUM SERPL-MCNC: 9 MG/DL (ref 8.7–10.5)
CHLORIDE SERPL-SCNC: 105 MMOL/L (ref 95–110)
CK SERPL-CCNC: 41 U/L (ref 20–180)
CO2 SERPL-SCNC: 18 MMOL/L (ref 23–29)
CREAT SERPL-MCNC: 1.1 MG/DL (ref 0.5–1.4)
DIFFERENTIAL METHOD BLD: ABNORMAL
EOSINOPHIL # BLD AUTO: 0.5 K/UL (ref 0–0.5)
EOSINOPHIL NFR BLD: 4.3 % (ref 0–8)
ERYTHROCYTE [DISTWIDTH] IN BLOOD BY AUTOMATED COUNT: 13.2 % (ref 11.5–14.5)
EST. GFR  (NO RACE VARIABLE): 52.1 ML/MIN/1.73 M^2
GLUCOSE SERPL-MCNC: 121 MG/DL (ref 70–110)
HCT VFR BLD AUTO: 38.5 % (ref 37–48.5)
HGB BLD-MCNC: 12.3 G/DL (ref 12–16)
IMM GRANULOCYTES # BLD AUTO: 0.05 K/UL (ref 0–0.04)
IMM GRANULOCYTES NFR BLD AUTO: 0.4 % (ref 0–0.5)
LYMPHOCYTES # BLD AUTO: 2.8 K/UL (ref 1–4.8)
LYMPHOCYTES NFR BLD: 24.8 % (ref 18–48)
MAGNESIUM SERPL-MCNC: 2.1 MG/DL (ref 1.6–2.6)
MCH RBC QN AUTO: 31.7 PG (ref 27–31)
MCHC RBC AUTO-ENTMCNC: 31.9 G/DL (ref 32–36)
MCV RBC AUTO: 99 FL (ref 82–98)
MONOCYTES # BLD AUTO: 0.8 K/UL (ref 0.3–1)
MONOCYTES NFR BLD: 6.7 % (ref 4–15)
NEUTROPHILS # BLD AUTO: 7.2 K/UL (ref 1.8–7.7)
NEUTROPHILS NFR BLD: 63.3 % (ref 38–73)
NRBC BLD-RTO: 0 /100 WBC
PHOSPHATE SERPL-MCNC: 3.8 MG/DL (ref 2.7–4.5)
PLATELET # BLD AUTO: 264 K/UL (ref 150–450)
PMV BLD AUTO: 10.1 FL (ref 9.2–12.9)
POCT GLUCOSE: 127 MG/DL (ref 70–110)
POTASSIUM SERPL-SCNC: 3.6 MMOL/L (ref 3.5–5.1)
PROT SERPL-MCNC: 6.4 G/DL (ref 6–8.4)
RBC # BLD AUTO: 3.88 M/UL (ref 4–5.4)
SODIUM SERPL-SCNC: 140 MMOL/L (ref 136–145)
WBC # BLD AUTO: 11.42 K/UL (ref 3.9–12.7)

## 2024-09-12 PROCEDURE — 25000003 PHARM REV CODE 250

## 2024-09-12 PROCEDURE — 83605 ASSAY OF LACTIC ACID: CPT

## 2024-09-12 PROCEDURE — 80053 COMPREHEN METABOLIC PANEL: CPT

## 2024-09-12 PROCEDURE — 83735 ASSAY OF MAGNESIUM: CPT

## 2024-09-12 PROCEDURE — 84100 ASSAY OF PHOSPHORUS: CPT

## 2024-09-12 PROCEDURE — 82550 ASSAY OF CK (CPK): CPT

## 2024-09-12 PROCEDURE — 85025 COMPLETE CBC W/AUTO DIFF WBC: CPT

## 2024-09-12 PROCEDURE — 82330 ASSAY OF CALCIUM: CPT

## 2024-09-12 PROCEDURE — 84146 ASSAY OF PROLACTIN: CPT

## 2024-09-12 PROCEDURE — 63600175 PHARM REV CODE 636 W HCPCS: Performed by: STUDENT IN AN ORGANIZED HEALTH CARE EDUCATION/TRAINING PROGRAM

## 2024-09-12 PROCEDURE — 11000001 HC ACUTE MED/SURG PRIVATE ROOM

## 2024-09-12 RX ADMIN — THERA TABS 1 TABLET: TAB at 11:09

## 2024-09-12 RX ADMIN — LORAZEPAM 1 MG: 2 INJECTION INTRAMUSCULAR; INTRAVENOUS at 10:09

## 2024-09-12 NOTE — SUBJECTIVE & OBJECTIVE
Interval History: PATEL ORDAZ. Patient awaiting dog therapy. Pending dispo.     Review of Systems   Unable to perform ROS: Dementia     Objective:     Vital Signs (Most Recent):  Temp: 98.8 °F (37.1 °C) (09/12/24 0734)  Pulse: 78 (09/12/24 0734)  Resp: 17 (09/12/24 0734)  BP: 114/78 (09/12/24 0734)  SpO2: 98 % (09/12/24 0734) Vital Signs (24h Range):  Temp:  [98.1 °F (36.7 °C)-98.8 °F (37.1 °C)] 98.8 °F (37.1 °C)  Pulse:  [75-78] 78  Resp:  [17-18] 17  SpO2:  [97 %-98 %] 98 %  BP: ()/(64-78) 114/78     Weight: 49.9 kg (110 lb)  Body mass index is 20.12 kg/m².  No intake or output data in the 24 hours ending 09/12/24 1120      Physical Exam  Constitutional:       General: She is not in acute distress.     Appearance: Normal appearance. She is not ill-appearing.   HENT:      Head: Normocephalic and atraumatic.      Right Ear: External ear normal.      Left Ear: External ear normal.      Nose: Nose normal.      Mouth/Throat:      Mouth: Mucous membranes are moist.   Eyes:      Conjunctiva/sclera: Conjunctivae normal.   Pulmonary:      Effort: Pulmonary effort is normal. No respiratory distress.   Musculoskeletal:      Right lower leg: No edema.      Left lower leg: No edema.   Skin:     General: Skin is warm and dry.   Neurological:      Mental Status: She is alert. She is disoriented.      Comments: Oriented to self, place.              Significant Labs: All pertinent labs within the past 24 hours have been reviewed.    Significant Imaging: I have reviewed all pertinent imaging results/findings within the past 24 hours.
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
## 2024-09-12 NOTE — PROGRESS NOTES
"                    Medical Nutrition Therapy    Medical Nutrition Therapy        Reason for Assessment: RD follow-up/LOS  Dx: Encephalopathy   Medical Hx: -      General Info: Pt continues to tolerate diet w/ 100% PO intake.  Per chart review, pt w/ UBW of 110# x 4 years. Pt appears thin, however no indicators of malnutrition noted.      Current Diet: Regular  % intake of meals: 100%     Ht: 5'2"  Wt: 50kg   BMI: 20.1     Labs: Reviewed  Meds: Reviewed     Overall Physical Appearance: Thin     Level of Risk: Low     Nutrition Dx: dementia without behavioral disturbance     RD follow-up? Yes  "

## 2024-09-12 NOTE — PLAN OF CARE
[facility] - Internal Medicine Telemetry  Discharge Reassessment    Primary Care Provider: Edwar Castaneda II, MD    Expected Discharge Date:     Reassessment (most recent)       Discharge Reassessment - 09/12/24 1616          Discharge Reassessment    Assessment Type Discharge Planning Reassessment (P)      Did the patient's condition or plan change since previous assessment? No (P)      Discharge Plan discussed with: Patient (P)      Communicated MARVEL with patient/caregiver Date not available/Unable to determine (P)      Discharge Plan A New Nursing Home placement - senior care care facility (P)      Discharge Plan B New Nursing Home placement - senior care care facility (P)      DME Needed Upon Discharge  none (P)      Transition of Care Barriers Social;No family/friends to help (P)      Why the patient remains in the hospital Placement issues (P)         Post-Acute Status    Post-Acute Authorization Placement (P)      Post-Acute Placement Status Referrals Sent (P)      Coverage [facility] (P)      Discharge Delays Post-Acute Set-up (P)                  CM spoke with pt in room, instructed that [facility] is in the process of finding her a new POA.  Pt states she refuses to go anywhere without her dogs.    4:33 PM  Pt wanted to speak with CM again.  CM spoke with pt in room.  She reiterates she doesn't want to go anywhere without her dogs.  She wants to consult an , states she will do this.  She states she wants to speak with Dr. Reese.  CM requested Dr. Reese to come speak with pt.    MARTA Cantu, BS, RN, CCM      Discharge Plan A and Plan B have been determined by review of patient's clinical status, future medical and therapeutic needs, and coverage/benefits for post-acute care in coordination with multidisciplinary team members.

## 2024-09-13 ENCOUNTER — DOCUMENTATION ONLY (OUTPATIENT)
Dept: NEUROLOGY | Facility: CLINIC | Age: 79
End: 2024-09-13

## 2024-09-13 LAB
BACTERIA #/AREA URNS AUTO: NORMAL /HPF
BILIRUB UR QL STRIP: NEGATIVE
CLARITY UR REFRACT.AUTO: CLEAR
COLOR UR AUTO: YELLOW
GLUCOSE UR QL STRIP: NEGATIVE
HGB UR QL STRIP: NEGATIVE
KETONES UR QL STRIP: NEGATIVE
LACTATE SERPL-SCNC: 1.8 MMOL/L (ref 0.5–2.2)
LACTATE SERPL-SCNC: 6.9 MMOL/L (ref 0.5–2.2)
LEUKOCYTE ESTERASE UR QL STRIP: ABNORMAL
MICROSCOPIC COMMENT: NORMAL
NITRITE UR QL STRIP: NEGATIVE
PH UR STRIP: 5 [PH] (ref 5–8)
PROLACTIN SERPL IA-MCNC: 177.2 NG/ML (ref 5.2–26.5)
PROT UR QL STRIP: NEGATIVE
RBC #/AREA URNS AUTO: 0 /HPF (ref 0–4)
SP GR UR STRIP: 1.02 (ref 1–1.03)
URN SPEC COLLECT METH UR: ABNORMAL
WBC #/AREA URNS AUTO: 2 /HPF (ref 0–5)

## 2024-09-13 PROCEDURE — 25000003 PHARM REV CODE 250

## 2024-09-13 PROCEDURE — 63600175 PHARM REV CODE 636 W HCPCS

## 2024-09-13 PROCEDURE — 95816 EEG AWAKE AND DROWSY: CPT

## 2024-09-13 PROCEDURE — 11000001 HC ACUTE MED/SURG PRIVATE ROOM

## 2024-09-13 PROCEDURE — 81001 URINALYSIS AUTO W/SCOPE: CPT

## 2024-09-13 PROCEDURE — 95816 EEG AWAKE AND DROWSY: CPT | Mod: 26,,, | Performed by: PSYCHIATRY & NEUROLOGY

## 2024-09-13 PROCEDURE — C9254 INJECTION, LACOSAMIDE: HCPCS

## 2024-09-13 PROCEDURE — 83605 ASSAY OF LACTIC ACID: CPT

## 2024-09-13 PROCEDURE — 99233 SBSQ HOSP IP/OBS HIGH 50: CPT | Mod: GC,,, | Performed by: PSYCHIATRY & NEUROLOGY

## 2024-09-13 PROCEDURE — 36415 COLL VENOUS BLD VENIPUNCTURE: CPT

## 2024-09-13 RX ORDER — LACOSAMIDE 50 MG/1
100 TABLET ORAL EVERY 12 HOURS
Status: DISCONTINUED | OUTPATIENT
Start: 2024-09-13 | End: 2024-09-20

## 2024-09-13 RX ADMIN — Medication 6 MG: at 08:09

## 2024-09-13 RX ADMIN — LACOSAMIDE 200 MG: 10 INJECTION INTRAVENOUS at 05:09

## 2024-09-13 RX ADMIN — LACOSAMIDE 100 MG: 50 TABLET, FILM COATED ORAL at 08:09

## 2024-09-13 RX ADMIN — THERA TABS 1 TABLET: TAB at 08:09
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
## 2024-09-13 NOTE — NURSING
Pt sitter witness pt having seizure. Found pt in bed leaning to right side having active seizure, and was foaming at the mouth . Reposition pt head to side elevated head ,apply 02 2L nasal cannula and proceed to check pt vitals . Pt seized for 10 mins and remained unresponsive to verbal stimuli. MD was contact, and schedule med was pulled.

## 2024-09-13 NOTE — PROCEDURES
Date of service  09/13/2024    Introduction  Electroencephalographic (EEG) recording is performed with electrodes placed according to the International 10-20 placement system. Thirty two (32) channels of digital signal (sampling rate of 512/sec) including T1 and T2 was simultaneously recorded from the scalp and may include EKG, EMG, and/or eye monitors. Recording band pass was 0.1 to 512 Hz. Digital video recording of the patient is simultaneously recorded with the EEG. The patient is instructed to report clinical symptoms which may occur during the recording session. EEG and video recording is stored and archived in digital format. Activation procedures which include photic stimulation, hyperventilation and instructing patients to perform simple tasks are done in selected patients.    The EEG is displayed on a monitor screen and can be reviewed using different montages. Computer assisted analysis is employed to detect spike and electrographic seizure activity. The entire record is submitted for computer analysis. The entire recording is visually reviewed and, the times identified by computer analysis as being spikes or seizures are reviewed again.     Compressed spectral analysis (CSA) is also performed on the activity recorded from each individual channel. This is displayed as a power display of frequencies from 0 to 30 Hz over time. The CSA is reviewed looking for asymmetries in power between homologous areas of the scalp and then compared with the original EEG recording.     Findings  The short-term video EEG recording during wakefulness contains 8 Hz alpha activity over the posterior head regions. There is diffuse 5-7 Hz theta slowing with overlying 3-4 Hz bitemporal delta slowing intermittently.                    Photic stimulation and hyperventilation were not performed. Orientation questions were answered correctly.     During the recording, the patient became drowsy but no sleep architecture was seen.      The EKG channel shows regular rhythm.    Interpretation  The short-term video EEG shows mild diffuse nonspecific slowing of the background with more focal slowing in the bitemporal regions.  These findings are indicative of encephalopathy but nonspecific for etiology. No potentially epileptiform activity was seen.

## 2024-09-13 NOTE — NURSING
Pt in bed asleep, awaken when touch. Removed O2 pt vitals stable WNL. Left bed in lowest position, call light and personal belonging within reach. Pt has sitter at bed side. No signs of distress noted

## 2024-09-13 NOTE — SUBJECTIVE & OBJECTIVE
History reviewed. No pertinent past medical history.    History reviewed. No pertinent surgical history.    Review of patient's allergies indicates:  No Known Allergies    Current Neurological Medications: none    No current facility-administered medications on file prior to encounter.     No current outpatient medications on file prior to encounter.     Family History    None       Tobacco Use    Smoking status: Never    Smokeless tobacco: Never   Substance and Sexual Activity    Alcohol use: Never    Drug use: Not on file    Sexual activity: Not on file     Review of Systems   Constitutional:  Negative for diaphoresis and fever.   HENT:  Negative for nosebleeds and rhinorrhea.    Eyes:  Negative for discharge and redness.   Respiratory:  Negative for cough and wheezing.    Cardiovascular:  Negative for leg swelling.   Gastrointestinal:  Negative for abdominal distention and vomiting.   Genitourinary:  Negative for difficulty urinating and hematuria.   Neurological:  Positive for seizures. Negative for tremors and facial asymmetry.     Objective:     Vital Signs (Most Recent):  Temp: 97.5 °F (36.4 °C) (09/13/24 0720)  Pulse: 80 (09/13/24 0720)  Resp: 17 (09/13/24 0006)  BP: 106/67 (09/13/24 0720)  SpO2: 97 % (09/13/24 0006) Vital Signs (24h Range):  Temp:  [97.5 °F (36.4 °C)-98.8 °F (37.1 °C)] 97.5 °F (36.4 °C)  Pulse:  [80-90] 80  Resp:  [17] 17  SpO2:  [94 %-97 %] 97 %  BP: (106-125)/(67-79) 106/67     Weight: 49.9 kg (110 lb)  Body mass index is 20.12 kg/m².     Physical Exam  Vitals and nursing note reviewed.   Constitutional:       General: She is not in acute distress.  HENT:      Head: Normocephalic.      Nose: Nose normal. No rhinorrhea.   Eyes:      General:         Right eye: No discharge.         Left eye: No discharge.      Conjunctiva/sclera: Conjunctivae normal.   Pulmonary:      Effort: Pulmonary effort is normal. No respiratory distress.   Skin:     General: Skin is warm and dry.   Neurological:       Mental Status: She is alert and oriented to person, place, and time.   Psychiatric:         Speech: Speech normal.          NEUROLOGICAL EXAMINATION:     MENTAL STATUS   Oriented to person, place, and time.   Attention: normal.   Speech: speech is normal   Level of consciousness: alert    CRANIAL NERVES     CN VII   Facial expression full, symmetric.     CN VIII   Hearing: intact    MOTOR EXAM   Muscle bulk: normal  Right arm pronator drift: absent  Left arm pronator drift: absent    Strength   Right deltoid: 5/5  Left deltoid: 5/5  Right iliopsoas: 5/5  Left iliopsoas: 5/5  Right quadriceps: 5/5  Left quadriceps: 5/5  Right anterior tibial: 5/5  Left anterior tibial: 5/5    SENSORY EXAM   Light touch normal.     GAIT AND COORDINATION     Tremor   Resting tremor: absent  Action tremor: absent      Significant Labs: All pertinent lab results from the past 24 hours have been reviewed.    Significant Imaging: I have reviewed all pertinent imaging results/findings within the past 24 hours.

## 2024-09-13 NOTE — CARE UPDATE
".RAPID RESPONSE NURSE PROACTIVE ROUNDING NOTE       Time of Visit: 2330    Admit Date: 2024  LOS: 79  Code Status: Full Code   Date of Visit: 2024  : 1945  Age: 78 y.o.  Sex: female  Race: White  Bed: 1142/1142 A:   MRN: [id number]  Was the patient discharged from an ICU this admission? No   Was the patient discharged from a PACU within last 24 hours? No   Did the patient receive conscious sedation/general anesthesia in last 24 hours? No  Was the patient in the ED within the past 24 hours? No  Was the patient on NIPPV within the past 24 hours? No   Attending Physician: Carito Reese MD  Primary Service: [facility] MED 5   Time spent at the bedside: 15 -30 min    SITUATION    Notified by charge RN via phone call.  Reason for alert: seizure-like activity   Called to evaluate the patient for Neuro.    BACKGROUND     Why is the patient in the hospital?: Cognitive and behavioral changes    Patient has no past medical history on file.    Last Vitals:  Temp: 98.8 °F (37.1 °C) (734)  Pulse: 90 (2253)  Resp: 17 (734)  BP: 125/79 (2253)  SpO2: 94 % (2253)    24 Hours Vitals Range:  Temp:  [98.8 °F (37.1 °C)]   Pulse:  [78-90]   Resp:  [17]   BP: (114-125)/(78-79)   SpO2:  [94 %-98 %]     Labs:  Recent Labs     24  2321   WBC 11.42   HGB 12.3   HCT 38.5          No results for input(s): "NA", "K", "CL", "CO2", "BUN", "CREATININE", "GLU", "PHOS", "MG" in the last 72 hours.    Invalid input(s): "CMP", "TBIL"     No results for input(s): "PH", "PCO2", "PO2", "HCO3", "POCSATURATED", "BE" in the last 72 hours.     ASSESSMENT      Physical Exam  Constitutional:       General: She is sleeping.      Appearance: Normal appearance.   Cardiovascular:      Rate and Rhythm: Normal rate.      Pulses: Normal pulses.   Pulmonary:      Effort: Pulmonary effort is normal.      Breath sounds: Normal breath sounds.   Musculoskeletal:         General: Normal range of motion. "   Neurological:      GCS: GCS eye subscore is 3. GCS verbal subscore is 4. GCS motor subscore is 6.         INTERVENTIONS    The patient was seen for Neurological problem. Staff concerns included seizure-like activity. The following interventions were performed: POCT glucose, seizure precautions, and  1 mg IM ativan administered.Blood work and stat chest x-ray obtained ordered prior to arrival by primary team.     RECOMMENDATIONS    Follow up with neurology, continue plan of care.     PROVIDER ESCALATION    Yes/No  Yes    Orders received and case discussed with Dereck Robledo .    Disposition: Remain in room 1142.    FOLLOW-UP    charge Jillian ZUNIGA  updated on plan of care. Instructed to call the Rapid Response Nurse, Rashida Escobar RN at [phone] for additional questions or concerns.

## 2024-09-13 NOTE — ASSESSMENT & PLAN NOTE
8/30 patient had a witnessed seizure for 3 minutes with jerking of the left arm and right leg, with post-ictal state. Labs with anion gap acidosis, otherwise no findings. Glucose 124. CMP, CBC, lactic acid, CPK WNL. Ionized calcium 1.02. CT head no evidence of acute intracranial abnormalities. No provoking factor identified in labs/history.  Per Neuro, low suspicion that rivastigmine triggered seizure, but not opposed to holding medication.     EEG: abnormal study due to moderate diffuse background slowing consistent with diffuse cerebral dysfunction and encephalopathy which may be on the basis of toxic, metabolic, or primary neuronal disorder.     9/13 patient suffered a second witnessed seizure. Episode lasted approx 10 minutes, during which patient was foaming at the mouth and leaning to the right. She received 1 mg Ativan IM. Lactate elevated. On evaluation 9/13 AM at baseline. Given she has now had two clinical events, will start AED for seizure prevention per neuro recs.     - Discontinued Rivastigmine 8/31  - Per neuro: Loading dose Lacosamide (200 mcg IV), followed by Lacosamide 100 mg BID PO.   - Outpatient f/u with neurology   - Seizure precautions   - PRN Ativan for GTC>5 min

## 2024-09-13 NOTE — CARE UPDATE
Called to bedside for seizure witnessed by sitter and nursing staff. Foaming at the mouth and un-directable, not withdrawing from noxious stimuli reportedly lasted around 10 mins. Patient without IV so given 1 mg Ativan IM. After 10 or so minutes patient began to open eyes, directable and withdraw. Discussed the case with Neurology on-call who said if another event 2 mg of Lorazepam q 15 for two rounds w/ 2 g of IV Keppra load, and call NCC. Neuro will see in the morning. EEG 24 hour monitored.

## 2024-09-13 NOTE — PROGRESS NOTES
EEG Hook up  No sign of skin breakdown noticed    Skin Integrity: Normal     Bryant Rader   09/13/2024 9:55 AM

## 2024-09-13 NOTE — ASSESSMENT & PLAN NOTE
78 year old woman with osteoporosis, suspected dementia. Had event of left arm and right leg shaking overnight 8/30-8/31 lasting 3 minutes. No prior seizure history and no provoking factor identified on labs or by history. CTH was negative for acute process, EEG without epileptiform discharge. Then had second event overnight 9/12-9/13 with foaming at the mouth and leaning to the right. On evaluation 9/13 AM at baseline. Given she has now had two clinical events, will start AED for seizure prevention.    Recommendations:  - start lacosamide--load 200 mg IV then 100 mg BID starting this evening  - will f/u EEG  - seizure precautions  - PRN ativan for GTC >5 minutes  - should have outpatient Neurology follow up; please place referral on discharge    Neurology will sign off at this time. Please call with questions.
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
## 2024-09-13 NOTE — SUBJECTIVE & OBJECTIVE
Interval History: Overnight patient suffered a witnessed seizure. Per chart documentation, patient was foaming at the mouth and nondirective. The seizure lasted for about 10 minutes. She received 1 mg Ativan IM during the episode. In the morning, patient was HD, on room air, and afebrile. She had no complaints. Slight tremor noted on physical exam. The patient was extremely upset yesterday at the prospect of not being able to keep her dogs. Pending dispo.     Review of Systems   Unable to perform ROS: Dementia (Denies any pain, no complaints.)     Objective:     Vital Signs (Most Recent):  Temp: 97.5 °F (36.4 °C) (09/13/24 1246)  Pulse: 80 (09/13/24 1246)  Resp: 19 (09/13/24 1246)  BP: 106/67 (09/13/24 1246)  SpO2: 97 % (09/13/24 1246) Vital Signs (24h Range):  Temp:  [97.5 °F (36.4 °C)-98.8 °F (37.1 °C)] 97.5 °F (36.4 °C)  Pulse:  [80-90] 80  Resp:  [17-19] 19  SpO2:  [94 %-97 %] 97 %  BP: (106-125)/(67-79) 106/67     Weight: 49.9 kg (110 lb)  Body mass index is 20.12 kg/m².  No intake or output data in the 24 hours ending 09/13/24 1546      Physical Exam  Constitutional:       General: She is not in acute distress.  HENT:      Head: Normocephalic and atraumatic.      Right Ear: External ear normal.      Left Ear: External ear normal.      Nose: Nose normal.      Mouth/Throat:      Mouth: Mucous membranes are moist.   Eyes:      Conjunctiva/sclera: Conjunctivae normal.   Cardiovascular:      Rate and Rhythm: Normal rate and regular rhythm.      Heart sounds: Normal heart sounds. No murmur heard.  Pulmonary:      Effort: Pulmonary effort is normal. No respiratory distress.      Breath sounds: Normal breath sounds.   Abdominal:      Palpations: Abdomen is soft.      Tenderness: There is no abdominal tenderness.   Musculoskeletal:      Right lower leg: No edema.      Left lower leg: No edema.   Skin:     General: Skin is warm and dry.   Neurological:      Mental Status: She is alert. She is disoriented.      Comments:  Oriented to self, place.              Significant Labs: All pertinent labs within the past 24 hours have been reviewed.    Significant Imaging: I have reviewed all pertinent imaging results/findings within the past 24 hours.

## 2024-09-13 NOTE — PLAN OF CARE
[facility] - Internal Medicine Telemetry  Discharge Reassessment    Primary Care Provider: Edwar Castaneda II, MD    Expected Discharge Date:     Reassessment (most recent)       Discharge Reassessment - 09/13/24 1323          Discharge Reassessment    Assessment Type Discharge Planning Reassessment (P)      Did the patient's condition or plan change since previous assessment? No (P)      Discharge Plan discussed with: Patient (P)      Communicated MARVEL with patient/caregiver Date not available/Unable to determine (P)      Discharge Plan A New Nursing Home placement - skilled nursing care facility (P)      Discharge Plan B New Nursing Home placement - skilled nursing care facility (P)      DME Needed Upon Discharge  none (P)      Transition of Care Barriers No family/friends to help;Social (P)      Why the patient remains in the hospital Placement issues (P)         Post-Acute Status    Post-Acute Authorization Placement (P)      Post-Acute Placement Status Referrals Sent (P)      Coverage [facility] (P)      Discharge Delays Post-Acute Set-up (P)                   received VM from pt requesting to speak with .  CM went to pt room; pt did not remember that she had called .  However, she wanted to get an update on d/c plan.   instructed that [facility] is seeking a POA for her.  Pt states she does not want a POA.   instructed that [facility] had determined this is necessary.    CM sent updated MD progress note to [facility] via .    CM called the [facility] to call in Loc[facility].  CM spoke with Meera, who states pt has Locet on file.  CM faxed PasRR to [facility], uploaded PasRR to CP.    2:15 PM  Per Brian Tinajero with the [facility], pt's 142 is still valid.  Call in Locet and submit PasRR on Monday.    MARTA Cantu, BS, RN, CCM      Discharge Plan A and Plan B have been determined by review of patient's clinical status, future medical and therapeutic needs, and coverage/benefits for  post-acute care in coordination with multidisciplinary team members.

## 2024-09-14 LAB
ALBUMIN SERPL BCP-MCNC: 3.9 G/DL (ref 3.5–5.2)
ALP SERPL-CCNC: 92 U/L (ref 55–135)
ALT SERPL W/O P-5'-P-CCNC: 27 U/L (ref 10–44)
ANION GAP SERPL CALC-SCNC: 8 MMOL/L (ref 8–16)
AST SERPL-CCNC: 28 U/L (ref 10–40)
BASOPHILS # BLD AUTO: 0.06 K/UL (ref 0–0.2)
BASOPHILS NFR BLD: 0.6 % (ref 0–1.9)
BILIRUB SERPL-MCNC: 0.4 MG/DL (ref 0.1–1)
BUN SERPL-MCNC: 22 MG/DL (ref 8–23)
CALCIUM SERPL-MCNC: 9.4 MG/DL (ref 8.7–10.5)
CHLORIDE SERPL-SCNC: 105 MMOL/L (ref 95–110)
CO2 SERPL-SCNC: 27 MMOL/L (ref 23–29)
CREAT SERPL-MCNC: 1.1 MG/DL (ref 0.5–1.4)
DIFFERENTIAL METHOD BLD: ABNORMAL
EOSINOPHIL # BLD AUTO: 0.3 K/UL (ref 0–0.5)
EOSINOPHIL NFR BLD: 2.4 % (ref 0–8)
ERYTHROCYTE [DISTWIDTH] IN BLOOD BY AUTOMATED COUNT: 13.1 % (ref 11.5–14.5)
EST. GFR  (NO RACE VARIABLE): 52.1 ML/MIN/1.73 M^2
GLUCOSE SERPL-MCNC: 94 MG/DL (ref 70–110)
HCT VFR BLD AUTO: 38.1 % (ref 37–48.5)
HGB BLD-MCNC: 12.6 G/DL (ref 12–16)
IMM GRANULOCYTES # BLD AUTO: 0.03 K/UL (ref 0–0.04)
IMM GRANULOCYTES NFR BLD AUTO: 0.3 % (ref 0–0.5)
LYMPHOCYTES # BLD AUTO: 1.9 K/UL (ref 1–4.8)
LYMPHOCYTES NFR BLD: 17.8 % (ref 18–48)
MCH RBC QN AUTO: 32.6 PG (ref 27–31)
MCHC RBC AUTO-ENTMCNC: 33.1 G/DL (ref 32–36)
MCV RBC AUTO: 98 FL (ref 82–98)
MONOCYTES # BLD AUTO: 0.7 K/UL (ref 0.3–1)
MONOCYTES NFR BLD: 6.4 % (ref 4–15)
NEUTROPHILS # BLD AUTO: 7.8 K/UL (ref 1.8–7.7)
NEUTROPHILS NFR BLD: 72.5 % (ref 38–73)
NRBC BLD-RTO: 0 /100 WBC
PLATELET # BLD AUTO: 272 K/UL (ref 150–450)
PMV BLD AUTO: 10.2 FL (ref 9.2–12.9)
POTASSIUM SERPL-SCNC: 4 MMOL/L (ref 3.5–5.1)
PROT SERPL-MCNC: 6.4 G/DL (ref 6–8.4)
RBC # BLD AUTO: 3.87 M/UL (ref 4–5.4)
SODIUM SERPL-SCNC: 140 MMOL/L (ref 136–145)
WBC # BLD AUTO: 10.79 K/UL (ref 3.9–12.7)

## 2024-09-14 PROCEDURE — 80053 COMPREHEN METABOLIC PANEL: CPT | Performed by: STUDENT IN AN ORGANIZED HEALTH CARE EDUCATION/TRAINING PROGRAM

## 2024-09-14 PROCEDURE — 11000001 HC ACUTE MED/SURG PRIVATE ROOM

## 2024-09-14 PROCEDURE — 36415 COLL VENOUS BLD VENIPUNCTURE: CPT | Performed by: STUDENT IN AN ORGANIZED HEALTH CARE EDUCATION/TRAINING PROGRAM

## 2024-09-14 PROCEDURE — 25000003 PHARM REV CODE 250

## 2024-09-14 PROCEDURE — 85025 COMPLETE CBC W/AUTO DIFF WBC: CPT | Performed by: STUDENT IN AN ORGANIZED HEALTH CARE EDUCATION/TRAINING PROGRAM

## 2024-09-14 RX ADMIN — THERA TABS 1 TABLET: TAB at 08:09

## 2024-09-14 RX ADMIN — LACOSAMIDE 100 MG: 50 TABLET, FILM COATED ORAL at 08:09

## 2024-09-14 NOTE — SUBJECTIVE & OBJECTIVE
Interval History: NAEON, no further sz episodes, pleasant this AM, pending dispo    Review of Systems  Objective:     Vital Signs (Most Recent):  Temp: 97.8 °F (36.6 °C) (09/14/24 0749)  Pulse: 83 (09/14/24 0749)  Resp: 18 (09/14/24 0749)  BP: 112/76 (09/14/24 0749)  SpO2: 97 % (09/14/24 0749) Vital Signs (24h Range):  Temp:  [97.5 °F (36.4 °C)-98.7 °F (37.1 °C)] 97.8 °F (36.6 °C)  Pulse:  [80-84] 83  Resp:  [16-19] 18  SpO2:  [96 %-97 %] 97 %  BP: (106-112)/(67-76) 112/76     Weight: 49.9 kg (110 lb)  Body mass index is 20.12 kg/m².    Intake/Output Summary (Last 24 hours) at 9/14/2024 0811  Last data filed at 9/14/2024 0630  Gross per 24 hour   Intake 250 ml   Output --   Net 250 ml         Physical Exam  Vitals and nursing note reviewed.   Constitutional:       General: She is not in acute distress.     Appearance: She is not ill-appearing, toxic-appearing or diaphoretic.   HENT:      Head: Normocephalic and atraumatic.      Right Ear: External ear normal.      Left Ear: External ear normal.      Mouth/Throat:      Mouth: Mucous membranes are moist.      Pharynx: No oropharyngeal exudate.   Eyes:      Extraocular Movements: Extraocular movements intact.      Pupils: Pupils are equal, round, and reactive to light.   Cardiovascular:      Rate and Rhythm: Normal rate and regular rhythm.      Pulses: Normal pulses.      Heart sounds: Normal heart sounds. No murmur heard.  Pulmonary:      Effort: Pulmonary effort is normal. No respiratory distress.      Breath sounds: Normal breath sounds. No wheezing or rales.   Abdominal:      General: Abdomen is flat. Bowel sounds are normal. There is no distension.      Palpations: Abdomen is soft. There is no mass.      Tenderness: There is no abdominal tenderness.   Musculoskeletal:         General: No swelling or tenderness. Normal range of motion.      Cervical back: Normal range of motion and neck supple.      Right lower leg: No edema.      Left lower leg: No edema.    Skin:     General: Skin is warm and dry.      Capillary Refill: Capillary refill takes less than 2 seconds.   Neurological:      General: No focal deficit present.      Mental Status: She is alert. Mental status is at baseline. She is disoriented.   Psychiatric:         Mood and Affect: Mood normal.         Behavior: Behavior normal.             Significant Labs: All pertinent labs within the past 24 hours have been reviewed.    Significant Imaging: I have reviewed all pertinent imaging results/findings within the past 24 hours.

## 2024-09-14 NOTE — ASSESSMENT & PLAN NOTE
8/30 patient had a witnessed seizure for 3 minutes with jerking of the left arm and right leg, with post-ictal state. Labs with anion gap acidosis, otherwise no findings. Glucose 124. CMP, CBC, lactic acid, CPK WNL. Ionized calcium 1.02. CT head no evidence of acute intracranial abnormalities. No provoking factor identified in labs/history.  Per Neuro, low suspicion that rivastigmine triggered seizure, but not opposed to holding medication.     EEG: abnormal study due to moderate diffuse background slowing consistent with diffuse cerebral dysfunction and encephalopathy which may be on the basis of toxic, metabolic, or primary neuronal disorder.     9/13 patient suffered a second witnessed seizure. Episode lasted approx 10 minutes, during which patient was foaming at the mouth and leaning to the right. She received 1 mg Ativan IM. Lactate elevated. On evaluation 9/13 AM at baseline. Given she has now had two clinical events, will start AED for seizure prevention per neuro recs.     - Discontinued Rivastigmine 8/31  - Per neuro: continue Lacosamide 100 mg BID PO.   - Outpatient f/u with neurology   - Seizure precautions   - PRN Ativan for GTC>5 min
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
## 2024-09-15 PROCEDURE — 11000001 HC ACUTE MED/SURG PRIVATE ROOM

## 2024-09-15 PROCEDURE — 25000003 PHARM REV CODE 250

## 2024-09-15 RX ADMIN — LACOSAMIDE 100 MG: 50 TABLET, FILM COATED ORAL at 08:09

## 2024-09-15 RX ADMIN — THERA TABS 1 TABLET: TAB at 08:09

## 2024-09-15 NOTE — SUBJECTIVE & OBJECTIVE
Interval History: NAEON, HD stable, on room air, afebrile. Patient denies any seizure activity, headache, lightheadedness. Endorses some post nasal drip but does not want any medications. Pending dispo.     Review of Systems   Unable to perform ROS: Dementia     Objective:     Vital Signs (Most Recent):  Temp: 98 °F (36.7 °C) (09/15/24 0723)  Pulse: 88 (09/15/24 0723)  Resp: 18 (09/15/24 0723)  BP: 125/88 (09/15/24 0723)  SpO2: 95 % (09/15/24 0723) Vital Signs (24h Range):  Temp:  [98 °F (36.7 °C)-98.3 °F (36.8 °C)] 98 °F (36.7 °C)  Pulse:  [75-88] 88  Resp:  [18] 18  SpO2:  [95 %-96 %] 95 %  BP: (125-133)/(84-88) 125/88     Weight: 49.9 kg (110 lb)  Body mass index is 20.12 kg/m².    Intake/Output Summary (Last 24 hours) at 9/15/2024 1339  Last data filed at 9/14/2024 2000  Gross per 24 hour   Intake --   Output 600 ml   Net -600 ml         Physical Exam  Constitutional:       Appearance: Normal appearance. She is not ill-appearing.      Comments: Patient wearing the same set of clothing.    HENT:      Head: Normocephalic and atraumatic.      Right Ear: External ear normal.      Left Ear: External ear normal.      Nose: Nose normal.      Mouth/Throat:      Mouth: Mucous membranes are moist.   Eyes:      Conjunctiva/sclera: Conjunctivae normal.   Cardiovascular:      Rate and Rhythm: Normal rate and regular rhythm.      Heart sounds: Normal heart sounds. No murmur heard.  Pulmonary:      Effort: Pulmonary effort is normal. No respiratory distress.      Breath sounds: Normal breath sounds.   Abdominal:      General: Abdomen is flat.      Palpations: Abdomen is soft.      Tenderness: There is no abdominal tenderness.   Musculoskeletal:      Right lower leg: No edema.      Left lower leg: No edema.   Skin:     General: Skin is warm and dry.   Neurological:      Mental Status: She is alert. She is disoriented.      Comments: Oriented to self, place.              Significant Labs: All pertinent labs within the past 24  hours have been reviewed.    Significant Imaging: I have reviewed all pertinent imaging results/findings within the past 24 hours.
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
## 2024-09-15 NOTE — PLAN OF CARE
Problem: Adult Inpatient Plan of Care  Goal: Plan of Care Review  Outcome: Progressing  Goal: Patient-Specific Goal (Individualized)  Outcome: Progressing  Goal: Absence of Hospital-Acquired Illness or Injury  Outcome: Progressing  Goal: Optimal Comfort and Wellbeing  Outcome: Progressing  Goal: Readiness for Transition of Care  Outcome: Progressing     Problem: Fall Injury Risk  Goal: Absence of Fall and Fall-Related Injury  Outcome: Progressing    Pt had an uneventful night. VSS. Pt denied any pain or discomfort  Pt had no seizures this shift. Sitter at bedside for safety .  Pt is free of falls and injuries. Bed in lowest position and call light within reach. Safety maintained

## 2024-09-16 PROCEDURE — 11000001 HC ACUTE MED/SURG PRIVATE ROOM

## 2024-09-16 PROCEDURE — 25000003 PHARM REV CODE 250

## 2024-09-16 RX ADMIN — THERA TABS 1 TABLET: TAB at 09:09

## 2024-09-16 RX ADMIN — LACOSAMIDE 100 MG: 50 TABLET, FILM COATED ORAL at 08:09

## 2024-09-16 RX ADMIN — LACOSAMIDE 100 MG: 50 TABLET, FILM COATED ORAL at 09:09

## 2024-09-16 NOTE — PLAN OF CARE
Problem: Adult Inpatient Plan of Care  Goal: Plan of Care Review  Outcome: Progressing  Goal: Patient-Specific Goal (Individualized)  Outcome: Progressing  Goal: Absence of Hospital-Acquired Illness or Injury  Outcome: Progressing  Goal: Optimal Comfort and Wellbeing  Outcome: Progressing  Goal: Readiness for Transition of Care  Outcome: Progressing     Problem: Fall Injury Risk  Goal: Absence of Fall and Fall-Related Injury  Outcome: Progressing    Pt had an uneventful night. VSS. Pt denied any pain or discomfort  Sitter remains at bedside for safety. Seizure precautions remains in place.  Pt is free of falls and injuries. Bed in lowest position and call light within reach. Safety maintained
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
## 2024-09-16 NOTE — SUBJECTIVE & OBJECTIVE
Interval History: NAEON. HD, on room air, afebrile. No complains this morning regarding her health. Pending dispo.    Review of Systems   Unable to perform ROS: Dementia   Cardiovascular:  Negative for chest pain.   Gastrointestinal:  Negative for abdominal pain.   Neurological:  Negative for seizures and headaches.     Objective:     Vital Signs (Most Recent):  Temp: 98.1 °F (36.7 °C) (09/16/24 0822)  Pulse: 76 (09/16/24 0822)  Resp: 18 (09/16/24 0822)  BP: 123/62 (09/16/24 0822)  SpO2: 97 % (09/16/24 0822) Vital Signs (24h Range):  Temp:  [98.1 °F (36.7 °C)-98.2 °F (36.8 °C)] 98.1 °F (36.7 °C)  Pulse:  [76] 76  Resp:  [18] 18  SpO2:  [97 %] 97 %  BP: (117-123)/(62-71) 123/62     Weight: 49.9 kg (110 lb)  Body mass index is 20.12 kg/m².  No intake or output data in the 24 hours ending 09/16/24 1136      Physical Exam  Constitutional:       General: She is not in acute distress.     Appearance: Normal appearance. She is not ill-appearing.   HENT:      Head: Normocephalic and atraumatic.      Right Ear: External ear normal.      Left Ear: External ear normal.      Nose: Nose normal.      Mouth/Throat:      Mouth: Mucous membranes are moist.   Eyes:      Conjunctiva/sclera: Conjunctivae normal.   Cardiovascular:      Rate and Rhythm: Normal rate and regular rhythm.      Heart sounds: Normal heart sounds. No murmur heard.  Pulmonary:      Effort: Pulmonary effort is normal. No respiratory distress.      Breath sounds: Normal breath sounds.   Abdominal:      General: Abdomen is flat.      Palpations: Abdomen is soft.      Tenderness: There is no abdominal tenderness.   Musculoskeletal:      Right lower leg: No edema.      Left lower leg: No edema.   Skin:     General: Skin is warm and dry.   Neurological:      Mental Status: She is alert. She is disoriented.      Comments: Oriented to self, place             Significant Labs: All pertinent labs within the past 24 hours have been reviewed.    Significant Imaging: I have  reviewed all pertinent imaging results/findings within the past 24 hours.

## 2024-09-17 PROCEDURE — 11000001 HC ACUTE MED/SURG PRIVATE ROOM

## 2024-09-17 PROCEDURE — 25000003 PHARM REV CODE 250

## 2024-09-17 RX ADMIN — LACOSAMIDE 100 MG: 50 TABLET, FILM COATED ORAL at 08:09

## 2024-09-17 RX ADMIN — LACOSAMIDE 100 MG: 50 TABLET, FILM COATED ORAL at 09:09

## 2024-09-17 RX ADMIN — THERA TABS 1 TABLET: TAB at 08:09

## 2024-09-17 NOTE — PLAN OF CARE
"[facility] - Internal Medicine Telemetry  Discharge Reassessment    Primary Care Provider: Edwar Castaneda II, MD    Expected Discharge Date:     Reassessment (most recent)       Discharge Reassessment - 09/17/24 1522          Discharge Reassessment    Assessment Type Discharge Planning Reassessment (P)      Did the patient's condition or plan change since previous assessment? No (P)      Discharge Plan discussed with: Patient (P)    Dr. Schilling and nurse Edyta and hans    Communicated MARVEL with patient/caregiver Date not available/Unable to determine (P)      Discharge Plan A New Nursing Home placement - correction care facility (P)      Discharge Plan B New Nursing Home placement - correction care facility (P)      DME Needed Upon Discharge  none (P)      Transition of Care Barriers No family/friends to help (P)      Why the patient remains in the hospital Placement issues (P)         Post-Acute Status    Post-Acute Authorization Placement (P)      Post-Acute Placement Status Referrals Sent (P)      Coverage [facility] (P)      Discharge Delays Post-Acute Set-up (P)                  CM spoke with pt, Dr. Schilling, nurse Lan, and hans.  CM instructed that several facilities have accepted pt; unfortunately none of them accept dogs.  Pt states that she will ask the vet that has dogs now to take her dogs if someone has to take the dogs.  Dr. Schilling instructed that pt has sitters d/t concerns about pt leaving.  Dr. Schilling instructed that pt remains here d/t we don't have a safe place for pt to go upon d/c yet.  She will be able to be d/c'd once guardianship obtained.  CM informed Dr. Schilling that there is no update on interdiction date at this time.  Pt states "Do what you've got to do."    MEGHAN CantuN, BS, RN, CCM      Discharge Plan A and Plan B have been determined by review of patient's clinical status, future medical and therapeutic needs, and coverage/benefits for post-acute care in " coordination with multidisciplinary team members.

## 2024-09-17 NOTE — SUBJECTIVE & OBJECTIVE
Interval History: NAEON. HD, on room air, afebrile. Pending dispo.     Review of Systems   Unable to perform ROS: Dementia   Neurological:  Negative for dizziness, light-headedness and headaches.     Objective:     Vital Signs (Most Recent):  Temp: 98.5 °F (36.9 °C) (09/17/24 0718)  Pulse: 68 (09/17/24 0718)  Resp: 17 (09/17/24 0718)  BP: 128/84 (09/17/24 0718)  SpO2: 97 % (09/17/24 0718) Vital Signs (24h Range):  Temp:  [98.4 °F (36.9 °C)-98.5 °F (36.9 °C)] 98.5 °F (36.9 °C)  Pulse:  [68-87] 68  Resp:  [17-18] 17  SpO2:  [97 %] 97 %  BP: (119-128)/(80-84) 128/84     Weight: 49.9 kg (110 lb)  Body mass index is 20.12 kg/m².    Intake/Output Summary (Last 24 hours) at 9/17/2024 1146  Last data filed at 9/16/2024 1621  Gross per 24 hour   Intake 240 ml   Output --   Net 240 ml         Physical Exam  Constitutional:       General: She is not in acute distress.     Appearance: Normal appearance. She is not ill-appearing.   HENT:      Head: Normocephalic and atraumatic.      Right Ear: External ear normal.      Left Ear: External ear normal.      Nose: Nose normal.      Mouth/Throat:      Mouth: Mucous membranes are moist.   Eyes:      Conjunctiva/sclera: Conjunctivae normal.   Cardiovascular:      Rate and Rhythm: Normal rate and regular rhythm.      Heart sounds: Normal heart sounds. No murmur heard.  Pulmonary:      Effort: Pulmonary effort is normal. No respiratory distress.      Breath sounds: Normal breath sounds.   Abdominal:      Palpations: Abdomen is soft.      Tenderness: There is no abdominal tenderness.   Musculoskeletal:      Right lower leg: No edema.      Left lower leg: No edema.   Skin:     General: Skin is warm and dry.   Neurological:      Mental Status: She is alert. She is disoriented.      Comments: Oriented to self, place. With same sitter today as she had on Saturday. Sitter mentioned playing with dogs in the atrium, but patient does not remember this.              Significant Labs: All pertinent  labs within the past 24 hours have been reviewed.    Significant Imaging: I have reviewed all pertinent imaging results/findings within the past 24 hours.

## 2024-09-17 NOTE — PLAN OF CARE
CM called in [facility].  CM faxed [facility] to state.  CM uploaded Pas[facility] to CP.      CM sent updated MD progress note via CP to the following facilities: [facility], [facility], [facility], [facility], [facility].    MEGHAN CantuN, BS, RN, CCM
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
## 2024-09-17 NOTE — PLAN OF CARE
Problem: Adult Inpatient Plan of Care  Goal: Plan of Care Review  Outcome: Progressing  Flowsheets (Taken 9/17/2024 4611)  Plan of Care Reviewed With: patient  Goal: Patient-Specific Goal (Individualized)  Outcome: Progressing  Goal: Absence of Hospital-Acquired Illness or Injury  Outcome: Progressing  Goal: Optimal Comfort and Wellbeing  Outcome: Progressing  Goal: Readiness for Transition of Care  Outcome: Progressing     Problem: Fall Injury Risk  Goal: Absence of Fall and Fall-Related Injury  Outcome: Progressing

## 2024-09-18 PROCEDURE — 11000001 HC ACUTE MED/SURG PRIVATE ROOM

## 2024-09-18 PROCEDURE — 25000003 PHARM REV CODE 250

## 2024-09-18 RX ADMIN — THERA TABS 1 TABLET: TAB at 10:09

## 2024-09-18 RX ADMIN — LACOSAMIDE 100 MG: 50 TABLET, FILM COATED ORAL at 10:09

## 2024-09-18 RX ADMIN — LACOSAMIDE 100 MG: 50 TABLET, FILM COATED ORAL at 09:09

## 2024-09-18 NOTE — PLAN OF CARE
Problem: Adult Inpatient Plan of Care  Goal: Plan of Care Review  Outcome: Progressing  Flowsheets (Taken 9/18/2024 7172)  Plan of Care Reviewed With: patient  Goal: Patient-Specific Goal (Individualized)  Outcome: Progressing  Goal: Absence of Hospital-Acquired Illness or Injury  Outcome: Progressing  Goal: Optimal Comfort and Wellbeing  Outcome: Progressing  Goal: Readiness for Transition of Care  Outcome: Progressing     Problem: Fall Injury Risk  Goal: Absence of Fall and Fall-Related Injury  Outcome: Progressing
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
## 2024-09-18 NOTE — PLAN OF CARE
Problem: Adult Inpatient Plan of Care  Goal: Plan of Care Review  Outcome: Not Progressing  Goal: Optimal Comfort and Wellbeing  Outcome: Not Progressing     Problem: Functional Deficit  Goal: Optimal Cognitive Function  Outcome: Progressing

## 2024-09-18 NOTE — SUBJECTIVE & OBJECTIVE
Interval History: NAEON. HD, on room air, afebrile. No complaints. Pending disposition.     Review of Systems   Unable to perform ROS: Dementia     Objective:     Vital Signs (Most Recent):  Temp: 98.8 °F (37.1 °C) (09/18/24 0740)  Pulse: 94 (09/18/24 0740)  Resp: 17 (09/18/24 0740)  BP: 115/80 (09/18/24 0740)  SpO2: 97 % (09/18/24 0740) Vital Signs (24h Range):  Temp:  [98.3 °F (36.8 °C)-98.8 °F (37.1 °C)] 98.8 °F (37.1 °C)  Pulse:  [84-94] 94  Resp:  [17-18] 17  SpO2:  [97 %] 97 %  BP: (101-115)/(67-80) 115/80     Weight: 49.9 kg (110 lb)  Body mass index is 20.12 kg/m².    Intake/Output Summary (Last 24 hours) at 9/18/2024 1712  Last data filed at 9/17/2024 1731  Gross per 24 hour   Intake 240 ml   Output 600 ml   Net -360 ml         Physical Exam  Constitutional:       General: She is not in acute distress.     Appearance: Normal appearance. She is not ill-appearing.   HENT:      Head: Normocephalic and atraumatic.      Right Ear: External ear normal.      Left Ear: External ear normal.      Nose: Nose normal.      Mouth/Throat:      Mouth: Mucous membranes are moist.   Eyes:      Conjunctiva/sclera: Conjunctivae normal.   Cardiovascular:      Rate and Rhythm: Normal rate and regular rhythm.      Heart sounds: Normal heart sounds. No murmur heard.  Pulmonary:      Effort: Pulmonary effort is normal. No respiratory distress.      Breath sounds: Normal breath sounds.   Abdominal:      General: Abdomen is flat.      Palpations: Abdomen is soft.      Tenderness: There is no abdominal tenderness.   Musculoskeletal:      Right lower leg: No edema.      Left lower leg: No edema.   Skin:     General: Skin is warm and dry.   Neurological:      Mental Status: She is alert. She is disoriented.      Comments: Oriented to self, place. Forgets conversations, recent experiences (meeting dogs, etc).              Significant Labs: All pertinent labs within the past 24 hours have been reviewed.    Significant Imaging: I have  reviewed all pertinent imaging results/findings within the past 24 hours.

## 2024-09-19 PROCEDURE — 25000003 PHARM REV CODE 250

## 2024-09-19 PROCEDURE — 11000001 HC ACUTE MED/SURG PRIVATE ROOM

## 2024-09-19 PROCEDURE — 94761 N-INVAS EAR/PLS OXIMETRY MLT: CPT

## 2024-09-19 RX ADMIN — LACOSAMIDE 100 MG: 50 TABLET, FILM COATED ORAL at 09:09

## 2024-09-19 RX ADMIN — THERA TABS 1 TABLET: TAB at 10:09

## 2024-09-19 RX ADMIN — LACOSAMIDE 100 MG: 50 TABLET, FILM COATED ORAL at 10:09

## 2024-09-19 NOTE — PLAN OF CARE
Problem: Adult Inpatient Plan of Care  Goal: Plan of Care Review  Outcome: Progressing  Goal: Patient-Specific Goal (Individualized)  Outcome: Progressing  Goal: Absence of Hospital-Acquired Illness or Injury  Outcome: Progressing  Goal: Optimal Comfort and Wellbeing  Outcome: Progressing  Goal: Readiness for Transition of Care  Outcome: Progressing     Problem: Fall Injury Risk  Goal: Absence of Fall and Fall-Related Injury  Outcome: Progressing     Problem: Functional Deficit  Goal: Optimal Cognitive Function  Outcome: Progressing

## 2024-09-19 NOTE — SUBJECTIVE & OBJECTIVE
"Interval History: No acute events overnight, afebrile, hemodynamically stable.       Review of Systems   Unable to perform ROS: Dementia     Objective:     Vital Signs (Most Recent):  Temp: 97.6 °F (36.4 °C) (09/19/24 1222)  Pulse: 71 (09/19/24 1222)  Resp: 18 (09/19/24 1222)  BP: 114/75 (09/19/24 1222)  SpO2: 98 % (09/19/24 1222) Vital Signs (24h Range):  Temp:  [97.6 °F (36.4 °C)-98.1 °F (36.7 °C)] 97.6 °F (36.4 °C)  Pulse:  [71-78] 71  Resp:  [18] 18  SpO2:  [96 %-98 %] 98 %  BP: (103-114)/(54-75) 114/75     Weight: 49.9 kg (110 lb)  Body mass index is 20.12 kg/m².  No intake or output data in the 24 hours ending 09/19/24 1338      Physical Exam  Constitutional:       General: She is not in acute distress.     Appearance: Normal appearance. She is not ill-appearing.   HENT:      Head: Normocephalic and atraumatic.      Right Ear: External ear normal.      Left Ear: External ear normal.      Nose: Nose normal.      Mouth/Throat:      Mouth: Mucous membranes are moist.   Eyes:      Conjunctiva/sclera: Conjunctivae normal.   Cardiovascular:      Rate and Rhythm: Normal rate and regular rhythm.      Heart sounds: Normal heart sounds. No murmur heard.  Pulmonary:      Effort: Pulmonary effort is normal. No respiratory distress.      Breath sounds: Normal breath sounds.   Abdominal:      General: Abdomen is flat.      Palpations: Abdomen is soft.      Tenderness: There is no abdominal tenderness.   Musculoskeletal:      Right lower leg: No edema.      Left lower leg: No edema.   Skin:     General: Skin is warm and dry.   Neurological:      Mental Status: She is alert.      Comments: Oriented to self, place.              Significant Labs: All pertinent labs within the past 24 hours have been reviewed.  CBC: No results for input(s): "WBC", "HGB", "HCT", "PLT" in the last 48 hours.  CMP: No results for input(s): "NA", "K", "CL", "CO2", "GLU", "BUN", "CREATININE", "CALCIUM", "PROT", "ALBUMIN", "BILITOT", "ALKPHOS", "AST", " ""ALT", "ANIONGAP", "EGFRNONAA" in the last 48 hours.    Invalid input(s): "ESTGFAFRICA"    Significant Imaging: I have reviewed all pertinent imaging results/findings within the past 24 hours.  "

## 2024-09-19 NOTE — PLAN OF CARE
[facility] - Internal Medicine Telemetry  Discharge Reassessment    Primary Care Provider: Edwar Castaneda II, MD    Expected Discharge Date:     Reassessment (most recent)       Discharge Reassessment - 09/19/24 1426          Discharge Reassessment    Assessment Type Discharge Planning Reassessment (P)      Did the patient's condition or plan change since previous assessment? No (P)      Discharge Plan discussed with: -- (P)    Admissions coordinators at [facility], [facility], [facility], [facility], and [facility]'s.    Name(s) and Number(s) See below (P)      Communicated MRAVEL with patient/caregiver Date not available/Unable to determine (P)      Discharge Plan A New Nursing Home placement - long term care facility (P)      Discharge Plan B New Nursing Home placement - long term care facility (P)      DME Needed Upon Discharge  none (P)      Transition of Care Barriers No family/friends to help (P)      Why the patient remains in the hospital Placement issues (P)         Post-Acute Status    Post-Acute Authorization Placement (P)      Post-Acute Placement Status Referrals Sent (P)      Coverage [facility] (P)      Discharge Delays Post-Acute Set-up (P)                  NICHOLAS spoke with the following admissions coordinators from NH's who had accepted pt in [facility].  Updated on pt's situation: seeking long term NH with memory care unit; pt is in process of interdiction.    [facility], [phone].  She states pt is clinically accepted.    [facility] [phone].  She states they do not have a memory care unit.    [facility], [phone].  She requests update sent in CP.    [facility], Keisha, [phone], she requests update.    [facility] [phone] -  left message with  requesting call back from admissions.    CM sent updated NH referral packets via CP to the above NH's except for [facility], which cannot accommodate pt's needs.    3:52 PM  Trevor from [facility] called back.  CM updated him on  pt's situation, seeking NH placement with Memory Care unit; seeking interdiction right now, once she's appointed a guardian from the state, we will be able to move forward with providing bank statements and signing paperwork.  CM informed that CM sent update in CP.    MARTA Cantu, BS, RN, CCM      Discharge Plan A and Plan B have been determined by review of patient's clinical status, future medical and therapeutic needs, and coverage/benefits for post-acute care in coordination with multidisciplinary team members.
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
## 2024-09-19 NOTE — PROGRESS NOTES
"Medical Nutrition Therapy        Reason for Assessment: RD follow-up/LOS  Dx: Encephalopathy   Medical Hx: -      General Info: Spoke w/ pt at bedside, pt continues to tolerate diet w/ % PO intake.  Per chart review, pt w/ UBW of 110# x 4 years. Pt appears thin, however no indicators of malnutrition noted.      Current Diet: Regular  % intake of meals: %     Ht: 5'2"  Wt: 50kg   BMI: 20.1     Labs: Reviewed  Meds: Reviewed     Overall Physical Appearance: Thin     Level of Risk: Low     Nutrition Dx: dementia without behavioral disturbance     RD follow-up? Yes  "

## 2024-09-20 PROCEDURE — 25000003 PHARM REV CODE 250

## 2024-09-20 PROCEDURE — 36415 COLL VENOUS BLD VENIPUNCTURE: CPT

## 2024-09-20 PROCEDURE — 80235 DRUG ASSAY LACOSAMIDE: CPT

## 2024-09-20 PROCEDURE — 11000001 HC ACUTE MED/SURG PRIVATE ROOM

## 2024-09-20 RX ORDER — LACOSAMIDE 50 MG/1
50 TABLET ORAL EVERY 12 HOURS
Status: DISCONTINUED | OUTPATIENT
Start: 2024-09-20 | End: 2024-12-05

## 2024-09-20 RX ORDER — NOREPINEPHRINE BITARTRATE/D5W 4MG/250ML
0-3 PLASTIC BAG, INJECTION (ML) INTRAVENOUS CONTINUOUS
Status: CANCELLED | OUTPATIENT
Start: 2024-09-20

## 2024-09-20 RX ADMIN — LACOSAMIDE 50 MG: 50 TABLET, FILM COATED ORAL at 09:09

## 2024-09-20 RX ADMIN — LACOSAMIDE 100 MG: 50 TABLET, FILM COATED ORAL at 08:09

## 2024-09-20 RX ADMIN — THERA TABS 1 TABLET: TAB at 08:09

## 2024-09-20 NOTE — ASSESSMENT & PLAN NOTE
8/30 patient had a witnessed seizure for 3 minutes with jerking of the left arm and right leg, with post-ictal state. Labs with anion gap acidosis, otherwise no findings. Glucose 124. CMP, CBC, lactic acid, CPK WNL. Ionized calcium 1.02. CT head no evidence of acute intracranial abnormalities. No provoking factor identified in labs/history.  Per Neuro, low suspicion that rivastigmine triggered seizure, but not opposed to holding medication.     EEG: abnormal study due to moderate diffuse background slowing consistent with diffuse cerebral dysfunction and encephalopathy which may be on the basis of toxic, metabolic, or primary neuronal disorder.     9/13 patient suffered a second witnessed seizure. Episode lasted approx 10 minutes, during which patient was foaming at the mouth and leaning to the right. She received 1 mg Ativan IM. Lactate elevated. On evaluation 9/13 AM at baseline. Given she has now had two clinical events, will start AED for seizure prevention per neuro recs.     - Discontinued Rivastigmine 8/31  - Decreased Lacosamide 100 mg to 50 mg BID PO.   - Outpatient f/u with neurology   - Seizure precautions   - PRN Ativan for GTC>5 min
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
## 2024-09-20 NOTE — PLAN OF CARE
CM rec'd VM from [facility] [phone].  CM called back, unable to leave .  CM responded in CP, requested call back.    MEGHAN CantuN, BS, RN, CCM

## 2024-09-20 NOTE — SUBJECTIVE & OBJECTIVE
Interval History: NAEON. HD, afebrile, on room air. Patient endorsed shakes this morning, decreased Lacosamide dose from 100 mg to 50 mg. Pending dispo.    Review of Systems   Unable to perform ROS: Dementia     Objective:     Vital Signs (Most Recent):  Temp: 98 °F (36.7 °C) (09/20/24 0756)  Pulse: 76 (09/20/24 0757)  Resp: 18 (09/19/24 1944)  BP: 122/69 (09/20/24 0757)  SpO2: 96 % (09/20/24 0757) Vital Signs (24h Range):  Temp:  [98 °F (36.7 °C)-98.8 °F (37.1 °C)] 98 °F (36.7 °C)  Pulse:  [76-84] 76  Resp:  [18] 18  SpO2:  [95 %-96 %] 96 %  BP: (116-122)/(69-74) 122/69     Weight: 49.9 kg (110 lb)  Body mass index is 20.12 kg/m².  No intake or output data in the 24 hours ending 09/20/24 1523      Physical Exam  Constitutional:       General: She is not in acute distress.     Appearance: Normal appearance. She is not ill-appearing.   HENT:      Head: Normocephalic and atraumatic.      Right Ear: External ear normal.      Left Ear: External ear normal.      Nose: Nose normal.      Mouth/Throat:      Mouth: Mucous membranes are moist.   Eyes:      Conjunctiva/sclera: Conjunctivae normal.   Cardiovascular:      Rate and Rhythm: Normal rate and regular rhythm.      Heart sounds: Normal heart sounds. No murmur heard.  Pulmonary:      Effort: Pulmonary effort is normal. No respiratory distress.      Breath sounds: Normal breath sounds.   Abdominal:      Palpations: Abdomen is soft.      Tenderness: There is no abdominal tenderness.   Musculoskeletal:      Right lower leg: No edema.      Left lower leg: No edema.   Skin:     General: Skin is warm and dry.   Neurological:      Mental Status: She is alert. She is disoriented.      Comments: Oriented to self and place.             Significant Labs: All pertinent labs within the past 24 hours have been reviewed.    Significant Imaging: I have reviewed all pertinent imaging results/findings within the past 24 hours.

## 2024-09-21 LAB — LACOSAMIDE: 5.4 MCG/ML (ref 1–10)

## 2024-09-21 PROCEDURE — 25000003 PHARM REV CODE 250

## 2024-09-21 PROCEDURE — 11000001 HC ACUTE MED/SURG PRIVATE ROOM

## 2024-09-21 RX ADMIN — LACOSAMIDE 50 MG: 50 TABLET, FILM COATED ORAL at 08:09

## 2024-09-21 RX ADMIN — LACOSAMIDE 50 MG: 50 TABLET, FILM COATED ORAL at 09:09

## 2024-09-21 RX ADMIN — THERA TABS 1 TABLET: TAB at 09:09
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
## 2024-09-21 NOTE — SUBJECTIVE & OBJECTIVE
Interval History: NAEON. Hemodynamically stable, on room air, afebrile. Patient states that shakes have improved, but now has dry mouth. Patient expressed that she did not want to take the medication any longer, stressed importance of medication in prevention of seizures. Pending dispo.     Review of Systems   Unable to perform ROS: Dementia   Neurological:  Negative for tremors.     Objective:     Vital Signs (Most Recent):  Temp: 98.2 °F (36.8 °C) (09/21/24 0812)  Pulse: 89 (09/21/24 0812)  Resp: 17 (09/21/24 0812)  BP: 107/73 (09/21/24 0812)  SpO2: 99 % (09/21/24 0812) Vital Signs (24h Range):  Temp:  [98 °F (36.7 °C)-98.5 °F (36.9 °C)] 98.2 °F (36.8 °C)  Pulse:  [76-89] 89  Resp:  [17-18] 17  SpO2:  [96 %-99 %] 99 %  BP: (107-122)/(69-73) 107/73     Weight: 49.9 kg (110 lb)  Body mass index is 20.12 kg/m².  No intake or output data in the 24 hours ending 09/21/24 1528      Physical Exam  Constitutional:       Appearance: Normal appearance.   HENT:      Head: Normocephalic and atraumatic.      Right Ear: External ear normal.      Left Ear: External ear normal.      Nose: Nose normal.      Mouth/Throat:      Mouth: Mucous membranes are moist.   Eyes:      Conjunctiva/sclera: Conjunctivae normal.   Cardiovascular:      Rate and Rhythm: Normal rate and regular rhythm.      Heart sounds: Normal heart sounds. No murmur heard.  Pulmonary:      Effort: Pulmonary effort is normal. No respiratory distress.      Breath sounds: Normal breath sounds.   Abdominal:      General: Abdomen is flat.      Palpations: Abdomen is soft.      Tenderness: There is no abdominal tenderness.   Musculoskeletal:      Right lower leg: No edema.      Left lower leg: No edema.   Skin:     General: Skin is warm and dry.   Neurological:      Mental Status: She is alert. She is disoriented.      Comments: Oriented to self, place.              Significant Labs: All pertinent labs within the past 24 hours have been reviewed.    Significant Imaging:  I have reviewed all pertinent imaging results/findings within the past 24 hours.

## 2024-09-22 PROCEDURE — 25000003 PHARM REV CODE 250

## 2024-09-22 PROCEDURE — 11000001 HC ACUTE MED/SURG PRIVATE ROOM

## 2024-09-22 RX ADMIN — THERA TABS 1 TABLET: TAB at 09:09

## 2024-09-22 RX ADMIN — LACOSAMIDE 50 MG: 50 TABLET, FILM COATED ORAL at 08:09

## 2024-09-22 RX ADMIN — LACOSAMIDE 50 MG: 50 TABLET, FILM COATED ORAL at 09:09

## 2024-09-22 NOTE — SUBJECTIVE & OBJECTIVE
Interval History: No acute events overnight. HD, on RA, afebrile. Improved shaking after decreasing dose of Vimpat. Still endorsing some dryness of mouth. Pending dispo.     Review of Systems   Unable to perform ROS: Dementia (States shaking improved. No pain.)     Objective:     Vital Signs (Most Recent):  Temp: 98.4 °F (36.9 °C) (09/22/24 0806)  Pulse: 82 (09/22/24 0806)  Resp: 18 (09/22/24 0806)  BP: 124/80 (09/22/24 0806)  SpO2: 98 % (09/22/24 0806) Vital Signs (24h Range):  Temp:  [98.1 °F (36.7 °C)-98.4 °F (36.9 °C)] 98.4 °F (36.9 °C)  Pulse:  [74-82] 82  Resp:  [18] 18  SpO2:  [96 %-98 %] 98 %  BP: (119-124)/(70-80) 124/80     Weight: 49.9 kg (110 lb)  Body mass index is 20.12 kg/m².    Intake/Output Summary (Last 24 hours) at 9/22/2024 1328  Last data filed at 9/21/2024 1330  Gross per 24 hour   Intake 240 ml   Output --   Net 240 ml         Physical Exam  Constitutional:       General: She is not in acute distress.     Appearance: Normal appearance. She is not ill-appearing.   HENT:      Head: Normocephalic and atraumatic.      Right Ear: External ear normal.      Left Ear: External ear normal.      Nose: Nose normal.      Mouth/Throat:      Mouth: Mucous membranes are moist.   Eyes:      Conjunctiva/sclera: Conjunctivae normal.   Cardiovascular:      Rate and Rhythm: Normal rate and regular rhythm.      Heart sounds: Normal heart sounds. No murmur heard.  Pulmonary:      Effort: Pulmonary effort is normal. No respiratory distress.      Breath sounds: Normal breath sounds.   Abdominal:      Palpations: Abdomen is soft.      Tenderness: There is no abdominal tenderness.   Musculoskeletal:      Right lower leg: No edema.      Left lower leg: No edema.   Skin:     General: Skin is warm and dry.   Neurological:      Mental Status: She is alert. She is disoriented.      Motor: No weakness.      Comments: Oriented to self, place.              Significant Labs: All pertinent labs within the past 24 hours have been  reviewed.    Significant Imaging: I have reviewed all pertinent imaging results/findings within the past 24 hours.
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
## 2024-09-23 PROCEDURE — 25000003 PHARM REV CODE 250

## 2024-09-23 PROCEDURE — 11000001 HC ACUTE MED/SURG PRIVATE ROOM

## 2024-09-23 RX ADMIN — LACOSAMIDE 50 MG: 50 TABLET, FILM COATED ORAL at 10:09

## 2024-09-23 RX ADMIN — THERA TABS 1 TABLET: TAB at 10:09

## 2024-09-23 RX ADMIN — LACOSAMIDE 50 MG: 50 TABLET, FILM COATED ORAL at 09:09

## 2024-09-23 NOTE — PLAN OF CARE
Problem: Adult Inpatient Plan of Care  Goal: Plan of Care Review  Outcome: Progressing  Goal: Patient-Specific Goal (Individualized)  Outcome: Progressing  Goal: Absence of Hospital-Acquired Illness or Injury  Outcome: Progressing  Goal: Optimal Comfort and Wellbeing  Outcome: Progressing  Goal: Readiness for Transition of Care  Outcome: Progressing     Problem: Functional Deficit  Goal: Optimal Cognitive Function  Outcome: Progressing
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
## 2024-09-23 NOTE — SUBJECTIVE & OBJECTIVE
Interval History: As per nurse patient didn't sleep well. She is on melatonin prn. Currently eating, tolerating PO intake, having bowel movements. No complains or overnight events.     Review of Systems  Unable to perform ROS: Dementia (States shaking improved. No pain.)   Objective:     Vital Signs (Most Recent):  Temp: 98.5 °F (36.9 °C) (09/23/24 0805)  Pulse: 85 (09/23/24 0805)  Resp: 18 (09/23/24 0805)  BP: (!) 125/58 (09/23/24 0805)  SpO2: 98 % (09/23/24 0805) Vital Signs (24h Range):  Temp:  [98.5 °F (36.9 °C)-98.6 °F (37 °C)] 98.5 °F (36.9 °C)  Pulse:  [75-85] 85  Resp:  [18] 18  SpO2:  [96 %-98 %] 98 %  BP: (119-125)/(58-72) 125/58     Weight: 49.9 kg (110 lb)  Body mass index is 20.12 kg/m².    Intake/Output Summary (Last 24 hours) at 9/23/2024 1441  Last data filed at 9/23/2024 0904  Gross per 24 hour   Intake 240 ml   Output --   Net 240 ml         Physical Exam      Constitutional:       General: She is not in acute distress.     Appearance: Normal appearance. She is not ill-appearing.   HENT:      Head: Normocephalic and atraumatic.      Right Ear: External ear normal.      Left Ear: External ear normal.      Nose: Nose normal.      Mouth/Throat:      Mouth: Mucous membranes are moist.   Eyes:      Conjunctiva/sclera: Conjunctivae normal.   Cardiovascular:      Rate and Rhythm: Normal rate and regular rhythm.      Heart sounds: Normal heart sounds. No murmur heard.  Pulmonary:      Effort: Pulmonary effort is normal. No respiratory distress.      Breath sounds: Normal breath sounds.   Abdominal:      Palpations: Abdomen is soft.      Tenderness: There is no abdominal tenderness.   Musculoskeletal:      Right lower leg: No edema.      Left lower leg: No edema.   Skin:     General: Skin is warm and dry.   Neurological:      Mental Status: She is alert. She is disoriented.      Motor: No weakness.      Comments: Oriented to self, place.      Significant Labs: All pertinent labs within the past 24 hours  have been reviewed.  Recent Lab Results       None            Significant Imaging: I have reviewed all pertinent imaging results/findings within the past 24 hours.

## 2024-09-23 NOTE — PLAN OF CARE
[facility] - Internal Medicine Telemetry  Discharge Reassessment    Primary Care Provider: Edwar Castaneda II, MD    Expected Discharge Date:     Reassessment (most recent)       Discharge Reassessment - 09/23/24 1616          Discharge Reassessment    Assessment Type Discharge Planning Reassessment (P)      Did the patient's condition or plan change since previous assessment? No (P)      Discharge Plan discussed with: Patient (P)      Communicated MARVEL with patient/caregiver Date not available/Unable to determine (P)      Discharge Plan A New Nursing Home placement - intermediate care facility (P)      Discharge Plan B New Nursing Home placement - intermediate care facility (P)      DME Needed Upon Discharge  none (P)      Transition of Care Barriers No family/friends to help (P)      Why the patient remains in the hospital Placement issues (P)         Post-Acute Status    Post-Acute Authorization Placement (P)      Post-Acute Placement Status Referrals Sent (P)      Coverage [facility] (P)      Discharge Delays Post-Acute Set-up (P)                  CM spoke with pt in room.  DC plan new intermediate NH placement.      MARTA Cantu, BS, RN, CCM      Discharge Plan A and Plan B have been determined by review of patient's clinical status, future medical and therapeutic needs, and coverage/benefits for post-acute care in coordination with multidisciplinary team members.

## 2024-09-24 PROCEDURE — 36415 COLL VENOUS BLD VENIPUNCTURE: CPT

## 2024-09-24 PROCEDURE — 11000001 HC ACUTE MED/SURG PRIVATE ROOM

## 2024-09-24 PROCEDURE — 80235 DRUG ASSAY LACOSAMIDE: CPT

## 2024-09-24 PROCEDURE — 25000003 PHARM REV CODE 250

## 2024-09-24 RX ADMIN — LACOSAMIDE 50 MG: 50 TABLET, FILM COATED ORAL at 08:09

## 2024-09-24 RX ADMIN — THERA TABS 1 TABLET: TAB at 08:09

## 2024-09-24 NOTE — SUBJECTIVE & OBJECTIVE
Interval History: No overnight events. Patient doing good. Has been eating, drinking. No pain, nausea or vomiting. Has been able to sleep good.     Review of Systems  Unable to perform ROS: Dementia (States shaking improved. No pain.)   Objective:     Vital Signs (Most Recent):  Temp: 97.6 °F (36.4 °C) (09/24/24 0756)  Pulse: 79 (09/24/24 0756)  Resp: 18 (09/24/24 0756)  BP: 116/87 (09/24/24 0756)  SpO2: 98 % (09/24/24 0756) Vital Signs (24h Range):  Temp:  [97.6 °F (36.4 °C)-98.3 °F (36.8 °C)] 97.6 °F (36.4 °C)  Pulse:  [79-85] 79  Resp:  [18] 18  SpO2:  [97 %-98 %] 98 %  BP: (101-116)/(69-87) 116/87     Weight: 49.9 kg (110 lb)  Body mass index is 20.12 kg/m².    Intake/Output Summary (Last 24 hours) at 9/24/2024 1537  Last data filed at 9/23/2024 1626  Gross per 24 hour   Intake 360 ml   Output --   Net 360 ml         Physical Exam      Constitutional:       General: She is not in acute distress.     Appearance: Normal appearance. She is not ill-appearing.   HENT:      Head: Normocephalic and atraumatic.      Right Ear: External ear normal.      Left Ear: External ear normal.      Nose: Nose normal.      Mouth/Throat:      Mouth: Mucous membranes are moist.   Eyes:      Conjunctiva/sclera: Conjunctivae normal.   Cardiovascular:      Rate and Rhythm: Normal rate and regular rhythm.      Heart sounds: Normal heart sounds. No murmur heard.  Pulmonary:      Effort: Pulmonary effort is normal. No respiratory distress.      Breath sounds: Normal breath sounds.   Abdominal:      Palpations: Abdomen is soft.      Tenderness: There is no abdominal tenderness.   Musculoskeletal:      Right lower leg: No edema.      Left lower leg: No edema.   Skin:     General: Skin is warm and dry.   Neurological:      Mental Status: She is alert. She is disoriented.      Motor: No weakness.      Comments: Oriented to self, place.    Significant Labs: All pertinent labs within the past 24 hours have been reviewed.    Significant Imaging:  I have reviewed all pertinent imaging results/findings within the past 24 hours.

## 2024-09-24 NOTE — PLAN OF CARE
Problem: Fall Injury Risk  Goal: Absence of Fall and Fall-Related Injury  Outcome: Progressing     Problem: Functional Deficit  Goal: Optimal Cognitive Function  Outcome: Progressing   No significant changes noted. Pt ambulated in the room. Bed locked and in lowest position. Call light in reach.

## 2024-09-24 NOTE — PLAN OF CARE
CM [facility] [place].    1:30 PM  Noreen walker [place] requests current MAR.  NICHOLAS sent MAR via CP.    MARTA Cantu, BS, RN, CCM
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
## 2024-09-25 PROCEDURE — 25000003 PHARM REV CODE 250

## 2024-09-25 PROCEDURE — 11000001 HC ACUTE MED/SURG PRIVATE ROOM

## 2024-09-25 RX ADMIN — THERA TABS 1 TABLET: TAB at 09:09

## 2024-09-25 RX ADMIN — LACOSAMIDE 50 MG: 50 TABLET, FILM COATED ORAL at 09:09

## 2024-09-25 RX ADMIN — LACOSAMIDE 50 MG: 50 TABLET, FILM COATED ORAL at 08:09

## 2024-09-25 NOTE — MEDICARE RECERTIFICATION
MEDICARE INPATIENT  PHYSICIAN RECERTIFICATION  OF MEDICAL NECESSITY      4th Recertification       I certify that this patient's continued medical needs require pending NH placement with memory care unit as patient has dementia and now unable to care for herself. Patient now without POA. Delay noted by our legal team due to son's unavailability. Patient's friend was POA, but has resigned as her POA. She lacks capacity regarding decision to DC home. Legal team involved in patient's care, we are seeking interdiction to proceed with placement. I estimate that the patient will be in the hospital for another 6 days.  Post-acute planning is in process, and currently the patient will likely be discharged to  Nursing home with Memory Care Unit.      Helena Barrientos M.D.  Department of Hospital Medicine  [facility]

## 2024-09-25 NOTE — PLAN OF CARE
Problem: Adult Inpatient Plan of Care  Goal: Plan of Care Review  9/25/2024 0612 by Rosanna Rojo LPN  Outcome: Progressing  9/25/2024 0612 by Rosanna Rojo, LPN  Outcome: Progressing  Goal: Patient-Specific Goal (Individualized)  9/25/2024 0612 by Rosanna Rojo, LPN  Outcome: Progressing  9/25/2024 0612 by Rosanna Rojo, LPN  Outcome: Progressing  Goal: Absence of Hospital-Acquired Illness or Injury  9/25/2024 0612 by Rosanna Rojo, LPN  Outcome: Progressing  9/25/2024 0612 by Rosanna Rojo, LPN  Outcome: Progressing  Goal: Optimal Comfort and Wellbeing  9/25/2024 0612 by Rosanna Rojo, LPN  Outcome: Progressing  9/25/2024 0612 by Rosanna Rojo, LPN  Outcome: Progressing  Goal: Readiness for Transition of Care  9/25/2024 0612 by Rosanna Rojo, LPN  Outcome: Progressing  9/25/2024 0612 by Rosanna Rojo, LPN  Outcome: Progressing     Problem: Fall Injury Risk  Goal: Absence of Fall and Fall-Related Injury  9/25/2024 0612 by Rosanna Rojo, LPN  Outcome: Progressing  9/25/2024 0612 by Rosanna Rojo, LPN  Outcome: Progressing     Problem: Functional Deficit  Goal: Optimal Cognitive Function  9/25/2024 0612 by Rosanna Rojo, LPN  Outcome: Progressing  9/25/2024 0612 by Rosanna Rojo, LPN  Outcome: Progressing   Pt Alert  and  able to express needs.  No c/o pain.  NO change.  Ambulating independently in room.  Sitter at the bedside.  Delirium Precautions maintained.     Safety maintained.  Bed in low position,  call  light in reach.
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
## 2024-09-25 NOTE — SUBJECTIVE & OBJECTIVE
Interval History: NAOE, VSS. Pt resting comfortably, no new complains.     Review of Systems  Objective:     Vital Signs (Most Recent):  Temp: 99.2 °F (37.3 °C) (09/25/24 0817)  Pulse: 77 (09/25/24 0817)  Resp: 18 (09/25/24 0817)  BP: 105/71 (09/25/24 0817)  SpO2: 97 % (09/25/24 0817) Vital Signs (24h Range):  Temp:  [98.1 °F (36.7 °C)-99.2 °F (37.3 °C)] 99.2 °F (37.3 °C)  Pulse:  [73-77] 77  Resp:  [17-18] 18  SpO2:  [96 %-97 %] 97 %  BP: (105-118)/(69-77) 105/71     Weight: 49.9 kg (110 lb)  Body mass index is 20.12 kg/m².    Intake/Output Summary (Last 24 hours) at 9/25/2024 1536  Last data filed at 9/25/2024 0820  Gross per 24 hour   Intake 600 ml   Output --   Net 600 ml         Physical Exam  Constitutional:       General: She is not in acute distress.     Appearance: Normal appearance. She is not ill-appearing.   HENT:      Head: Normocephalic and atraumatic.      Right Ear: External ear normal.      Left Ear: External ear normal.      Nose: Nose normal.      Mouth/Throat:      Mouth: Mucous membranes are moist.   Eyes:      Conjunctiva/sclera: Conjunctivae normal.   Cardiovascular:      Rate and Rhythm: Normal rate and regular rhythm.      Heart sounds: Normal heart sounds. No murmur heard.  Pulmonary:      Effort: Pulmonary effort is normal. No respiratory distress.      Breath sounds: Normal breath sounds.   Abdominal:      Palpations: Abdomen is soft.      Tenderness: There is no abdominal tenderness.   Musculoskeletal:      Right lower leg: No edema.      Left lower leg: No edema.   Skin:     General: Skin is warm and dry.   Neurological:      Mental Status: She is alert. She is disoriented.      Motor: No weakness.      Comments: Oriented to self, place.              Significant Labs: All pertinent labs within the past 24 hours have been reviewed.    Significant Imaging: I have reviewed all pertinent imaging results/findings within the past 24 hours.

## 2024-09-26 LAB — LACOSAMIDE: 2.3 MCG/ML (ref 1–10)

## 2024-09-26 PROCEDURE — 25000003 PHARM REV CODE 250

## 2024-09-26 PROCEDURE — 11000001 HC ACUTE MED/SURG PRIVATE ROOM

## 2024-09-26 RX ADMIN — LACOSAMIDE 50 MG: 50 TABLET, FILM COATED ORAL at 09:09

## 2024-09-26 RX ADMIN — THERA TABS 1 TABLET: TAB at 09:09

## 2024-09-26 NOTE — PLAN OF CARE
Problem: Adult Inpatient Plan of Care  Goal: Plan of Care Review  Outcome: Progressing  Flowsheets (Taken 9/25/2024 1935)  Plan of Care Reviewed With: patient  Goal: Patient-Specific Goal (Individualized)  Outcome: Progressing  Goal: Absence of Hospital-Acquired Illness or Injury  Outcome: Progressing  Goal: Optimal Comfort and Wellbeing  Outcome: Progressing  Goal: Readiness for Transition of Care  Outcome: Progressing   Pt AAOX4, Denies any pain and discomfort during this shift, VS stable. instructed the pt to call for assitance if needed, call light within reach. No acute events noted during this shift. Plan of care ongoing.

## 2024-09-26 NOTE — PLAN OF CARE
[facility] - Internal Medicine Telemetry  Discharge Reassessment    Primary Care Provider: Edwar Castaneda II, MD    Expected Discharge Date:     Reassessment (most recent)       Discharge Reassessment - 09/26/24 1441          Discharge Reassessment    Assessment Type Discharge Planning Reassessment (P)      Did the patient's condition or plan change since previous assessment? No (P)      Discharge Plan discussed with: Patient (P)      Communicated MARVEL with patient/caregiver Date not available/Unable to determine (P)      Discharge Plan A New Nursing Home placement - intermediate care facility (P)      Discharge Plan B New Nursing Home placement - intermediate care facility (P)      DME Needed Upon Discharge  none (P)      Transition of Care Barriers No family/friends to help (P)      Why the patient remains in the hospital Placement issues (P)         Post-Acute Status    Post-Acute Authorization Placement (P)      Post-Acute Placement Status Referrals Sent (P)      Coverage [facility] (P)      Discharge Delays Post-Acute Set-up (P)                  Pt is still in process of interdiction.  DC plan new intermediate NH placement.      CM spoke with pt in room.  Pt states she wants to cash a check, but then stated never mind.    MEGHAN CantuN, BS, RN, CCM      Discharge Plan A and Plan B have been determined by review of patient's clinical status, future medical and therapeutic needs, and coverage/benefits for post-acute care in coordination with multidisciplinary team members.

## 2024-09-26 NOTE — SUBJECTIVE & OBJECTIVE
Interval History: No overnight events. Patient sleeping well. Eating, drinking. No pain, no nausea, no vomiting. Having bowel movements. Pending disposition.     Review of Systems  Objective:     Vital Signs (Most Recent):  Temp: 98.6 °F (37 °C) (09/26/24 0815)  Pulse: 79 (09/26/24 0815)  Resp: 18 (09/26/24 0815)  BP: 122/79 (09/26/24 0815)  SpO2: 97 % (09/26/24 0815) Vital Signs (24h Range):  Temp:  [98.6 °F (37 °C)-98.9 °F (37.2 °C)] 98.6 °F (37 °C)  Pulse:  [79-84] 79  Resp:  [18] 18  SpO2:  [97 %] 97 %  BP: ()/(62-79) 122/79     Weight: 49.9 kg (110 lb)  Body mass index is 20.12 kg/m².    Intake/Output Summary (Last 24 hours) at 9/26/2024 1455  Last data filed at 9/26/2024 0516  Gross per 24 hour   Intake 170 ml   Output --   Net 170 ml         Physical Exam      Constitutional:       General: She is not in acute distress.     Appearance: Normal appearance. She is not ill-appearing.   HENT:      Head: Normocephalic and atraumatic.      Right Ear: External ear normal.      Left Ear: External ear normal.      Nose: Nose normal.      Mouth/Throat:      Mouth: Mucous membranes are moist.   Eyes:      Conjunctiva/sclera: Conjunctivae normal.   Cardiovascular:      Rate and Rhythm: Normal rate and regular rhythm.      Heart sounds: Normal heart sounds. No murmur heard.  Pulmonary:      Effort: Pulmonary effort is normal. No respiratory distress.      Breath sounds: Normal breath sounds.   Abdominal:      Palpations: Abdomen is soft.      Tenderness: There is no abdominal tenderness.   Musculoskeletal:      Right lower leg: No edema.      Left lower leg: No edema.   Skin:     General: Skin is warm and dry.   Neurological:      Mental Status: She is alert. She is disoriented.      Motor: No weakness.      Comments: Oriented to self, place.    Significant Labs: All pertinent labs within the past 24 hours have been reviewed.    Significant Imaging: I have reviewed all pertinent imaging results/findings within the  past 24 hours.
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
## 2024-09-26 NOTE — PLAN OF CARE
Problem: Adult Inpatient Plan of Care  Goal: Plan of Care Review  Outcome: Progressing  Goal: Patient-Specific Goal (Individualized)  Outcome: Progressing  Goal: Absence of Hospital-Acquired Illness or Injury  Outcome: Progressing  Goal: Optimal Comfort and Wellbeing  Outcome: Progressing  Goal: Readiness for Transition of Care  Outcome: Progressing     Problem: Fall Injury Risk  Goal: Absence of Fall and Fall-Related Injury  Outcome: Progressing     Problem: Functional Deficit  Goal: Optimal Cognitive Function  Outcome: Progressing       Pt AAO x 3, VS stable. Medications given. Sitter at bedside. Pt with no complaints this shift. Side rails up x 2, bed locked and low, call light within reach. POC ongoing.

## 2024-09-27 PROCEDURE — 25000003 PHARM REV CODE 250

## 2024-09-27 PROCEDURE — 11000001 HC ACUTE MED/SURG PRIVATE ROOM

## 2024-09-27 RX ADMIN — LACOSAMIDE 50 MG: 50 TABLET, FILM COATED ORAL at 08:09

## 2024-09-27 RX ADMIN — THERA TABS 1 TABLET: TAB at 08:09
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
## 2024-09-27 NOTE — SUBJECTIVE & OBJECTIVE
Interval History: Patient has been doing well. No complains, or events overnight. Sleeping well, eating, drinking, walking, urinating and having bowel movements.     Review of Systems  Objective:     Vital Signs (Most Recent):  Temp: 97.9 °F (36.6 °C) (09/27/24 0820)  Pulse: 82 (09/27/24 0820)  Resp: 18 (09/27/24 0820)  BP: 138/81 (09/27/24 0820)  SpO2: 98 % (09/27/24 0820) Vital Signs (24h Range):  Temp:  [97.9 °F (36.6 °C)-98.3 °F (36.8 °C)] 97.9 °F (36.6 °C)  Pulse:  [79-82] 82  Resp:  [18] 18  SpO2:  [97 %-98 %] 98 %  BP: (108-138)/(71-81) 138/81     Weight: 49.9 kg (110 lb)  Body mass index is 20.12 kg/m².    Intake/Output Summary (Last 24 hours) at 9/27/2024 1353  Last data filed at 9/26/2024 2130  Gross per 24 hour   Intake 120 ml   Output --   Net 120 ml         Physical Exam      Constitutional:       General: She is not in acute distress.     Appearance: Normal appearance. She is not ill-appearing.   HENT:      Head: Normocephalic and atraumatic.      Right Ear: External ear normal.      Left Ear: External ear normal.      Nose: Nose normal.      Mouth/Throat:      Mouth: Mucous membranes are moist.   Eyes:      Conjunctiva/sclera: Conjunctivae normal.   Cardiovascular:      Rate and Rhythm: Normal rate and regular rhythm.      Heart sounds: Normal heart sounds. No murmur heard.  Pulmonary:      Effort: Pulmonary effort is normal. No respiratory distress.      Breath sounds: Normal breath sounds.   Abdominal:      Palpations: Abdomen is soft.      Tenderness: There is no abdominal tenderness.   Musculoskeletal:      Right lower leg: No edema.      Left lower leg: No edema.   Skin:     General: Skin is warm and dry.   Neurological:      Mental Status: She is alert and oriented x3.      Motor: No weakness.      Comments: Oriented to self, time, place.  Significant Labs: All pertinent labs within the past 24 hours have been reviewed.    Significant Imaging: I have reviewed all pertinent imaging  results/findings within the past 24 hours.

## 2024-09-27 NOTE — PLAN OF CARE
CM sent updated MD progress note to [place], [place], and [place] via CP.    MEGHAN CantuN, BS, RN, [facility]

## 2024-09-28 PROCEDURE — 25000003 PHARM REV CODE 250

## 2024-09-28 PROCEDURE — 11000001 HC ACUTE MED/SURG PRIVATE ROOM

## 2024-09-28 RX ADMIN — LACOSAMIDE 50 MG: 50 TABLET, FILM COATED ORAL at 08:09

## 2024-09-28 RX ADMIN — THERA TABS 1 TABLET: TAB at 08:09

## 2024-09-28 NOTE — PLAN OF CARE
Problem: Adult Inpatient Plan of Care  Goal: Plan of Care Review  Outcome: Progressing  Goal: Patient-Specific Goal (Individualized)  Outcome: Progressing  Goal: Absence of Hospital-Acquired Illness or Injury  Outcome: Progressing  Goal: Optimal Comfort and Wellbeing  Outcome: Progressing  Goal: Readiness for Transition of Care  Outcome: Progressing     Problem: Fall Injury Risk  Goal: Absence of Fall and Fall-Related Injury  Outcome: Progressing     Problem: Functional Deficit  Goal: Optimal Cognitive Function  Outcome: Progressing     Problem: Comorbidity Management  Goal: Maintenance of Seizure Control  Outcome: Progressing

## 2024-09-28 NOTE — SUBJECTIVE & OBJECTIVE
Interval History: Patient doing well. No events overnight. Eating, drinking. Having bowel movements. No pain, nausea or vomiting. Pending disposition placement.    Review of Systems  Objective:     Vital Signs (Most Recent):  Temp: 98.2 °F (36.8 °C) (09/28/24 0740)  Pulse: 75 (09/28/24 0740)  Resp: 18 (09/28/24 0740)  BP: 119/79 (09/28/24 0740)  SpO2: 98 % (09/28/24 0740) Vital Signs (24h Range):  Temp:  [98.2 °F (36.8 °C)-98.9 °F (37.2 °C)] 98.2 °F (36.8 °C)  Pulse:  [75-81] 75  Resp:  [18] 18  SpO2:  [96 %-98 %] 98 %  BP: (101-119)/(59-79) 119/79     Weight: 49.9 kg (110 lb)  Body mass index is 20.12 kg/m².    Intake/Output Summary (Last 24 hours) at 9/28/2024 1106  Last data filed at 9/28/2024 0804  Gross per 24 hour   Intake 264 ml   Output --   Net 264 ml         Physical Exam      Constitutional:       General: She is not in acute distress.     Appearance: Normal appearance. She is not ill-appearing.   HENT:      Head: Normocephalic and atraumatic.      Right Ear: External ear normal.      Left Ear: External ear normal.      Nose: Nose normal.      Mouth/Throat:      Mouth: Mucous membranes are moist.   Eyes:      Conjunctiva/sclera: Conjunctivae normal.   Cardiovascular:      Rate and Rhythm: Normal rate and regular rhythm.      Heart sounds: Normal heart sounds. No murmur heard.  Pulmonary:      Effort: Pulmonary effort is normal. No respiratory distress.      Breath sounds: Normal breath sounds.   Abdominal:      Palpations: Abdomen is soft.      Tenderness: There is no abdominal tenderness.   Musculoskeletal:      Right lower leg: No edema.      Left lower leg: No edema.   Skin:     General: Skin is warm and dry.   Neurological:      Mental Status: She is alert and oriented x3.      Motor: No weakness.      Comments: Oriented to self, time, place.  Significant Labs: All pertinent labs within the past 24 hours have been reviewed.    Significant Imaging: I have reviewed all pertinent imaging results/findings  within the past 24 hours.

## 2024-09-28 NOTE — PLAN OF CARE
Problem: Adult Inpatient Plan of Care  Goal: Plan of Care Review  Outcome: Not Progressing  Goal: Optimal Comfort and Wellbeing  Outcome: Not Progressing     Problem: Functional Deficit  Goal: Optimal Cognitive Function  Outcome: Not Progressing
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
## 2024-09-28 NOTE — ASSESSMENT & PLAN NOTE
8/30 patient had a witnessed seizure for 3 minutes with jerking of the left arm and right leg, with post-ictal state. Labs with anion gap acidosis, otherwise no findings. Glucose 124. CMP, CBC, lactic acid, CPK WNL. Ionized calcium 1.02. CT head no evidence of acute intracranial abnormalities. No provoking factor identified in labs/history.  Per Neuro, low suspicion that rivastigmine triggered seizure, but not opposed to holding medication.     EEG: abnormal study due to moderate diffuse background slowing consistent with diffuse cerebral dysfunction and encephalopathy which may be on the basis of toxic, metabolic, or primary neuronal disorder.     9/13 patient suffered a second witnessed seizure. Episode lasted approx 10 minutes, during which patient was foaming at the mouth and leaning to the right. She received 1 mg Ativan IM. Lactate elevated. On evaluation 9/13 AM at baseline. Given she has now had two clinical events, will start AED for seizure prevention per neuro recs.     - Lacosamide 50 mg BID PO.   - Outpatient f/u with neurology   - Seizure precautions   - PRN Ativan for GTC>5 min

## 2024-09-29 PROCEDURE — 11000001 HC ACUTE MED/SURG PRIVATE ROOM

## 2024-09-29 PROCEDURE — 25000003 PHARM REV CODE 250

## 2024-09-29 RX ADMIN — THERA TABS 1 TABLET: TAB at 08:09

## 2024-09-29 RX ADMIN — LACOSAMIDE 50 MG: 50 TABLET, FILM COATED ORAL at 08:09
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
## 2024-09-29 NOTE — PLAN OF CARE
Problem: Adult Inpatient Plan of Care  Goal: Plan of Care Review  Outcome: Progressing  Goal: Patient-Specific Goal (Individualized)  Outcome: Progressing  Goal: Absence of Hospital-Acquired Illness or Injury  Outcome: Progressing  Goal: Optimal Comfort and Wellbeing  Outcome: Progressing  Goal: Readiness for Transition of Care  Outcome: Progressing     Problem: Fall Injury Risk  Goal: Absence of Fall and Fall-Related Injury  Outcome: Progressing     Problem: Functional Deficit  Goal: Optimal Cognitive Function  Outcome: Progressing     Problem: Comorbidity Management  Goal: Maintenance of Seizure Control  Outcome: Progressing   Fall precaution maintained this shift call bell in reach. Bed in low position. Instructed pt to call for assistance. No acute distress noted over night. Vs stable. All concerns addressed and answered.

## 2024-09-29 NOTE — SUBJECTIVE & OBJECTIVE
Interval History: Patient doing well. No events overnight. Eating, drinking. Having bowel movements. No pain, nausea or vomiting. Pending disposition placement.     Review of Systems  Objective:     Vital Signs (Most Recent):  Temp: 97.9 °F (36.6 °C) (09/29/24 0820)  Pulse: 76 (09/29/24 0820)  Resp: 18 (09/29/24 0820)  BP: 123/86 (09/29/24 0820)  SpO2: 99 % (09/29/24 0820) Vital Signs (24h Range):  Temp:  [97.9 °F (36.6 °C)-98.3 °F (36.8 °C)] 97.9 °F (36.6 °C)  Pulse:  [76-85] 76  Resp:  [18] 18  SpO2:  [96 %-99 %] 99 %  BP: (104-123)/(70-86) 123/86     Weight: 49.9 kg (110 lb)  Body mass index is 20.12 kg/m².    Intake/Output Summary (Last 24 hours) at 9/29/2024 1218  Last data filed at 9/28/2024 1239  Gross per 24 hour   Intake 30 ml   Output --   Net 30 ml         Physical Exam      Constitutional:       General: She is not in acute distress.     Appearance: Normal appearance. She is not ill-appearing.   HENT:      Head: Normocephalic and atraumatic.      Right Ear: External ear normal.      Left Ear: External ear normal.      Nose: Nose normal.      Mouth/Throat:      Mouth: Mucous membranes are moist.   Eyes:      Conjunctiva/sclera: Conjunctivae normal.   Cardiovascular:      Rate and Rhythm: Normal rate and regular rhythm.      Heart sounds: Normal heart sounds. No murmur heard.  Pulmonary:      Effort: Pulmonary effort is normal. No respiratory distress.      Breath sounds: Normal breath sounds.   Abdominal:      Palpations: Abdomen is soft.      Tenderness: There is no abdominal tenderness.   Musculoskeletal:      Right lower leg: No edema.      Left lower leg: No edema.   Skin:     General: Skin is warm and dry.   Neurological:      Mental Status: She is alert and oriented x3.      Motor: No weakness.      Comments: Oriented to self, time, place.    Significant Labs: All pertinent labs within the past 24 hours have been reviewed.    Significant Imaging: I have reviewed all pertinent imaging  results/findings within the past 24 hours.

## 2024-09-30 PROCEDURE — 25000003 PHARM REV CODE 250

## 2024-09-30 PROCEDURE — 11000001 HC ACUTE MED/SURG PRIVATE ROOM

## 2024-09-30 RX ADMIN — LACOSAMIDE 50 MG: 50 TABLET, FILM COATED ORAL at 08:09

## 2024-09-30 RX ADMIN — THERA TABS 1 TABLET: TAB at 08:09

## 2024-09-30 NOTE — PROGRESS NOTES
"Medical Nutrition Therapy        Reason for Assessment: RD follow-up/LOS  Dx: Encephalopathy   Medical Hx: -      General Info: Spoke w/ pt at bedside, pt continues to tolerate diet w/ 75% PO intake.  Per chart review, pt w/ UBW of 110# x 4 years. Pt appears thin, however no indicators of malnutrition noted.      Current Diet: Regular  % intake of meals: %     Ht: 5'2"  Wt: 50kg   BMI: 20.1     Labs: Reviewed  Meds: Reviewed     Overall Physical Appearance: Thin     Level of Risk: Low     Nutrition Dx: dementia without behavioral disturbance     RD follow-up? Yes  "

## 2024-09-30 NOTE — PLAN OF CARE
[facility] Tele[facility]  Discharge Reassessment    Primary Care Provider: Edwar Castaneda II, MD    Expected Discharge Date:     Reassessment (most recent)       Discharge Reassessment - 09/30/24 1508          Discharge Reassessment    Assessment Type Discharge Planning Reassessment (P)      Did the patient's condition or plan change since previous assessment? No (P)      Discharge Plan discussed with: Patient (P)      Communicated MARVEL with patient/caregiver Date not available/Unable to determine (P)      Discharge Plan A [facility] placement [facility] (P)      Discharge Plan B New Nursing Home placement - prison care facility (P)      DME Needed Upon Discharge  none (P)      Transition of Care Barriers No family/friends to help (P)      Why the patient remains in the hospital Placement issues (P)         Post-Acute Status    Post-Acute Authorization Placement (P)      Post-Acute Placement Status Referrals Sent (P)      Coverage [facility] (P)      Discharge Delays Post-Acute Set-up (P)                  CM spoke with pt in hallway, no concerns or complaints expressed.  DC plan prison [facility] once interdiction process complete.    MARTA Cantu, BS, RN, CCM      Discharge Plan A and Plan B have been determined by review of patient's clinical status, future medical and therapeutic needs, and coverage/benefits for post-acute care in coordination with multidisciplinary team members.
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
## 2024-09-30 NOTE — SUBJECTIVE & OBJECTIVE
Interval History: NAOE, VSS pt resting comfortably in room. Watching TV with sitter at bedside.     Review of Systems  Objective:     Vital Signs (Most Recent):  Temp: 97.9 °F (36.6 °C) (09/30/24 0739)  Pulse: 85 (09/30/24 0739)  Resp: 18 (09/30/24 0739)  BP: 129/87 (09/30/24 0739)  SpO2: 97 % (09/30/24 0739) Vital Signs (24h Range):  Temp:  [97.4 °F (36.3 °C)-98.6 °F (37 °C)] 97.9 °F (36.6 °C)  Pulse:  [65-85] 85  Resp:  [18] 18  SpO2:  [97 %-98 %] 97 %  BP: (115-135)/(76-87) 129/87     Weight: 49.9 kg (110 lb)  Body mass index is 20.12 kg/m².  No intake or output data in the 24 hours ending 09/30/24 1349      Physical Exam  Constitutional:       General: She is not in acute distress.     Appearance: Normal appearance. She is not ill-appearing.   HENT:      Head: Normocephalic and atraumatic.      Right Ear: External ear normal.      Left Ear: External ear normal.      Nose: Nose normal.      Mouth/Throat:      Mouth: Mucous membranes are moist.   Eyes:      Conjunctiva/sclera: Conjunctivae normal.   Cardiovascular:      Rate and Rhythm: Normal rate and regular rhythm.      Heart sounds: Normal heart sounds. No murmur heard.  Pulmonary:      Effort: Pulmonary effort is normal. No respiratory distress.      Breath sounds: Normal breath sounds.   Abdominal:      Palpations: Abdomen is soft.      Tenderness: There is no abdominal tenderness.   Musculoskeletal:      Right lower leg: No edema.      Left lower leg: No edema.   Skin:     General: Skin is warm and dry.   Neurological:      Mental Status: She is alert. She is disoriented.      Motor: No weakness.      Comments: Oriented to self, place.              Significant Labs: All pertinent labs within the past 24 hours have been reviewed.    Significant Imaging: I have reviewed all pertinent imaging results/findings within the past 24 hours.

## 2024-09-30 NOTE — PLAN OF CARE
CHW went to talk to long stay patient about what would make her stay here a little more comfortable since she has been here awhile. Patient expressed that she was ready to get out of the hospital and get home to her dogs.    CHW discussed with manager about getting the volunteer dogs to come and visit with her and CHW went to volunteer services and the patient added to the list of visits.    Since patient has been added to list she has received one visit and CHW was told that the patient responded well to the visit. CHW went to follow up with patient and asked about the visit and patient was excited about it. CHW will continue to follow up.    FLORENTINO Tracey  [phone]

## 2024-10-01 PROCEDURE — 11000001 HC ACUTE MED/SURG PRIVATE ROOM

## 2024-10-01 PROCEDURE — 25000003 PHARM REV CODE 250

## 2024-10-01 RX ADMIN — LACOSAMIDE 50 MG: 50 TABLET, FILM COATED ORAL at 09:10

## 2024-10-01 RX ADMIN — LACOSAMIDE 50 MG: 50 TABLET, FILM COATED ORAL at 08:10

## 2024-10-01 RX ADMIN — THERA TABS 1 TABLET: TAB at 08:10
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
## 2024-10-01 NOTE — SUBJECTIVE & OBJECTIVE
Interval History: NAOE, VSS pt resting comfortably in room. Patient slept well. Had bowel movements.     Review of Systems  Objective:     Vital Signs (Most Recent):  Temp: 98.7 °F (37.1 °C) (10/01/24 0807)  Pulse: 82 (10/01/24 0807)  Resp: 19 (10/01/24 0807)  BP: 111/77 (10/01/24 0807)  SpO2: 96 % (10/01/24 0807) Vital Signs (24h Range):  Temp:  [97.9 °F (36.6 °C)-98.7 °F (37.1 °C)] 98.7 °F (37.1 °C)  Pulse:  [82-84] 82  Resp:  [18-19] 19  SpO2:  [96 %-97 %] 96 %  BP: (111-116)/(59-77) 111/77     Weight: 49.9 kg (110 lb)  Body mass index is 20.12 kg/m².    Intake/Output Summary (Last 24 hours) at 10/1/2024 1040  Last data filed at 9/30/2024 1758  Gross per 24 hour   Intake 120 ml   Output --   Net 120 ml         Physical Exam  Constitutional:       General: She is not in acute distress.     Appearance: Normal appearance. She is not ill-appearing.   HENT:      Head: Normocephalic and atraumatic.      Right Ear: External ear normal.      Left Ear: External ear normal.      Nose: Nose normal.      Mouth/Throat:      Mouth: Mucous membranes are moist.   Eyes:      Conjunctiva/sclera: Conjunctivae normal.   Cardiovascular:      Rate and Rhythm: Normal rate and regular rhythm.      Heart sounds: Normal heart sounds. No murmur heard.  Pulmonary:      Effort: Pulmonary effort is normal. No respiratory distress.      Breath sounds: Normal breath sounds.   Abdominal:      Palpations: Abdomen is soft.      Tenderness: There is no abdominal tenderness.   Musculoskeletal:      Right lower leg: No edema.      Left lower leg: No edema.   Skin:     General: Skin is warm and dry.   Neurological:      Mental Status: She is alert. She is disoriented.      Motor: No weakness.      Comments: Oriented to self, place.               Significant Labs: All pertinent labs within the past 24 hours have been reviewed.    Significant Imaging: I have reviewed all pertinent imaging results/findings within the past 24 hours.

## 2024-10-02 PROCEDURE — 25000003 PHARM REV CODE 250

## 2024-10-02 PROCEDURE — 11000001 HC ACUTE MED/SURG PRIVATE ROOM

## 2024-10-02 RX ADMIN — LACOSAMIDE 50 MG: 50 TABLET, FILM COATED ORAL at 09:10

## 2024-10-02 RX ADMIN — THERA TABS 1 TABLET: TAB at 08:10

## 2024-10-02 RX ADMIN — LACOSAMIDE 50 MG: 50 TABLET, FILM COATED ORAL at 08:10
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
## 2024-10-02 NOTE — PLAN OF CARE
Problem: Adult Inpatient Plan of Care  Goal: Plan of Care Review  Outcome: Progressing  Goal: Patient-Specific Goal (Individualized)  Outcome: Progressing  Goal: Absence of Hospital-Acquired Illness or Injury  Outcome: Progressing  Goal: Optimal Comfort and Wellbeing  Outcome: Progressing  Goal: Readiness for Transition of Care  Outcome: Progressing     Problem: Fall Injury Risk  Goal: Absence of Fall and Fall-Related Injury  Outcome: Progressing     Problem: Comorbidity Management  Goal: Maintenance of Seizure Control  Outcome: Progressing

## 2024-10-02 NOTE — SUBJECTIVE & OBJECTIVE
Interval History: No events overnights. Patient resting comfortably, slept well. Eating, drinking, urinating, no bowel movements.     Review of Systems  Objective:     Vital Signs (Most Recent):  Temp: 98.5 °F (36.9 °C) (10/01/24 1953)  Pulse: 76 (10/01/24 1953)  Resp: 18 (10/02/24 0811)  BP: 138/71 (10/02/24 0811)  SpO2: 97 % (10/01/24 1953) Vital Signs (24h Range):  Temp:  [98.5 °F (36.9 °C)] 98.5 °F (36.9 °C)  Pulse:  [76] 76  Resp:  [18] 18  SpO2:  [97 %] 97 %  BP: (106-138)/(69-71) 138/71     Weight: 49.9 kg (110 lb)  Body mass index is 20.12 kg/m².    Intake/Output Summary (Last 24 hours) at 10/2/2024 1039  Last data filed at 10/1/2024 1111  Gross per 24 hour   Intake 120 ml   Output --   Net 120 ml         Physical Exam  Constitutional:       General: She is not in acute distress.     Appearance: Normal appearance. She is not ill-appearing.   HENT:      Head: Normocephalic and atraumatic.      Right Ear: External ear normal.      Left Ear: External ear normal.      Nose: Nose normal.      Mouth/Throat:      Mouth: Mucous membranes are moist.   Eyes:      Conjunctiva/sclera: Conjunctivae normal.   Cardiovascular:      Rate and Rhythm: Normal rate and regular rhythm.      Heart sounds: Normal heart sounds. No murmur heard.  Pulmonary:      Effort: Pulmonary effort is normal. No respiratory distress.      Breath sounds: Normal breath sounds.   Abdominal:      Palpations: Abdomen is soft.      Tenderness: There is no abdominal tenderness.   Musculoskeletal:      Right lower leg: No edema.      Left lower leg: No edema.   Skin:     General: Skin is warm and dry.   Neurological:      Mental Status: She is alert. She is disoriented.      Motor: No weakness.      Comments: Oriented to self, place.              Significant Labs: All pertinent labs within the past 24 hours have been reviewed.    Significant Imaging: I have reviewed all pertinent imaging results/findings within the past 24 hours.

## 2024-10-03 PROCEDURE — 25000003 PHARM REV CODE 250

## 2024-10-03 PROCEDURE — 11000001 HC ACUTE MED/SURG PRIVATE ROOM

## 2024-10-03 RX ADMIN — LACOSAMIDE 50 MG: 50 TABLET, FILM COATED ORAL at 09:10

## 2024-10-03 RX ADMIN — LACOSAMIDE 50 MG: 50 TABLET, FILM COATED ORAL at 08:10

## 2024-10-03 RX ADMIN — THERA TABS 1 TABLET: TAB at 09:10
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
## 2024-10-03 NOTE — SUBJECTIVE & OBJECTIVE
Interval History: No events overnight. Patient doing good, slept well. No vomiting, no pain. Refers she has been eating and drinking and had BM yesterday.    Review of Systems  Objective:     Vital Signs (Most Recent):  Temp: 98.7 °F (37.1 °C) (10/03/24 0749)  Pulse: 73 (10/03/24 0749)  Resp: 18 (10/03/24 0749)  BP: 138/82 (10/03/24 0749)  SpO2: 97 % (10/03/24 0749) Vital Signs (24h Range):  Temp:  [97.5 °F (36.4 °C)-98.7 °F (37.1 °C)] 98.7 °F (37.1 °C)  Pulse:  [73-78] 73  Resp:  [18] 18  SpO2:  [97 %] 97 %  BP: (107-138)/(60-82) 138/82     Weight: 49.9 kg (110 lb)  Body mass index is 20.12 kg/m².  No intake or output data in the 24 hours ending 10/03/24 1032      Physical Exam  Constitutional:       General: She is not in acute distress.     Appearance: Normal appearance. She is not ill-appearing.   HENT:      Head: Normocephalic and atraumatic.      Right Ear: External ear normal.      Left Ear: External ear normal.      Nose: Nose normal.      Mouth/Throat:      Mouth: Mucous membranes are moist.   Eyes:      Conjunctiva/sclera: Conjunctivae normal.   Cardiovascular:      Rate and Rhythm: Normal rate and regular rhythm.      Heart sounds: Normal heart sounds. No murmur heard.  Pulmonary:      Effort: Pulmonary effort is normal. No respiratory distress.      Breath sounds: Normal breath sounds.   Abdominal:      Palpations: Abdomen is soft.      Tenderness: There is no abdominal tenderness.   Musculoskeletal:      Right lower leg: No edema.      Left lower leg: No edema.   Skin:     General: Skin is warm and dry.   Neurological:      Mental Status: She is alert. She is disoriented.      Motor: No weakness.      Comments: Oriented to self, place.              Significant Labs: All pertinent labs within the past 24 hours have been reviewed.    Significant Imaging: I have reviewed all pertinent imaging results/findings within the past 24 hours.
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
## 2024-10-03 NOTE — PLAN OF CARE
[facility] - Internal Medicine Telemetry  Discharge Reassessment    Primary Care Provider: Edwar Castaneda II, MD    Expected Discharge Date:     Reassessment (most recent)       Discharge Reassessment - 10/03/24 1134          Discharge Reassessment    Assessment Type Discharge Planning Reassessment (P)      Did the patient's condition or plan change since previous assessment? No (P)      Discharge Plan discussed with: Patient (P)      Communicated MARVEL with patient/caregiver Date not available/Unable to determine (P)      Discharge Plan A New Nursing Home placement - group home care facility (P)      Discharge Plan B New Nursing Home placement - group home care facility (P)      DME Needed Upon Discharge  none (P)      Transition of Care Barriers No family/friends to help (P)      Why the patient remains in the hospital Placement issues (P)         Post-Acute Status    Post-Acute Authorization Placement (P)      Post-Acute Placement Status Referrals Sent (P)      Coverage [facility] (P)      Discharge Delays Post-Acute Set-up (P)                    CM spoke with pt in room.  DC plan is new group home home placement.  Pt in process of inderdiction.  Pt states she has no problems or concerns she wishes to discuss with CM.    MEGHAN CantuN, BS, RN, CCM      Discharge Plan A and Plan B have been determined by review of patient's clinical status, future medical and therapeutic needs, and coverage/benefits for post-acute care in coordination with multidisciplinary team members.

## 2024-10-04 PROCEDURE — 25000003 PHARM REV CODE 250

## 2024-10-04 PROCEDURE — 11000001 HC ACUTE MED/SURG PRIVATE ROOM

## 2024-10-04 RX ADMIN — LACOSAMIDE 50 MG: 50 TABLET, FILM COATED ORAL at 09:10

## 2024-10-04 RX ADMIN — LACOSAMIDE 50 MG: 50 TABLET, FILM COATED ORAL at 08:10

## 2024-10-04 RX ADMIN — THERA TABS 1 TABLET: TAB at 09:10
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
## 2024-10-04 NOTE — SUBJECTIVE & OBJECTIVE
Interval History: NAOE, VSS. Pt comfortably in her room waiting to go to [facility].     Review of Systems  Objective:     Vital Signs (Most Recent):  Temp: 97.7 °F (36.5 °C) (10/04/24 0930)  Pulse: 84 (10/04/24 0930)  Resp: 16 (10/04/24 0930)  BP: 116/67 (10/04/24 0930)  SpO2: 98 % (10/04/24 0930) Vital Signs (24h Range):  Temp:  [97.7 °F (36.5 °C)-98 °F (36.7 °C)] 97.7 °F (36.5 °C)  Pulse:  [81-84] 84  Resp:  [16-18] 16  SpO2:  [97 %-98 %] 98 %  BP: (106-116)/(61-67) 116/67     Weight: 49.9 kg (110 lb)  Body mass index is 20.12 kg/m².  No intake or output data in the 24 hours ending 10/04/24 1414      Physical Exam  Constitutional:       General: She is not in acute distress.     Appearance: Normal appearance. She is not ill-appearing.   HENT:      Head: Normocephalic and atraumatic.      Right Ear: External ear normal.      Left Ear: External ear normal.      Nose: Nose normal.      Mouth/Throat:      Mouth: Mucous membranes are moist.   Eyes:      Conjunctiva/sclera: Conjunctivae normal.   Cardiovascular:      Rate and Rhythm: Normal rate and regular rhythm.      Heart sounds: Normal heart sounds. No murmur heard.  Pulmonary:      Effort: Pulmonary effort is normal. No respiratory distress.      Breath sounds: Normal breath sounds.   Abdominal:      Palpations: Abdomen is soft.      Tenderness: There is no abdominal tenderness.   Musculoskeletal:      Right lower leg: No edema.      Left lower leg: No edema.   Skin:     General: Skin is warm and dry.   Neurological:      Mental Status: She is alert. She is disoriented.      Motor: No weakness.      Comments: Oriented to self, place.              Significant Labs: All pertinent labs within the past 24 hours have been reviewed.    Significant Imaging: I have reviewed all pertinent imaging results/findings within the past 24 hours.

## 2024-10-05 PROCEDURE — 25000003 PHARM REV CODE 250

## 2024-10-05 PROCEDURE — 11000001 HC ACUTE MED/SURG PRIVATE ROOM

## 2024-10-05 RX ADMIN — LACOSAMIDE 50 MG: 50 TABLET, FILM COATED ORAL at 10:10

## 2024-10-05 RX ADMIN — THERA TABS 1 TABLET: TAB at 10:10

## 2024-10-05 RX ADMIN — LACOSAMIDE 50 MG: 50 TABLET, FILM COATED ORAL at 08:10
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
## 2024-10-05 NOTE — SUBJECTIVE & OBJECTIVE
Interval History: NAOE, VSS. Pt comfortably in her room. Eating, drinking, has been urinating and having bowel movements.     Review of Systems  Objective:     Vital Signs (Most Recent):  Temp: 98.7 °F (37.1 °C) (10/05/24 1103)  Pulse: 80 (10/05/24 1103)  Resp: 18 (10/05/24 1103)  BP: 114/70 (10/05/24 1103)  SpO2: 98 % (10/05/24 1103) Vital Signs (24h Range):  Temp:  [98.3 °F (36.8 °C)-98.7 °F (37.1 °C)] 98.7 °F (37.1 °C)  Pulse:  [79-80] 80  Resp:  [18] 18  SpO2:  [98 %] 98 %  BP: (114-134)/(70-76) 114/70     Weight: 49.9 kg (110 lb)  Body mass index is 20.12 kg/m².  No intake or output data in the 24 hours ending 10/05/24 1521      Physical Exam  Constitutional:       General: She is not in acute distress.     Appearance: Normal appearance. She is not ill-appearing.   HENT:      Head: Normocephalic and atraumatic.      Right Ear: External ear normal.      Left Ear: External ear normal.      Nose: Nose normal.      Mouth/Throat:      Mouth: Mucous membranes are moist.   Eyes:      Conjunctiva/sclera: Conjunctivae normal.   Cardiovascular:      Rate and Rhythm: Normal rate and regular rhythm.      Heart sounds: Normal heart sounds. No murmur heard.  Pulmonary:      Effort: Pulmonary effort is normal. No respiratory distress.      Breath sounds: Normal breath sounds.   Abdominal:      Palpations: Abdomen is soft.      Tenderness: There is no abdominal tenderness.   Musculoskeletal:      Right lower leg: No edema.      Left lower leg: No edema.   Skin:     General: Skin is warm and dry.   Neurological:      Mental Status: She is alert. She is disoriented.      Motor: No weakness.      Comments: Oriented to self, place.              Significant Labs: All pertinent labs within the past 24 hours have been reviewed.    Significant Imaging: I have reviewed all pertinent imaging results/findings within the past 24 hours.

## 2024-10-06 PROCEDURE — 25000003 PHARM REV CODE 250

## 2024-10-06 PROCEDURE — 11000001 HC ACUTE MED/SURG PRIVATE ROOM

## 2024-10-06 RX ADMIN — LACOSAMIDE 50 MG: 50 TABLET, FILM COATED ORAL at 08:10

## 2024-10-06 RX ADMIN — THERA TABS 1 TABLET: TAB at 08:10

## 2024-10-06 NOTE — SUBJECTIVE & OBJECTIVE
Interval History: No events overnight. Patient had been resting comfortably, eating, drinking, urinating, and having bowel movements. Denies any pain, nausea or vomiting. Awaiting disposition.     Review of Systems  Objective:     Vital Signs (Most Recent):  Temp: 98.6 °F (37 °C) (10/06/24 1137)  Pulse: 86 (10/06/24 1137)  Resp: 18 (10/06/24 1137)  BP: 118/74 (10/06/24 1137)  SpO2: 98 % (10/06/24 1137) Vital Signs (24h Range):  Temp:  [97.9 °F (36.6 °C)-98.6 °F (37 °C)] 98.6 °F (37 °C)  Pulse:  [64-86] 86  Resp:  [18] 18  SpO2:  [97 %-98 %] 98 %  BP: (103-118)/(66-75) 118/74     Weight: 49.9 kg (110 lb)  Body mass index is 20.12 kg/m².  No intake or output data in the 24 hours ending 10/06/24 1216      Physical Exam  Constitutional:       General: She is not in acute distress.     Appearance: Normal appearance. She is not ill-appearing.   HENT:      Head: Normocephalic and atraumatic.      Right Ear: External ear normal.      Left Ear: External ear normal.      Nose: Nose normal.      Mouth/Throat:      Mouth: Mucous membranes are moist.   Eyes:      Conjunctiva/sclera: Conjunctivae normal.   Cardiovascular:      Rate and Rhythm: Normal rate and regular rhythm.      Heart sounds: Normal heart sounds. No murmur heard.  Pulmonary:      Effort: Pulmonary effort is normal. No respiratory distress.      Breath sounds: Normal breath sounds.   Abdominal:      Palpations: Abdomen is soft.      Tenderness: There is no abdominal tenderness.   Musculoskeletal:      Right lower leg: No edema.      Left lower leg: No edema.   Skin:     General: Skin is warm and dry.   Neurological:      Mental Status: She is alert and oriented to person, place, and time.      Motor: No weakness.      Comments: Oriented to self, place and time             Significant Labs: All pertinent labs within the past 24 hours have been reviewed.    Significant Imaging: I have reviewed all pertinent imaging results/findings within the past 24 hours.
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
## 2024-10-06 NOTE — PLAN OF CARE
Problem: Adult Inpatient Plan of Care  Goal: Plan of Care Review  Outcome: Progressing  Goal: Patient-Specific Goal (Individualized)  Outcome: Progressing  Goal: Absence of Hospital-Acquired Illness or Injury  Outcome: Progressing  Goal: Optimal Comfort and Wellbeing  Outcome: Progressing  Goal: Readiness for Transition of Care  Outcome: Progressing     Problem: Fall Injury Risk  Goal: Absence of Fall and Fall-Related Injury  Outcome: Progressing     Problem: Seizure, Active Management  Goal: Absence of Seizure/Seizure-Related Injury  Outcome: Progressing     Problem: Functional Deficit  Goal: Optimal Cognitive Function  Outcome: Progressing     Problem: Comorbidity Management  Goal: Maintenance of Seizure Control  Outcome: Progressing

## 2024-10-06 NOTE — PLAN OF CARE
Problem: Adult Inpatient Plan of Care  Goal: Plan of Care Review  Outcome: Progressing     Problem: Fall Injury Risk  Goal: Absence of Fall and Fall-Related Injury  Outcome: Progressing     Problem: Seizure, Active Management  Goal: Absence of Seizure/Seizure-Related Injury  Outcome: Progressing

## 2024-10-07 PROCEDURE — 11000001 HC ACUTE MED/SURG PRIVATE ROOM

## 2024-10-07 PROCEDURE — 25000003 PHARM REV CODE 250

## 2024-10-07 RX ADMIN — LACOSAMIDE 50 MG: 50 TABLET, FILM COATED ORAL at 10:10

## 2024-10-07 RX ADMIN — THERA TABS 1 TABLET: TAB at 10:10

## 2024-10-07 RX ADMIN — LACOSAMIDE 50 MG: 50 TABLET, FILM COATED ORAL at 08:10

## 2024-10-07 NOTE — SUBJECTIVE & OBJECTIVE
Interval History: No events overnight. Patient doing well, eating, drinking, having bowel movements, urinating spontaneously. Awaiting disposition.     Review of Systems  Objective:     Vital Signs (Most Recent):  Temp: 97.6 °F (36.4 °C) (10/07/24 0846)  Pulse: 80 (10/07/24 0846)  Resp: 18 (10/07/24 0846)  BP: 127/81 (10/07/24 0846)  SpO2: 99 % (10/07/24 0846) Vital Signs (24h Range):  Temp:  [97.6 °F (36.4 °C)-98.6 °F (37 °C)] 97.6 °F (36.4 °C)  Pulse:  [78-86] 80  Resp:  [18] 18  SpO2:  [97 %-99 %] 99 %  BP: ()/(62-81) 127/81     Weight: 49.9 kg (110 lb)  Body mass index is 20.12 kg/m².  No intake or output data in the 24 hours ending 10/07/24 1129      Physical Exam  Constitutional:       General: She is not in acute distress.     Appearance: Normal appearance. She is not ill-appearing.   HENT:      Head: Normocephalic and atraumatic.      Right Ear: External ear normal.      Left Ear: External ear normal.      Nose: Nose normal.      Mouth/Throat:      Mouth: Mucous membranes are moist.   Eyes:      Conjunctiva/sclera: Conjunctivae normal.   Cardiovascular:      Rate and Rhythm: Normal rate and regular rhythm.      Heart sounds: Normal heart sounds. No murmur heard.  Pulmonary:      Effort: Pulmonary effort is normal. No respiratory distress.      Breath sounds: Normal breath sounds.   Abdominal:      Palpations: Abdomen is soft.      Tenderness: There is no abdominal tenderness.   Musculoskeletal:      Right lower leg: No edema.      Left lower leg: No edema.   Skin:     General: Skin is warm and dry.   Neurological:      Mental Status: She is alert and oriented to person, place, and time.      Motor: No weakness.      Comments: Oriented to self, place and time             Significant Labs: All pertinent labs within the past 24 hours have been reviewed.    Significant Imaging: I have reviewed all pertinent imaging results/findings within the past 24 hours.

## 2024-10-07 NOTE — PLAN OF CARE
[facility] - Internal Medicine Telemetry  Discharge Reassessment    Primary Care Provider: Edwar Castaneda II, MD    Expected Discharge Date:     Reassessment (most recent)       Discharge Reassessment - 10/07/24 1423          Discharge Reassessment    Assessment Type Discharge Planning Reassessment (P)      Did the patient's condition or plan change since previous assessment? No (P)      Discharge Plan discussed with: Patient (P)      Communicated MARVEL with patient/caregiver Date not available/Unable to determine (P)      Discharge Plan A New Nursing Home placement - [facility] care facility (P)      Discharge Plan B New Nursing Home placement - [facility] care facility (P)      DME Needed Upon Discharge  none (P)      Transition of Care Barriers No family/friends to help (P)      Why the patient remains in the hospital Placement issues (P)         Post-Acute Status    Post-Acute Authorization Placement (P)      Post-Acute Placement Status Referrals Sent (P)      Coverage [facility] (P)      Discharge Delays Post-Acute Set-up (P)                  NICHOLAS spoke with pt in room.  She expressed no questions or concerns.    Pt still in process of getting a legal guardian appointed by the state.    4:19 PM  NICHOLAS sent updated MD progress note to [place], [facility], [place], [place].    MARTA Cantu, BS, RN, CCM      Discharge Plan A and Plan B have been determined by review of patient's clinical status, future medical and therapeutic needs, and coverage/benefits for post-acute care in coordination with multidisciplinary team members.
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
## 2024-10-08 PROCEDURE — 11000001 HC ACUTE MED/SURG PRIVATE ROOM

## 2024-10-08 PROCEDURE — 25000003 PHARM REV CODE 250

## 2024-10-08 RX ADMIN — LACOSAMIDE 50 MG: 50 TABLET, FILM COATED ORAL at 08:10

## 2024-10-08 RX ADMIN — THERA TABS 1 TABLET: TAB at 08:10

## 2024-10-08 RX ADMIN — LACOSAMIDE 50 MG: 50 TABLET, FILM COATED ORAL at 09:10

## 2024-10-08 NOTE — PLAN OF CARE
Problem: Adult Inpatient Plan of Care  Goal: Plan of Care Review  Outcome: Progressing  Goal: Patient-Specific Goal (Individualized)  Outcome: Progressing  Goal: Absence of Hospital-Acquired Illness or Injury  Outcome: Progressing  Goal: Optimal Comfort and Wellbeing  Outcome: Progressing  Goal: Readiness for Transition of Care  Outcome: Progressing     Problem: Fall Injury Risk  Goal: Absence of Fall and Fall-Related Injury  Outcome: Progressing     Problem: Functional Deficit  Goal: Optimal Cognitive Function  Outcome: Progressing     Problem: Comorbidity Management  Goal: Maintenance of Seizure Control  Outcome: Progressing     Problem: Seizure, Active Management  Goal: Absence of Seizure/Seizure-Related Injury  Outcome: Progressing

## 2024-10-08 NOTE — SUBJECTIVE & OBJECTIVE
Interval History: No events overnights. Patient doing well, drinking, eating, having bowel movements. Still pending disposition.    Review of Systems  Objective:     Vital Signs (Most Recent):  Temp: 97.9 °F (36.6 °C) (10/08/24 0816)  Pulse: 81 (10/08/24 0816)  Resp: 17 (10/08/24 0816)  BP: 118/75 (10/08/24 0816)  SpO2: 98 % (10/08/24 0816) Vital Signs (24h Range):  Temp:  [97.9 °F (36.6 °C)] 97.9 °F (36.6 °C)  Pulse:  [81] 81  Resp:  [17-18] 17  SpO2:  [98 %] 98 %  BP: (118)/(75-78) 118/75     Weight: 49.9 kg (110 lb)  Body mass index is 20.12 kg/m².    Intake/Output Summary (Last 24 hours) at 10/8/2024 1439  Last data filed at 10/8/2024 0910  Gross per 24 hour   Intake 120 ml   Output --   Net 120 ml         Physical Exam  Constitutional:       General: She is not in acute distress.     Appearance: Normal appearance. She is not ill-appearing.   HENT:      Head: Normocephalic and atraumatic.      Right Ear: External ear normal.      Left Ear: External ear normal.      Nose: Nose normal.      Mouth/Throat:      Mouth: Mucous membranes are moist.   Eyes:      Conjunctiva/sclera: Conjunctivae normal.   Cardiovascular:      Rate and Rhythm: Normal rate and regular rhythm.      Heart sounds: Normal heart sounds. No murmur heard.  Pulmonary:      Effort: Pulmonary effort is normal. No respiratory distress.      Breath sounds: Normal breath sounds.   Abdominal:      Palpations: Abdomen is soft.      Tenderness: There is no abdominal tenderness.   Musculoskeletal:      Right lower leg: No edema.      Left lower leg: No edema.   Skin:     General: Skin is warm and dry.   Neurological:      Mental Status: She is alert and oriented to person, place, and time.      Motor: No weakness.      Comments: Oriented to self, place and time             Significant Labs: All pertinent labs within the past 24 hours have been reviewed.    Significant Imaging: I have reviewed all pertinent imaging results/findings within the past 24  hours.

## 2024-10-08 NOTE — PLAN OF CARE
Recommendations    Continue Regular diet  RD following    Goals: meet % een/epn by next RD f/u  Nutrition Goal Status: new  Communication of RD Recs: other (comment) (poc)

## 2024-10-08 NOTE — PROGRESS NOTES
"[facility] Medicine Telemetry  Adult Nutrition  Progress Note    SUMMARY   Recommendations    Continue Regular diet  RD following    Goals: meet % een/epn by next RD f/u  Nutrition Goal Status: new  Communication of RD Recs: other (comment) (poc)    Assessment and Plan    No nutrition diagnosis at this time.     Reason for Assessment    Reason For Assessment: RD follow-up  Diagnosis: other (see comments) (Cognitive and behavioral changes)    General Information Comments:   10/8: PMHx: AMS. RD f/u, pt admitted 106 days ago. Pt out of the room. Spoke to nurse's aid who states pt is probably taking a walk. Noted with 100% IN of meals. RD follow up.     9/30: Spoke w/ pt at bedside, pt continues to tolerate diet w/ 75% PO intake.  Per chart review, pt w/ UBW of 110# x 4 years. Pt appears thin, however no indicators of malnutrition noted.     9/19: Spoke w/ pt at bedside, pt continues to tolerate diet w/ % PO intake.  Per chart review, pt w/ UBW of 110# x 4 years. Pt appears thin, however no indicators of malnutrition noted.    9/12:  Pt continues to tolerate diet w/ 100% PO intake.  Per chart review, pt w/ UBW of 110# x 4 years. Pt appears thin, however no indicators of malnutrition noted.     9/6: Spoke w/ pt at bedside, pt continues to tolerate diet w/ 75% PO intake.  Per chart review, pt w/ UBW of 110# x 4 years. Pt appears thin, however no indicators of malnutrition noted.    Nutrition Discharge Planning: genral healthful diet    Nutrition Related Social Determinants of Health: SDOH: Unable to assess at this time.       Nutrition Risk Screen    Nutrition Risk Screen: no indicators present    Nutrition/Diet History    Spiritual, Cultural Beliefs, Worship Practices, Values that Affect Care: no  Food Allergies: NKFA    Anthropometrics    Temp: 97.9 °F (36.6 °C)  Height Method: Stated  Height: 5' 2" (157.5 cm)  Height (inches): 62 in  Weight Method: Bed Scale  Weight: 49.9 kg (110 lb)  Weight " (lb): 110 lb  Ideal Body Weight (IBW), Female: 110 lb  % Ideal Body Weight, Female (lb): 100 %  BMI (Calculated): 20.1  BMI Grade: 18.5-24.9 - normal       Lab/Procedures/Meds    Pertinent Labs Reviewed: reviewed  Pertinent Labs Comments: Mch: 32.6, gfr: 52.1  Pertinent Medications Reviewed: reviewed  Pertinent Medications Comments: Mvi      Estimated/Assessed Needs    Weight Used For Calorie Calculations: 49.9 kg (110 lb 0.2 oz)  Energy Calorie Requirements (kcal): 5623-3535 (25-30kcal/kg)  Energy Need Method: Kcal/kg  Protein Requirements: 60 (1.2 g/kg)  Weight Used For Protein Calculations: 49.9 kg (110 lb 0.2 oz)     Estimated Fluid Requirement Method: RDA Method  RDA Method (mL): 1247         Nutrition Prescription Ordered    Current Diet Order: Regular    Evaluation of Received Nutrient/Fluid Intake    I/O: +2.2 L since admit  Comments: LBM 10/3  Tolerance: tolerating  % Intake of Estimated Energy Needs: 75 - 100 %  % Meal Intake: 75 - 100 %    Nutrition Risk    Level of Risk/Frequency of Follow-up: low - moderate (f/u q5-7 days)     Monitor and Evaluation    Food and Nutrient Intake: energy intake, food and beverage intake  Food and Nutrient Adminstration: diet order  Physical Activity and Function: nutrition-related ADLs and IADLs  Anthropometric Measurements: height/length, weight, weight change, body mass index  Biochemical Data, Medical Tests and Procedures: electrolyte and renal panel, gastrointestinal profile, glucose/endocrine profile, inflammatory profile, lipid profile     Nutrition Follow-Up    RD Follow-up?: Yes    Merly Vazquez RD, LDN
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
## 2024-10-09 PROCEDURE — 25000003 PHARM REV CODE 250

## 2024-10-09 PROCEDURE — 11000001 HC ACUTE MED/SURG PRIVATE ROOM

## 2024-10-09 RX ADMIN — LACOSAMIDE 50 MG: 50 TABLET, FILM COATED ORAL at 10:10

## 2024-10-09 RX ADMIN — LACOSAMIDE 50 MG: 50 TABLET, FILM COATED ORAL at 08:10

## 2024-10-09 RX ADMIN — THERA TABS 1 TABLET: TAB at 10:10
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
## 2024-10-09 NOTE — SUBJECTIVE & OBJECTIVE
Interval History: No events overnights. Patient doing well, drinking, eating, having bowel movements. Still pending disposition.     Review of Systems  Objective:     Vital Signs (Most Recent):  Temp: 98.1 °F (36.7 °C) (10/08/24 1953)  Pulse: 80 (10/09/24 1009)  Resp: 17 (10/09/24 1009)  BP: 117/78 (10/09/24 1009)  SpO2: 100 % (10/09/24 1009) Vital Signs (24h Range):  Temp:  [98.1 °F (36.7 °C)] 98.1 °F (36.7 °C)  Pulse:  [80-84] 80  Resp:  [17-18] 17  SpO2:  [94 %-100 %] 100 %  BP: (117-122)/(62-78) 117/78     Weight: 49.9 kg (110 lb)  Body mass index is 20.12 kg/m².  No intake or output data in the 24 hours ending 10/09/24 1846      Physical Exam  Constitutional:       General: She is not in acute distress.     Appearance: Normal appearance. She is not ill-appearing.   HENT:      Head: Normocephalic and atraumatic.      Right Ear: External ear normal.      Left Ear: External ear normal.      Nose: Nose normal.      Mouth/Throat:      Mouth: Mucous membranes are moist.   Eyes:      Conjunctiva/sclera: Conjunctivae normal.   Cardiovascular:      Rate and Rhythm: Normal rate and regular rhythm.      Heart sounds: Normal heart sounds. No murmur heard.  Pulmonary:      Effort: Pulmonary effort is normal. No respiratory distress.      Breath sounds: Normal breath sounds.   Abdominal:      Palpations: Abdomen is soft.      Tenderness: There is no abdominal tenderness.   Musculoskeletal:      Right lower leg: No edema.      Left lower leg: No edema.   Skin:     General: Skin is warm and dry.   Neurological:      Mental Status: She is alert and oriented to person, place, and time.      Motor: No weakness.      Comments: Oriented to self, place and time             Significant Labs: All pertinent labs within the past 24 hours have been reviewed.    Significant Imaging: I have reviewed all pertinent imaging results/findings within the past 24 hours.

## 2024-10-10 PROCEDURE — 25000003 PHARM REV CODE 250

## 2024-10-10 PROCEDURE — 11000001 HC ACUTE MED/SURG PRIVATE ROOM

## 2024-10-10 RX ADMIN — THERA TABS 1 TABLET: TAB at 08:10

## 2024-10-10 RX ADMIN — LACOSAMIDE 50 MG: 50 TABLET, FILM COATED ORAL at 08:10

## 2024-10-10 RX ADMIN — LACOSAMIDE 50 MG: 50 TABLET, FILM COATED ORAL at 09:10
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
## 2024-10-10 NOTE — PLAN OF CARE
Problem: Adult Inpatient Plan of Care  Goal: Plan of Care Review  Outcome: Not Progressing  Flowsheets (Taken 10/10/2024 1611)  Plan of Care Reviewed With: patient  Goal: Patient-Specific Goal (Individualized)  Outcome: Not Progressing  Goal: Absence of Hospital-Acquired Illness or Injury  Outcome: Not Progressing  Goal: Optimal Comfort and Wellbeing  Outcome: Not Progressing  Goal: Readiness for Transition of Care  Outcome: Not Progressing     Problem: Fall Injury Risk  Goal: Absence of Fall and Fall-Related Injury  Outcome: Not Progressing  Intervention: Identify and Manage Contributors  Flowsheets (Taken 10/10/2024 0800)  Self-Care Promotion:   independence encouraged   BADL personal objects within reach   BADL personal routines maintained     Problem: Seizure, Active Management  Goal: Absence of Seizure/Seizure-Related Injury  Outcome: Not Progressing  Intervention: Prevent Seizure-Related Injury  Flowsheets (Taken 10/10/2024 1612)  Aspiration Prevention During Seizure: (no seizure this shift) other (see comments)  Seizure Precautions: clutter-free environment maintained     Problem: Functional Deficit  Goal: Optimal Cognitive Function  Outcome: Not Progressing  Intervention: Optimize Cognitive Function  Flowsheets (Taken 10/10/2024 0800)  Environment Familiarity/Consistency: daily routine followed  Self-Care Promotion:   independence encouraged   BADL personal objects within reach   BADL personal routines maintained     Problem: Comorbidity Management  Goal: Maintenance of Seizure Control  Outcome: Not Progressing  Intervention: Maintain Seizure Symptom Control  Flowsheets (Taken 10/10/2024 0800)  Seizure Precautions:   activity supervised   clutter-free environment maintained

## 2024-10-10 NOTE — PLAN OF CARE
Problem: Adult Inpatient Plan of Care  Goal: Plan of Care Review  Outcome: Progressing  Goal: Patient-Specific Goal (Individualized)  Outcome: Progressing  Goal: Absence of Hospital-Acquired Illness or Injury  Outcome: Progressing  Goal: Optimal Comfort and Wellbeing  Outcome: Progressing  Goal: Readiness for Transition of Care  Outcome: Progressing     Problem: Fall Injury Risk  Goal: Absence of Fall and Fall-Related Injury  Outcome: Progressing     Problem: Seizure, Active Management  Goal: Absence of Seizure/Seizure-Related Injury  Outcome: Progressing

## 2024-10-10 NOTE — SUBJECTIVE & OBJECTIVE
Interval History: NAOE, VSS. Pending dispo.     Review of Systems  Objective:     Vital Signs (Most Recent):  Temp: 98 °F (36.7 °C) (10/09/24 1927)  Pulse: 88 (10/10/24 0758)  Resp: 17 (10/10/24 0758)  BP: (!) 101/54 (10/10/24 0758)  SpO2: 97 % (10/10/24 0758) Vital Signs (24h Range):  Temp:  [98 °F (36.7 °C)] 98 °F (36.7 °C)  Pulse:  [85-88] 88  Resp:  [17-18] 17  SpO2:  [96 %-97 %] 97 %  BP: (101-102)/(54-67) 101/54     Weight: 49.9 kg (110 lb)  Body mass index is 20.12 kg/m².    Intake/Output Summary (Last 24 hours) at 10/10/2024 1341  Last data filed at 10/10/2024 0636  Gross per 24 hour   Intake 120 ml   Output --   Net 120 ml         Physical Exam  Constitutional:       General: She is not in acute distress.     Appearance: Normal appearance. She is not ill-appearing.   HENT:      Head: Normocephalic and atraumatic.      Right Ear: External ear normal.      Left Ear: External ear normal.      Nose: Nose normal.      Mouth/Throat:      Mouth: Mucous membranes are moist.   Eyes:      Conjunctiva/sclera: Conjunctivae normal.   Cardiovascular:      Rate and Rhythm: Normal rate and regular rhythm.      Heart sounds: Normal heart sounds. No murmur heard.  Pulmonary:      Effort: Pulmonary effort is normal. No respiratory distress.      Breath sounds: Normal breath sounds.   Abdominal:      Palpations: Abdomen is soft.      Tenderness: There is no abdominal tenderness.   Musculoskeletal:      Right lower leg: No edema.      Left lower leg: No edema.   Skin:     General: Skin is warm and dry.   Neurological:      Mental Status: She is alert and oriented to person, place, and time.      Motor: No weakness.      Comments: Oriented to self, place and time             Significant Labs: All pertinent labs within the past 24 hours have been reviewed.    Significant Imaging: I have reviewed all pertinent imaging results/findings within the past 24 hours.

## 2024-10-11 PROCEDURE — 11000001 HC ACUTE MED/SURG PRIVATE ROOM

## 2024-10-11 PROCEDURE — 25000003 PHARM REV CODE 250

## 2024-10-11 RX ADMIN — LACOSAMIDE 50 MG: 50 TABLET, FILM COATED ORAL at 10:10

## 2024-10-11 RX ADMIN — THERA TABS 1 TABLET: TAB at 08:10

## 2024-10-11 RX ADMIN — LACOSAMIDE 50 MG: 50 TABLET, FILM COATED ORAL at 08:10

## 2024-10-11 NOTE — PLAN OF CARE
[facility] - Internal Medicine Telemetry  Discharge Reassessment    Primary Care Provider: Edwar Castaneda II, MD    Expected Discharge Date:     Reassessment (most recent)       Discharge Reassessment - 10/11/24 0852          Discharge Reassessment    Assessment Type Discharge Planning Reassessment (P)      Did the patient's condition or plan change since previous assessment? No (P)      Discharge Plan discussed with: Patient (P)      Communicated MARVEL with patient/caregiver Date not available/Unable to determine (P)      Discharge Plan A New Nursing Home placement - [facility] care facility (P)      Discharge Plan B New Nursing Home placement - [facility] care facility (P)      DME Needed Upon Discharge  none (P)      Transition of Care Barriers No family/friends to help (P)      Why the patient remains in the hospital Placement issues (P)         Post-Acute Status    Post-Acute Authorization Placement (P)      Post-Acute Placement Status Referrals Sent (P)      Coverage [facility] (P)      Discharge Delays Post-Acute Set-up (P)                  CM spoke with pt in room.  Pt expressed no questions or concerns.  DC plan [facility].    MARTA Cantu, BS, RN, CCM      Discharge Plan A and Plan B have been determined by review of patient's clinical status, future medical and therapeutic needs, and coverage/benefits for post-acute care in coordination with multidisciplinary team members.

## 2024-10-11 NOTE — SUBJECTIVE & OBJECTIVE
Interval History: No events overnight. Patient seems comfortable, eating, drinking, urinating having bowel movements.     Review of Systems  Objective:     Vital Signs (Most Recent):  Temp: 98.1 °F (36.7 °C) (10/10/24 2049)  Pulse: 81 (10/10/24 2049)  Resp: 17 (10/10/24 0758)  BP: 109/70 (10/10/24 2049)  SpO2: 97 % (10/10/24 2049) Vital Signs (24h Range):  Temp:  [98.1 °F (36.7 °C)] 98.1 °F (36.7 °C)  Pulse:  [81] 81  SpO2:  [97 %] 97 %  BP: (109)/(70) 109/70     Weight: 49.9 kg (110 lb)  Body mass index is 20.12 kg/m².    Intake/Output Summary (Last 24 hours) at 10/11/2024 0975  Last data filed at 10/11/2024 0640  Gross per 24 hour   Intake 120 ml   Output --   Net 120 ml         Physical Exam  Constitutional:       General: She is not in acute distress.     Appearance: Normal appearance. She is not ill-appearing.   HENT:      Head: Normocephalic and atraumatic.      Right Ear: External ear normal.      Left Ear: External ear normal.      Nose: Nose normal.      Mouth/Throat:      Mouth: Mucous membranes are moist.   Eyes:      Conjunctiva/sclera: Conjunctivae normal.   Cardiovascular:      Rate and Rhythm: Normal rate and regular rhythm.      Heart sounds: Normal heart sounds. No murmur heard.  Pulmonary:      Effort: Pulmonary effort is normal. No respiratory distress.      Breath sounds: Normal breath sounds.   Abdominal:      Palpations: Abdomen is soft.      Tenderness: There is no abdominal tenderness.   Musculoskeletal:      Right lower leg: No edema.      Left lower leg: No edema.   Skin:     General: Skin is warm and dry.   Neurological:      Mental Status: She is alert and oriented to person, place, and time.      Motor: No weakness.      Comments: Oriented to self, place and time               Significant Labs: All pertinent labs within the past 24 hours have been reviewed.    Significant Imaging: I have reviewed all pertinent imaging results/findings within the past 24 hours.
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
## 2024-10-11 NOTE — PLAN OF CARE
Problem: Adult Inpatient Plan of Care  Goal: Plan of Care Review  Outcome: Progressing  Goal: Patient-Specific Goal (Individualized)  Outcome: Progressing  Goal: Absence of Hospital-Acquired Illness or Injury  Outcome: Progressing  Goal: Optimal Comfort and Wellbeing  Outcome: Progressing  Goal: Readiness for Transition of Care  Outcome: Progressing     Problem: Fall Injury Risk  Goal: Absence of Fall and Fall-Related Injury  Outcome: Progressing     Problem: Functional Deficit  Goal: Optimal Cognitive Function  Outcome: Progressing     Problem: Comorbidity Management  Goal: Maintenance of Seizure Control  Outcome: Progressing     Problem: Seizure, Active Management  Goal: Absence of Seizure/Seizure-Related Injury  Outcome: Progressing   Fall precaution maintained this shift call bell in reach. Bed in low position. Instructed pt to call for assistance. No acute distress noted over night. Vs stable. All concerns addressed and answered.

## 2024-10-11 NOTE — CONSULTS
KULWINDER consulted for PIV access.    Pt refusing insertion at this time and states that her current PIV is working fine. Existing PIV assessed and shows no signs of infection and flushes easily.

## 2024-10-12 PROCEDURE — 25000003 PHARM REV CODE 250

## 2024-10-12 PROCEDURE — 11000001 HC ACUTE MED/SURG PRIVATE ROOM

## 2024-10-12 RX ADMIN — LACOSAMIDE 50 MG: 50 TABLET, FILM COATED ORAL at 08:10

## 2024-10-12 RX ADMIN — THERA TABS 1 TABLET: TAB at 08:10
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
## 2024-10-12 NOTE — SUBJECTIVE & OBJECTIVE
Interval History: Patient resting comfortably. No events overnight. Eating, drinking, urinating, having bowel movements.     Review of Systems  Objective:     Vital Signs (Most Recent):  Temp: 97.9 °F (36.6 °C) (10/11/24 2001)  Pulse: 81 (10/11/24 2001)  Resp: 18 (10/11/24 2001)  BP: (!) 109/56 (10/11/24 2001)  SpO2: 95 % (10/11/24 2001) Vital Signs (24h Range):  Temp:  [97.9 °F (36.6 °C)-98 °F (36.7 °C)] 97.9 °F (36.6 °C)  Pulse:  [76-81] 81  Resp:  [18] 18  SpO2:  [95 %-98 %] 95 %  BP: (109-123)/(56-72) 109/56     Weight: 49.5 kg (109 lb 2 oz)  Body mass index is 19.96 kg/m².    Intake/Output Summary (Last 24 hours) at 10/12/2024 1042  Last data filed at 10/12/2024 0900  Gross per 24 hour   Intake 480 ml   Output 600 ml   Net -120 ml         Physical Exam  Constitutional:       General: She is not in acute distress.     Appearance: Normal appearance. She is not ill-appearing.   HENT:      Head: Normocephalic and atraumatic.      Right Ear: External ear normal.      Left Ear: External ear normal.      Nose: Nose normal.      Mouth/Throat:      Mouth: Mucous membranes are moist.   Eyes:      Conjunctiva/sclera: Conjunctivae normal.   Cardiovascular:      Rate and Rhythm: Normal rate and regular rhythm.      Heart sounds: Normal heart sounds. No murmur heard.  Pulmonary:      Effort: Pulmonary effort is normal. No respiratory distress.      Breath sounds: Normal breath sounds.   Abdominal:      Palpations: Abdomen is soft.      Tenderness: There is no abdominal tenderness.   Musculoskeletal:      Right lower leg: No edema.      Left lower leg: No edema.   Skin:     General: Skin is warm and dry.   Neurological:      Mental Status: She is alert and oriented to person, place, and time.      Motor: No weakness.      Comments: Oriented to self, place and time             Significant Labs: All pertinent labs within the past 24 hours have been reviewed.    Significant Imaging: I have reviewed all pertinent imaging  results/findings within the past 24 hours.

## 2024-10-13 PROCEDURE — 25000003 PHARM REV CODE 250

## 2024-10-13 PROCEDURE — 11000001 HC ACUTE MED/SURG PRIVATE ROOM

## 2024-10-13 RX ADMIN — LACOSAMIDE 50 MG: 50 TABLET, FILM COATED ORAL at 09:10

## 2024-10-13 RX ADMIN — LACOSAMIDE 50 MG: 50 TABLET, FILM COATED ORAL at 08:10

## 2024-10-13 RX ADMIN — THERA TABS 1 TABLET: TAB at 08:10

## 2024-10-13 NOTE — PLAN OF CARE
Problem: Adult Inpatient Plan of Care  Goal: Plan of Care Review  Outcome: Progressing  Goal: Patient-Specific Goal (Individualized)  Outcome: Progressing  Goal: Absence of Hospital-Acquired Illness or Injury  Outcome: Progressing  Goal: Optimal Comfort and Wellbeing  Outcome: Progressing  Goal: Readiness for Transition of Care  Outcome: Progressing     Problem: Fall Injury Risk  Goal: Absence of Fall and Fall-Related Injury  Outcome: Progressing     Problem: Functional Deficit  Goal: Optimal Cognitive Function  Outcome: Progressing     Problem: Comorbidity Management  Goal: Maintenance of Seizure Control  Outcome: Progressing     Problem: Seizure, Active Management  Goal: Absence of Seizure/Seizure-Related Injury  Outcome: Progressing    Pt had an uneventful night. VSS. Pt denied any pain or discomfort  Pt is free of falls and injuries. Sitter remains at bedside for safety  Bed in lowest position and call light within reach. Safety maintained

## 2024-10-13 NOTE — SUBJECTIVE & OBJECTIVE
Interval History:   Patient resting comfortably. No events overnight. Eating, drinking, urinating, having bowel movements.     Review of Systems  Objective:     Vital Signs (Most Recent):  Temp: 98.2 °F (36.8 °C) (10/13/24 0905)  Pulse: 97 (10/13/24 0905)  Resp: 18 (10/13/24 0905)  BP: 105/71 (10/13/24 0905)  SpO2: 96 % (10/13/24 0905) Vital Signs (24h Range):  Temp:  [98.2 °F (36.8 °C)] 98.2 °F (36.8 °C)  Pulse:  [79-97] 97  Resp:  [18] 18  SpO2:  [96 %-97 %] 96 %  BP: (105-118)/(71-73) 105/71     Weight: 49.5 kg (109 lb 2 oz)  Body mass index is 19.96 kg/m².    Intake/Output Summary (Last 24 hours) at 10/13/2024 1815  Last data filed at 10/13/2024 1800  Gross per 24 hour   Intake 720 ml   Output --   Net 720 ml         Physical Exam  Constitutional:       General: She is not in acute distress.     Appearance: Normal appearance. She is not ill-appearing.   HENT:      Head: Normocephalic and atraumatic.      Right Ear: External ear normal.      Left Ear: External ear normal.      Nose: Nose normal.      Mouth/Throat:      Mouth: Mucous membranes are moist.   Eyes:      Conjunctiva/sclera: Conjunctivae normal.   Cardiovascular:      Rate and Rhythm: Normal rate and regular rhythm.      Heart sounds: Normal heart sounds. No murmur heard.  Pulmonary:      Effort: Pulmonary effort is normal. No respiratory distress.      Breath sounds: Normal breath sounds.   Abdominal:      Palpations: Abdomen is soft.      Tenderness: There is no abdominal tenderness.   Musculoskeletal:      Right lower leg: No edema.      Left lower leg: No edema.   Skin:     General: Skin is warm and dry.   Neurological:      Mental Status: She is alert and oriented to person, place, and time.      Motor: No weakness.      Comments: Oriented to self, place and time             Significant Labs: All pertinent labs within the past 24 hours have been reviewed.    Significant Imaging: I have reviewed all pertinent imaging results/findings within the  past 24 hours.
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
## 2024-10-14 PROCEDURE — 25000003 PHARM REV CODE 250

## 2024-10-14 PROCEDURE — 11000001 HC ACUTE MED/SURG PRIVATE ROOM

## 2024-10-14 RX ADMIN — LACOSAMIDE 50 MG: 50 TABLET, FILM COATED ORAL at 09:10

## 2024-10-14 RX ADMIN — THERA TABS 1 TABLET: TAB at 09:10
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
## 2024-10-14 NOTE — PLAN OF CARE
CM went to pt room to discuss d/c plan, reassess, pt not in room.    2:48 PM  CM sent updated MD progress note and current med list to [facility] via CP.    MEGHAN CantuN, BS, RN, [facility]

## 2024-10-14 NOTE — PLAN OF CARE
Problem: Adult Inpatient Plan of Care  Goal: Plan of Care Review  Outcome: Progressing  Goal: Patient-Specific Goal (Individualized)  Outcome: Progressing  Goal: Absence of Hospital-Acquired Illness or Injury  Outcome: Progressing  Goal: Optimal Comfort and Wellbeing  Outcome: Progressing  Goal: Readiness for Transition of Care  Outcome: Progressing     Problem: Fall Injury Risk  Goal: Absence of Fall and Fall-Related Injury  Outcome: Progressing     Problem: Functional Deficit  Goal: Optimal Cognitive Function  Outcome: Progressing     Problem: Comorbidity Management  Goal: Maintenance of Seizure Control  Outcome: Progressing     Problem: Seizure, Active Management  Goal: Absence of Seizure/Seizure-Related Injury  Outcome: Progressing     Pt had an uneventful night. VSS. Pt denied any pain or discomfort  Pt is free of falls and injuries.  Sitter remains at bedside for safety. Pt with no seizures this shift Bed in lowest position and call light within reach. Safety maintained

## 2024-10-14 NOTE — SUBJECTIVE & OBJECTIVE
Interval History: NAOE, Pt resting comfortably watching TV with the sitter at bedside.     Review of Systems  Objective:     Vital Signs (Most Recent):  Temp: 98.6 °F (37 °C) (10/13/24 2130)  Pulse: 82 (10/13/24 2130)  Resp: 18 (10/13/24 2130)  BP: 109/72 (10/13/24 2130)  SpO2: 98 % (10/13/24 2130) Vital Signs (24h Range):  Temp:  [98.6 °F (37 °C)] 98.6 °F (37 °C)  Pulse:  [82] 82  Resp:  [18] 18  SpO2:  [98 %] 98 %  BP: (109)/(72) 109/72     Weight: 49.5 kg (109 lb 2 oz)  Body mass index is 19.96 kg/m².    Intake/Output Summary (Last 24 hours) at 10/14/2024 1437  Last data filed at 10/13/2024 2000  Gross per 24 hour   Intake 480 ml   Output --   Net 480 ml         Physical Exam  Constitutional:       General: She is not in acute distress.     Appearance: Normal appearance. She is not ill-appearing.   HENT:      Head: Normocephalic and atraumatic.      Right Ear: External ear normal.      Left Ear: External ear normal.      Nose: Nose normal.      Mouth/Throat:      Mouth: Mucous membranes are moist.   Eyes:      Conjunctiva/sclera: Conjunctivae normal.   Cardiovascular:      Rate and Rhythm: Normal rate and regular rhythm.      Heart sounds: Normal heart sounds. No murmur heard.  Pulmonary:      Effort: Pulmonary effort is normal. No respiratory distress.      Breath sounds: Normal breath sounds.   Abdominal:      Palpations: Abdomen is soft.      Tenderness: There is no abdominal tenderness.   Musculoskeletal:      Right lower leg: No edema.      Left lower leg: No edema.   Skin:     General: Skin is warm and dry.   Neurological:      Mental Status: She is alert and oriented to person, place, and time.      Motor: No weakness.      Comments: Oriented to self, place and time             Significant Labs: All pertinent labs within the past 24 hours have been reviewed.  None    Significant Imaging: I have reviewed all pertinent imaging results/findings within the past 24 hours.

## 2024-10-15 LAB
ALBUMIN SERPL BCP-MCNC: 3.9 G/DL (ref 3.5–5.2)
ALP SERPL-CCNC: 80 U/L (ref 55–135)
ALT SERPL W/O P-5'-P-CCNC: 26 U/L (ref 10–44)
ANION GAP SERPL CALC-SCNC: 13 MMOL/L (ref 8–16)
AST SERPL-CCNC: 27 U/L (ref 10–40)
BASOPHILS # BLD AUTO: 0.06 K/UL (ref 0–0.2)
BASOPHILS NFR BLD: 0.5 % (ref 0–1.9)
BILIRUB SERPL-MCNC: 0.6 MG/DL (ref 0.1–1)
BUN SERPL-MCNC: 24 MG/DL (ref 8–23)
CALCIUM SERPL-MCNC: 9.6 MG/DL (ref 8.7–10.5)
CHLORIDE SERPL-SCNC: 106 MMOL/L (ref 95–110)
CO2 SERPL-SCNC: 21 MMOL/L (ref 23–29)
CREAT SERPL-MCNC: 1 MG/DL (ref 0.5–1.4)
DIFFERENTIAL METHOD BLD: ABNORMAL
EOSINOPHIL # BLD AUTO: 0.2 K/UL (ref 0–0.5)
EOSINOPHIL NFR BLD: 1.9 % (ref 0–8)
ERYTHROCYTE [DISTWIDTH] IN BLOOD BY AUTOMATED COUNT: 13 % (ref 11.5–14.5)
EST. GFR  (NO RACE VARIABLE): 58.4 ML/MIN/1.73 M^2
GLUCOSE SERPL-MCNC: 82 MG/DL (ref 70–110)
HCT VFR BLD AUTO: 40.7 % (ref 37–48.5)
HGB BLD-MCNC: 13.2 G/DL (ref 12–16)
IMM GRANULOCYTES # BLD AUTO: 0.05 K/UL (ref 0–0.04)
IMM GRANULOCYTES NFR BLD AUTO: 0.4 % (ref 0–0.5)
LYMPHOCYTES # BLD AUTO: 1.8 K/UL (ref 1–4.8)
LYMPHOCYTES NFR BLD: 15.4 % (ref 18–48)
MCH RBC QN AUTO: 32.4 PG (ref 27–31)
MCHC RBC AUTO-ENTMCNC: 32.4 G/DL (ref 32–36)
MCV RBC AUTO: 100 FL (ref 82–98)
MONOCYTES # BLD AUTO: 0.6 K/UL (ref 0.3–1)
MONOCYTES NFR BLD: 5.3 % (ref 4–15)
NEUTROPHILS # BLD AUTO: 8.9 K/UL (ref 1.8–7.7)
NEUTROPHILS NFR BLD: 76.5 % (ref 38–73)
NRBC BLD-RTO: 0 /100 WBC
PLATELET # BLD AUTO: 286 K/UL (ref 150–450)
PMV BLD AUTO: 10.2 FL (ref 9.2–12.9)
POTASSIUM SERPL-SCNC: 4.2 MMOL/L (ref 3.5–5.1)
PROT SERPL-MCNC: 6.7 G/DL (ref 6–8.4)
RBC # BLD AUTO: 4.08 M/UL (ref 4–5.4)
SODIUM SERPL-SCNC: 140 MMOL/L (ref 136–145)
WBC # BLD AUTO: 11.64 K/UL (ref 3.9–12.7)

## 2024-10-15 PROCEDURE — 36415 COLL VENOUS BLD VENIPUNCTURE: CPT

## 2024-10-15 PROCEDURE — 80053 COMPREHEN METABOLIC PANEL: CPT

## 2024-10-15 PROCEDURE — 25000003 PHARM REV CODE 250

## 2024-10-15 PROCEDURE — 85025 COMPLETE CBC W/AUTO DIFF WBC: CPT

## 2024-10-15 PROCEDURE — 11000001 HC ACUTE MED/SURG PRIVATE ROOM

## 2024-10-15 RX ADMIN — THERA TABS 1 TABLET: TAB at 09:10

## 2024-10-15 RX ADMIN — LACOSAMIDE 50 MG: 50 TABLET, FILM COATED ORAL at 08:10

## 2024-10-15 RX ADMIN — LACOSAMIDE 50 MG: 50 TABLET, FILM COATED ORAL at 09:10

## 2024-10-15 NOTE — PLAN OF CARE
[facility] - Internal Medicine Telemetry  Discharge Reassessment    Primary Care Provider: Edwar Castaneda II, MD    Expected Discharge Date:     Reassessment (most recent)       Discharge Reassessment - 10/15/24 1318          Discharge Reassessment    Assessment Type Discharge Planning Assessment (P)      Did the patient's condition or plan change since previous assessment? No (P)      Discharge Plan discussed with: Patient (P)      Communicated MARVEL with patient/caregiver Date not available/Unable to determine (P)      Discharge Plan A New Nursing Home placement - correction care facility (P)      Discharge Plan B New Nursing Home placement - correction care facility (P)      DME Needed Upon Discharge  none (P)      Transition of Care Barriers No family/friends to help (P)      Why the patient remains in the hospital Placement issues (P)         Post-Acute Status    Post-Acute Authorization Placement (P)      Post-Acute Placement Status Referrals Sent (P)      Coverage [facility] (P)      Discharge Delays Post-Acute Set-up (P)                  CM spoke with pt in room.  Pt expressed no questions or concerns.  DC plan remains to dc to [facility] care [facility] with memory care once legal guardian is established.    MARTA Cantu, BS, RN, CCM      Discharge Plan A and Plan B have been determined by review of patient's clinical status, future medical and therapeutic needs, and coverage/benefits for post-acute care in coordination with multidisciplinary team members.

## 2024-10-15 NOTE — SUBJECTIVE & OBJECTIVE
Interval History: Patient very upset today, wanting to leave and saying she does not need to be put on fall precautions and that she doesn't want an IV line placed on her. She has been eating, drinking, having bowel moments, and urinating spontaneously with no acute overnight events.     Review of Systems  Objective:     Vital Signs (Most Recent):  Temp: 98.3 °F (36.8 °C) (10/15/24 0723)  Pulse: 76 (10/15/24 0723)  Resp: 18 (10/14/24 2030)  BP: 105/73 (10/15/24 0723)  SpO2: 98 % (10/15/24 0723) Vital Signs (24h Range):  Temp:  [98.3 °F (36.8 °C)-99.1 °F (37.3 °C)] 98.3 °F (36.8 °C)  Pulse:  [76-85] 76  Resp:  [18] 18  SpO2:  [97 %-98 %] 98 %  BP: (105-108)/(71-73) 105/73     Weight: 49.5 kg (109 lb 2 oz)  Body mass index is 19.96 kg/m².  No intake or output data in the 24 hours ending 10/15/24 0918      Physical Exam  Constitutional:       General: She is not in acute distress.     Appearance: Normal appearance. She is not ill-appearing.   HENT:      Head: Normocephalic and atraumatic.      Right Ear: External ear normal.      Left Ear: External ear normal.      Nose: Nose normal.      Mouth/Throat:      Mouth: Mucous membranes are moist.   Eyes:      Conjunctiva/sclera: Conjunctivae normal.   Cardiovascular:      Rate and Rhythm: Normal rate and regular rhythm.      Heart sounds: Normal heart sounds. No murmur heard.  Pulmonary:      Effort: Pulmonary effort is normal. No respiratory distress.      Breath sounds: Normal breath sounds.   Abdominal:      Palpations: Abdomen is soft.      Tenderness: There is no abdominal tenderness.   Musculoskeletal:      Right lower leg: No edema.      Left lower leg: No edema.   Skin:     General: Skin is warm and dry.   Neurological:      Mental Status: She is alert and oriented to person, place, and time.      Motor: No weakness.      Comments: Oriented to self, place and time             Significant Labs: All pertinent labs within the past 24 hours have been  reviewed.    Significant Imaging: I have reviewed all pertinent imaging results/findings within the past 24 hours.
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
## 2024-10-15 NOTE — PLAN OF CARE
Problem: Adult Inpatient Plan of Care  Goal: Plan of Care Review  Outcome: Adequate for Care Transition  Goal: Patient-Specific Goal (Individualized)  Outcome: Adequate for Care Transition  Goal: Absence of Hospital-Acquired Illness or Injury  Outcome: Adequate for Care Transition  Goal: Optimal Comfort and Wellbeing  Outcome: Adequate for Care Transition  Goal: Readiness for Transition of Care  Outcome: Adequate for Care Transition     Problem: Fall Injury Risk  Goal: Absence of Fall and Fall-Related Injury  Outcome: Adequate for Care Transition     Problem: Functional Deficit  Goal: Optimal Cognitive Function  Outcome: Adequate for Care Transition     Problem: Comorbidity Management  Goal: Maintenance of Seizure Control  Outcome: Adequate for Care Transition     Problem: Seizure, Active Management  Goal: Absence of Seizure/Seizure-Related Injury  Outcome: Adequate for Care Transition

## 2024-10-16 PROCEDURE — 11000001 HC ACUTE MED/SURG PRIVATE ROOM

## 2024-10-16 PROCEDURE — 25000003 PHARM REV CODE 250

## 2024-10-16 RX ADMIN — THERA TABS 1 TABLET: TAB at 09:10

## 2024-10-16 RX ADMIN — LACOSAMIDE 50 MG: 50 TABLET, FILM COATED ORAL at 08:10

## 2024-10-16 RX ADMIN — LACOSAMIDE 50 MG: 50 TABLET, FILM COATED ORAL at 09:10

## 2024-10-16 NOTE — PROGRESS NOTES
Asim Cortez - Internal Medicine Telemetry  Adult Nutrition  Progress Note    SUMMARY       Recommendations    1.) Recommend continuing with Regular Diet,as tolerated.     2.) If PO intake <50%, recommend Boost Plus TID to help meet needs.     3.) RD to monitor wt, PO intake, skin, labs.     Goals: meet % een/epn by next RD f/u  Nutrition Goal Status: goal met  Communication of RD Recs: other (comment) (poc)    Assessment and Plan    No nutritional dx at this time    Reason for Assessment    Reason For Assessment: RD follow-up  Diagnosis: other (see comments) (Cognitive and behavioral changes)    General Information Comments:  RD f/u  10/16: pt with discharge issues, remains admitted to [facility]. Wt updated on 10/15- wt remains stable. Per RN charting, PO intake remains %. No new skin breakdown noted on RN notes. RD team to continue to monitor and f/u.   10/8: PMHx: AMS. RD f/u, pt admitted 106 days ago. Pt out of the room. Spoke to nurse's aid who states pt is probably taking a walk. Noted with 100% IN of meals. RD follow up.     9/30: Spoke w/ pt at bedside, pt continues to tolerate diet w/ 75% PO intake.  Per chart review, pt w/ UBW of 110# x 4 years. Pt appears thin, however no indicators of malnutrition noted.     9/19: Spoke w/ pt at bedside, pt continues to tolerate diet w/ % PO intake.  Per chart review, pt w/ UBW of 110# x 4 years. Pt appears thin, however no indicators of malnutrition noted.    9/12:  Pt continues to tolerate diet w/ 100% PO intake.  Per chart review, pt w/ UBW of 110# x 4 years. Pt appears thin, however no indicators of malnutrition noted.     9/6: Spoke w/ pt at bedside, pt continues to tolerate diet w/ 75% PO intake.  Per chart review, pt w/ UBW of 110# x 4 years. Pt appears thin, however no indicators of malnutrition noted.    Nutrition Discharge Planning: general healthful diet    Nutrition Risk Screen    Nutrition Risk Screen: no indicators present    Nutrition/Diet  "History    Spiritual, Cultural Beliefs, Christianity Practices, Values that Affect Care: no  Food Allergies: NKFA    Anthropometrics    Temp: 98 °F (36.7 °C)  Height Method: Stated  Height: 5' 2" (157.5 cm)  Height (inches): 62 in  Weight Method: Bed Scale  Weight: 49.5 kg (109 lb 2 oz)  Weight (lb): 109.13 lb  Ideal Body Weight (IBW), Female: 110 lb  % Ideal Body Weight, Female (lb): 100 %  BMI (Calculated): 20.1  BMI Grade: 18.5-24.9 - normal    Lab/Procedures/Meds    Pertinent Labs Reviewed: reviewed  Pertinent Labs Comments: 10/15: BUN: 24, GFR: 58.4    Pertinent Medications Reviewed: reviewed  Pertinent Medications Comments: MVI    Estimated/Assessed Needs    Weight Used For Calorie Calculations: 49.9 kg (110 lb 0.2 oz)  Energy Calorie Requirements (kcal): 8378-8531 (25-30kcal/kg)  Energy Need Method: Kcal/kg    Protein Requirements: 60 (1.2 g/kg)  Weight Used For Protein Calculations: 49.9 kg (110 lb 0.2 oz)    Estimated Fluid Requirement Method: RDA Method  RDA Method (mL): 1247    Nutrition Prescription Ordered    Current Diet Order: Regular    Evaluation of Received Nutrient/Fluid Intake    I/O: +2.2L since 10/5  Energy Calories Required: meeting needs  Protein Required: meeting needs  Fluid Required:  (as per MD)  Comments: LBM 10/14  Tolerance: tolerating  % Intake of Estimated Energy Needs: 75 - 100 %  % Meal Intake: 75 - 100 %    Nutrition Risk    Level of Risk/Frequency of Follow-up: low - moderate (f/u q5-7 days)     Monitor and Evaluation    Food and Nutrient Intake: energy intake, food and beverage intake  Food and Nutrient Adminstration: diet order  Physical Activity and Function: nutrition-related ADLs and IADLs  Anthropometric Measurements: height/length, weight, weight change, body mass index  Biochemical Data, Medical Tests and Procedures: electrolyte and renal panel, gastrointestinal profile, glucose/endocrine profile, inflammatory profile, lipid profile     Nutrition Follow-Up    RD Follow-up?: " Yes

## 2024-10-16 NOTE — PLAN OF CARE
Problem: Adult Inpatient Plan of Care  Goal: Plan of Care Review  Outcome: Progressing     Problem: Fall Injury Risk  Goal: Absence of Fall and Fall-Related Injury  Outcome: Progressing     Problem: Functional Deficit  Goal: Optimal Cognitive Function  Outcome: Progressing

## 2024-10-16 NOTE — MEDICARE RECERTIFICATION
MEDICARE INPATIENT  PHYSICIAN RECERTIFICATION  OF MEDICAL NECESSITY        5th Recertification                   I certify that this patient's continued medical needs require pending NH placement with memory care unit as patient has dementia and now unable to care for herself. Patient now without POA. Delay noted by our legal team due to son's unavailability. Patient's friend was POA, but has resigned as her POA. She lacks capacity regarding decision to DC home. Legal team involved in patient's care, we are seeking interdiction to proceed with placement. I estimate that the patient will be in the hospital until interdiction process is complete and legal guardian is established.   Post-acute planning is in process, and currently the patient will likely be discharged to  Nursing home with Memory Care Unit.

## 2024-10-16 NOTE — SUBJECTIVE & OBJECTIVE
Interval History: No events overnight. Patient doing good, had been eating and drinking. Urinating, having bowel movements.   CBC and CMP came back wnl.    Review of Systems  Objective:     Vital Signs (Most Recent):  Temp: 99.5 °F (37.5 °C) (10/16/24 0812)  Pulse: 91 (10/16/24 0812)  Resp: 18 (10/15/24 1948)  BP: 103/71 (10/16/24 0812)  SpO2: 95 % (10/16/24 0812) Vital Signs (24h Range):  Temp:  [98 °F (36.7 °C)-99.5 °F (37.5 °C)] 99.5 °F (37.5 °C)  Pulse:  [82-91] 91  Resp:  [18] 18  SpO2:  [95 %-98 %] 95 %  BP: (103-118)/(71-78) 103/71     Weight: 49.5 kg (109 lb 2 oz)  Body mass index is 19.96 kg/m².  No intake or output data in the 24 hours ending 10/16/24 0953      Physical Exam  Constitutional:       General: She is not in acute distress.     Appearance: Normal appearance. She is not ill-appearing.   HENT:      Head: Normocephalic and atraumatic.      Right Ear: External ear normal.      Left Ear: External ear normal.      Nose: Nose normal.      Mouth/Throat:      Mouth: Mucous membranes are moist.   Eyes:      Conjunctiva/sclera: Conjunctivae normal.   Cardiovascular:      Rate and Rhythm: Normal rate and regular rhythm.      Heart sounds: Normal heart sounds. No murmur heard.  Pulmonary:      Effort: Pulmonary effort is normal. No respiratory distress.      Breath sounds: Normal breath sounds.   Abdominal:      Palpations: Abdomen is soft.      Tenderness: There is no abdominal tenderness.   Musculoskeletal:      Right lower leg: No edema.      Left lower leg: No edema.   Skin:     General: Skin is warm and dry.   Neurological:      Mental Status: She is alert and oriented to person, place, and time.      Motor: No weakness.      Comments: Oriented to self, place and time             Significant Labs: All pertinent labs within the past 24 hours have been reviewed.    Significant Imaging: I have reviewed all pertinent imaging results/findings within the past 24 hours.

## 2024-10-16 NOTE — PLAN OF CARE
She is in no distress. She has a sitter at the bedside.She went to [facility] at noon. No complaints

## 2024-10-16 NOTE — PLAN OF CARE
Recommendations     1.) Recommend continuing with Regular Diet,as tolerated.      2.) If PO intake <50%, recommend Boost Plus TID to help meet needs.      3.) RD to monitor wt, PO intake, skin, labs.      Goals: meet % een/epn by next RD f/u  Nutrition Goal Status: goal met  Communication of RD Recs: other (comment) (poc)
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
## 2024-10-17 PROCEDURE — 25000003 PHARM REV CODE 250

## 2024-10-17 PROCEDURE — 11000001 HC ACUTE MED/SURG PRIVATE ROOM

## 2024-10-17 RX ADMIN — LACOSAMIDE 50 MG: 50 TABLET, FILM COATED ORAL at 07:10

## 2024-10-17 RX ADMIN — THERA TABS 1 TABLET: TAB at 09:10

## 2024-10-17 RX ADMIN — LACOSAMIDE 50 MG: 50 TABLET, FILM COATED ORAL at 09:10

## 2024-10-17 NOTE — PLAN OF CARE
[facility] - Internal Medicine Telemetry  Discharge Reassessment    Primary Care Provider: Edwar Castaneda II, MD    Expected Discharge Date:     Reassessment (most recent)       Discharge Reassessment - 10/17/24 1514          Discharge Reassessment    Assessment Type Discharge Planning Reassessment (P)      Did the patient's condition or plan change since previous assessment? No (P)      Discharge Plan discussed with: Patient (P)      Communicated MARVEL with patient/caregiver Date not available/Unable to determine (P)      Discharge Plan A New Nursing Home placement - senior care care facility (P)      Discharge Plan B New Nursing Home placement - senior care care facility (P)      DME Needed Upon Discharge  none (P)      Transition of Care Barriers No family/friends to help (P)      Why the patient remains in the hospital Placement issues (P)         Post-Acute Status    Post-Acute Authorization Placement (P)      Post-Acute Placement Status Referrals Sent (P)      Coverage [facility] (P)      Discharge Delays Post-Acute Set-up (P)                    CM spoke with pt in room.  She expresses no questions or concerns.    DC plan remains the same; d/c to senior care NH with memory care unit once legal guardian obtained.    MEGHAN CantuN, BS, RN, CCM      Discharge Plan A and Plan B have been determined by review of patient's clinical status, future medical and therapeutic needs, and coverage/benefits for post-acute care in coordination with multidisciplinary team members.
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
## 2024-10-17 NOTE — SUBJECTIVE & OBJECTIVE
Interval History: No events overnight. Patient doing good, had been eating and drinking. Urinating, having bowel movements.     Review of Systems  Objective:     Vital Signs (Most Recent):  Temp: 98.1 °F (36.7 °C) (10/17/24 0758)  Pulse: 91 (10/17/24 0758)  Resp: 18 (10/17/24 0758)  BP: 121/78 (10/17/24 0758)  SpO2: 97 % (10/17/24 0758) Vital Signs (24h Range):  Temp:  [98.1 °F (36.7 °C)-99.5 °F (37.5 °C)] 98.1 °F (36.7 °C)  Pulse:  [78-99] 91  Resp:  [18] 18  SpO2:  [95 %-97 %] 97 %  BP: (102-121)/(67-78) 121/78     Weight: 49.5 kg (109 lb 2 oz)  Body mass index is 19.96 kg/m².  No intake or output data in the 24 hours ending 10/17/24 0939      Physical Exam  Constitutional:       General: She is not in acute distress.     Appearance: Normal appearance. She is not ill-appearing.   HENT:      Head: Normocephalic and atraumatic.      Right Ear: External ear normal.      Left Ear: External ear normal.      Nose: Nose normal.      Mouth/Throat:      Mouth: Mucous membranes are moist.   Eyes:      Conjunctiva/sclera: Conjunctivae normal.   Cardiovascular:      Rate and Rhythm: Normal rate and regular rhythm.      Heart sounds: Normal heart sounds. No murmur heard.  Pulmonary:      Effort: Pulmonary effort is normal. No respiratory distress.      Breath sounds: Normal breath sounds.   Abdominal:      Palpations: Abdomen is soft.      Tenderness: There is no abdominal tenderness.   Musculoskeletal:      Right lower leg: No edema.      Left lower leg: No edema.   Skin:     General: Skin is warm and dry.   Neurological:      Mental Status: She is alert and oriented to person, place, and time.      Motor: No weakness.      Comments: Oriented to self, place and time             Significant Labs: All pertinent labs within the past 24 hours have been reviewed.    Significant Imaging: I have reviewed all pertinent imaging results/findings within the past 24 hours.

## 2024-10-17 NOTE — CHAPLAIN
10/16/24 1915   Clinical Encounter Type   Visit Type Follow-up   Visit Category Other (Comment)   Visited With Patient;Health care provider   Number of Family Visited 1   Length of Visit 90 Minutes   Continue Visiting Yes   Referral From    Referral To    Patient Spiritual Encounters   Care Provided Reflective listening;Life review/ reflection;Compassionate presence;Other care (specify)   Patient Coping Open/discussion   Comments - Patient visited pt, provided listening ears and a caring presence while pt bemoaned her health, her family, and being estranged from her son. at first, she stated that she needed to find a way to connect with people and inform them of the importance of spiritual care. When I questioned where she intended to accomplish that, she told the [facility] that she regularly attended masses at the [facility] and that she wanted to invite a large number of people to the service. Subsequently, she reported that she approached a pt on her floor to discuss the need for spiritual care, but the patient refused to listen to her. Pt stated that she has to raise awareness of spiritual care and let people know that such care is available in the hospital. She said that the chp's visit had really benefited her and that she was aware that many other patients in the hospital would also benefit. I just listened as pt talked a lot; it looked like she wanted someone to chat with. [facility] reassured the patient that all of the patients are being attended to and thanked her for her concerns. She was also told by [facility] not to go into any patient's room. Spiritual and supportive care provided.

## 2024-10-18 PROCEDURE — 11000001 HC ACUTE MED/SURG PRIVATE ROOM

## 2024-10-18 PROCEDURE — 25000003 PHARM REV CODE 250

## 2024-10-18 RX ORDER — DIAZEPAM 10 MG/2G
10 GEL RECTAL ONCE AS NEEDED
Status: DISCONTINUED | OUTPATIENT
Start: 2024-10-18 | End: 2025-01-10 | Stop reason: HOSPADM

## 2024-10-18 RX ADMIN — LACOSAMIDE 50 MG: 50 TABLET, FILM COATED ORAL at 08:10

## 2024-10-18 RX ADMIN — THERA TABS 1 TABLET: TAB at 08:10

## 2024-10-18 NOTE — ASSESSMENT & PLAN NOTE
8/30 patient had a witnessed seizure for 3 minutes with jerking of the left arm and right leg, with post-ictal state. Labs with anion gap acidosis, otherwise no findings. Glucose 124. CMP, CBC, lactic acid, CPK WNL. Ionized calcium 1.02. CT head no evidence of acute intracranial abnormalities. No provoking factor identified in labs/history.  Per Neuro, low suspicion that rivastigmine triggered seizure, but not opposed to holding medication.     EEG: abnormal study due to moderate diffuse background slowing consistent with diffuse cerebral dysfunction and encephalopathy which may be on the basis of toxic, metabolic, or primary neuronal disorder.     9/13 patient suffered a second witnessed seizure. Episode lasted approx 10 minutes, during which patient was foaming at the mouth and leaning to the right. She received 1 mg Ativan IM. Lactate elevated. On evaluation 9/13 AM at baseline. Given she has now had two clinical events, will start AED for seizure prevention per neuro recs.     - Lacosamide 50 mg BID PO.   - Outpatient f/u with neurology   - Seizure precautions   - PRN rectal diazepam for GTC>5 min

## 2024-10-18 NOTE — SUBJECTIVE & OBJECTIVE
Interval History: Patient has been doing good. No events overnight. Had been eating, and drinking. Sleeping well. Had bowel movements and had been urinating. Ordered rectal diazepam PRN as patient requested to take off her IV line.    Review of Systems  Objective:     Vital Signs (Most Recent):  Temp: 98.3 °F (36.8 °C) (10/18/24 0801)  Pulse: 77 (10/18/24 0801)  Resp: 18 (10/18/24 0801)  BP: 116/65 (10/18/24 0801)  SpO2: 98 % (10/18/24 0801) Vital Signs (24h Range):  Temp:  [97.9 °F (36.6 °C)-98.3 °F (36.8 °C)] 98.3 °F (36.8 °C)  Pulse:  [77-81] 77  Resp:  [18] 18  SpO2:  [97 %-98 %] 98 %  BP: (105-116)/(65-68) 116/65     Weight: 49.5 kg (109 lb 2 oz)  Body mass index is 19.96 kg/m².  No intake or output data in the 24 hours ending 10/18/24 1422      Physical Exam  Constitutional:       General: She is not in acute distress.     Appearance: Normal appearance. She is not ill-appearing.   HENT:      Head: Normocephalic and atraumatic.      Right Ear: External ear normal.      Left Ear: External ear normal.      Nose: Nose normal.      Mouth/Throat:      Mouth: Mucous membranes are moist.   Eyes:      Conjunctiva/sclera: Conjunctivae normal.   Cardiovascular:      Rate and Rhythm: Normal rate and regular rhythm.      Heart sounds: Normal heart sounds. No murmur heard.  Pulmonary:      Effort: Pulmonary effort is normal. No respiratory distress.      Breath sounds: Normal breath sounds.   Abdominal:      Palpations: Abdomen is soft.      Tenderness: There is no abdominal tenderness.   Musculoskeletal:      Right lower leg: No edema.      Left lower leg: No edema.   Skin:     General: Skin is warm and dry.   Neurological:      Mental Status: She is alert and oriented to person, place, and time.      Motor: No weakness.      Comments: Oriented to self, place and time             Significant Labs: All pertinent labs within the past 24 hours have been reviewed.    Significant Imaging: I have reviewed all pertinent imaging  results/findings within the past 24 hours.

## 2024-10-18 NOTE — PLAN OF CARE
Problem: Adult Inpatient Plan of Care  Goal: Plan of Care Review  Outcome: Progressing  Goal: Patient-Specific Goal (Individualized)  Outcome: Progressing  Goal: Absence of Hospital-Acquired Illness or Injury  Outcome: Progressing  Goal: Optimal Comfort and Wellbeing  Outcome: Progressing  Goal: Readiness for Transition of Care  Outcome: Progressing     Problem: Fall Injury Risk  Goal: Absence of Fall and Fall-Related Injury  Outcome: Progressing     Problem: Seizure, Active Management  Goal: Absence of Seizure/Seizure-Related Injury  Outcome: Progressing     Problem: Functional Deficit  Goal: Optimal Cognitive Function  Outcome: Progressing      "   11/10/19 1426   Behavioral Health   Hallucinations denies / not responding to hallucinations   Thinking distractable;poor concentration   Orientation person: oriented;place: oriented;date: oriented;time: oriented   Memory baseline memory   Insight poor   Judgement impaired   Eye Contact at examiner   Affect full range affect   Mood anxious;mood is calm   Physical Appearance/Attire untidy   Suicidality thoughts and plan  (pt stated \"thinking\" plan suicidal)   Self Injury plan;active;urges   Activities of Daily Living   Hygiene/Grooming independent   Oral Hygiene independent   Dress street clothes;independent   Room Organization independent       Patient slept half of the morning shift, describe them day so far good better than yesterday. Mostly the patient stayed at group room (OT) reading while listening their music. The patient denies having any symptoms of hallucination or hearing today. Their had socialized with staff and peers in OT. The patient informed the writer they anxious is 10/10 but no depression today. Their agree having suicidal thoughts and plan, when asked what is the plan? They thinking about it but no firm plan yet. Patient goal for today is not taken PRN today. Overall, patient mood was call, cooperative, displayed appropriate behavior, pace the hallway periodically. Patient's safety was intact and no abnormal behavior occur at this time.  "

## 2024-10-18 NOTE — NURSING
IV removed due to  and needs to be rotated.  Notified Med Team 5, patient ok to be without IV. Prn rectal anti-seizure medicine ordered by MD in place of IV Lorazepam

## 2024-10-18 NOTE — PLAN OF CARE
CM called in [place] to state; CM faxed PasRR to state.  CM uploaded current PasRR to CP.  CM uploaded 142 to .    MEGHAN CantuN, BS, RN, CCM
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
## 2024-10-18 NOTE — PLAN OF CARE
Problem: Adult Inpatient Plan of Care  Goal: Plan of Care Review  10/17/2024 1901 by Aretha Moody, RN  Outcome: Progressing  1Goal: Optimal Comfort and Wellbeing  10/17/2024 1901 by Aretha Moody, RN  Outcome: Progressing

## 2024-10-19 PROCEDURE — 25000003 PHARM REV CODE 250

## 2024-10-19 PROCEDURE — 11000001 HC ACUTE MED/SURG PRIVATE ROOM

## 2024-10-19 RX ADMIN — THERA TABS 1 TABLET: TAB at 11:10

## 2024-10-19 RX ADMIN — LACOSAMIDE 50 MG: 50 TABLET, FILM COATED ORAL at 08:10

## 2024-10-19 RX ADMIN — LACOSAMIDE 50 MG: 50 TABLET, FILM COATED ORAL at 11:10

## 2024-10-19 NOTE — SUBJECTIVE & OBJECTIVE
Interval History: NAOE, VSS. Pt watiching tv comfortably with sitter at bedside.     Review of Systems  Objective:     Vital Signs (Most Recent):  Temp: 97.7 °F (36.5 °C) (10/19/24 0014)  Pulse: 61 (10/19/24 0014)  Resp: 18 (10/19/24 0014)  BP: 118/80 (10/19/24 0014)  SpO2: 97 % (10/19/24 0014) Vital Signs (24h Range):  Temp:  [97.7 °F (36.5 °C)] 97.7 °F (36.5 °C)  Pulse:  [61] 61  Resp:  [18] 18  SpO2:  [97 %] 97 %  BP: (118)/(80) 118/80     Weight: 49.5 kg (109 lb 2 oz)  Body mass index is 19.96 kg/m².  No intake or output data in the 24 hours ending 10/19/24 1309      Physical Exam  Constitutional:       General: She is not in acute distress.     Appearance: Normal appearance. She is not ill-appearing.   HENT:      Head: Normocephalic and atraumatic.      Right Ear: External ear normal.      Left Ear: External ear normal.      Nose: Nose normal.      Mouth/Throat:      Mouth: Mucous membranes are moist.   Eyes:      Conjunctiva/sclera: Conjunctivae normal.   Cardiovascular:      Rate and Rhythm: Normal rate and regular rhythm.      Heart sounds: Normal heart sounds. No murmur heard.  Pulmonary:      Effort: Pulmonary effort is normal. No respiratory distress.      Breath sounds: Normal breath sounds.   Abdominal:      Palpations: Abdomen is soft.      Tenderness: There is no abdominal tenderness.   Musculoskeletal:      Right lower leg: No edema.      Left lower leg: No edema.   Skin:     General: Skin is warm and dry.   Neurological:      Mental Status: She is alert and oriented to person, place, and time.      Motor: No weakness.      Comments: Oriented to self, place and time             Significant Labs: All pertinent labs within the past 24 hours have been reviewed.    Significant Imaging: I have reviewed all pertinent imaging results/findings within the past 24 hours.
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
## 2024-10-20 PROCEDURE — 25000003 PHARM REV CODE 250

## 2024-10-20 PROCEDURE — 11000001 HC ACUTE MED/SURG PRIVATE ROOM

## 2024-10-20 RX ADMIN — LACOSAMIDE 50 MG: 50 TABLET, FILM COATED ORAL at 08:10

## 2024-10-20 RX ADMIN — THERA TABS 1 TABLET: TAB at 09:10

## 2024-10-20 RX ADMIN — LACOSAMIDE 50 MG: 50 TABLET, FILM COATED ORAL at 09:10
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
## 2024-10-20 NOTE — SUBJECTIVE & OBJECTIVE
Interval History: NAOE, VSS. Pt watching tv comfortably with sitter at bedside.     Review of Systems  Objective:     Vital Signs (Most Recent):  Temp: 98.3 °F (36.8 °C) (10/20/24 0900)  Pulse: 80 (10/20/24 0900)  Resp: 16 (10/20/24 0900)  BP: 119/77 (10/20/24 0900)  SpO2: 96 % (10/20/24 0900) Vital Signs (24h Range):  Temp:  [97.8 °F (36.6 °C)-98.3 °F (36.8 °C)] 98.3 °F (36.8 °C)  Pulse:  [80-82] 80  Resp:  [16-18] 16  SpO2:  [95 %-98 %] 96 %  BP: (108-122)/(63-77) 119/77     Weight: 49.5 kg (109 lb 2 oz)  Body mass index is 19.96 kg/m².    Intake/Output Summary (Last 24 hours) at 10/20/2024 1123  Last data filed at 10/20/2024 0800  Gross per 24 hour   Intake 360 ml   Output --   Net 360 ml         Physical Exam  Constitutional:       General: She is not in acute distress.     Appearance: Normal appearance. She is not ill-appearing.   HENT:      Head: Normocephalic and atraumatic.      Right Ear: External ear normal.      Left Ear: External ear normal.      Nose: Nose normal.      Mouth/Throat:      Mouth: Mucous membranes are moist.   Eyes:      Conjunctiva/sclera: Conjunctivae normal.   Cardiovascular:      Rate and Rhythm: Normal rate and regular rhythm.      Heart sounds: Normal heart sounds. No murmur heard.  Pulmonary:      Effort: Pulmonary effort is normal. No respiratory distress.      Breath sounds: Normal breath sounds.   Abdominal:      Palpations: Abdomen is soft.      Tenderness: There is no abdominal tenderness.   Musculoskeletal:      Right lower leg: No edema.      Left lower leg: No edema.   Skin:     General: Skin is warm and dry.   Neurological:      Mental Status: She is alert and oriented to person, place, and time.      Motor: No weakness.      Comments: Oriented to self, place and time             Significant Labs: All pertinent labs within the past 24 hours have been reviewed.    Significant Imaging: I have reviewed all pertinent imaging results/findings within the past 24 hours.

## 2024-10-21 PROCEDURE — 25000003 PHARM REV CODE 250

## 2024-10-21 PROCEDURE — 11000001 HC ACUTE MED/SURG PRIVATE ROOM

## 2024-10-21 RX ADMIN — LACOSAMIDE 50 MG: 50 TABLET, FILM COATED ORAL at 08:10

## 2024-10-21 RX ADMIN — THERA TABS 1 TABLET: TAB at 09:10

## 2024-10-21 RX ADMIN — LACOSAMIDE 50 MG: 50 TABLET, FILM COATED ORAL at 09:10

## 2024-10-21 NOTE — PLAN OF CARE
[facility] - Internal Medicine Telemetry  Discharge Reassessment    Primary Care Provider: Edwar Castaneda II, MD    Expected Discharge Date:     Reassessment (most recent)       Discharge Reassessment - 10/21/24 1152          Discharge Reassessment    Assessment Type Discharge Planning Reassessment (P)      Did the patient's condition or plan change since previous assessment? No (P)      Discharge Plan discussed with: Patient (P)      Communicated MARVEL with patient/caregiver Date not available/Unable to determine (P)      Discharge Plan A New Nursing Home placement - care home care facility (P)      Discharge Plan B New Nursing Home placement - care home care facility (P)      DME Needed Upon Discharge  none (P)      Transition of Care Barriers No family/friends to help (P)      Why the patient remains in the hospital Placement issues (P)         Post-Acute Status    Post-Acute Authorization Placement (P)      Post-Acute Placement Status Referrals Sent (P)      Coverage [facility] (P)      Discharge Delays Post-Acute Set-up (P)                    CM spoke with pt in room.  Pt expressed no questions or concerns.  DC plan new care home NH with memory unit; pt is in process of interdiction; cannot proceed with further progress towards placement until legal guardian obtained.    MEGHAN CantuN, BS, RN, CCM      Discharge Plan A and Plan B have been determined by review of patient's clinical status, future medical and therapeutic needs, and coverage/benefits for post-acute care in coordination with multidisciplinary team members.

## 2024-10-21 NOTE — SUBJECTIVE & OBJECTIVE
Interval History: NAEON. Patient well overall    Review of Systems   Constitutional:  Negative for chills and fever.   Respiratory:  Negative for cough, chest tightness and shortness of breath.    Cardiovascular:  Negative for leg swelling.   Gastrointestinal:  Negative for abdominal pain, constipation, diarrhea, nausea and vomiting.   Genitourinary:  Negative for dysuria.   Neurological:  Negative for dizziness, numbness and headaches.   Psychiatric/Behavioral:  Negative for agitation, behavioral problems and confusion.    All other systems reviewed and are negative.    Objective:     Vital Signs (Most Recent):  Temp: 98.5 °F (36.9 °C) (10/21/24 0915)  Pulse: 99 (10/21/24 0915)  Resp: 16 (10/21/24 0915)  BP: 113/77 (10/21/24 0915)  SpO2: 97 % (10/21/24 0915) Vital Signs (24h Range):  Temp:  [98.1 °F (36.7 °C)-98.5 °F (36.9 °C)] 98.5 °F (36.9 °C)  Pulse:  [80-99] 99  Resp:  [16-18] 16  SpO2:  [97 %-98 %] 97 %  BP: (113-124)/(77-81) 113/77     Weight: 49.5 kg (109 lb 2 oz)  Body mass index is 19.96 kg/m².    Intake/Output Summary (Last 24 hours) at 10/21/2024 1103  Last data filed at 10/20/2024 1200  Gross per 24 hour   Intake 480 ml   Output --   Net 480 ml         Physical Exam  Constitutional:       General: She is not in acute distress.     Appearance: Normal appearance. She is not ill-appearing.   HENT:      Head: Normocephalic and atraumatic.      Right Ear: External ear normal.      Left Ear: External ear normal.      Nose: Nose normal.      Mouth/Throat:      Mouth: Mucous membranes are moist.   Eyes:      Conjunctiva/sclera: Conjunctivae normal.   Cardiovascular:      Rate and Rhythm: Normal rate and regular rhythm.      Heart sounds: Normal heart sounds. No murmur heard.  Pulmonary:      Effort: Pulmonary effort is normal. No respiratory distress.      Breath sounds: Normal breath sounds.   Abdominal:      Palpations: Abdomen is soft.      Tenderness: There is no abdominal tenderness.   Musculoskeletal:       Right lower leg: No edema.      Left lower leg: No edema.   Skin:     General: Skin is warm and dry.   Neurological:      Mental Status: She is alert and oriented to person, place, and time.      Motor: No weakness.      Comments: Oriented to self, place and time             Significant Labs: All pertinent labs within the past 24 hours have been reviewed.    Significant Imaging: I have reviewed all pertinent imaging results/findings within the past 24 hours.
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
## 2024-10-22 PROCEDURE — 25000003 PHARM REV CODE 250

## 2024-10-22 PROCEDURE — 11000001 HC ACUTE MED/SURG PRIVATE ROOM

## 2024-10-22 RX ADMIN — LACOSAMIDE 50 MG: 50 TABLET, FILM COATED ORAL at 08:10

## 2024-10-22 RX ADMIN — LACOSAMIDE 50 MG: 50 TABLET, FILM COATED ORAL at 09:10

## 2024-10-22 RX ADMIN — THERA TABS 1 TABLET: TAB at 08:10
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
## 2024-10-22 NOTE — SUBJECTIVE & OBJECTIVE
Interval History: NAEON. Patient feels well overall. No complaints today     Review of Systems   Constitutional:  Negative for chills and fever.   Respiratory:  Negative for cough, chest tightness and shortness of breath.    Cardiovascular:  Negative for leg swelling.   Gastrointestinal:  Negative for abdominal pain, constipation, diarrhea, nausea and vomiting.   Genitourinary:  Negative for dysuria.   Neurological:  Negative for dizziness, numbness and headaches.   Psychiatric/Behavioral:  Negative for agitation, behavioral problems and confusion.    All other systems reviewed and are negative.    Objective:     Vital Signs (Most Recent):  Temp: 98.1 °F (36.7 °C) (10/21/24 2008)  Pulse: 75 (10/22/24 0737)  Resp: 18 (10/22/24 0737)  BP: 109/73 (10/22/24 0737)  SpO2: 96 % (10/22/24 0737) Vital Signs (24h Range):  Temp:  [98.1 °F (36.7 °C)-98.6 °F (37 °C)] 98.1 °F (36.7 °C)  Pulse:  [73-89] 75  Resp:  [16-18] 18  SpO2:  [96 %-98 %] 96 %  BP: (106-110)/(70-74) 109/73     Weight: 49.5 kg (109 lb 2 oz)  Body mass index is 19.96 kg/m².  No intake or output data in the 24 hours ending 10/22/24 1034      Physical Exam  Constitutional:       General: She is not in acute distress.     Appearance: Normal appearance. She is not ill-appearing.   HENT:      Head: Normocephalic and atraumatic.      Right Ear: External ear normal.      Left Ear: External ear normal.      Nose: Nose normal.      Mouth/Throat:      Mouth: Mucous membranes are moist.   Eyes:      Conjunctiva/sclera: Conjunctivae normal.   Cardiovascular:      Rate and Rhythm: Normal rate and regular rhythm.      Heart sounds: Normal heart sounds. No murmur heard.  Pulmonary:      Effort: Pulmonary effort is normal. No respiratory distress.      Breath sounds: Normal breath sounds.   Abdominal:      Palpations: Abdomen is soft.      Tenderness: There is no abdominal tenderness.   Musculoskeletal:      Right lower leg: No edema.      Left lower leg: No edema.   Skin:      General: Skin is warm and dry.   Neurological:      Mental Status: She is alert and oriented to person, place, and time.      Motor: No weakness.      Comments: Oriented to self, place and time             Significant Labs: All pertinent labs within the past 24 hours have been reviewed.    Significant Imaging: I have reviewed all pertinent imaging results/findings within the past 24 hours.

## 2024-10-22 NOTE — PLAN OF CARE
CM sent updated MD progress note to accepting NH facilities [facility], and [facility] via CP.    MEGHAN CantuN, BS, RN, CCM

## 2024-10-23 PROCEDURE — 25000003 PHARM REV CODE 250

## 2024-10-23 PROCEDURE — 11000001 HC ACUTE MED/SURG PRIVATE ROOM

## 2024-10-23 RX ADMIN — LACOSAMIDE 50 MG: 50 TABLET, FILM COATED ORAL at 09:10

## 2024-10-23 RX ADMIN — THERA TABS 1 TABLET: TAB at 08:10

## 2024-10-23 RX ADMIN — LACOSAMIDE 50 MG: 50 TABLET, FILM COATED ORAL at 08:10

## 2024-10-23 NOTE — PLAN OF CARE
Recommendations     1.) Recommend continuing with Regular Diet,as tolerated.      2.) If PO intake <50%, recommend Boost Plus TID to help meet needs.      3.) RD to monitor wt, PO intake, skin, labs.      Goals: meet % een/epn by next RD f/u  Nutrition Goal Status: goal met  Communication of RD Recs: (poc)

## 2024-10-23 NOTE — PROGRESS NOTES
"[facility] - Internal Medicine Telemetry  Adult Nutrition  Progress Note    SUMMARY       Recommendations    1.) Recommend continuing with Regular Diet,as tolerated.      2.) If PO intake <50%, recommend Boost Plus TID to help meet needs.      3.) RD to monitor wt, PO intake, skin, labs.      Goals: meet % een/epn by next RD f/u  Nutrition Goal Status: goal met  Communication of RD Recs: (poc)    Assessment and Plan    No nutritional dx at this time.     Reason for Assessment    Reason For Assessment: RD follow-up  Diagnosis: other (see comments) (Cognitive and behavioral changes)  General Information Comments: RD f/u: Pt consumign 100% of meals provided. Pt does not meet criteria for malnutrition. RD following.  Nutrition Discharge Planning: genral healthful diet    Nutrition Risk Screen    Nutrition Risk Screen: no indicators present    Nutrition/Diet History    Spiritual, Cultural Beliefs, Taoism Practices, Values that Affect Care: no  Food Allergies: NKFA    Anthropometrics    Temp: 98.2 °F (36.8 °C)  Height Method: Stated  Height: 5' 2" (157.5 cm)  Height (inches): 62 in  Weight Method: Bed Scale  Weight: 49.5 kg (109 lb 2 oz)  Weight (lb): 109.13 lb  Ideal Body Weight (IBW), Female: 110 lb  % Ideal Body Weight, Female (lb): 100 %  BMI (Calculated): 20.1  BMI Grade: 18.5-24.9 - normal       Lab/Procedures/Meds    Pertinent Labs Reviewed: reviewed  Pertinent Labs Comments: 10/15: BUN: 24, GFR: 58.4  Pertinent Medications Reviewed: reviewed  Pertinent Medications Comments: MVI    Estimated/Assessed Needs    Weight Used For Calorie Calculations: 49.9 kg (110 lb 0.2 oz)  Energy Calorie Requirements (kcal): 2154-8935 (25-30kcal/kg)  Energy Need Method: Kcal/kg  Protein Requirements: 60 (1.2 g/kg)  Weight Used For Protein Calculations: 49.9 kg (110 lb 0.2 oz)     Estimated Fluid Requirement Method: RDA Method  RDA Method (mL): 1247         Nutrition Prescription Ordered    Current Diet Order: " Regular    Evaluation of Received Nutrient/Fluid Intake    I/O: +2.2L since 10/5  Energy Calories Required: meeting needs  Protein Required: meeting needs  Fluid Required:  (as per MD)  Comments: LBM 10/14  Tolerance: tolerating  % Intake of Estimated Energy Needs: 100%  % Meal Intake: 100%    Nutrition Risk    Level of Risk/Frequency of Follow-up: low - moderate (f/u q5-7 days)     Monitor and Evaluation    Food and Nutrient Intake: energy intake, food and beverage intake  Food and Nutrient Adminstration: diet order  Physical Activity and Function: nutrition-related ADLs and IADLs  Anthropometric Measurements: height/length, weight, weight change, body mass index  Biochemical Data, Medical Tests and Procedures: electrolyte and renal panel, gastrointestinal profile, glucose/endocrine profile, inflammatory profile, lipid profile     Nutrition Follow-Up    RD Follow-up?: Yes

## 2024-10-24 PROCEDURE — 25000003 PHARM REV CODE 250

## 2024-10-24 PROCEDURE — 11000001 HC ACUTE MED/SURG PRIVATE ROOM

## 2024-10-24 RX ADMIN — LACOSAMIDE 50 MG: 50 TABLET, FILM COATED ORAL at 08:10

## 2024-10-24 RX ADMIN — LACOSAMIDE 50 MG: 50 TABLET, FILM COATED ORAL at 09:10

## 2024-10-24 RX ADMIN — THERA TABS 1 TABLET: TAB at 08:10

## 2024-10-24 NOTE — PLAN OF CARE
"  Problem: Adult Inpatient Plan of Care  Goal: Plan of Care Review  Outcome: Progressing  Flowsheets (Taken 10/24/2024 1195)  Plan of Care Reviewed With: patient  Goal: Patient-Specific Goal (Individualized)  Outcome: Progressing  Goal: Absence of Hospital-Acquired Illness or Injury  Outcome: Progressing  Goal: Optimal Comfort and Wellbeing  Outcome: Progressing  Goal: Readiness for Transition of Care  Outcome: Progressing  Pt AAOX4, Denies any pain and discomfort during this shift, VS stable, scheduled meds given, during assessment nurse noticed a bulging asymmetrical look like sac filled with fluid around pt right side of the neck, 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 Med team 5 made aware. MD " I have seen it before". Sitter remains at bedside. instructed the pt to call for assitance if needed, call light within reach. No acute events noted during this shift. Plan of care ongoing.   "

## 2024-10-24 NOTE — SUBJECTIVE & OBJECTIVE
Interval History: NAEON. Patient has no complaints today.     Review of Systems   Constitutional:  Negative for chills and fever.   Respiratory:  Negative for cough, chest tightness and shortness of breath.    Cardiovascular:  Negative for leg swelling.   Gastrointestinal:  Negative for abdominal pain, constipation, diarrhea, nausea and vomiting.   Genitourinary:  Negative for dysuria.   Neurological:  Negative for dizziness, numbness and headaches.   Psychiatric/Behavioral:  Negative for agitation, behavioral problems and confusion.    All other systems reviewed and are negative.    Objective:     Vital Signs (Most Recent):  Temp: 98.8 °F (37.1 °C) (10/24/24 0753)  Pulse: 92 (10/24/24 0753)  Resp: 18 (10/24/24 0753)  BP: 106/62 (10/24/24 0753)  SpO2: 98 % (10/24/24 0753) Vital Signs (24h Range):  Temp:  [98.1 °F (36.7 °C)-98.8 °F (37.1 °C)] 98.8 °F (37.1 °C)  Pulse:  [85-92] 92  Resp:  [17-18] 18  SpO2:  [96 %-98 %] 98 %  BP: (104-106)/(57-62) 106/62     Weight: 49.5 kg (109 lb 2 oz)  Body mass index is 19.96 kg/m².    Intake/Output Summary (Last 24 hours) at 10/24/2024 1023  Last data filed at 10/24/2024 0822  Gross per 24 hour   Intake 600 ml   Output --   Net 600 ml         Physical Exam  Constitutional:       General: She is not in acute distress.     Appearance: Normal appearance. She is not ill-appearing.   HENT:      Head: Normocephalic and atraumatic.      Right Ear: External ear normal.      Left Ear: External ear normal.      Nose: Nose normal.      Mouth/Throat:      Mouth: Mucous membranes are moist.   Eyes:      Conjunctiva/sclera: Conjunctivae normal.   Cardiovascular:      Rate and Rhythm: Normal rate and regular rhythm.      Heart sounds: Normal heart sounds. No murmur heard.  Pulmonary:      Effort: Pulmonary effort is normal. No respiratory distress.      Breath sounds: Normal breath sounds.   Abdominal:      Palpations: Abdomen is soft.      Tenderness: There is no abdominal tenderness.    Musculoskeletal:      Right lower leg: No edema.      Left lower leg: No edema.   Skin:     General: Skin is warm and dry.   Neurological:      Mental Status: She is alert and oriented to person, place, and time.      Motor: No weakness.      Comments: Oriented to self, place and time             Significant Labs: All pertinent labs within the past 24 hours have been reviewed.    Significant Imaging: I have reviewed all pertinent imaging results/findings within the past 24 hours.

## 2024-10-24 NOTE — SUBJECTIVE & OBJECTIVE
Interval History: NAEON. Patient resting comfortably in bed     Review of Systems   Constitutional:  Negative for chills and fever.   Respiratory:  Negative for cough, chest tightness and shortness of breath.    Cardiovascular:  Negative for leg swelling.   Gastrointestinal:  Negative for abdominal pain, constipation, diarrhea, nausea and vomiting.   Genitourinary:  Negative for dysuria.   Neurological:  Negative for dizziness, numbness and headaches.   Psychiatric/Behavioral:  Negative for agitation, behavioral problems and confusion.    All other systems reviewed and are negative.    Objective:     Vital Signs (Most Recent):  Temp: 98.8 °F (37.1 °C) (10/24/24 0753)  Pulse: 92 (10/24/24 0753)  Resp: 18 (10/24/24 0753)  BP: 106/62 (10/24/24 0753)  SpO2: 98 % (10/24/24 0753) Vital Signs (24h Range):  Temp:  [98.1 °F (36.7 °C)-98.8 °F (37.1 °C)] 98.8 °F (37.1 °C)  Pulse:  [85-92] 92  Resp:  [17-18] 18  SpO2:  [96 %-98 %] 98 %  BP: (104-106)/(57-62) 106/62     Weight: 49.5 kg (109 lb 2 oz)  Body mass index is 19.96 kg/m².    Intake/Output Summary (Last 24 hours) at 10/24/2024 1530  Last data filed at 10/24/2024 1213  Gross per 24 hour   Intake 600 ml   Output --   Net 600 ml         Physical Exam  Constitutional:       General: She is not in acute distress.     Appearance: Normal appearance. She is not ill-appearing.   HENT:      Head: Normocephalic and atraumatic.      Right Ear: External ear normal.      Left Ear: External ear normal.      Nose: Nose normal.      Mouth/Throat:      Mouth: Mucous membranes are moist.   Eyes:      Conjunctiva/sclera: Conjunctivae normal.   Cardiovascular:      Rate and Rhythm: Normal rate and regular rhythm.      Heart sounds: Normal heart sounds. No murmur heard.  Pulmonary:      Effort: Pulmonary effort is normal. No respiratory distress.      Breath sounds: Normal breath sounds.   Abdominal:      Palpations: Abdomen is soft.      Tenderness: There is no abdominal tenderness.    Musculoskeletal:      Right lower leg: No edema.      Left lower leg: No edema.   Skin:     General: Skin is warm and dry.   Neurological:      Mental Status: She is alert and oriented to person, place, and time.      Motor: No weakness.      Comments: Oriented to self, place and time             Significant Labs: All pertinent labs within the past 24 hours have been reviewed.    Significant Imaging: I have reviewed all pertinent imaging results/findings within the past 24 hours.
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
## 2024-10-24 NOTE — PLAN OF CARE
[facility] - Internal Medicine Telemetry  Discharge Reassessment    Primary Care Provider: Edwar Castaneda II, MD    Expected Discharge Date:     Reassessment (most recent)       Discharge Reassessment - 10/24/24 1139          Discharge Reassessment    Assessment Type Discharge Planning Reassessment (P)      Did the patient's condition or plan change since previous assessment? No (P)      Discharge Plan discussed with: Patient;Friend (P)      Name(s) and Number(s) Opal Gallegos [phone] (P)      Communicated MARVEL with patient/caregiver Date not available/Unable to determine (P)      Discharge Plan A New Nursing Home placement - [facility] care facility (P)      Discharge Plan B New Nursing Home placement - [facility] care facility (P)      DME Needed Upon Discharge  none (P)      Transition of Care Barriers No family/friends to help (P)      Why the patient remains in the hospital Placement issues (P)         Post-Acute Status    Post-Acute Authorization Placement (P)      Post-Acute Placement Status Referrals Sent (P)      Coverage [facility] (P)      Discharge Delays Post-Acute Set-up (P)                  CM spoke with pt, her friends Opal Gallegos [phone] in room; charge nurse Juany.  Pt states she is going to wait until the 2 people come back.  She states that 2 people came by yesterday, they wanted to see pictures of her house.  Pt unable to provide names of the people, just that there were 2 of them that were supposed to come back today and see the pictures.  Pt was instructed to not show pictures to people she doesn't know.  Charge nurse Juany is aware and states she will involve her nursing leadership in this situation.    DC plan remains the same; DC to [facility] once interdiction process complete and pt has guardian of the state.    MARTA Cantu, BS, RN, CCM      Discharge Plan A and Plan B have been determined by review of patient's clinical status,  future medical and therapeutic needs, and coverage/benefits for post-acute care in coordination with multidisciplinary team members.

## 2024-10-25 PROCEDURE — 11000001 HC ACUTE MED/SURG PRIVATE ROOM

## 2024-10-25 PROCEDURE — 25000003 PHARM REV CODE 250

## 2024-10-25 RX ADMIN — THERA TABS 1 TABLET: TAB at 08:10

## 2024-10-25 RX ADMIN — LACOSAMIDE 50 MG: 50 TABLET, FILM COATED ORAL at 09:10

## 2024-10-25 RX ADMIN — LACOSAMIDE 50 MG: 50 TABLET, FILM COATED ORAL at 08:10
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
## 2024-10-25 NOTE — SUBJECTIVE & OBJECTIVE
Interval History: NAEON. Patient resting comfortably in bed.  Denies any complaints or concerns at this time.    Review of Systems   Constitutional:  Negative for chills and fever.   Respiratory:  Negative for cough, chest tightness and shortness of breath.    Cardiovascular:  Negative for leg swelling.   Gastrointestinal:  Negative for abdominal pain, constipation, diarrhea, nausea and vomiting.   Genitourinary:  Negative for dysuria.   Neurological:  Negative for dizziness, numbness and headaches.   Psychiatric/Behavioral:  Negative for agitation, behavioral problems and confusion.    All other systems reviewed and are negative.    Objective:     Vital Signs (Most Recent):  Temp: 97.9 °F (36.6 °C) (10/25/24 0746)  Pulse: 77 (10/25/24 0746)  Resp: 18 (10/25/24 0746)  BP: 116/77 (10/25/24 0746)  SpO2: 98 % (10/25/24 0746) Vital Signs (24h Range):  Temp:  [97.9 °F (36.6 °C)-98.1 °F (36.7 °C)] 97.9 °F (36.6 °C)  Pulse:  [77-80] 77  Resp:  [18] 18  SpO2:  [97 %-98 %] 98 %  BP: (114-116)/(75-77) 116/77     Weight: 49.5 kg (109 lb 2 oz)  Body mass index is 19.96 kg/m².    Intake/Output Summary (Last 24 hours) at 10/25/2024 1111  Last data filed at 10/25/2024 0859  Gross per 24 hour   Intake 720 ml   Output --   Net 720 ml         Physical Exam  Constitutional:       General: She is not in acute distress.     Appearance: Normal appearance. She is not ill-appearing.   HENT:      Head: Normocephalic and atraumatic.      Right Ear: External ear normal.      Left Ear: External ear normal.      Nose: Nose normal.      Mouth/Throat:      Mouth: Mucous membranes are moist.   Eyes:      Conjunctiva/sclera: Conjunctivae normal.   Cardiovascular:      Rate and Rhythm: Normal rate and regular rhythm.      Heart sounds: Normal heart sounds. No murmur heard.  Pulmonary:      Effort: Pulmonary effort is normal. No respiratory distress.      Breath sounds: Normal breath sounds.   Abdominal:      Palpations: Abdomen is soft.       Tenderness: There is no abdominal tenderness.   Musculoskeletal:      Right lower leg: No edema.      Left lower leg: No edema.   Skin:     General: Skin is warm and dry.   Neurological:      Mental Status: She is alert and oriented to person, place, and time.      Motor: No weakness.      Comments: Oriented to self, place and time             Significant Labs: All pertinent labs within the past 24 hours have been reviewed.    Significant Imaging: I have reviewed all pertinent imaging results/findings within the past 24 hours.

## 2024-10-26 PROCEDURE — 25000003 PHARM REV CODE 250

## 2024-10-26 PROCEDURE — 11000001 HC ACUTE MED/SURG PRIVATE ROOM

## 2024-10-26 RX ADMIN — THERA TABS 1 TABLET: TAB at 08:10

## 2024-10-26 RX ADMIN — LACOSAMIDE 50 MG: 50 TABLET, FILM COATED ORAL at 08:10

## 2024-10-26 RX ADMIN — LACOSAMIDE 50 MG: 50 TABLET, FILM COATED ORAL at 09:10

## 2024-10-26 NOTE — SUBJECTIVE & OBJECTIVE
Interval History: NAEON. Patient is resting comfortably in bed. Patient had concerns about her disposition. Spoke with her about SW working to find her placement. No other complaints today.     Review of Systems   Constitutional:  Negative for chills and fever.   Respiratory:  Negative for cough, chest tightness and shortness of breath.    Cardiovascular:  Negative for leg swelling.   Gastrointestinal:  Negative for abdominal pain, constipation, diarrhea, nausea and vomiting.   Genitourinary:  Negative for dysuria.   Neurological:  Negative for dizziness, numbness and headaches.   Psychiatric/Behavioral:  Negative for agitation, behavioral problems and confusion.    All other systems reviewed and are negative.    Objective:     Vital Signs (Most Recent):  Temp: 99 °F (37.2 °C) (10/26/24 0807)  Pulse: 70 (10/26/24 0807)  Resp: 18 (10/26/24 0807)  BP: 115/79 (10/26/24 0807)  SpO2: 98 % (10/26/24 0807) Vital Signs (24h Range):  Temp:  [98.2 °F (36.8 °C)-99 °F (37.2 °C)] 99 °F (37.2 °C)  Pulse:  [70-76] 70  Resp:  [18] 18  SpO2:  [98 %] 98 %  BP: (115-117)/(66-79) 115/79     Weight: 49.5 kg (109 lb 2 oz)  Body mass index is 19.96 kg/m².    Intake/Output Summary (Last 24 hours) at 10/26/2024 0819  Last data filed at 10/25/2024 1747  Gross per 24 hour   Intake 600 ml   Output --   Net 600 ml         Physical Exam  Constitutional:       General: She is not in acute distress.     Appearance: Normal appearance. She is not ill-appearing.   HENT:      Head: Normocephalic and atraumatic.      Right Ear: External ear normal.      Left Ear: External ear normal.      Nose: Nose normal.      Mouth/Throat:      Mouth: Mucous membranes are moist.   Eyes:      Conjunctiva/sclera: Conjunctivae normal.   Cardiovascular:      Rate and Rhythm: Normal rate and regular rhythm.      Heart sounds: Normal heart sounds. No murmur heard.  Pulmonary:      Effort: Pulmonary effort is normal. No respiratory distress.      Breath sounds: Normal  breath sounds.   Abdominal:      Palpations: Abdomen is soft.      Tenderness: There is no abdominal tenderness.   Musculoskeletal:      Right lower leg: No edema.      Left lower leg: No edema.   Skin:     General: Skin is warm and dry.   Neurological:      Mental Status: She is alert and oriented to person, place, and time.      Motor: No weakness.      Comments: Oriented to self, place and time             Significant Labs: All pertinent labs within the past 24 hours have been reviewed.    Significant Imaging: I have reviewed all pertinent imaging results/findings within the past 24 hours.
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
## 2024-10-27 PROCEDURE — 11000001 HC ACUTE MED/SURG PRIVATE ROOM

## 2024-10-27 PROCEDURE — 25000003 PHARM REV CODE 250

## 2024-10-27 PROCEDURE — 94761 N-INVAS EAR/PLS OXIMETRY MLT: CPT

## 2024-10-27 RX ADMIN — LACOSAMIDE 50 MG: 50 TABLET, FILM COATED ORAL at 09:10

## 2024-10-27 RX ADMIN — THERA TABS 1 TABLET: TAB at 09:10
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
## 2024-10-27 NOTE — PLAN OF CARE
Problem: Adult Inpatient Plan of Care  Goal: Plan of Care Review  Outcome: Adequate for Care Transition  Goal: Patient-Specific Goal (Individualized)  Outcome: Adequate for Care Transition  Goal: Absence of Hospital-Acquired Illness or Injury  Outcome: Adequate for Care Transition  Goal: Optimal Comfort and Wellbeing  Outcome: Adequate for Care Transition  Goal: Readiness for Transition of Care  Outcome: Adequate for Care Transition     Problem: Fall Injury Risk  Goal: Absence of Fall and Fall-Related Injury  Outcome: Adequate for Care Transition     Problem: Seizure, Active Management  Goal: Absence of Seizure/Seizure-Related Injury  Outcome: Adequate for Care Transition     Problem: Functional Deficit  Goal: Optimal Cognitive Function  Outcome: Adequate for Care Transition     Problem: Comorbidity Management  Goal: Maintenance of Seizure Control  Outcome: Adequate for Care Transition   Pt Alert and oriented to self .  Able to express needs.  Unable to live alone and care for herself independently.  Resident on [facility] since 6/24/24.  Placement in progress.  No c/o pain .  Ambulates independently.  Sitter at the bedside day and night.   NO recent seizure activity noted.  Safety maintained.  Bed in low position,  call  light in reach.

## 2024-10-28 PROCEDURE — 25000003 PHARM REV CODE 250

## 2024-10-28 PROCEDURE — 11000001 HC ACUTE MED/SURG PRIVATE ROOM

## 2024-10-28 RX ADMIN — THERA TABS 1 TABLET: TAB at 08:10

## 2024-10-28 RX ADMIN — LACOSAMIDE 50 MG: 50 TABLET, FILM COATED ORAL at 09:10

## 2024-10-28 RX ADMIN — LACOSAMIDE 50 MG: 50 TABLET, FILM COATED ORAL at 08:10

## 2024-10-28 NOTE — SUBJECTIVE & OBJECTIVE
Interval History: NAEON. Patient is resting comfortably in bed. No complaints today.     Review of Systems   Constitutional:  Negative for chills and fever.   Respiratory:  Negative for cough, chest tightness and shortness of breath.    Cardiovascular:  Negative for leg swelling.   Gastrointestinal:  Negative for abdominal pain, constipation, diarrhea, nausea and vomiting.   Genitourinary:  Negative for dysuria.   Neurological:  Negative for dizziness, numbness and headaches.   Psychiatric/Behavioral:  Negative for agitation, behavioral problems and confusion.    All other systems reviewed and are negative.    Objective:     Vital Signs (Most Recent):  Temp: 98.3 °F (36.8 °C) (10/28/24 0814)  Pulse: 89 (10/28/24 0814)  Resp: 16 (10/28/24 0814)  BP: 110/77 (10/28/24 0814)  SpO2: 98 % (10/28/24 0814) Vital Signs (24h Range):  Temp:  [97.6 °F (36.4 °C)-98.3 °F (36.8 °C)] 98.3 °F (36.8 °C)  Pulse:  [72-89] 89  Resp:  [16-18] 16  SpO2:  [96 %-98 %] 98 %  BP: (110-150)/(77-84) 110/77     Weight: 49.5 kg (109 lb 2 oz)  Body mass index is 19.96 kg/m².    Intake/Output Summary (Last 24 hours) at 10/28/2024 1440  Last data filed at 10/27/2024 2145  Gross per 24 hour   Intake 120 ml   Output --   Net 120 ml         Physical Exam  Constitutional:       General: She is not in acute distress.     Appearance: Normal appearance. She is not ill-appearing.   HENT:      Head: Normocephalic and atraumatic.      Right Ear: External ear normal.      Left Ear: External ear normal.      Nose: Nose normal.      Mouth/Throat:      Mouth: Mucous membranes are moist.   Eyes:      Conjunctiva/sclera: Conjunctivae normal.   Cardiovascular:      Rate and Rhythm: Normal rate and regular rhythm.      Heart sounds: Normal heart sounds. No murmur heard.  Pulmonary:      Effort: Pulmonary effort is normal. No respiratory distress.      Breath sounds: Normal breath sounds.   Abdominal:      Palpations: Abdomen is soft.      Tenderness: There is no  abdominal tenderness.   Musculoskeletal:      Right lower leg: No edema.      Left lower leg: No edema.   Skin:     General: Skin is warm and dry.   Neurological:      Mental Status: She is alert and oriented to person, place, and time.      Motor: No weakness.      Comments: Oriented to self, place and time           Significant Labs: All pertinent labs within the past 24 hours have been reviewed.    Significant Imaging: I have reviewed all pertinent imaging results/findings within the past 24 hours.

## 2024-10-28 NOTE — PLAN OF CARE
Problem: Adult Inpatient Plan of Care  Goal: Plan of Care Review  Outcome: Progressing  Flowsheets (Taken 10/27/2024 1939)  Plan of Care Reviewed With: patient  Goal: Patient-Specific Goal (Individualized)  Outcome: Progressing  Goal: Absence of Hospital-Acquired Illness or Injury  Outcome: Progressing  Goal: Optimal Comfort and Wellbeing  Outcome: Progressing  Goal: Readiness for Transition of Care  Outcome: Progressing   Pt AAOX4, Denies any pain and discomfort during this shift, VS stable, scheduled meds given sitter remains at bedside. Educated pt on the use of call light, instructed the pt to call for assitance if needed, call light within reach. No acute events noted during this shift. Plan of care ongoing.

## 2024-10-28 NOTE — PLAN OF CARE
Problem: Adult Inpatient Plan of Care  Goal: Plan of Care Review  Outcome: Progressing  Goal: Patient-Specific Goal (Individualized)  Outcome: Progressing  Goal: Absence of Hospital-Acquired Illness or Injury  Outcome: Progressing  Goal: Optimal Comfort and Wellbeing  Outcome: Progressing  Goal: Readiness for Transition of Care  Outcome: Progressing     Problem: Fall Injury Risk  Goal: Absence of Fall and Fall-Related Injury  Outcome: Progressing     No significant events this shift. Pt AAO x 4, VS stable. Sitter at bedside. Pt with no complaints this shift. Side rails up x 2, bed locked and low, call light within reach. POC ongoing.

## 2024-10-29 PROCEDURE — 25000003 PHARM REV CODE 250

## 2024-10-29 PROCEDURE — 11000001 HC ACUTE MED/SURG PRIVATE ROOM

## 2024-10-29 RX ADMIN — THERA TABS 1 TABLET: TAB at 09:10

## 2024-10-29 RX ADMIN — LACOSAMIDE 50 MG: 50 TABLET, FILM COATED ORAL at 09:10

## 2024-10-29 RX ADMIN — LACOSAMIDE 50 MG: 50 TABLET, FILM COATED ORAL at 10:10
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
## 2024-10-29 NOTE — PLAN OF CARE
Problem: Adult Inpatient Plan of Care  Goal: Plan of Care Review  Outcome: Progressing  Goal: Patient-Specific Goal (Individualized)  Outcome: Progressing  Goal: Absence of Hospital-Acquired Illness or Injury  Outcome: Progressing  Goal: Optimal Comfort and Wellbeing  Outcome: Progressing  Goal: Readiness for Transition of Care  Outcome: Progressing     Problem: Fall Injury Risk  Goal: Absence of Fall and Fall-Related Injury  Outcome: Progressing     Problem: Seizure, Active Management  Goal: Absence of Seizure/Seizure-Related Injury  Outcome: Progressing       No significant events this shift. Pt AAO x 4, VS stable. Medications given. Sitter at bedside. Pt with no complaints this shift. Side rails up x 2, bed locked and low, call light within reach. POC ongoing.

## 2024-10-29 NOTE — SUBJECTIVE & OBJECTIVE
Interval History: NAEON. Patient is resting comfortably in bed. No other complaints    Review of Systems   Constitutional:  Negative for chills and fever.   Respiratory:  Negative for cough, chest tightness and shortness of breath.    Cardiovascular:  Negative for leg swelling.   Gastrointestinal:  Negative for abdominal pain, constipation, diarrhea, nausea and vomiting.   Genitourinary:  Negative for dysuria.   Neurological:  Negative for dizziness, numbness and headaches.   Psychiatric/Behavioral:  Negative for agitation, behavioral problems and confusion.    All other systems reviewed and are negative.    Objective:     Vital Signs (Most Recent):  Temp: 98.6 °F (37 °C) (10/29/24 0700)  Pulse: 77 (10/29/24 0700)  Resp: 18 (10/29/24 0700)  BP: 110/66 (10/29/24 0700)  SpO2: 97 % (10/29/24 0700) Vital Signs (24h Range):  Temp:  [98.5 °F (36.9 °C)-98.6 °F (37 °C)] 98.6 °F (37 °C)  Pulse:  [77-91] 77  Resp:  [18] 18  SpO2:  [95 %-97 %] 97 %  BP: (103-110)/(62-66) 110/66     Weight: 49.5 kg (109 lb 2 oz)  Body mass index is 19.96 kg/m².    Intake/Output Summary (Last 24 hours) at 10/29/2024 1219  Last data filed at 10/28/2024 2200  Gross per 24 hour   Intake 240 ml   Output --   Net 240 ml         Physical Exam  Constitutional:       General: She is not in acute distress.     Appearance: Normal appearance. She is not ill-appearing.   HENT:      Head: Normocephalic and atraumatic.      Right Ear: External ear normal.      Left Ear: External ear normal.      Nose: Nose normal.      Mouth/Throat:      Mouth: Mucous membranes are moist.   Eyes:      Conjunctiva/sclera: Conjunctivae normal.   Cardiovascular:      Rate and Rhythm: Normal rate and regular rhythm.      Heart sounds: Normal heart sounds. No murmur heard.  Pulmonary:      Effort: Pulmonary effort is normal. No respiratory distress.      Breath sounds: Normal breath sounds.   Abdominal:      Palpations: Abdomen is soft.      Tenderness: There is no abdominal  tenderness.   Musculoskeletal:      Right lower leg: No edema.      Left lower leg: No edema.   Skin:     General: Skin is warm and dry.   Neurological:      Mental Status: She is alert and oriented to person, place, and time.      Motor: No weakness.      Comments: Oriented to self, place and time             Significant Labs: All pertinent labs within the past 24 hours have been reviewed.    Significant Imaging: I have reviewed all pertinent imaging results/findings within the past 24 hours.

## 2024-10-29 NOTE — PLAN OF CARE
[facility] Tele[facility]  Discharge Reassessment    Primary Care Provider: Edwar Castaneda II, MD    Expected Discharge Date:     Reassessment (most recent)       Discharge Reassessment - 10/29/24 0955          Discharge Reassessment    Assessment Type Discharge Planning Reassessment (P)      Did the patient's condition or plan change since previous assessment? No (P)      Discharge Plan discussed with: Patient (P)      Communicated MARVEL with patient/caregiver Date not available/Unable to determine (P)      Discharge Plan A New Nursing Home placement - group home care facility (P)      Discharge Plan B New Nursing Home placement - group home care facility (P)      DME Needed Upon Discharge  none (P)      Transition of Care Barriers No family/friends to help (P)      Why the patient remains in the hospital Placement issues (P)         Post-Acute Status    Post-Acute Authorization Placement (P)      Post-Acute Placement Status Referrals Sent (P)      Coverage [facility] (P)      Discharge Delays Post-Acute Set-up (P)                    CM spoke with pt in room, she denies any problems or concerns.  DC plan new NH admission once interdiction complete and pt is assigned a legal guardian.    MEGHAN CantuN, BS, RN, CCM      Discharge Plan A and Plan B have been determined by review of patient's clinical status, future medical and therapeutic needs, and coverage/benefits for post-acute care in coordination with multidisciplinary team members.

## 2024-10-30 PROCEDURE — 25000003 PHARM REV CODE 250

## 2024-10-30 PROCEDURE — 11000001 HC ACUTE MED/SURG PRIVATE ROOM

## 2024-10-30 RX ADMIN — LACOSAMIDE 50 MG: 50 TABLET, FILM COATED ORAL at 09:10

## 2024-10-30 RX ADMIN — LACOSAMIDE 50 MG: 50 TABLET, FILM COATED ORAL at 08:10

## 2024-10-30 RX ADMIN — THERA TABS 1 TABLET: TAB at 08:10

## 2024-10-30 NOTE — PROGRESS NOTES
"[facility] Tele[facility]  Adult Nutrition  Progress Note    SUMMARY       Recommendations    1.) Recommend continuing with Regular Diet,as tolerated.      2.) If PO intake <50%, recommend Boost Plus TID to help meet needs.      3.) RD to monitor wt, PO intake, skin, labs.      Goals: meet % een/epn by next RD f/u  Nutrition Goal Status: goal met  Communication of RD Recs: (poc)    Assessment and Plan    No nutritional dx at this time.     Reason for Assessment    Reason For Assessment: RD follow-up  Diagnosis: other (see comments) (Cognitive and behavioral changes)  General Information Comments: RD follow-up: Spoke w/ pt at bedside, pt continues w/ good appetite, 100% meal consumption noted. Pt denies any difficulties w/ chewing/swallowing. No n/v. Pt w/ no indicators for malnutrition at this time. RD following.  Nutrition Discharge Planning: general healthful diet  Nutrition Related Social Determinants of Health: SDOH: None Identified     Nutrition Risk Screen    Nutrition Risk Screen: no indicators present    Nutrition/Diet History    Spiritual, Cultural Beliefs, Oriental orthodox Practices, Values that Affect Care: no  Food Allergies: NKFA    Anthropometrics    Temp: 98 °F (36.7 °C)  Height Method: Stated  Height: 5' 2" (157.5 cm)  Height (inches): 62 in  Weight Method: Bed Scale  Weight: 49.9 kg (110 lb 0.2 oz)  Weight (lb): 110.01 lb  Ideal Body Weight (IBW), Female: 110 lb  % Ideal Body Weight, Female (lb): 100 %  BMI (Calculated): 20.1  BMI Grade: 18.5-24.9 - normal       Lab/Procedures/Meds    Pertinent Labs Reviewed: reviewed  Pertinent Labs Comments: 10/15: BUN: 24, GFR: 58.4  Pertinent Medications Reviewed: reviewed  Pertinent Medications Comments: MVI    Estimated/Assessed Needs    Weight Used For Calorie Calculations: 49.9 kg (110 lb 0.2 oz)  Energy Calorie Requirements (kcal): 2314-0107 (25-30kcal/kg)  Energy Need Method: Kcal/kg  Protein Requirements: 60 (1.2 g/kg)  Weight Used For Protein " Calculations: 49.9 kg (110 lb 0.2 oz)     Estimated Fluid Requirement Method: RDA Method  RDA Method (mL): 1247         Nutrition Prescription Ordered    Current Diet Order: Regular    Evaluation of Received Nutrient/Fluid Intake    I/O: +4.8L since 10/16  Energy Calories Required: meeting needs  Protein Required: meeting needs  Fluid Required:  (1 ml or fluid as per MD)  Comments: LBM 10/30  Tolerance: tolerating  % Intake of Estimated Energy Needs: 100%  % Meal Intake: 100%    Nutrition Risk    Level of Risk/Frequency of Follow-up: low (1x/week)    Monitor and Evaluation    Food and Nutrient Intake: energy intake, food and beverage intake  Food and Nutrient Adminstration: diet order  Physical Activity and Function: nutrition-related ADLs and IADLs  Anthropometric Measurements: height/length, weight, weight change, body mass index  Biochemical Data, Medical Tests and Procedures: electrolyte and renal panel, gastrointestinal profile, glucose/endocrine profile, inflammatory profile, lipid profile     Nutrition Follow-Up    RD Follow-up?: Yes    By Elizabeth Lewis, Registration Eligible, PL-LDN
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
## 2024-10-30 NOTE — SUBJECTIVE & OBJECTIVE
Interval History: NAEON. Patient is resting comfortably in bed. No acute concerns.     Review of Systems   Constitutional:  Negative for chills and fever.   Respiratory:  Negative for cough, chest tightness and shortness of breath.    Cardiovascular:  Negative for leg swelling.   Gastrointestinal:  Negative for abdominal pain, constipation, diarrhea, nausea and vomiting.   Genitourinary:  Negative for dysuria.   Neurological:  Negative for dizziness, numbness and headaches.   Psychiatric/Behavioral:  Negative for agitation, behavioral problems and confusion.    All other systems reviewed and are negative.    Objective:     Vital Signs (Most Recent):  Temp: 98 °F (36.7 °C) (10/30/24 0809)  Pulse: 105 (10/30/24 0809)  Resp: 17 (10/30/24 0809)  BP: 127/84 (10/30/24 0809)  SpO2: 98 % (10/30/24 0809) Vital Signs (24h Range):  Temp:  [98 °F (36.7 °C)-98.6 °F (37 °C)] 98 °F (36.7 °C)  Pulse:  [] 105  Resp:  [17-18] 17  SpO2:  [96 %-98 %] 98 %  BP: (110-127)/(66-84) 127/84     Weight: 49.9 kg (110 lb 0.2 oz)  Body mass index is 20.12 kg/m².  No intake or output data in the 24 hours ending 10/30/24 1131      Physical Exam  Constitutional:       General: She is not in acute distress.     Appearance: Normal appearance. She is not ill-appearing.   HENT:      Head: Normocephalic and atraumatic.      Right Ear: External ear normal.      Left Ear: External ear normal.      Nose: Nose normal.      Mouth/Throat:      Mouth: Mucous membranes are moist.   Eyes:      Conjunctiva/sclera: Conjunctivae normal.   Cardiovascular:      Rate and Rhythm: Normal rate and regular rhythm.      Heart sounds: Normal heart sounds. No murmur heard.  Pulmonary:      Effort: Pulmonary effort is normal. No respiratory distress.      Breath sounds: Normal breath sounds.   Abdominal:      Palpations: Abdomen is soft.      Tenderness: There is no abdominal tenderness.   Musculoskeletal:      Right lower leg: No edema.      Left lower leg: No edema.    Skin:     General: Skin is warm and dry.   Neurological:      Mental Status: She is alert and oriented to person, place, and time.      Motor: No weakness.      Comments: Oriented to self, place and time             Significant Labs: All pertinent labs within the past 24 hours have been reviewed.    Significant Imaging: I have reviewed all pertinent imaging results/findings within the past 24 hours.

## 2024-10-30 NOTE — PLAN OF CARE
Problem: Adult Inpatient Plan of Care  Goal: Plan of Care Review  10/30/2024 1226 by Vivian Butler RN  Outcome: Adequate for Care Transition  10/30/2024 1226 by Vivian Butler RN  Outcome: Adequate for Care Transition     Problem: Adult Inpatient Plan of Care  Goal: Optimal Comfort and Wellbeing  10/30/2024 1226 by Vivian Butler RN  Outcome: Adequate for Care Transition  10/30/2024 1226 by Vivian Butler RN  Outcome: Adequate for Care Transition     Problem: Adult Inpatient Plan of Care  Goal: Readiness for Transition of Care  10/30/2024 1226 by Vivian Butler RN  Outcome: Adequate for Care Transition  10/30/2024 1226 by Vivian Butler RN  Outcome: Adequate for Care Transition     Pt observed sitting on the side of the bed. AAOx4. No distress noted. Tolerated morning meds well. Bed locked, in lowest position, call light in reach.

## 2024-10-31 PROCEDURE — 25000003 PHARM REV CODE 250

## 2024-10-31 PROCEDURE — 11000001 HC ACUTE MED/SURG PRIVATE ROOM

## 2024-10-31 RX ADMIN — LACOSAMIDE 50 MG: 50 TABLET, FILM COATED ORAL at 09:10

## 2024-10-31 RX ADMIN — THERA TABS 1 TABLET: TAB at 09:10
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
## 2024-10-31 NOTE — SUBJECTIVE & OBJECTIVE
Interval History: NAEON. Patient is resting comfortably in bed. No acute concerns.     Review of Systems   Constitutional:  Negative for chills and fever.   Respiratory:  Negative for cough, chest tightness and shortness of breath.    Cardiovascular:  Negative for leg swelling.   Gastrointestinal:  Negative for abdominal pain, constipation, diarrhea, nausea and vomiting.   Genitourinary:  Negative for dysuria.   Neurological:  Negative for dizziness, numbness and headaches.   Psychiatric/Behavioral:  Negative for agitation, behavioral problems and confusion.    All other systems reviewed and are negative.    Objective:     Vital Signs (Most Recent):  Temp: 98.2 °F (36.8 °C) (10/31/24 0759)  Pulse: 79 (10/31/24 0759)  Resp: 18 (10/31/24 0759)  BP: (!) 147/81 (10/31/24 0759)  SpO2: 99 % (10/31/24 0759) Vital Signs (24h Range):  Temp:  [97.9 °F (36.6 °C)-98.2 °F (36.8 °C)] 98.2 °F (36.8 °C)  Pulse:  [79-89] 79  Resp:  [18] 18  SpO2:  [98 %-99 %] 99 %  BP: (115-147)/(72-81) 147/81     Weight: 49.9 kg (110 lb 0.2 oz)  Body mass index is 20.12 kg/m².    Intake/Output Summary (Last 24 hours) at 10/31/2024 1017  Last data filed at 10/30/2024 2130  Gross per 24 hour   Intake 120 ml   Output --   Net 120 ml         Physical Exam  Constitutional:       General: She is not in acute distress.     Appearance: Normal appearance. She is not ill-appearing.   HENT:      Head: Normocephalic and atraumatic.      Right Ear: External ear normal.      Left Ear: External ear normal.      Nose: Nose normal.      Mouth/Throat:      Mouth: Mucous membranes are moist.   Eyes:      Conjunctiva/sclera: Conjunctivae normal.   Cardiovascular:      Rate and Rhythm: Normal rate and regular rhythm.      Heart sounds: Normal heart sounds. No murmur heard.  Pulmonary:      Effort: Pulmonary effort is normal. No respiratory distress.      Breath sounds: Normal breath sounds.   Abdominal:      Palpations: Abdomen is soft.      Tenderness: There is no  abdominal tenderness.   Musculoskeletal:      Right lower leg: No edema.      Left lower leg: No edema.   Skin:     General: Skin is warm and dry.   Neurological:      Mental Status: She is alert and oriented to person, place, and time.      Motor: No weakness.      Comments: Oriented to self, place and time             Significant Labs: All pertinent labs within the past 24 hours have been reviewed.    Significant Imaging: I have reviewed all pertinent imaging results/findings within the past 24 hours.

## 2024-11-01 PROCEDURE — 25000003 PHARM REV CODE 250

## 2024-11-01 PROCEDURE — 11000001 HC ACUTE MED/SURG PRIVATE ROOM

## 2024-11-01 RX ADMIN — THERA TABS 1 TABLET: TAB at 11:11

## 2024-11-01 RX ADMIN — LACOSAMIDE 50 MG: 50 TABLET, FILM COATED ORAL at 09:11

## 2024-11-01 RX ADMIN — LACOSAMIDE 50 MG: 50 TABLET, FILM COATED ORAL at 11:11

## 2024-11-01 NOTE — SUBJECTIVE & OBJECTIVE
Interval History: NAEON. Patient is resting comfortably in bed. No complaints today.     Review of Systems   Constitutional:  Negative for chills and fever.   Respiratory:  Negative for cough, chest tightness and shortness of breath.    Cardiovascular:  Negative for leg swelling.   Gastrointestinal:  Negative for abdominal pain, constipation, diarrhea, nausea and vomiting.   Genitourinary:  Negative for dysuria.   Neurological:  Negative for dizziness, numbness and headaches.   Psychiatric/Behavioral:  Negative for agitation, behavioral problems and confusion.    All other systems reviewed and are negative.    Objective:     Vital Signs (Most Recent):  Temp: 98.4 °F (36.9 °C) (10/31/24 2015)  Pulse: 83 (10/31/24 2015)  Resp: 18 (10/31/24 2015)  BP: 117/72 (10/31/24 2015)  SpO2: (!) 94 % (10/31/24 2015) Vital Signs (24h Range):  Temp:  [98.2 °F (36.8 °C)-98.4 °F (36.9 °C)] 98.4 °F (36.9 °C)  Pulse:  [79-83] 83  Resp:  [18] 18  SpO2:  [94 %-99 %] 94 %  BP: (117-147)/(72-81) 117/72     Weight: 49.9 kg (110 lb 0.2 oz)  Body mass index is 20.12 kg/m².    Intake/Output Summary (Last 24 hours) at 11/1/2024 0722  Last data filed at 10/31/2024 1330  Gross per 24 hour   Intake 480 ml   Output --   Net 480 ml         Physical Exam  Constitutional:       General: She is not in acute distress.     Appearance: Normal appearance. She is not ill-appearing.   HENT:      Head: Normocephalic and atraumatic.      Right Ear: External ear normal.      Left Ear: External ear normal.      Nose: Nose normal.      Mouth/Throat:      Mouth: Mucous membranes are moist.   Eyes:      Conjunctiva/sclera: Conjunctivae normal.   Cardiovascular:      Rate and Rhythm: Normal rate and regular rhythm.      Heart sounds: Normal heart sounds. No murmur heard.  Pulmonary:      Effort: Pulmonary effort is normal. No respiratory distress.      Breath sounds: Normal breath sounds.   Abdominal:      Palpations: Abdomen is soft.      Tenderness: There is no  abdominal tenderness.   Musculoskeletal:      Right lower leg: No edema.      Left lower leg: No edema.   Skin:     General: Skin is warm and dry.   Neurological:      Mental Status: She is alert and oriented to person, place, and time.      Motor: No weakness.      Comments: Oriented to self, place and time             Significant Labs: All pertinent labs within the past 24 hours have been reviewed.    Significant Imaging: I have reviewed all pertinent imaging results/findings within the past 24 hours.

## 2024-11-01 NOTE — PLAN OF CARE
"[facility] - Internal Medicine Telemetry  Discharge Reassessment    Primary Care Provider: Edwar Castaneda II, MD    Expected Discharge Date:     Reassessment (most recent)       Discharge Reassessment - 11/01/24 1136          Discharge Reassessment    Assessment Type Discharge Planning Reassessment (P)      Did the patient's condition or plan change since previous assessment? No (P)      Discharge Plan discussed with: Patient (P)      Communicated MARVEL with patient/caregiver Date not available/Unable to determine (P)      Discharge Plan A [facility] (P)      Discharge Plan B New Nursing Home placement - [facility] care facility (P)      DME Needed Upon Discharge  none (P)      Transition of Care Barriers No family/friends to help (P)      Why the patient remains in the hospital Placement issues (P)         Post-Acute Status    Post-Acute Authorization Placement (P)      Post-Acute Placement Status Referrals Sent (P)      Coverage [employer] (P)      Discharge Delays Post-Acute Set-up (P)                  CM was informed by charge nurse Argentina Paige that pt was in room writing checks.    CM went to pt room to discuss with pt.  She was in room with sitter.  Pt asked CM to not get in her face while she was doing something personal.  CM explained that there is concern that someone might be taking advantage of her.  Pt states nobody is taking advantage of her, and she wants a place where nobody will disturb her when she's doing something personal.  CM apologized for disturbing pt. Leadership Alexia notified of this conversation.     12:30 PM  NICHOLAS went with charge nurse Argentina to pt's room to collect checks.  Pt was not in room.    1:32 PM  NICHOLAS and Argentina went back to pt's room.  Pt refused to surrender checks.  She threw cash on the floor along with her ID, credit card, and insurance card and yelled "here, take it, then get out".  She handed what looked like folded up checks to " NICHOLAS and said those were her checks.  Everything was retrieved, put in an envelope to be locked at the nurses station.  NICHOLAS and Argentina counted brady $68 and noted this along with other personal items belonging to pt written on the outside of envelope.  Pt did not surrender checks - she provided something that looked like checks, but they were just check stubs.  Argentina states she will check with tech later to  collect checks once pt has calmed down.    2:04 PM  Per Argentina, she retrieved the checks; sitter was able to get them.  She will lock in nurse's station along with pt's other personal effects.    MEGHAN CantuN, BS, RN, [facility]      Discharge Plan A and Plan B have been determined by review of patient's clinical status, future medical and therapeutic needs, and coverage/benefits for post-acute care in coordination with multidisciplinary team members.
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
## 2024-11-01 NOTE — PLAN OF CARE
Problem: Adult Inpatient Plan of Care  Goal: Plan of Care Review  Outcome: Progressing  Flowsheets (Taken 11/1/2024 1831)  Plan of Care Reviewed With: patient  Goal: Patient-Specific Goal (Individualized)  Outcome: Progressing  Goal: Absence of Hospital-Acquired Illness or Injury  Outcome: Progressing  Goal: Optimal Comfort and Wellbeing  Outcome: Progressing  Goal: Readiness for Transition of Care  Outcome: Progressing     Problem: Fall Injury Risk  Goal: Absence of Fall and Fall-Related Injury  Outcome: Progressing  Intervention: Identify and Manage Contributors  Flowsheets (Taken 11/1/2024 1109)  Self-Care Promotion:   independence encouraged   BADL personal objects within reach   BADL personal routines maintained     Problem: Seizure, Active Management  Goal: Absence of Seizure/Seizure-Related Injury  Outcome: Progressing  Intervention: Prevent Seizure-Related Injury  Flowsheets (Taken 11/1/2024 1109)  Seizure Precautions: activity supervised     Problem: Functional Deficit  Goal: Optimal Cognitive Function  Outcome: Progressing  Intervention: Optimize Cognitive Function  Flowsheets (Taken 11/1/2024 1109)  Environment Familiarity/Consistency: daily routine followed  Self-Care Promotion:   independence encouraged   BADL personal objects within reach   BADL personal routines maintained     Problem: Fall Injury Risk  Goal: Absence of Fall and Fall-Related Injury  Outcome: Progressing  Intervention: Identify and Manage Contributors  Flowsheets (Taken 11/1/2024 1109)  Self-Care Promotion:   independence encouraged   BADL personal objects within reach   BADL personal routines maintained     Problem: Comorbidity Management  Goal: Maintenance of Seizure Control  Outcome: Progressing  Intervention: Maintain Seizure Symptom Control  Flowsheets (Taken 11/1/2024 1109)  Seizure Precautions: activity supervised

## 2024-11-02 PROCEDURE — 25000003 PHARM REV CODE 250

## 2024-11-02 PROCEDURE — 11000001 HC ACUTE MED/SURG PRIVATE ROOM

## 2024-11-02 RX ADMIN — LACOSAMIDE 50 MG: 50 TABLET, FILM COATED ORAL at 09:11

## 2024-11-02 RX ADMIN — THERA TABS 1 TABLET: TAB at 09:11
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
## 2024-11-02 NOTE — SUBJECTIVE & OBJECTIVE
Interval History: Patient was upset that the staff confiscated her checks. Refer to  note on 11/01/24 for more details. No other complaints.    Review of Systems   Constitutional:  Negative for chills and fever.   Respiratory:  Negative for cough, chest tightness and shortness of breath.    Cardiovascular:  Negative for leg swelling.   Gastrointestinal:  Negative for abdominal pain, constipation, diarrhea, nausea and vomiting.   Genitourinary:  Negative for dysuria.   Neurological:  Negative for dizziness, numbness and headaches.   Psychiatric/Behavioral:  Negative for agitation, behavioral problems and confusion.    All other systems reviewed and are negative.    Objective:     Vital Signs (Most Recent):  Temp: 98.3 °F (36.8 °C) (11/02/24 0722)  Pulse: 84 (11/02/24 0722)  Resp: 18 (11/02/24 0722)  BP: 127/82 (11/02/24 0722)  SpO2: 98 % (11/02/24 0722) Vital Signs (24h Range):  Temp:  [98.2 °F (36.8 °C)-98.3 °F (36.8 °C)] 98.3 °F (36.8 °C)  Pulse:  [84-91] 84  Resp:  [18] 18  SpO2:  [93 %-98 %] 98 %  BP: (127-137)/(81-82) 127/82     Weight: 49.9 kg (110 lb 0.2 oz)  Body mass index is 20.12 kg/m².    Intake/Output Summary (Last 24 hours) at 11/2/2024 1100  Last data filed at 11/2/2024 0940  Gross per 24 hour   Intake 240 ml   Output --   Net 240 ml         Physical Exam  Constitutional:       General: She is not in acute distress.     Appearance: Normal appearance. She is not ill-appearing.   HENT:      Head: Normocephalic and atraumatic.      Right Ear: External ear normal.      Left Ear: External ear normal.      Nose: Nose normal.      Mouth/Throat:      Mouth: Mucous membranes are moist.   Eyes:      Conjunctiva/sclera: Conjunctivae normal.   Cardiovascular:      Rate and Rhythm: Normal rate and regular rhythm.      Heart sounds: Normal heart sounds. No murmur heard.  Pulmonary:      Effort: Pulmonary effort is normal. No respiratory distress.      Breath sounds: Normal breath sounds.   Abdominal:       Palpations: Abdomen is soft.      Tenderness: There is no abdominal tenderness.   Musculoskeletal:      Right lower leg: No edema.      Left lower leg: No edema.   Skin:     General: Skin is warm and dry.   Neurological:      Mental Status: She is alert and oriented to person, place, and time.      Motor: No weakness.      Comments: Oriented to self, place and time             Significant Labs: All pertinent labs within the past 24 hours have been reviewed.    Significant Imaging: I have reviewed all pertinent imaging results/findings within the past 24 hours.

## 2024-11-03 PROCEDURE — 25000003 PHARM REV CODE 250

## 2024-11-03 PROCEDURE — 11000001 HC ACUTE MED/SURG PRIVATE ROOM

## 2024-11-03 RX ADMIN — LACOSAMIDE 50 MG: 50 TABLET, FILM COATED ORAL at 08:11

## 2024-11-03 RX ADMIN — LACOSAMIDE 50 MG: 50 TABLET, FILM COATED ORAL at 09:11

## 2024-11-03 RX ADMIN — THERA TABS 1 TABLET: TAB at 09:11

## 2024-11-03 NOTE — PLAN OF CARE
Problem: Adult Inpatient Plan of Care  Goal: Plan of Care Review  Outcome: Progressing  Goal: Patient-Specific Goal (Individualized)  Outcome: Progressing  Goal: Absence of Hospital-Acquired Illness or Injury  Outcome: Progressing  Goal: Optimal Comfort and Wellbeing  Outcome: Progressing  Goal: Readiness for Transition of Care  Outcome: Progressing     Problem: Fall Injury Risk  Goal: Absence of Fall and Fall-Related Injury  Outcome: Progressing     Problem: Functional Deficit  Goal: Optimal Cognitive Function  Outcome: Progressing     Problem: Comorbidity Management  Goal: Maintenance of Seizure Control  Outcome: Progressing     Problem: Seizure, Active Management  Goal: Absence of Seizure/Seizure-Related Injury  Outcome: Progressing       Pt had an uneventful night. VSS. Pt denied any pain or discomfort Pt in better mood   Pt is free of falls and injuries Safety and Protective Precautions remains in place. Sitter remains at bedside. . Bed in lowest position and call light within reach. Safety maintained

## 2024-11-03 NOTE — SUBJECTIVE & OBJECTIVE
Interval History: NAEON. Patient doing well this morning. No other concerns at this time.     Review of Systems   Constitutional:  Negative for chills and fever.   Respiratory:  Negative for cough, chest tightness and shortness of breath.    Cardiovascular:  Negative for leg swelling.   Gastrointestinal:  Negative for abdominal pain, constipation, diarrhea, nausea and vomiting.   Genitourinary:  Negative for dysuria.   Neurological:  Negative for dizziness, numbness and headaches.   Psychiatric/Behavioral:  Negative for agitation, behavioral problems and confusion.    All other systems reviewed and are negative.    Objective:     Vital Signs (Most Recent):  Temp: 97.7 °F (36.5 °C) (11/03/24 0716)  Pulse: 78 (11/03/24 0716)  Resp: 18 (11/03/24 0716)  BP: 125/82 (11/03/24 0716)  SpO2: 99 % (11/03/24 0716) Vital Signs (24h Range):  Temp:  [97.7 °F (36.5 °C)-98.3 °F (36.8 °C)] 97.7 °F (36.5 °C)  Pulse:  [78-85] 78  Resp:  [18] 18  SpO2:  [96 %-99 %] 99 %  BP: (116-125)/(73-82) 125/82     Weight: 49.9 kg (110 lb 0.2 oz)  Body mass index is 20.12 kg/m².    Intake/Output Summary (Last 24 hours) at 11/3/2024 1216  Last data filed at 11/3/2024 0906  Gross per 24 hour   Intake 840 ml   Output --   Net 840 ml         Physical Exam  Constitutional:       General: She is not in acute distress.     Appearance: Normal appearance. She is not ill-appearing.   HENT:      Head: Normocephalic and atraumatic.      Right Ear: External ear normal.      Left Ear: External ear normal.      Nose: Nose normal.      Mouth/Throat:      Mouth: Mucous membranes are moist.   Eyes:      Conjunctiva/sclera: Conjunctivae normal.   Cardiovascular:      Rate and Rhythm: Normal rate and regular rhythm.      Heart sounds: Normal heart sounds. No murmur heard.  Pulmonary:      Effort: Pulmonary effort is normal. No respiratory distress.      Breath sounds: Normal breath sounds.   Abdominal:      Palpations: Abdomen is soft.      Tenderness: There is no  abdominal tenderness.   Musculoskeletal:      Right lower leg: No edema.      Left lower leg: No edema.   Skin:     General: Skin is warm and dry.   Neurological:      Mental Status: She is alert and oriented to person, place, and time.      Motor: No weakness.      Comments: Oriented to self, place and time             Significant Labs: All pertinent labs within the past 24 hours have been reviewed.    Significant Imaging: I have reviewed all pertinent imaging results/findings within the past 24 hours.
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
## 2024-11-04 PROCEDURE — 11000001 HC ACUTE MED/SURG PRIVATE ROOM

## 2024-11-04 PROCEDURE — 25000003 PHARM REV CODE 250

## 2024-11-04 RX ADMIN — LACOSAMIDE 50 MG: 50 TABLET, FILM COATED ORAL at 08:11

## 2024-11-04 RX ADMIN — THERA TABS 1 TABLET: TAB at 08:11

## 2024-11-04 NOTE — SUBJECTIVE & OBJECTIVE
Interval History: NAEON. AF. HDS. RA. Pt is doing well today with no complaints.    Review of Systems   Constitutional:  Negative for chills and fever.   Respiratory:  Negative for cough, chest tightness and shortness of breath.    Cardiovascular:  Negative for leg swelling.   Gastrointestinal:  Negative for abdominal pain, constipation, diarrhea, nausea and vomiting.   Genitourinary:  Negative for dysuria.   Neurological:  Negative for dizziness, numbness and headaches.   Psychiatric/Behavioral:  Negative for agitation, behavioral problems and confusion.    All other systems reviewed and are negative.    Objective:     Vital Signs (Most Recent):  Temp: 98.6 °F (37 °C) (11/04/24 0721)  Pulse: 88 (11/04/24 0721)  Resp: 17 (11/04/24 0721)  BP: 109/70 (11/04/24 0721)  SpO2: 98 % (11/04/24 0721) Vital Signs (24h Range):  Temp:  [98 °F (36.7 °C)-98.6 °F (37 °C)] 98.6 °F (37 °C)  Pulse:  [77-88] 88  Resp:  [17-18] 17  SpO2:  [98 %] 98 %  BP: (102-109)/(64-70) 109/70     Weight: 49.9 kg (110 lb 0.2 oz)  Body mass index is 20.12 kg/m².    Intake/Output Summary (Last 24 hours) at 11/4/2024 1302  Last data filed at 11/4/2024 0904  Gross per 24 hour   Intake 840 ml   Output --   Net 840 ml         Physical Exam  Constitutional:       General: She is not in acute distress.     Appearance: Normal appearance. She is not ill-appearing.   HENT:      Head: Normocephalic and atraumatic.      Right Ear: External ear normal.      Left Ear: External ear normal.      Nose: Nose normal.      Mouth/Throat:      Mouth: Mucous membranes are moist.   Eyes:      Conjunctiva/sclera: Conjunctivae normal.   Cardiovascular:      Rate and Rhythm: Normal rate and regular rhythm.      Pulses: Normal pulses.      Heart sounds: Normal heart sounds. No murmur heard.  Pulmonary:      Effort: Pulmonary effort is normal. No respiratory distress.      Breath sounds: Normal breath sounds.   Abdominal:      General: There is no distension.      Palpations:  Abdomen is soft. There is no mass.      Tenderness: There is no abdominal tenderness. There is no guarding or rebound.      Hernia: No hernia is present.   Musculoskeletal:      Right lower leg: No edema.      Left lower leg: No edema.   Skin:     General: Skin is warm and dry.   Neurological:      Mental Status: She is alert and oriented to person, place, and time.      Motor: No weakness.      Comments: Oriented to self, place and time             Significant Labs: All pertinent labs within the past 24 hours have been reviewed.    Significant Imaging: I have reviewed all pertinent imaging results/findings within the past 24 hours.
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
## 2024-11-04 NOTE — PLAN OF CARE
Problem: Adult Inpatient Plan of Care  Goal: Plan of Care Review  Outcome: Progressing  Goal: Patient-Specific Goal (Individualized)  Outcome: Progressing  Goal: Absence of Hospital-Acquired Illness or Injury  Outcome: Progressing  Goal: Optimal Comfort and Wellbeing  Outcome: Progressing  Goal: Readiness for Transition of Care  Outcome: Progressing     Problem: Fall Injury Risk  Goal: Absence of Fall and Fall-Related Injury  Outcome: Progressing     Problem: Functional Deficit  Goal: Optimal Cognitive Function  Outcome: Progressing     Problem: Comorbidity Management  Goal: Maintenance of Seizure Control  Outcome: Progressing     Problem: Seizure, Active Management  Goal: Absence of Seizure/Seizure-Related Injury  Outcome: Progressing       Pt had an uneventful night. VSS. Pt denied any pain or discomfort  Pt mildly agitated this shift.  Pt redirected with reinforcement about safety rules . Pt is free of falls and injuries. Bed in lowest position and call light within reach. Safety maintained

## 2024-11-04 NOTE — PLAN OF CARE
CM sent updated MD progress note to [facility], [facility], [facility] via CP.    MEGHAN CantuN, BS, RN, CCM

## 2024-11-04 NOTE — PLAN OF CARE
[facility] - Internal Medicine Telemetry  Discharge Reassessment    Primary Care Provider: Edwar Castaneda II, MD    Expected Discharge Date:     Reassessment (most recent)       Discharge Reassessment - 11/04/24 1558          Discharge Reassessment    Assessment Type Discharge Planning Reassessment (P)      Did the patient's condition or plan change since previous assessment? No (P)      Discharge Plan discussed with: Patient (P)      Communicated MARVEL with patient/caregiver Date not available/Unable to determine (P)      Discharge Plan A New Nursing Home placement - alf care facility (P)      Discharge Plan B New Nursing Home placement - alf care facility (P)      DME Needed Upon Discharge  none (P)      Transition of Care Barriers No family/friends to help (P)      Why the patient remains in the hospital Placement issues (P)         Post-Acute Status    Post-Acute Authorization Placement (P)      Post-Acute Placement Status Referrals Sent (P)      Coverage mcare [facility] (P)      Discharge Delays Post-Acute Set-up (P)                  CM spoke with pt in [facility].  She expressed no questions or concerns.  DC plan remains the same: DC to alf NH once legal guardian appointed.    MEGHAN CantuN, BS, RN, CCM      Discharge Plan A and Plan B have been determined by review of patient's clinical status, future medical and therapeutic needs, and coverage/benefits for post-acute care in coordination with multidisciplinary team members.

## 2024-11-05 PROCEDURE — 25000003 PHARM REV CODE 250

## 2024-11-05 PROCEDURE — 11000001 HC ACUTE MED/SURG PRIVATE ROOM

## 2024-11-05 RX ADMIN — LACOSAMIDE 50 MG: 50 TABLET, FILM COATED ORAL at 09:11

## 2024-11-05 RX ADMIN — LACOSAMIDE 50 MG: 50 TABLET, FILM COATED ORAL at 08:11

## 2024-11-05 RX ADMIN — THERA TABS 1 TABLET: TAB at 09:11
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
## 2024-11-05 NOTE — ASSESSMENT & PLAN NOTE
"Patient stated she had regular eye doctor that was taking care of her. States she previously was on drops and got laser treatment (Possibly SLT(?)). States she is no longer on eye drops. Patient denies eye pain, changes in vision, blurry vision.     Ophtho consulted. During interview, patient became visibly upset and yelling about being "locked in assisted". Interview terminated. Unable to assess intraocular pressure. Low suspicion for any acute event. Per chart review, cannot find any previous home eye meds.      - Will re-consult ophthalmology if patient becomes more cooperative or acute concerns arise.   "

## 2024-11-05 NOTE — PLAN OF CARE
Problem: Adult Inpatient Plan of Care  Goal: Plan of Care Review  Outcome: Progressing  Goal: Patient-Specific Goal (Individualized)  Outcome: Progressing  Goal: Absence of Hospital-Acquired Illness or Injury  Outcome: Progressing  Goal: Optimal Comfort and Wellbeing  Outcome: Progressing  Goal: Readiness for Transition of Care  Outcome: Progressing     Problem: Fall Injury Risk  Goal: Absence of Fall and Fall-Related Injury  Outcome: Progressing     Problem: Seizure, Active Management  Goal: Absence of Seizure/Seizure-Related Injury  Outcome: Progressing       No significant events this shift. Medications given as ordered. Sitter at bedside. Pt with no complaints. AAO x 4, VS stable. Side rails up x 2, bed locked and low, call light within reach. POC ongoing.

## 2024-11-05 NOTE — PLAN OF CARE
AAOx4; POC reviewed & verbalizes understanding.  Admit DX: Cognitive & behavioral changes  Afebrile. No acute events on shift. No deficits noted  IV access & IVF: ok to leave out  BM: 11/4/24  : WNL  Appetite: adequate  Bed alarm set; fall precautions maintained.   Bed locked in lowest position, side rails up x2, call light within reach, environment clear. Encouraged pt to call nurse with any concerns.  Safety measures maintained

## 2024-11-05 NOTE — SUBJECTIVE & OBJECTIVE
Interval History: NAEON. Pt is well with no complaints. Pending placement.     Review of Systems   Constitutional:  Negative for chills and fever.   Respiratory:  Negative for cough, chest tightness and shortness of breath.    Cardiovascular:  Negative for leg swelling.   Gastrointestinal:  Negative for abdominal pain, constipation, diarrhea, nausea and vomiting.   Genitourinary:  Negative for dysuria.   Neurological:  Negative for dizziness, numbness and headaches.   Psychiatric/Behavioral:  Negative for agitation, behavioral problems and confusion.    All other systems reviewed and are negative.    Objective:     Vital Signs (Most Recent):  Temp: 97.8 °F (36.6 °C) (11/05/24 0745)  Pulse: 86 (11/05/24 0745)  Resp: 18 (11/05/24 0745)  BP: 135/87 (11/05/24 0745)  SpO2: 97 % (11/05/24 0745) Vital Signs (24h Range):  Temp:  [97.8 °F (36.6 °C)-98.1 °F (36.7 °C)] 97.8 °F (36.6 °C)  Pulse:  [86-87] 86  Resp:  [18] 18  SpO2:  [97 %] 97 %  BP: (103-135)/(62-87) 135/87     Weight: 49.9 kg (110 lb 0.2 oz)  Body mass index is 20.12 kg/m².    Intake/Output Summary (Last 24 hours) at 11/5/2024 1356  Last data filed at 11/5/2024 0825  Gross per 24 hour   Intake 720 ml   Output --   Net 720 ml         Physical Exam  Constitutional:       General: She is not in acute distress.     Appearance: Normal appearance. She is not ill-appearing.   HENT:      Head: Normocephalic and atraumatic.      Right Ear: External ear normal.      Left Ear: External ear normal.      Nose: Nose normal.      Mouth/Throat:      Mouth: Mucous membranes are moist.   Eyes:      Conjunctiva/sclera: Conjunctivae normal.   Cardiovascular:      Rate and Rhythm: Normal rate and regular rhythm.      Pulses: Normal pulses.      Heart sounds: Normal heart sounds. No murmur heard.  Pulmonary:      Effort: Pulmonary effort is normal. No respiratory distress.      Breath sounds: Normal breath sounds.   Abdominal:      General: There is no distension.      Palpations:  Abdomen is soft. There is no mass.      Tenderness: There is no abdominal tenderness. There is no guarding or rebound.      Hernia: No hernia is present.   Musculoskeletal:      Right lower leg: No edema.      Left lower leg: No edema.   Skin:     General: Skin is warm and dry.   Neurological:      Mental Status: She is alert and oriented to person, place, and time.      Motor: No weakness.      Comments: Oriented to self, place and time             Significant Labs: All pertinent labs within the past 24 hours have been reviewed.    Significant Imaging: I have reviewed all pertinent imaging results/findings within the past 24 hours.

## 2024-11-06 PROCEDURE — 25000003 PHARM REV CODE 250

## 2024-11-06 PROCEDURE — 11000001 HC ACUTE MED/SURG PRIVATE ROOM

## 2024-11-06 RX ADMIN — THERA TABS 1 TABLET: TAB at 09:11

## 2024-11-06 RX ADMIN — LACOSAMIDE 50 MG: 50 TABLET, FILM COATED ORAL at 09:11

## 2024-11-06 RX ADMIN — LACOSAMIDE 50 MG: 50 TABLET, FILM COATED ORAL at 08:11
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
## 2024-11-06 NOTE — ASSESSMENT & PLAN NOTE
"Patient stated she had regular eye doctor that was taking care of her. States she previously was on drops and got laser treatment (Possibly SLT(?)). States she is no longer on eye drops. Patient denies eye pain, changes in vision, blurry vision.     Ophtho consulted. During interview, patient became visibly upset and yelling about being "locked in shelter". Interview terminated. Unable to assess intraocular pressure. Low suspicion for any acute event. Per chart review, cannot find any previous home eye meds.      - Will re-consult ophthalmology if patient becomes more cooperative or acute concerns arise.   "

## 2024-11-06 NOTE — SUBJECTIVE & OBJECTIVE
Interval History: NAEON. Patient is doing well with no complaints. Pending disposition.     Review of Systems   Constitutional:  Negative for chills and fever.   Respiratory:  Negative for cough, chest tightness and shortness of breath.    Cardiovascular:  Negative for leg swelling.   Gastrointestinal:  Negative for abdominal pain, constipation, diarrhea, nausea and vomiting.   Genitourinary:  Negative for dysuria.   Neurological:  Negative for dizziness, numbness and headaches.   Psychiatric/Behavioral:  Negative for agitation, behavioral problems and confusion.    All other systems reviewed and are negative.    Objective:     Vital Signs (Most Recent):  Temp: 98.6 °F (37 °C) (11/05/24 2048)  Pulse: 89 (11/06/24 0729)  Resp: 18 (11/06/24 0729)  BP: 113/76 (11/06/24 0729)  SpO2: 98 % (11/06/24 0729) Vital Signs (24h Range):  Temp:  [98.6 °F (37 °C)] 98.6 °F (37 °C)  Pulse:  [80-89] 89  Resp:  [18] 18  SpO2:  [96 %-98 %] 98 %  BP: (103-113)/(51-76) 113/76     Weight: 49.9 kg (110 lb 0.2 oz)  Body mass index is 20.12 kg/m².  No intake or output data in the 24 hours ending 11/06/24 1032      Physical Exam  Constitutional:       General: She is not in acute distress.     Appearance: Normal appearance. She is not ill-appearing.   HENT:      Head: Normocephalic and atraumatic.      Right Ear: External ear normal.      Left Ear: External ear normal.      Nose: Nose normal.      Mouth/Throat:      Mouth: Mucous membranes are moist.   Eyes:      Conjunctiva/sclera: Conjunctivae normal.   Cardiovascular:      Rate and Rhythm: Normal rate and regular rhythm.      Pulses: Normal pulses.      Heart sounds: Normal heart sounds. No murmur heard.  Pulmonary:      Effort: Pulmonary effort is normal. No respiratory distress.      Breath sounds: Normal breath sounds.   Abdominal:      General: There is no distension.      Palpations: Abdomen is soft. There is no mass.      Tenderness: There is no abdominal tenderness. There is no  guarding or rebound.      Hernia: No hernia is present.   Musculoskeletal:      Right lower leg: No edema.      Left lower leg: No edema.   Skin:     General: Skin is warm and dry.   Neurological:      Mental Status: She is alert and oriented to person, place, and time.      Motor: No weakness.      Comments: Oriented to self, place and time             Significant Labs: All pertinent labs within the past 24 hours have been reviewed.    Significant Imaging: I have reviewed all pertinent imaging results/findings within the past 24 hours.

## 2024-11-06 NOTE — MEDICARE RECERTIFICATION
MEDICARE INPATIENT  PHYSICIAN RECERTIFICATION  OF MEDICAL NECESSITY        5th Recertification                   I certify that this patient's continued medical needs require pending NH placement with memory care unit as patient has dementia and now unable to care for herself. Patient now without POA. Delay noted by our legal team due to son's unavailability. Patient's friend was POA, but has resigned as her POA. She lacks capacity regarding decision to DC home. Legal team involved in patient's care, we are seeking interdiction to proceed with placement. I estimate that the patient will be in the hospital until interdiction process is completed.  Post-acute planning is in process, and currently the patient will likely be discharged to  Nursing home with Memory Care Unit.

## 2024-11-07 LAB
OHS QRS DURATION: 80 MS
OHS QTC CALCULATION: 442 MS

## 2024-11-07 PROCEDURE — 11000001 HC ACUTE MED/SURG PRIVATE ROOM

## 2024-11-07 PROCEDURE — 25000003 PHARM REV CODE 250

## 2024-11-07 RX ADMIN — LACOSAMIDE 50 MG: 50 TABLET, FILM COATED ORAL at 08:11

## 2024-11-07 RX ADMIN — THERA TABS 1 TABLET: TAB at 08:11

## 2024-11-07 NOTE — ASSESSMENT & PLAN NOTE
78-year-old lady here with encephalopathy, secondary to worsening dementia.  Clinically her presentation is not concering for acute sepsis, or stroke.        Extensive workup for AMS has been negative. Neurology suspects her underlying dementia is driving her presentation. Patient very resistant to discharging to SNF or any facility. Per our team and Psychiatry the patient does not show decision making capacity. Son prefers SNF or facility placement. However, patient threatened and attempted to leave AMA on 7/15 so she was PEC'd.  PEC is to prevent AMA but she does not require further psychological stabilization, discuss with legal need for PEC regarding capacity to leave AMA.         Plan:  - CEC  on . Patient has situational capacity. Friend David resigned as MPOA.    - PRN zyprexa.   - Still in the interdiction process. Once she is assigned a legal guardian, we can proceed with NH placement. She has accepting facilities, her paperwork is up to date. Unable to move forward until pt has a legal guardian.

## 2024-11-07 NOTE — SUBJECTIVE & OBJECTIVE
Interval History: NAEON. AF. HDS. RA. Patient is comfortable with no complaints. CM working on disposition with legal.     Review of Systems   Constitutional:  Negative for chills and fever.   Respiratory:  Negative for cough, chest tightness and shortness of breath.    Cardiovascular:  Negative for leg swelling.   Gastrointestinal:  Negative for abdominal pain, constipation, diarrhea, nausea and vomiting.   Genitourinary:  Negative for dysuria.   Neurological:  Negative for dizziness, numbness and headaches.   Psychiatric/Behavioral:  Negative for agitation, behavioral problems and confusion.    All other systems reviewed and are negative.    Objective:     Vital Signs (Most Recent):  Temp: 98 °F (36.7 °C) (11/07/24 0736)  Pulse: 91 (11/07/24 0736)  Resp: 18 (11/07/24 0736)  BP: 126/84 (11/07/24 0736)  SpO2: 98 % (11/07/24 0736) Vital Signs (24h Range):  Temp:  [98 °F (36.7 °C)-98.2 °F (36.8 °C)] 98 °F (36.7 °C)  Pulse:  [86-91] 91  Resp:  [18] 18  SpO2:  [97 %-98 %] 98 %  BP: (126-132)/(81-84) 126/84     Weight: 49.9 kg (110 lb 0.2 oz)  Body mass index is 20.12 kg/m².  No intake or output data in the 24 hours ending 11/07/24 0836      Physical Exam  Constitutional:       General: She is not in acute distress.     Appearance: Normal appearance. She is not ill-appearing.   HENT:      Head: Normocephalic and atraumatic.      Right Ear: External ear normal.      Left Ear: External ear normal.      Nose: Nose normal.      Mouth/Throat:      Mouth: Mucous membranes are moist.   Eyes:      Conjunctiva/sclera: Conjunctivae normal.   Cardiovascular:      Rate and Rhythm: Normal rate and regular rhythm.      Pulses: Normal pulses.      Heart sounds: Normal heart sounds. No murmur heard.  Pulmonary:      Effort: Pulmonary effort is normal. No respiratory distress.      Breath sounds: Normal breath sounds.   Abdominal:      General: There is no distension.      Palpations: Abdomen is soft. There is no mass.      Tenderness:  There is no abdominal tenderness. There is no guarding or rebound.      Hernia: No hernia is present.   Musculoskeletal:      Right lower leg: No edema.      Left lower leg: No edema.   Skin:     General: Skin is warm and dry.   Neurological:      Mental Status: She is alert and oriented to person, place, and time.      Motor: No weakness.      Comments: Oriented to self, place and time             Significant Labs: All pertinent labs within the past 24 hours have been reviewed.    Significant Imaging: I have reviewed all pertinent imaging results/findings within the past 24 hours.

## 2024-11-07 NOTE — PROGRESS NOTES
"[facility] Tele[facility]  Adult Nutrition  Progress Note    SUMMARY       Recommendations    1.) Recommend continuing with Regular Diet,as tolerated.      2.) If PO intake <50%, recommend Boost Plus TID to help meet needs.      3.) RD to monitor wt, PO intake, skin, labs.      Goals: meet % een/epn by next RD f/u  Nutrition Goal Status: goal met  Communication of RD Recs: (poc)      Assessment and Plan    No nutritional dx at this time.      Reason for Assessment    Reason For Assessment: RD follow-up  Diagnosis: other (see comments) (Cognitive and behavioral changes)  General Information Comments: Pt consuming 100% of meals noted. No indicators for malnutrition at this time.  Nutrition Discharge Planning: general healthful diet  Nutrition Related Social Determinants of Health: SDOH: None Identified     Nutrition Risk Screen    Nutrition Risk Screen: no indicators present    Nutrition/Diet History    Spiritual, Cultural Beliefs, [employer]ism Practices, Values that Affect Care: no  Food Allergies: NKFA    Anthropometrics    Temp: 98.6 °F (37 °C)  Height Method: Stated  Height: 5' 2" (157.5 cm)  Height (inches): 62 in  Weight Method: Bed Scale  Weight: 49.9 kg (110 lb 0.2 oz)  Weight (lb): 110.01 lb  Ideal Body Weight (IBW), Female: 110 lb  % Ideal Body Weight, Female (lb): 100 %  BMI (Calculated): 20.1  BMI Grade: 18.5-24.9 - normal       Lab/Procedures/Meds    Pertinent Labs Reviewed: reviewed  Pertinent Labs Comments: 10/15: BUN: 24, GFR: 58.4  Pertinent Medications Reviewed: reviewed  Pertinent Medications Comments: MVI    Estimated/Assessed Needs    Weight Used For Calorie Calculations: 49.9 kg (110 lb 0.2 oz)  Energy Calorie Requirements (kcal): 5349-6957 (25-30kcal/kg)  Energy Need Method: Kcal/kg  Protein Requirements: 60 (1.2 g/kg)  Weight Used For Protein Calculations: 49.9 kg (110 lb 0.2 oz)     Estimated Fluid Requirement Method: RDA Method  RDA Method (mL): 1247         Nutrition " Prescription Ordered    Current Diet Order: Regular    Evaluation of Received Nutrient/Fluid Intake    I/O: +2.2L since 10/5  Energy Calories Required: meeting needs  Protein Required: meeting needs  Fluid Required:  (as per MD)  Tolerance: tolerating  % Intake of Estimated Energy Needs: 100%  % Meal Intake: 100%    Nutrition Risk    Level of Risk/Frequency of Follow-up: low (1x/week)     Monitor and Evaluation    Food and Nutrient Intake: energy intake, food and beverage intake  Food and Nutrient Adminstration: diet order  Physical Activity and Function: nutrition-related ADLs and IADLs  Anthropometric Measurements: height/length, weight, weight change, body mass index  Biochemical Data, Medical Tests and Procedures: electrolyte and renal panel, gastrointestinal profile, glucose/endocrine profile, inflammatory profile, lipid profile     Nutrition Follow-Up    RD Follow-up?: Yes  By Elizabeth Lewis, Registration Eligible, PL-LDN

## 2024-11-07 NOTE — PLAN OF CARE
[facility]  Discharge Reassessment    Primary Care Provider: Edwar Castaneda II, MD    Expected Discharge Date:     Reassessment (most recent)       Discharge Reassessment - 11/07/24 1135          Discharge Reassessment    Assessment Type Discharge Planning Reassessment (P)      Did the patient's condition or plan change since previous assessment? No (P)      Discharge Plan discussed with: Patient (P)      Communicated MARVEL with patient/caregiver Date not available/Unable to determine (P)      Discharge Plan A New Nursing Home placement - intermediate care facility (P)      Discharge Plan B New Nursing Home placement - intermediate care facility (P)      DME Needed Upon Discharge  none (P)      Transition of Care Barriers No family/friends to help (P)      Why the patient remains in the hospital Placement issues (P)         Post-Acute Status    Post-Acute Authorization Placement (P)      Post-Acute Placement Status Referrals Sent (P)      Coverage [facility] (P)      Discharge Delays Post-Acute Set-up (P)                    CM spoke with pt in room.  Pt expressed no questions or concerns.  DC plan remains the same - new nursing home placement as soon as a legal guardian is appointed.    11:51 AM  Per nurse Edyta, pt is requesting her cash that was locked in the charge nurse station.  CM asked supervisor Alexia if that would be OK.  Alexia responded yes, since legal only discussed her checks being collected. CM discussed with Edyta and charge nurse Juany.  Edyta and Juany will be returning pt's cash to her in her room.    3:57 PM  Per supervisor Alexia, The  contract was signed and the petition was submitted yesterday. We are now waiting on a court date.  Medical team notified.    MARTA Cantu, BS, RN, CCM      Discharge Plan A and Plan B have been determined by review of patient's clinical status, future medical and therapeutic needs, and coverage/benefits for  post-acute care in coordination with multidisciplinary team members.
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
## 2024-11-08 PROCEDURE — 25000003 PHARM REV CODE 250

## 2024-11-08 PROCEDURE — 11000001 HC ACUTE MED/SURG PRIVATE ROOM

## 2024-11-08 RX ADMIN — THERA TABS 1 TABLET: TAB at 08:11

## 2024-11-08 RX ADMIN — LACOSAMIDE 50 MG: 50 TABLET, FILM COATED ORAL at 08:11

## 2024-11-08 RX ADMIN — LACOSAMIDE 50 MG: 50 TABLET, FILM COATED ORAL at 09:11
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
## 2024-11-08 NOTE — SUBJECTIVE & OBJECTIVE
Interval History: NAEON. AF. HDS. RA. Patient is comfortable with no complaints. CM working on disposition with legal.     Review of Systems   Constitutional:  Negative for chills and fever.   Respiratory:  Negative for cough, chest tightness and shortness of breath.    Cardiovascular:  Negative for leg swelling.   Gastrointestinal:  Negative for abdominal pain, constipation, diarrhea, nausea and vomiting.   Genitourinary:  Negative for dysuria.   Neurological:  Negative for dizziness, numbness and headaches.   Psychiatric/Behavioral:  Negative for agitation, behavioral problems and confusion.    All other systems reviewed and are negative.    Objective:     Vital Signs (Most Recent):  Temp: 97.9 °F (36.6 °C) (11/07/24 1917)  Pulse: 85 (11/08/24 0833)  Resp: 18 (11/08/24 0833)  BP: 107/73 (11/08/24 0833)  SpO2: 97 % (11/08/24 0833) Vital Signs (24h Range):  Temp:  [97.9 °F (36.6 °C)] 97.9 °F (36.6 °C)  Pulse:  [81-85] 85  Resp:  [18] 18  SpO2:  [97 %] 97 %  BP: (107-114)/(53-73) 107/73     Weight: 49.9 kg (110 lb 0.2 oz)  Body mass index is 20.12 kg/m².    Intake/Output Summary (Last 24 hours) at 11/8/2024 1205  Last data filed at 11/8/2024 0840  Gross per 24 hour   Intake 480 ml   Output --   Net 480 ml         Physical Exam  Constitutional:       General: She is not in acute distress.     Appearance: Normal appearance. She is not ill-appearing.   HENT:      Head: Normocephalic and atraumatic.      Right Ear: External ear normal.      Left Ear: External ear normal.      Nose: Nose normal.      Mouth/Throat:      Mouth: Mucous membranes are moist.   Eyes:      Conjunctiva/sclera: Conjunctivae normal.   Cardiovascular:      Rate and Rhythm: Normal rate and regular rhythm.      Pulses: Normal pulses.      Heart sounds: Normal heart sounds. No murmur heard.  Pulmonary:      Effort: Pulmonary effort is normal. No respiratory distress.      Breath sounds: Normal breath sounds.   Abdominal:      General: There is no  distension.      Palpations: Abdomen is soft. There is no mass.      Tenderness: There is no abdominal tenderness. There is no guarding or rebound.      Hernia: No hernia is present.   Musculoskeletal:      Right lower leg: No edema.      Left lower leg: No edema.   Skin:     General: Skin is warm and dry.   Neurological:      Mental Status: She is alert and oriented to person, place, and time.      Motor: No weakness.      Comments: Oriented to self, place and time             Significant Labs: All pertinent labs within the past 24 hours have been reviewed.    Significant Imaging: I have reviewed all pertinent imaging results/findings within the past 24 hours.

## 2024-11-08 NOTE — PLAN OF CARE
Per supervisor Alexia Gaytan, Court date is set for December 16. Please let the nursing homes that have accepted we will be ready to discharge within the week following the court date.  CM sent update to [facility], [facility], [facility], and [facility] via CP.    MEGHAN CantuN, BS, RN, CCM

## 2024-11-09 PROCEDURE — 25000003 PHARM REV CODE 250

## 2024-11-09 PROCEDURE — 11000001 HC ACUTE MED/SURG PRIVATE ROOM

## 2024-11-09 RX ADMIN — LACOSAMIDE 50 MG: 50 TABLET, FILM COATED ORAL at 10:11

## 2024-11-09 RX ADMIN — THERA TABS 1 TABLET: TAB at 10:11

## 2024-11-09 RX ADMIN — LACOSAMIDE 50 MG: 50 TABLET, FILM COATED ORAL at 09:11

## 2024-11-09 NOTE — SUBJECTIVE & OBJECTIVE
Interval History: NAEON. AF. HDS. RA. Patient is comfortable with no complaints. Legal team working on disposition. Pending court date.     Review of Systems   Constitutional:  Negative for chills and fever.   Respiratory:  Negative for cough, chest tightness and shortness of breath.    Cardiovascular:  Negative for leg swelling.   Gastrointestinal:  Negative for abdominal pain, constipation, diarrhea, nausea and vomiting.   Genitourinary:  Negative for dysuria.   Neurological:  Negative for dizziness, numbness and headaches.   Psychiatric/Behavioral:  Negative for agitation, behavioral problems and confusion.    All other systems reviewed and are negative.    Objective:     Vital Signs (Most Recent):  Temp: 98.3 °F (36.8 °C) (11/09/24 0846)  Pulse: 80 (11/09/24 0846)  Resp: 18 (11/09/24 0846)  BP: 123/72 (11/09/24 0846)  SpO2: 99 % (11/09/24 0846) Vital Signs (24h Range):  Temp:  [98.3 °F (36.8 °C)-98.6 °F (37 °C)] 98.3 °F (36.8 °C)  Pulse:  [80-90] 80  Resp:  [18] 18  SpO2:  [97 %-99 %] 99 %  BP: (111-123)/(55-72) 123/72     Weight: 49.9 kg (110 lb 0.2 oz)  Body mass index is 20.12 kg/m².    Intake/Output Summary (Last 24 hours) at 11/9/2024 1101  Last data filed at 11/9/2024 0512  Gross per 24 hour   Intake 560 ml   Output --   Net 560 ml         Physical Exam  Constitutional:       General: She is not in acute distress.     Appearance: Normal appearance. She is not ill-appearing.   HENT:      Head: Normocephalic and atraumatic.      Right Ear: External ear normal.      Left Ear: External ear normal.      Nose: Nose normal.      Mouth/Throat:      Mouth: Mucous membranes are moist.   Eyes:      Conjunctiva/sclera: Conjunctivae normal.   Cardiovascular:      Rate and Rhythm: Normal rate and regular rhythm.      Pulses: Normal pulses.      Heart sounds: Normal heart sounds. No murmur heard.  Pulmonary:      Effort: Pulmonary effort is normal. No respiratory distress.      Breath sounds: Normal breath sounds.    Abdominal:      General: There is no distension.      Palpations: Abdomen is soft. There is no mass.      Tenderness: There is no abdominal tenderness. There is no guarding or rebound.      Hernia: No hernia is present.   Musculoskeletal:      Right lower leg: No edema.      Left lower leg: No edema.   Skin:     General: Skin is warm and dry.   Neurological:      Mental Status: She is alert and oriented to person, place, and time.      Motor: No weakness.      Comments: Oriented to self, place and time             Significant Labs: All pertinent labs within the past 24 hours have been reviewed.    Significant Imaging: I have reviewed all pertinent imaging results/findings within the past 24 hours.
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
## 2024-11-09 NOTE — PLAN OF CARE
Problem: Adult Inpatient Plan of Care  Goal: Plan of Care Review  Outcome: Progressing  Flowsheets (Taken 11/9/2024 0308)  Plan of Care Reviewed With: patient  Goal: Optimal Comfort and Wellbeing  Outcome: Progressing  Intervention: Monitor Pain and Promote Comfort  Flowsheets (Taken 11/9/2024 0308)  Pain Management Interventions:   care clustered   quiet environment facilitated   relaxation techniques promoted  Intervention: Provide Person-Centered Care  Flowsheets (Taken 11/9/2024 0308)  Trust Relationship/Rapport:   care explained   choices provided   questions encouraged   reassurance provided   thoughts/feelings acknowledged   questions answered     Problem: Fall Injury Risk  Goal: Absence of Fall and Fall-Related Injury  Outcome: Progressing  Intervention: Identify and Manage Contributors  Flowsheets (Taken 11/9/2024 0308)  Self-Care Promotion: BADL personal objects within reach  Medication Review/Management: medications reviewed  Intervention: Promote Injury-Free Environment  Flowsheets (Taken 11/9/2024 0308)  Safety Promotion/Fall Prevention:   assistive device/personal item within reach   lighting adjusted   patient expresses understanding of fall risk and prevention   /camera at bedside     Problem: Functional Deficit  Goal: Optimal Cognitive Function  Outcome: Progressing  Intervention: Optimize Cognitive Function  Flowsheets (Taken 11/9/2024 0308)  Environment Familiarity/Consistency: daily routine followed  Self-Care Promotion: BADL personal objects within reach  Sensory Stimulation Regulation:   quiet environment promoted   lighting decreased   care clustered     Problem: Seizure, Active Management  Goal: Absence of Seizure/Seizure-Related Injury  Outcome: Progressing  Intervention: Prevent Seizure-Related Injury  Flowsheets (Taken 11/9/2024 0308)  Seizure Precautions:   activity supervised   clutter-free environment maintained   emergency equipment at bedside

## 2024-11-09 NOTE — PLAN OF CARE
Problem: Adult Inpatient Plan of Care  Goal: Plan of Care Review  Outcome: Progressing  Goal: Optimal Comfort and Wellbeing  Outcome: Progressing  Goal: Readiness for Transition of Care  Outcome: Progressing      Pt sitting on the edge of the bed watching television. Pt is in no apparent stress. No significant changes noted. Bed locked, in lowest position, call light in reach.

## 2024-11-09 NOTE — ASSESSMENT & PLAN NOTE
"Patient stated she had regular eye doctor that was taking care of her. States she previously was on drops and got laser treatment (Possibly SLT(?)). States she is no longer on eye drops. Patient denies eye pain, changes in vision, blurry vision.     Ophtho consulted. During interview, patient became visibly upset and yelling about being "locked in longterm". Interview terminated. Unable to assess intraocular pressure. Low suspicion for any acute event. Per chart review, cannot find any previous home eye meds.      - Will re-consult ophthalmology if patient becomes more cooperative or acute concerns arise.   "

## 2024-11-10 PROCEDURE — 11000001 HC ACUTE MED/SURG PRIVATE ROOM

## 2024-11-10 PROCEDURE — 25000003 PHARM REV CODE 250

## 2024-11-10 RX ADMIN — LACOSAMIDE 50 MG: 50 TABLET, FILM COATED ORAL at 08:11

## 2024-11-10 RX ADMIN — THERA TABS 1 TABLET: TAB at 08:11

## 2024-11-10 RX ADMIN — LACOSAMIDE 50 MG: 50 TABLET, FILM COATED ORAL at 09:11

## 2024-11-10 NOTE — ASSESSMENT & PLAN NOTE
Patient has been comfortable with no complaints. No agitation    Plan  - PRN zyprexa  - Hold physical restraints unless absolutely needed.

## 2024-11-10 NOTE — PLAN OF CARE
Problem: Adult Inpatient Plan of Care  Goal: Plan of Care Review  Outcome: Progressing  Goal: Patient-Specific Goal (Individualized)  Outcome: Progressing  Goal: Absence of Hospital-Acquired Illness or Injury  Outcome: Progressing  Goal: Optimal Comfort and Wellbeing  Outcome: Progressing  Goal: Readiness for Transition of Care  Outcome: Progressing     Problem: Fall Injury Risk  Goal: Absence of Fall and Fall-Related Injury  Outcome: Progressing     Problem: Functional Deficit  Goal: Optimal Cognitive Function  Outcome: Progressing     Problem: Comorbidity Management  Goal: Maintenance of Seizure Control  Outcome: Progressing     Problem: Seizure, Active Management  Goal: Absence of Seizure/Seizure-Related Injury  Outcome: Progressing    Pt had an uneventful night. VSS. Pt denied any pain or discomfort  Pt is free of falls and injuries. Sitter remains in room for safety Bed in lowest position and call light within reach. Safety maintained

## 2024-11-10 NOTE — SUBJECTIVE & OBJECTIVE
Interval History: NAEON. AF. HDS. RA. Patient is resting comfortably without complaints. She is pending disposition while court date is in process for POA.     Review of Systems   Constitutional:  Negative for chills and fever.   Respiratory:  Negative for cough, chest tightness and shortness of breath.    Cardiovascular:  Negative for leg swelling.   Gastrointestinal:  Negative for abdominal pain, constipation, diarrhea, nausea and vomiting.   Genitourinary:  Negative for dysuria.   Neurological:  Negative for dizziness, numbness and headaches.   Psychiatric/Behavioral:  Negative for agitation, behavioral problems and confusion.    All other systems reviewed and are negative.    Objective:     Vital Signs (Most Recent):  Temp: 97.6 °F (36.4 °C) (11/10/24 0807)  Pulse: 77 (11/10/24 0807)  Resp: 16 (11/10/24 0807)  BP: 111/75 (11/10/24 0807)  SpO2: 99 % (11/10/24 0807) Vital Signs (24h Range):  Temp:  [97.6 °F (36.4 °C)-98.3 °F (36.8 °C)] 97.6 °F (36.4 °C)  Pulse:  [67-77] 77  Resp:  [16-18] 16  SpO2:  [96 %-99 %] 99 %  BP: (110-111)/(71-75) 111/75     Weight: 49.9 kg (110 lb 0.2 oz)  Body mass index is 20.12 kg/m².    Intake/Output Summary (Last 24 hours) at 11/10/2024 1118  Last data filed at 11/9/2024 2000  Gross per 24 hour   Intake 60 ml   Output --   Net 60 ml         Physical Exam  Constitutional:       General: She is not in acute distress.     Appearance: Normal appearance. She is not ill-appearing.   HENT:      Head: Normocephalic and atraumatic.      Right Ear: External ear normal.      Left Ear: External ear normal.      Nose: Nose normal.      Mouth/Throat:      Mouth: Mucous membranes are moist.   Eyes:      Conjunctiva/sclera: Conjunctivae normal.   Cardiovascular:      Rate and Rhythm: Normal rate and regular rhythm.      Pulses: Normal pulses.      Heart sounds: Normal heart sounds. No murmur heard.  Pulmonary:      Effort: Pulmonary effort is normal. No respiratory distress.      Breath sounds:  Normal breath sounds.   Abdominal:      General: There is no distension.      Palpations: Abdomen is soft. There is no mass.      Tenderness: There is no abdominal tenderness. There is no guarding or rebound.      Hernia: No hernia is present.   Musculoskeletal:      Right lower leg: No edema.      Left lower leg: No edema.   Skin:     General: Skin is warm and dry.   Neurological:      Mental Status: She is alert and oriented to person, place, and time.      Motor: No weakness.      Comments: Oriented to self, place and time             Significant Labs: All pertinent labs within the past 24 hours have been reviewed.    Significant Imaging: I have reviewed all pertinent imaging results/findings within the past 24 hours.

## 2024-11-10 NOTE — ASSESSMENT & PLAN NOTE
8/30 patient had a witnessed seizure for 3 minutes with jerking of the left arm and right leg, with post-ictal state. Labs with anion gap acidosis, otherwise no findings. Glucose 124. CMP, CBC, lactic acid, CPK WNL. Ionized calcium 1.02. CT head no evidence of acute intracranial abnormalities. No provoking factor identified in labs/history.  Per Neuro, low suspicion that rivastigmine triggered seizure, but not opposed to holding medication.     EEG: abnormal study due to moderate diffuse background slowing consistent with diffuse cerebral dysfunction and encephalopathy which may be on the basis of toxic, metabolic, or primary neuronal disorder.     9/13 patient suffered a second witnessed seizure. Episode lasted approx 10 minutes, during which patient was foaming at the mouth and leaning to the right. She received 1 mg Ativan IM. Lactate elevated. On evaluation 9/13 AM at baseline. Given she has now had two clinical events, will start AED for seizure prevention per neuro recs.     No recurrence of seizures     Plan  - Lacosamide 50 mg BID PO.   - Outpatient f/u with neurology   - Seizure precautions   - PRN rectal diazepam for GTC>5 min
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
## 2024-11-10 NOTE — ASSESSMENT & PLAN NOTE
"Patient stated she had regular eye doctor that was taking care of her. States she previously was on drops and got laser treatment (Possibly SLT(?)). States she is no longer on eye drops. Patient denies eye pain, changes in vision, blurry vision.     Ophtho consulted. During interview, patient became visibly upset and yelling about being "locked in residential". Interview terminated. Unable to assess intraocular pressure. Low suspicion for any acute event. Per chart review, cannot find any previous home eye meds. No complaints of vision     Plan  - Will re-consult ophthalmology if patient becomes more cooperative or acute concerns arise.   "

## 2024-11-11 PROCEDURE — 25000003 PHARM REV CODE 250

## 2024-11-11 PROCEDURE — 11000001 HC ACUTE MED/SURG PRIVATE ROOM

## 2024-11-11 RX ADMIN — LACOSAMIDE 50 MG: 50 TABLET, FILM COATED ORAL at 09:11

## 2024-11-11 RX ADMIN — THERA TABS 1 TABLET: TAB at 09:11

## 2024-11-11 RX ADMIN — LACOSAMIDE 50 MG: 50 TABLET, FILM COATED ORAL at 08:11

## 2024-11-11 NOTE — SUBJECTIVE & OBJECTIVE
Interval History: NAEON. AF. HDS. RA. Patient resting comfortably without complaints. She is pending deposition once court date is set regarding her legal guardianship.     Review of Systems   Constitutional:  Negative for chills and fever.   Respiratory:  Negative for cough, chest tightness and shortness of breath.    Cardiovascular:  Negative for leg swelling.   Gastrointestinal:  Negative for abdominal pain, constipation, diarrhea, nausea and vomiting.   Genitourinary:  Negative for dysuria.   Neurological:  Negative for dizziness, numbness and headaches.   Psychiatric/Behavioral:  Negative for agitation, behavioral problems and confusion.    All other systems reviewed and are negative.    Objective:     Vital Signs (Most Recent):  Temp: 97.9 °F (36.6 °C) (11/11/24 0656)  Pulse: 66 (11/11/24 0656)  Resp: 18 (11/11/24 0656)  BP: 128/84 (11/11/24 0656)  SpO2: 97 % (11/11/24 0656) Vital Signs (24h Range):  Temp:  [97.9 °F (36.6 °C)-98.1 °F (36.7 °C)] 97.9 °F (36.6 °C)  Pulse:  [66-78] 66  Resp:  [18] 18  SpO2:  [97 %] 97 %  BP: (106-128)/(69-84) 128/84     Weight: 49.9 kg (110 lb 0.2 oz)  Body mass index is 20.12 kg/m².    Intake/Output Summary (Last 24 hours) at 11/11/2024 1032  Last data filed at 11/10/2024 2000  Gross per 24 hour   Intake 120 ml   Output --   Net 120 ml         Physical Exam  Constitutional:       General: She is not in acute distress.     Appearance: Normal appearance. She is not ill-appearing.   HENT:      Head: Normocephalic and atraumatic.      Right Ear: External ear normal.      Left Ear: External ear normal.      Nose: Nose normal.      Mouth/Throat:      Mouth: Mucous membranes are moist.   Eyes:      Conjunctiva/sclera: Conjunctivae normal.   Cardiovascular:      Rate and Rhythm: Normal rate and regular rhythm.      Pulses: Normal pulses.      Heart sounds: Normal heart sounds. No murmur heard.  Pulmonary:      Effort: Pulmonary effort is normal. No respiratory distress.      Breath  sounds: Normal breath sounds.   Abdominal:      General: There is no distension.      Palpations: Abdomen is soft. There is no mass.      Tenderness: There is no abdominal tenderness. There is no guarding or rebound.      Hernia: No hernia is present.   Musculoskeletal:      Right lower leg: No edema.      Left lower leg: No edema.   Skin:     General: Skin is warm and dry.   Neurological:      Mental Status: She is alert and oriented to person, place, and time.      Motor: No weakness.      Comments: Oriented to self, place and time             Significant Labs: All pertinent labs within the past 24 hours have been reviewed.    Significant Imaging: I have reviewed all pertinent imaging results/findings within the past 24 hours.
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
## 2024-11-11 NOTE — PLAN OF CARE
Problem: Adult Inpatient Plan of Care  Goal: Plan of Care Review  Outcome: Progressing  Goal: Patient-Specific Goal (Individualized)  Outcome: Progressing  Goal: Absence of Hospital-Acquired Illness or Injury  Outcome: Progressing  Goal: Optimal Comfort and Wellbeing  Outcome: Progressing  Goal: Readiness for Transition of Care  Outcome: Progressing     Problem: Fall Injury Risk  Goal: Absence of Fall and Fall-Related Injury  Outcome: Progressing     Problem: Functional Deficit  Goal: Optimal Cognitive Function  Outcome: Progressing     Problem: Comorbidity Management  Goal: Maintenance of Seizure Control  Outcome: Progressing     Problem: Seizure, Active Management  Goal: Absence of Seizure/Seizure-Related Injury  Outcome: Progressing     Pt had an uneventful night. VSS. Pt denied any pain or discomfort  Pt calm and coopertive Pt is free of falls and injuries. Sitter remains at bedside for safety.  Bed in lowest position and call light within reach. Safety maintained

## 2024-11-11 NOTE — ASSESSMENT & PLAN NOTE
"Patient stated she had regular eye doctor that was taking care of her. States she previously was on drops and got laser treatment (Possibly SLT(?)). States she is no longer on eye drops. Patient denies eye pain, changes in vision, blurry vision.     Ophtho consulted. During interview, patient became visibly upset and yelling about being "locked in [facility]". Interview terminated. Unable to assess intraocular pressure. Low suspicion for any acute event. Per chart review, cannot find any previous home eye meds. No complaints of vision     Plan  - Will re-consult ophthalmology if patient becomes more cooperative or acute concerns arise.   "

## 2024-11-12 PROCEDURE — 25000003 PHARM REV CODE 250

## 2024-11-12 PROCEDURE — 11000001 HC ACUTE MED/SURG PRIVATE ROOM

## 2024-11-12 RX ADMIN — THERA TABS 1 TABLET: TAB at 08:11

## 2024-11-12 RX ADMIN — LACOSAMIDE 50 MG: 50 TABLET, FILM COATED ORAL at 08:11

## 2024-11-12 NOTE — PLAN OF CARE
Problem: Adult Inpatient Plan of Care  Goal: Plan of Care Review  Outcome: Progressing  Flowsheets (Taken 11/12/2024 1204)  Plan of Care Reviewed With: patient  Goal: Absence of Hospital-Acquired Illness or Injury  Outcome: Progressing  Goal: Optimal Comfort and Wellbeing  Outcome: Progressing     Problem: Fall Injury Risk  Goal: Absence of Fall and Fall-Related Injury  Outcome: Progressing     Problem: Seizure, Active Management  Goal: Absence of Seizure/Seizure-Related Injury  Outcome: Progressing     Problem: Functional Deficit  Goal: Optimal Cognitive Function  Outcome: Progressing     Problem: Comorbidity Management  Goal: Maintenance of Seizure Control  Outcome: Progressing

## 2024-11-12 NOTE — SUBJECTIVE & OBJECTIVE
Interval History: NAEON. AF. HDS. RA. Patient resting comfortably without complaints. Court date pending for legal guardianship.     Review of Systems   Constitutional:  Negative for chills and fever.   Respiratory:  Negative for cough, chest tightness and shortness of breath.    Cardiovascular:  Negative for leg swelling.   Gastrointestinal:  Negative for abdominal pain, constipation, diarrhea, nausea and vomiting.   Genitourinary:  Negative for dysuria.   Neurological:  Negative for dizziness, numbness and headaches.   Psychiatric/Behavioral:  Negative for agitation, behavioral problems and confusion.    All other systems reviewed and are negative.    Objective:     Vital Signs (Most Recent):  Temp: 97.7 °F (36.5 °C) (11/12/24 0552)  Pulse: 69 (11/12/24 0552)  Resp: 18 (11/12/24 0552)  BP: (!) 142/83 (11/12/24 0552)  SpO2: 100 % (11/12/24 0552) Vital Signs (24h Range):  Temp:  [97.7 °F (36.5 °C)-97.9 °F (36.6 °C)] 97.7 °F (36.5 °C)  Pulse:  [66-87] 69  Resp:  [18] 18  SpO2:  [97 %-100 %] 100 %  BP: (114-142)/(64-84) 142/83     Weight: 49.9 kg (110 lb 0.2 oz)  Body mass index is 20.12 kg/m².  No intake or output data in the 24 hours ending 11/12/24 1146      Physical Exam  Constitutional:       General: She is not in acute distress.     Appearance: Normal appearance. She is not ill-appearing.   HENT:      Head: Normocephalic and atraumatic.      Right Ear: External ear normal.      Left Ear: External ear normal.      Nose: Nose normal.      Mouth/Throat:      Mouth: Mucous membranes are moist.   Eyes:      Conjunctiva/sclera: Conjunctivae normal.   Cardiovascular:      Rate and Rhythm: Normal rate and regular rhythm.      Pulses: Normal pulses.      Heart sounds: Normal heart sounds. No murmur heard.  Pulmonary:      Effort: Pulmonary effort is normal. No respiratory distress.      Breath sounds: Normal breath sounds.   Abdominal:      General: There is no distension.      Palpations: Abdomen is soft. There is no  mass.      Tenderness: There is no abdominal tenderness. There is no guarding or rebound.      Hernia: No hernia is present.   Musculoskeletal:      Right lower leg: No edema.      Left lower leg: No edema.   Skin:     General: Skin is warm and dry.   Neurological:      Mental Status: She is alert and oriented to person, place, and time.      Motor: No weakness.      Comments: Oriented to self, place and time             Significant Labs: All pertinent labs within the past 24 hours have been reviewed.    Significant Imaging: I have reviewed all pertinent imaging results/findings within the past 24 hours.
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
## 2024-11-12 NOTE — PLAN OF CARE
Spoke with , Goldy May, appointed to represent patient. Meeting scheduled for 11/14 at 12:30.     Alexia Gaytan, MSW, ELMERW, ACM-SW  Manager - Case Management

## 2024-11-12 NOTE — PLAN OF CARE
Pt's bed alarm going off, and staff went into pt's room to assist her. Pt was already in the bathroom when staff arrived. Once pt got back into bed, pt started telling staff she doesn't want the bed alarm on because she can do it by herself. Pt stated she will not call for assistance because she is fully capable of walking to bathroom. Pt very upset with staff because the tv was left on too. Notified charge, Cal, that pt is refusing bed alarm.

## 2024-11-13 LAB
ALBUMIN SERPL BCP-MCNC: 3.8 G/DL (ref 3.5–5.2)
ALP SERPL-CCNC: 81 U/L (ref 40–150)
ALT SERPL W/O P-5'-P-CCNC: 34 U/L (ref 10–44)
ANION GAP SERPL CALC-SCNC: 10 MMOL/L (ref 8–16)
AST SERPL-CCNC: 29 U/L (ref 10–40)
BASOPHILS # BLD AUTO: 0.06 K/UL (ref 0–0.2)
BASOPHILS NFR BLD: 0.5 % (ref 0–1.9)
BILIRUB SERPL-MCNC: 0.5 MG/DL (ref 0.1–1)
BUN SERPL-MCNC: 27 MG/DL (ref 8–23)
CALCIUM SERPL-MCNC: 9.2 MG/DL (ref 8.7–10.5)
CHLORIDE SERPL-SCNC: 108 MMOL/L (ref 95–110)
CO2 SERPL-SCNC: 24 MMOL/L (ref 23–29)
CREAT SERPL-MCNC: 1.1 MG/DL (ref 0.5–1.4)
DIFFERENTIAL METHOD BLD: ABNORMAL
EOSINOPHIL # BLD AUTO: 0.2 K/UL (ref 0–0.5)
EOSINOPHIL NFR BLD: 2.1 % (ref 0–8)
ERYTHROCYTE [DISTWIDTH] IN BLOOD BY AUTOMATED COUNT: 13.2 % (ref 11.5–14.5)
EST. GFR  (NO RACE VARIABLE): 51.8 ML/MIN/1.73 M^2
GLUCOSE SERPL-MCNC: 103 MG/DL (ref 70–110)
HCT VFR BLD AUTO: 36.5 % (ref 37–48.5)
HGB BLD-MCNC: 12.2 G/DL (ref 12–16)
IMM GRANULOCYTES # BLD AUTO: 0.05 K/UL (ref 0–0.04)
IMM GRANULOCYTES NFR BLD AUTO: 0.4 % (ref 0–0.5)
LYMPHOCYTES # BLD AUTO: 2.3 K/UL (ref 1–4.8)
LYMPHOCYTES NFR BLD: 20.2 % (ref 18–48)
MCH RBC QN AUTO: 32.4 PG (ref 27–31)
MCHC RBC AUTO-ENTMCNC: 33.4 G/DL (ref 32–36)
MCV RBC AUTO: 97 FL (ref 82–98)
MONOCYTES # BLD AUTO: 0.7 K/UL (ref 0.3–1)
MONOCYTES NFR BLD: 5.8 % (ref 4–15)
NEUTROPHILS # BLD AUTO: 8 K/UL (ref 1.8–7.7)
NEUTROPHILS NFR BLD: 71 % (ref 38–73)
NRBC BLD-RTO: 0 /100 WBC
PLATELET # BLD AUTO: 280 K/UL (ref 150–450)
PMV BLD AUTO: 10.3 FL (ref 9.2–12.9)
POTASSIUM SERPL-SCNC: 4.3 MMOL/L (ref 3.5–5.1)
PROT SERPL-MCNC: 6.4 G/DL (ref 6–8.4)
RBC # BLD AUTO: 3.76 M/UL (ref 4–5.4)
SODIUM SERPL-SCNC: 142 MMOL/L (ref 136–145)
WBC # BLD AUTO: 11.33 K/UL (ref 3.9–12.7)

## 2024-11-13 PROCEDURE — 11000001 HC ACUTE MED/SURG PRIVATE ROOM

## 2024-11-13 PROCEDURE — 25000003 PHARM REV CODE 250

## 2024-11-13 PROCEDURE — 36415 COLL VENOUS BLD VENIPUNCTURE: CPT

## 2024-11-13 PROCEDURE — 80053 COMPREHEN METABOLIC PANEL: CPT

## 2024-11-13 PROCEDURE — 85025 COMPLETE CBC W/AUTO DIFF WBC: CPT

## 2024-11-13 RX ADMIN — THERA TABS 1 TABLET: TAB at 10:11

## 2024-11-13 RX ADMIN — LACOSAMIDE 50 MG: 50 TABLET, FILM COATED ORAL at 10:11

## 2024-11-13 RX ADMIN — LACOSAMIDE 50 MG: 50 TABLET, FILM COATED ORAL at 08:11

## 2024-11-13 NOTE — SUBJECTIVE & OBJECTIVE
Interval History: NAEON. AF. HDS. RA. Patient resting comfortably without complaints. Court date pending for legal guardianship. Will draw labs today.     Review of Systems   Constitutional:  Negative for chills and fever.   Respiratory:  Negative for cough, chest tightness and shortness of breath.    Cardiovascular:  Negative for leg swelling.   Gastrointestinal:  Negative for abdominal pain, constipation, diarrhea, nausea and vomiting.   Genitourinary:  Negative for dysuria.   Neurological:  Negative for dizziness, numbness and headaches.   Psychiatric/Behavioral:  Negative for agitation, behavioral problems and confusion.    All other systems reviewed and are negative.    Objective:     Vital Signs (Most Recent):  Temp: 98.1 °F (36.7 °C) (11/13/24 0743)  Pulse: 73 (11/13/24 0743)  Resp: 18 (11/13/24 0743)  BP: 113/70 (11/13/24 0743)  SpO2: 97 % (11/13/24 0743) Vital Signs (24h Range):  Temp:  [98.1 °F (36.7 °C)-98.5 °F (36.9 °C)] 98.1 °F (36.7 °C)  Pulse:  [73-81] 73  Resp:  [18] 18  SpO2:  [97 %] 97 %  BP: (103-113)/(65-70) 113/70     Weight: 49.9 kg (110 lb 0.2 oz)  Body mass index is 20.12 kg/m².    Intake/Output Summary (Last 24 hours) at 11/13/2024 1313  Last data filed at 11/13/2024 0900  Gross per 24 hour   Intake 240 ml   Output 1 ml   Net 239 ml         Physical Exam  Constitutional:       General: She is not in acute distress.     Appearance: Normal appearance. She is not ill-appearing.   HENT:      Head: Normocephalic and atraumatic.      Right Ear: External ear normal.      Left Ear: External ear normal.      Nose: Nose normal.      Mouth/Throat:      Mouth: Mucous membranes are moist.   Eyes:      Conjunctiva/sclera: Conjunctivae normal.   Cardiovascular:      Rate and Rhythm: Normal rate and regular rhythm.      Pulses: Normal pulses.      Heart sounds: Normal heart sounds. No murmur heard.  Pulmonary:      Effort: Pulmonary effort is normal. No respiratory distress.      Breath sounds: Normal  breath sounds.   Abdominal:      General: There is no distension.      Palpations: Abdomen is soft. There is no mass.      Tenderness: There is no abdominal tenderness. There is no guarding or rebound.      Hernia: No hernia is present.   Musculoskeletal:      Right lower leg: No edema.      Left lower leg: No edema.   Skin:     General: Skin is warm and dry.   Neurological:      Mental Status: She is alert and oriented to person, place, and time.      Motor: No weakness.      Comments: Oriented to self, place and time             Significant Labs: All pertinent labs within the past 24 hours have been reviewed.    Significant Imaging: I have reviewed all pertinent imaging results/findings within the past 24 hours.

## 2024-11-13 NOTE — PROGRESS NOTES
"[facility] - Internal Medicine Telemetry  Adult Nutrition  Progress Note    SUMMARY       Recommendations    1.) Recommend continuing with Regular Diet,as tolerated.      2.) If PO intake <50%, recommend Boost Plus TID to help meet needs.      3.) RD to monitor wt, PO intake, skin, labs.      Goals: meet % een/epn by next RD f/u  Nutrition Goal Status: goal met  Communication of RD Recs: (poc)    Assessment and Plan    No nutritional dx at this time.      Reason for Assessment    Reason For Assessment: RD follow-up  Diagnosis: other (see comments) (Cognitive and behavioral changes)  General Information Comments: Spoke w/ pt at bedside, continues to reports a good appetite 100% meal consumption noted at meals. Pt w/ no indicators for malnutrition at this time. RD following.  Nutrition Discharge Planning: general healthful diet  Nutrition Related Social Determinants of Health: SDOH: None Identified     Nutrition Risk Screen    Nutrition Risk Screen: no indicators present    Nutrition/Diet History    Spiritual, Cultural Beliefs, Pent[employer]al Practices, Values that Affect Care: no  Food Allergies: NKFA    Anthropometrics    Temp: 98.1 °F (36.7 °C)  Height Method: Stated  Height: 5' 2" (157.5 cm)  Height (inches): 62 in  Weight Method: Bed Scale  Weight: 49.9 kg (110 lb 0.2 oz)  Weight (lb): 110.01 lb  Ideal Body Weight (IBW), Female: 110 lb  % Ideal Body Weight, Female (lb): 100 %  BMI (Calculated): 20.1  BMI Grade: 18.5-24.9 - normal       Lab/Procedures/Meds    Pertinent Labs Reviewed: reviewed  Pertinent Labs Comments: 10/15: BUN: 24, GFR: 58.4  Pertinent Medications Reviewed: reviewed  Pertinent Medications Comments: MVI    Estimated/Assessed Needs    Weight Used For Calorie Calculations: 49.9 kg (110 lb 0.2 oz)  Energy Calorie Requirements (kcal): 1736-6232 (25-30kcal/kg)  Energy Need Method: Kcal/kg  Protein Requirements: 60 (1.2 g/kg)  Weight Used For Protein Calculations: 49.9 kg (110 lb 0.2 oz)  Estimated " Fluid Requirement Method: RDA Method  RDA Method (mL): 1247     Nutrition Prescription Ordered    Current Diet Order: Regular    Evaluation of Received Nutrient/Fluid Intake    I/O: +5.1L since 10/30  Energy Calories Required: meeting needs  Protein Required: meeting needs  Fluid Required:  (1 ml or fluid as per MD)  Comments: LBM noted- 11/11  Tolerance: tolerating  % Intake of Estimated Energy Needs: 75 - 100 %  % Meal Intake: 75 - 100 %    Nutrition Risk    Level of Risk/Frequency of Follow-up: low  (7 days)     Monitor and Evaluation    Food and Nutrient Intake: energy intake, food and beverage intake  Food and Nutrient Adminstration: diet order  Physical Activity and Function: nutrition-related ADLs and IADLs  Anthropometric Measurements: height/length, weight, weight change, body mass index  Biochemical Data, Medical Tests and Procedures: electrolyte and renal panel, gastrointestinal profile, glucose/endocrine profile, inflammatory profile, lipid profile     Nutrition Follow-Up    RD Follow-up?: Yes  By Elizabeth Lewis, Registration Eligible, PL-LDN
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
## 2024-11-14 PROCEDURE — 25000003 PHARM REV CODE 250

## 2024-11-14 PROCEDURE — 11000001 HC ACUTE MED/SURG PRIVATE ROOM

## 2024-11-14 RX ADMIN — LACOSAMIDE 50 MG: 50 TABLET, FILM COATED ORAL at 08:11

## 2024-11-14 RX ADMIN — THERA TABS 1 TABLET: TAB at 08:11

## 2024-11-14 NOTE — PLAN OF CARE
Met with  representative, Goldy May, and provided information regarding attempts to appoint POA representation and have son provide decision-making.     Dr. Schilling provided history of patient's stay to .      reviewed checks held in unit director office and met with patient.     Alexia Gaytan, MSW, ELMERW, ACCYNDIE-SW  Manager - Case Management
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
## 2024-11-14 NOTE — SUBJECTIVE & OBJECTIVE
Interval History: NAEON. AF. HDS. RA. Patient is resting comfortably without any complaints. Pending court date for legal guardianship. All labs were unremarkable.    Review of Systems   Constitutional:  Negative for chills and fever.   Respiratory:  Negative for cough, chest tightness and shortness of breath.    Cardiovascular:  Negative for leg swelling.   Gastrointestinal:  Negative for abdominal pain, constipation, diarrhea, nausea and vomiting.   Genitourinary:  Negative for dysuria.   Neurological:  Negative for dizziness, numbness and headaches.   Psychiatric/Behavioral:  Negative for agitation, behavioral problems and confusion.    All other systems reviewed and are negative.    Objective:     Vital Signs (Most Recent):  Temp: 98.3 °F (36.8 °C) (11/14/24 0756)  Pulse: 81 (11/14/24 0756)  Resp: 18 (11/14/24 0756)  BP: 127/87 (11/14/24 0756)  SpO2: 97 % (11/14/24 0756) Vital Signs (24h Range):  Temp:  [98.2 °F (36.8 °C)-98.3 °F (36.8 °C)] 98.3 °F (36.8 °C)  Pulse:  [81-88] 81  Resp:  [18] 18  SpO2:  [97 %-98 %] 97 %  BP: (106-127)/(64-87) 127/87     Weight: 49.9 kg (110 lb 0.2 oz)  Body mass index is 20.12 kg/m².    Intake/Output Summary (Last 24 hours) at 11/14/2024 1019  Last data filed at 11/14/2024 0820  Gross per 24 hour   Intake 250 ml   Output --   Net 250 ml         Physical Exam  Constitutional:       General: She is not in acute distress.     Appearance: Normal appearance. She is not ill-appearing.   HENT:      Head: Normocephalic and atraumatic.      Right Ear: External ear normal.      Left Ear: External ear normal.      Nose: Nose normal.      Mouth/Throat:      Mouth: Mucous membranes are moist.   Eyes:      Conjunctiva/sclera: Conjunctivae normal.   Cardiovascular:      Rate and Rhythm: Normal rate and regular rhythm.      Pulses: Normal pulses.      Heart sounds: Normal heart sounds. No murmur heard.  Pulmonary:      Effort: Pulmonary effort is normal. No respiratory distress.      Breath  sounds: Normal breath sounds.   Abdominal:      General: There is no distension.      Palpations: Abdomen is soft. There is no mass.      Tenderness: There is no abdominal tenderness. There is no guarding or rebound.      Hernia: No hernia is present.   Musculoskeletal:      Right lower leg: No edema.      Left lower leg: No edema.   Skin:     General: Skin is warm and dry.   Neurological:      Mental Status: She is alert and oriented to person, place, and time.      Motor: No weakness.      Comments: Oriented to self, place and time             Significant Labs: CBC:   Recent Labs   Lab 11/13/24  1414   WBC 11.33   HGB 12.2   HCT 36.5*        CMP:   Recent Labs   Lab 11/13/24  1414      K 4.3      CO2 24      BUN 27*   CREATININE 1.1   CALCIUM 9.2   PROT 6.4   ALBUMIN 3.8   BILITOT 0.5   ALKPHOS 81   AST 29   ALT 34   ANIONGAP 10       Significant Imaging: I have reviewed all pertinent imaging results/findings within the past 24 hours.

## 2024-11-14 NOTE — ASSESSMENT & PLAN NOTE
"Patient stated she had regular eye doctor that was taking care of her. States she previously was on drops and got laser treatment (Possibly SLT(?)). States she is no longer on eye drops. Patient denies eye pain, changes in vision, blurry vision.     Ophtho consulted. During interview, patient became visibly upset and yelling about being "locked in half-way". Interview terminated. Unable to assess intraocular pressure. Low suspicion for any acute event. Per chart review, cannot find any previous home eye meds. No complaints of vision     Plan  - Will re-consult ophthalmology if patient becomes more cooperative or acute concerns arise.   "

## 2024-11-15 PROCEDURE — 25000003 PHARM REV CODE 250

## 2024-11-15 PROCEDURE — 11000001 HC ACUTE MED/SURG PRIVATE ROOM

## 2024-11-15 RX ADMIN — LACOSAMIDE 50 MG: 50 TABLET, FILM COATED ORAL at 08:11

## 2024-11-15 RX ADMIN — LACOSAMIDE 50 MG: 50 TABLET, FILM COATED ORAL at 09:11

## 2024-11-15 RX ADMIN — THERA TABS 1 TABLET: TAB at 08:11

## 2024-11-15 NOTE — ASSESSMENT & PLAN NOTE
Patient has been comfortable with no complaints. Patient has been pleasant throughout the last few weeks. Need need for restraints or medications     Plan  - PRN zyprexa  - Hold physical restraints unless absolutely needed.

## 2024-11-15 NOTE — ASSESSMENT & PLAN NOTE
78-year-old lady here with encephalopathy, secondary to worsening dementia.  Clinically her presentation is not concering for acute sepsis, or stroke.        Extensive workup for AMS has been negative. Neurology suspects her underlying dementia is driving her presentation. Patient very resistant to discharging to SNF or any facility. Per our team and Psychiatry the patient does not show decision making capacity. Son prefers SNF or facility placement. However, patient threatened and attempted to leave AMA on 7/15 so she was PEC'd.  PEC is to prevent AMA but she does not require further psychological stabilization, discuss with legal need for PEC regarding capacity to leave AMA.         Plan:  - CEC  on . Patient has situational capacity. Friend David resigned as MPOA.    - PRN zyprexa.   - Court date assigned ()   - Once she is assigned a legal guardian, we can proceed with NH placement.  - she has accepting facilities, her paperwork is up to date. Unable to move forward until pt has a legal guardian.

## 2024-11-15 NOTE — SUBJECTIVE & OBJECTIVE
Interval History: NAEON. HDS. RA. Patient resting comfortably without complaints. Recent labs were unremarkable. Court date (12/16) for legal guardianship.     Review of Systems   Constitutional:  Negative for chills and fever.   Respiratory:  Negative for cough, chest tightness and shortness of breath.    Cardiovascular:  Negative for leg swelling.   Gastrointestinal:  Negative for abdominal pain, constipation, diarrhea, nausea and vomiting.   Genitourinary:  Negative for dysuria.   Neurological:  Negative for dizziness, numbness and headaches.   Psychiatric/Behavioral:  Negative for agitation, behavioral problems and confusion.    All other systems reviewed and are negative.    Objective:     Vital Signs (Most Recent):  Temp: 98 °F (36.7 °C) (11/15/24 0753)  Pulse: 80 (11/15/24 0753)  Resp: 18 (11/15/24 0753)  BP: 136/81 (11/15/24 0753)  SpO2: 96 % (11/15/24 0753) Vital Signs (24h Range):  Temp:  [98 °F (36.7 °C)] 98 °F (36.7 °C)  Pulse:  [80] 80  Resp:  [18] 18  SpO2:  [96 %] 96 %  BP: (135-136)/(78-81) 136/81     Weight: 49.9 kg (110 lb 0.2 oz)  Body mass index is 20.12 kg/m².    Intake/Output Summary (Last 24 hours) at 11/15/2024 0904  Last data filed at 11/14/2024 1350  Gross per 24 hour   Intake 240 ml   Output --   Net 240 ml         Physical Exam  Constitutional:       General: She is not in acute distress.     Appearance: Normal appearance. She is not ill-appearing.   HENT:      Head: Normocephalic and atraumatic.      Right Ear: External ear normal.      Left Ear: External ear normal.      Nose: Nose normal.      Mouth/Throat:      Mouth: Mucous membranes are moist.   Eyes:      Conjunctiva/sclera: Conjunctivae normal.   Cardiovascular:      Rate and Rhythm: Normal rate and regular rhythm.      Pulses: Normal pulses.      Heart sounds: Normal heart sounds. No murmur heard.  Pulmonary:      Effort: Pulmonary effort is normal. No respiratory distress.      Breath sounds: Normal breath sounds.   Abdominal:       General: There is no distension.      Palpations: Abdomen is soft. There is no mass.      Tenderness: There is no abdominal tenderness. There is no guarding or rebound.      Hernia: No hernia is present.   Musculoskeletal:      Right lower leg: No edema.      Left lower leg: No edema.   Skin:     General: Skin is warm and dry.   Neurological:      Mental Status: She is alert and oriented to person, place, and time.      Motor: No weakness.      Comments: Oriented to self, place and time             Significant Labs: All pertinent labs within the past 24 hours have been reviewed.    Significant Imaging: I have reviewed all pertinent imaging results/findings within the past 24 hours.
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
## 2024-11-16 PROCEDURE — 25000003 PHARM REV CODE 250

## 2024-11-16 PROCEDURE — 11000001 HC ACUTE MED/SURG PRIVATE ROOM

## 2024-11-16 RX ADMIN — LACOSAMIDE 50 MG: 50 TABLET, FILM COATED ORAL at 09:11

## 2024-11-16 RX ADMIN — THERA TABS 1 TABLET: TAB at 09:11
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
## 2024-11-16 NOTE — SUBJECTIVE & OBJECTIVE
Interval History: NAEON. AF. HDS. RA. Patient is resting comfortably without any complaints. Pending nursing home placement after legal guardianship pending court date (12/16)     Review of Systems   Constitutional:  Negative for chills and fever.   Respiratory:  Negative for cough, chest tightness and shortness of breath.    Cardiovascular:  Negative for leg swelling.   Gastrointestinal:  Negative for abdominal pain, constipation, diarrhea, nausea and vomiting.   Genitourinary:  Negative for dysuria.   Neurological:  Negative for dizziness, numbness and headaches.   Psychiatric/Behavioral:  Negative for agitation, behavioral problems and confusion.    All other systems reviewed and are negative.    Objective:     Vital Signs (Most Recent):  Temp: 98.2 °F (36.8 °C) (11/15/24 1952)  Pulse: 85 (11/15/24 1952)  Resp: 18 (11/15/24 1952)  BP: 131/83 (11/15/24 1952)  SpO2: 97 % (11/15/24 1952) Vital Signs (24h Range):  Temp:  [98 °F (36.7 °C)-98.2 °F (36.8 °C)] 98.2 °F (36.8 °C)  Pulse:  [80-85] 85  Resp:  [18] 18  SpO2:  [96 %-97 %] 97 %  BP: (131-136)/(81-83) 131/83     Weight: 49.9 kg (110 lb 0.2 oz)  Body mass index is 20.12 kg/m².    Intake/Output Summary (Last 24 hours) at 11/16/2024 0655  Last data filed at 11/15/2024 1330  Gross per 24 hour   Intake 480 ml   Output --   Net 480 ml         Physical Exam  Constitutional:       General: She is not in acute distress.     Appearance: Normal appearance. She is not ill-appearing.   HENT:      Head: Normocephalic and atraumatic.      Right Ear: External ear normal.      Left Ear: External ear normal.      Nose: Nose normal.      Mouth/Throat:      Mouth: Mucous membranes are moist.   Eyes:      Conjunctiva/sclera: Conjunctivae normal.   Cardiovascular:      Rate and Rhythm: Normal rate and regular rhythm.      Pulses: Normal pulses.      Heart sounds: Normal heart sounds. No murmur heard.  Pulmonary:      Effort: Pulmonary effort is normal. No respiratory distress.       Breath sounds: Normal breath sounds.   Abdominal:      General: There is no distension.      Palpations: Abdomen is soft. There is no mass.      Tenderness: There is no abdominal tenderness. There is no guarding or rebound.      Hernia: No hernia is present.   Musculoskeletal:      Right lower leg: No edema.      Left lower leg: No edema.   Skin:     General: Skin is warm and dry.   Neurological:      Mental Status: She is alert and oriented to person, place, and time.      Motor: No weakness.      Comments: Oriented to self, place and time             Significant Labs: All pertinent labs within the past 24 hours have been reviewed.    Significant Imaging: I have reviewed all pertinent imaging results/findings within the past 24 hours.

## 2024-11-17 PROCEDURE — 11000001 HC ACUTE MED/SURG PRIVATE ROOM

## 2024-11-17 PROCEDURE — 25000003 PHARM REV CODE 250

## 2024-11-17 RX ADMIN — LACOSAMIDE 50 MG: 50 TABLET, FILM COATED ORAL at 09:11

## 2024-11-17 RX ADMIN — THERA TABS 1 TABLET: TAB at 09:11

## 2024-11-17 RX ADMIN — LACOSAMIDE 50 MG: 50 TABLET, FILM COATED ORAL at 08:11
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
## 2024-11-17 NOTE — SUBJECTIVE & OBJECTIVE
Interval History: NAEON. AF. HDS. RA. Patient is resting comfortably without any complaints. Pending nursing home placement after legal guardianship pending court date (12/16     Review of Systems   Constitutional:  Negative for chills and fever.   Respiratory:  Negative for cough, chest tightness and shortness of breath.    Cardiovascular:  Negative for leg swelling.   Gastrointestinal:  Negative for abdominal pain, constipation, diarrhea, nausea and vomiting.   Genitourinary:  Negative for dysuria.   Neurological:  Negative for dizziness, numbness and headaches.   Psychiatric/Behavioral:  Negative for agitation, behavioral problems and confusion.    All other systems reviewed and are negative.    Objective:     Vital Signs (Most Recent):  Temp: 98.6 °F (37 °C) (11/17/24 0717)  Pulse: 74 (11/17/24 0717)  Resp: 18 (11/17/24 0717)  BP: 130/83 (11/17/24 0717)  SpO2: 95 % (11/17/24 0717) Vital Signs (24h Range):  Temp:  [98.1 °F (36.7 °C)-98.6 °F (37 °C)] 98.6 °F (37 °C)  Pulse:  [74-79] 74  Resp:  [18] 18  SpO2:  [95 %-96 %] 95 %  BP: (111-130)/(70-83) 130/83     Weight: 49.9 kg (110 lb 0.2 oz)  Body mass index is 20.12 kg/m².    Intake/Output Summary (Last 24 hours) at 11/17/2024 1126  Last data filed at 11/17/2024 0928  Gross per 24 hour   Intake 600 ml   Output --   Net 600 ml         Physical Exam  Constitutional:       General: She is not in acute distress.     Appearance: Normal appearance. She is not ill-appearing.   HENT:      Head: Normocephalic and atraumatic.      Right Ear: External ear normal.      Left Ear: External ear normal.      Nose: Nose normal.      Mouth/Throat:      Mouth: Mucous membranes are moist.   Eyes:      Conjunctiva/sclera: Conjunctivae normal.   Cardiovascular:      Rate and Rhythm: Normal rate and regular rhythm.      Pulses: Normal pulses.      Heart sounds: Normal heart sounds. No murmur heard.  Pulmonary:      Effort: Pulmonary effort is normal. No respiratory distress.      Breath  sounds: Normal breath sounds.   Abdominal:      General: There is no distension.      Palpations: Abdomen is soft. There is no mass.      Tenderness: There is no abdominal tenderness. There is no guarding or rebound.      Hernia: No hernia is present.   Musculoskeletal:      Right lower leg: No edema.      Left lower leg: No edema.   Skin:     General: Skin is warm and dry.   Neurological:      Mental Status: She is alert and oriented to person, place, and time.      Motor: No weakness.      Comments: Oriented to self, place and time             Significant Labs: All pertinent labs within the past 24 hours have been reviewed.    Significant Imaging: I have reviewed all pertinent imaging results/findings within the past 24 hours.

## 2024-11-18 PROCEDURE — 11000001 HC ACUTE MED/SURG PRIVATE ROOM

## 2024-11-18 PROCEDURE — 25000003 PHARM REV CODE 250

## 2024-11-18 RX ADMIN — LACOSAMIDE 50 MG: 50 TABLET, FILM COATED ORAL at 09:11

## 2024-11-18 RX ADMIN — THERA TABS 1 TABLET: TAB at 09:11

## 2024-11-18 NOTE — SUBJECTIVE & OBJECTIVE
Interval History: NAEON. Patient sitting up in bed eating breakfast. No particular complaints. Pending interdiction court date on 12/16 for legal guardianship.     Review of Systems   Constitutional:  Negative for chills and fever.   Respiratory:  Negative for shortness of breath.    Cardiovascular:  Negative for chest pain.   Gastrointestinal:  Negative for abdominal pain.   Neurological:  Negative for headaches.   Psychiatric/Behavioral:  Positive for confusion. Negative for agitation.    All other systems reviewed and are negative.    Objective:     Vital Signs (Most Recent):  Temp: 97.8 °F (36.6 °C) (11/18/24 0805)  Pulse: 77 (11/18/24 0805)  Resp: 17 (11/18/24 0805)  BP: 135/80 (11/18/24 0805)  SpO2: 99 % (11/17/24 1954) Vital Signs (24h Range):  Temp:  [97.8 °F (36.6 °C)-98.1 °F (36.7 °C)] 97.8 °F (36.6 °C)  Pulse:  [77-79] 77  Resp:  [17-18] 17  SpO2:  [99 %] 99 %  BP: (110-135)/(75-80) 135/80     Weight: 49.9 kg (110 lb 0.2 oz)  Body mass index is 20.12 kg/m².  No intake or output data in the 24 hours ending 11/18/24 1359      Physical Exam  Vitals and nursing note reviewed.   Constitutional:       General: She is not in acute distress.     Appearance: Normal appearance. She is not ill-appearing.   HENT:      Head: Normocephalic and atraumatic.      Right Ear: External ear normal.      Left Ear: External ear normal.   Cardiovascular:      Rate and Rhythm: Normal rate and regular rhythm.      Pulses: Normal pulses.      Heart sounds: Normal heart sounds.   Pulmonary:      Effort: Pulmonary effort is normal.      Breath sounds: Normal breath sounds.   Abdominal:      General: Abdomen is flat. There is no distension.      Palpations: Abdomen is soft.      Tenderness: There is no abdominal tenderness.   Musculoskeletal:      Right lower leg: No edema.      Left lower leg: No edema.   Skin:     General: Skin is warm and dry.   Neurological:      Mental Status: She is alert. Mental status is at baseline.       Comments: Not oriented to why she needs to stay in the hospital
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
## 2024-11-19 PROCEDURE — 25000003 PHARM REV CODE 250

## 2024-11-19 PROCEDURE — 11000001 HC ACUTE MED/SURG PRIVATE ROOM

## 2024-11-19 RX ADMIN — LACOSAMIDE 50 MG: 50 TABLET, FILM COATED ORAL at 09:11

## 2024-11-19 RX ADMIN — THERA TABS 1 TABLET: TAB at 09:11

## 2024-11-19 NOTE — PLAN OF CARE
CM called in [facility], faxed PasRR to [facility].  CM uploaded PasRR to [facility].  CM sent MD progress note to [facility].    12:34 PM  142 not received; CM refaxed PasRR to [facility].    MEGHAN CantuN, BS, RN, CCM

## 2024-11-19 NOTE — SUBJECTIVE & OBJECTIVE
Interval History: No acute concerns from patient. She reports she does not like to wear socks at night because they cause her feet to swell.    Review of Systems   Constitutional:  Negative for activity change, appetite change, chills and fever.   Respiratory:  Negative for cough and shortness of breath.    Cardiovascular:  Negative for chest pain and leg swelling.   Gastrointestinal:  Negative for abdominal pain, diarrhea, nausea and vomiting.   Musculoskeletal:  Negative for arthralgias and back pain.   Skin:  Negative for color change and rash.   Neurological:  Negative for light-headedness and headaches.     Objective:     Vital Signs (Most Recent):  Temp: 98 °F (36.7 °C) (11/19/24 0707)  Pulse: (!) 119 (11/19/24 0707)  Resp: 18 (11/19/24 0707)  BP: 119/77 (11/19/24 0707)  SpO2: (!) 80 % (11/19/24 0707) Vital Signs (24h Range):  Temp:  [98 °F (36.7 °C)] 98 °F (36.7 °C)  Pulse:  [119] 119  Resp:  [18] 18  SpO2:  [80 %] 80 %  BP: (119)/(77) 119/77     Weight: 49.9 kg (110 lb 0.2 oz)  Body mass index is 20.12 kg/m².    Intake/Output Summary (Last 24 hours) at 11/19/2024 1141  Last data filed at 11/19/2024 0943  Gross per 24 hour   Intake 240 ml   Output --   Net 240 ml         Physical Exam  Constitutional:       General: She is not in acute distress.     Appearance: Normal appearance. She is normal weight.   HENT:      Head: Normocephalic and atraumatic.      Nose: No congestion or rhinorrhea.   Eyes:      General: No scleral icterus.     Conjunctiva/sclera: Conjunctivae normal.   Cardiovascular:      Rate and Rhythm: Normal rate and regular rhythm.      Heart sounds: Normal heart sounds. No murmur heard.  Pulmonary:      Effort: Pulmonary effort is normal.      Breath sounds: Normal breath sounds. No wheezing or rales.   Abdominal:      General: Abdomen is flat. Bowel sounds are normal.      Palpations: Abdomen is soft.   Musculoskeletal:         General: No swelling or tenderness.   Skin:     General: Skin is  warm and dry.      Findings: No rash.   Neurological:      Mental Status: She is alert. Mental status is at baseline.      Comments: Some confusion (baseline)   Psychiatric:         Mood and Affect: Mood normal.         Behavior: Behavior normal.             Significant Labs: All pertinent labs within the past 24 hours have been reviewed.  None    Significant Imaging: I have reviewed all pertinent imaging results/findings within the past 24 hours.  No new imaging within last 24 hours.

## 2024-11-19 NOTE — ASSESSMENT & PLAN NOTE
No recent episodes of agitation. Patient has remained calm and cooperative.      Plan  - PRN zyprexa  - Hold physical restraints unless absolutely needed.
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
## 2024-11-19 NOTE — ASSESSMENT & PLAN NOTE
8/30 patient had a witnessed seizure for 3 minutes with jerking of the left arm and right leg, with post-ictal state. Labs with anion gap acidosis, otherwise no findings. Glucose 124. CMP, CBC, lactic acid, CPK WNL. Ionized calcium 1.02. CT head no evidence of acute intracranial abnormalities. No provoking factor identified in labs/history.  Per Neuro, low suspicion that rivastigmine triggered seizure, but not opposed to holding medication.     EEG: abnormal study due to moderate diffuse background slowing consistent with diffuse cerebral dysfunction and encephalopathy which may be on the basis of toxic, metabolic, or primary neuronal disorder.     9/13 patient suffered a second witnessed seizure. Episode lasted approx 10 minutes, during which patient was foaming at the mouth and leaning to the right. She received 1 mg Ativan IM. Lactate elevated. On evaluation 9/13 AM at baseline. Given she has now had two clinical events, will start AED for seizure prevention per neuro recs.     No recurrence or recent seizures     Plan  - Continue Lacosamide 50 mg BID PO   - Outpatient f/u with neurology   - Seizure precautions   - PRN rectal diazepam for GTC>5 min

## 2024-11-19 NOTE — ASSESSMENT & PLAN NOTE
78-year-old lady here with encephalopathy, secondary to worsening dementia.  Clinically her presentation is not concering for acute sepsis, or stroke.        Extensive workup for AMS has been negative. Neurology suspects her underlying dementia is driving her presentation. Patient very resistant to discharging to SNF or any facility. Per our team and Psychiatry the patient does not show decision making capacity. Son prefers SNF or facility placement. However, patient threatened and attempted to leave AMA on 7/15 so she was PEC'd.  PEC is to prevent AMA but she does not require further psychological stabilization, discuss with legal need for PEC regarding capacity to leave AMA.         Plan:  - CEC  on . Patient has situational capacity. Friend David resigned as MPOA.    - PRN zyprexa.   - Court date assigned ()   - Once she is assigned a legal guardian, will attempt safe placement possibly into memory care.  - she has accepting facilities, her paperwork is up to date. Unable to move forward until pt has a legal guardian.

## 2024-11-20 PROCEDURE — 11000001 HC ACUTE MED/SURG PRIVATE ROOM

## 2024-11-20 PROCEDURE — 25000003 PHARM REV CODE 250

## 2024-11-20 RX ADMIN — LACOSAMIDE 50 MG: 50 TABLET, FILM COATED ORAL at 08:11

## 2024-11-20 RX ADMIN — THERA TABS 1 TABLET: TAB at 09:11

## 2024-11-20 RX ADMIN — LACOSAMIDE 50 MG: 50 TABLET, FILM COATED ORAL at 09:11

## 2024-11-20 NOTE — PLAN OF CARE
Recommendation/Intervention:   1) Continue current regular diet as tolerated, encourage PO intake     2) If PO intake <50%, recommend Boost Plus TID to help meet needs     3) RD to monitor wt, PO intake, skin, labs.     Goals: meet % een/epn by next RD f/u  Nutrition Goal Status: goal met  Communication of RD Recs: other (comment) (poc)
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
## 2024-11-20 NOTE — PROGRESS NOTES
"[facility] - Internal Medicine Telemetry  Adult Nutrition  Progress Note    SUMMARY     Recommendation/Intervention:   1) Continue current regular diet as tolerated, encourage PO intake     2) If PO intake <50%, recommend Boost Plus TID to help meet needs     3) RD to monitor wt, PO intake, skin, labs.    Goals: meet % een/epn by next RD f/u  Nutrition Goal Status: goal met  Communication of RD Recs: other (comment) (poc)    Assessment and Plan    No acute nutrition diagnosis at this time    Reason for Assessment    Reason For Assessment: RD follow-up  Diagnosis: other (see comments) (Cognitive and behavioral changes)  General Information Comments: Pt followed by RD team. Remains on regular diet with good appetite, consuming 100% at recent meals. No N/V/D. Some confusion (baseline) per MD. Pt w/ no indicators for malnutrition at this time. RD following.  Nutrition Discharge Planning: genral healthful diet    Nutrition Risk Screen    Nutrition Risk Screen: no indicators present    Nutrition/Diet History    Spiritual, Cultural Beliefs, Yazidi Practices, Values that Affect Care: no  Food Allergies: NKFA    Anthropometrics    Temp: 98.1 °F (36.7 °C)  Height Method: Stated  Height: 5' 2" (157.5 cm)  Height (inches): 62 in  Weight Method: Bed Scale  Weight: 49.9 kg (110 lb 0.2 oz)  Weight (lb): 110.01 lb  Ideal Body Weight (IBW), Female: 110 lb  % Ideal Body Weight, Female (lb): 100 %  BMI (Calculated): 20.1  BMI Grade: 18.5-24.9 - normal       Lab/Procedures/Meds    Pertinent Labs Reviewed: reviewed  Pertinent Labs Comments: Hct: 36.5, BUN: 27, eGFR: 51.8  Pertinent Medications Reviewed: reviewed   lacosamide  50 mg Oral Q12H    multivitamin  1 tablet Oral Daily       Estimated/Assessed Needs    Weight Used For Calorie Calculations: 49.9 kg (110 lb 0.2 oz)  Energy Calorie Requirements (kcal): 1633-9441 (25-30kcal/kg)  Energy Need Method: Kcal/kg  Protein Requirements: 60 (1.2 g/kg)  Weight Used For Protein " Calculations: 49.9 kg (110 lb 0.2 oz)     Estimated Fluid Requirement Method: RDA Method  RDA Method (mL): 1247     Nutrition Prescription Ordered    Current Diet Order: Regular    Evaluation of Received Nutrient/Fluid Intake    I/O: + 4.6 L since 11/6  Energy Calories Required: meeting needs  Protein Required: meeting needs  Fluid Required:  (as per MD)  Comments: LBM 11/19  Tolerance: tolerating  % Intake of Estimated Energy Needs: 75 - 100 %  % Meal Intake: 75 - 100 %    Nutrition Risk    Level of Risk/Frequency of Follow-up: low - moderate (f/u q5-7 days)     Monitor and Evaluation    Food and Nutrient Intake: energy intake, food and beverage intake  Food and Nutrient Adminstration: diet order  Physical Activity and Function: nutrition-related ADLs and IADLs  Anthropometric Measurements: height/length, weight, weight change, body mass index  Biochemical Data, Medical Tests and Procedures: electrolyte and renal panel, gastrointestinal profile, glucose/endocrine profile, inflammatory profile, lipid profile     Nutrition Follow-Up    RD Follow-up?: Yes

## 2024-11-21 PROCEDURE — 25000003 PHARM REV CODE 250

## 2024-11-21 PROCEDURE — 11000001 HC ACUTE MED/SURG PRIVATE ROOM

## 2024-11-21 RX ADMIN — THERA TABS 1 TABLET: TAB at 08:11

## 2024-11-21 RX ADMIN — LACOSAMIDE 50 MG: 50 TABLET, FILM COATED ORAL at 08:11
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
## 2024-11-21 NOTE — SUBJECTIVE & OBJECTIVE
Interval History: NAEON. Patient planning to do rosary at 9 am today. No complaints, HDS, on room air.     Review of Systems   HENT:  Negative for postnasal drip and rhinorrhea.    Respiratory:  Negative for cough and shortness of breath.    Cardiovascular:  Negative for chest pain.   Gastrointestinal:  Negative for abdominal pain.     Objective:     Vital Signs (Most Recent):  Temp: 98.2 °F (36.8 °C) (11/21/24 0827)  Pulse: 81 (11/21/24 0827)  Resp: 18 (11/21/24 0827)  BP: 138/87 (11/21/24 0827)  SpO2: 97 % (11/21/24 0827) Vital Signs (24h Range):  Temp:  [98.1 °F (36.7 °C)-98.2 °F (36.8 °C)] 98.2 °F (36.8 °C)  Pulse:  [81-87] 81  Resp:  [18] 18  SpO2:  [97 %-98 %] 97 %  BP: (119-138)/(77-87) 138/87     Weight: 49.9 kg (110 lb 0.2 oz)  Body mass index is 20.12 kg/m².  No intake or output data in the 24 hours ending 11/21/24 0848      Physical Exam  Constitutional:       General: She is not in acute distress.     Appearance: Normal appearance. She is not ill-appearing.   HENT:      Head: Normocephalic and atraumatic.      Mouth/Throat:      Mouth: Mucous membranes are moist.   Eyes:      Conjunctiva/sclera: Conjunctivae normal.   Cardiovascular:      Rate and Rhythm: Normal rate and regular rhythm.      Heart sounds: Normal heart sounds. No murmur heard.  Pulmonary:      Effort: Pulmonary effort is normal. No respiratory distress.      Breath sounds: Normal breath sounds.   Musculoskeletal:      Right lower leg: No edema.      Left lower leg: No edema.   Skin:     General: Skin is warm and dry.   Neurological:      Mental Status: She is alert. She is disoriented.      Comments: Not oriented as to why she must remain in hospital.              Significant Labs: All pertinent labs within the past 24 hours have been reviewed.    Significant Imaging: I have reviewed all pertinent imaging results/findings within the past 24 hours.

## 2024-11-22 PROCEDURE — 25000003 PHARM REV CODE 250

## 2024-11-22 PROCEDURE — 11000001 HC ACUTE MED/SURG PRIVATE ROOM

## 2024-11-22 RX ADMIN — LACOSAMIDE 50 MG: 50 TABLET, FILM COATED ORAL at 09:11

## 2024-11-22 RX ADMIN — THERA TABS 1 TABLET: TAB at 10:11

## 2024-11-22 RX ADMIN — LACOSAMIDE 50 MG: 50 TABLET, FILM COATED ORAL at 10:11

## 2024-11-22 NOTE — SUBJECTIVE & OBJECTIVE
Interval History: NAEON. No complaints. Patient suspicious of [place], states that she believes it may have something to do with the drinking water. She states that it good be something good, could be something bad.   HDS, on room air, afebrile.     Review of Systems   Respiratory:  Negative for shortness of breath.    Cardiovascular:  Negative for chest pain.   Gastrointestinal:  Negative for abdominal distention.     Objective:     Vital Signs (Most Recent):  Temp: 97.7 °F (36.5 °C) (11/21/24 2058)  Pulse: 82 (11/21/24 2058)  Resp: 18 (11/21/24 2058)  BP: (!) 151/71 (11/21/24 2058)  SpO2: 96 % (11/21/24 2058) Vital Signs (24h Range):  Temp:  [97.7 °F (36.5 °C)] 97.7 °F (36.5 °C)  Pulse:  [82] 82  Resp:  [18] 18  SpO2:  [96 %] 96 %  BP: (151)/(71) 151/71     Weight: 49.9 kg (110 lb 0.2 oz)  Body mass index is 20.12 kg/m².  No intake or output data in the 24 hours ending 11/22/24 1144      Physical Exam  Constitutional:       General: She is not in acute distress.     Appearance: She is not ill-appearing.   HENT:      Mouth/Throat:      Mouth: Mucous membranes are moist.   Eyes:      Conjunctiva/sclera: Conjunctivae normal.   Cardiovascular:      Rate and Rhythm: Normal rate and regular rhythm.      Heart sounds: Normal heart sounds. No murmur heard.  Pulmonary:      Effort: Pulmonary effort is normal. No respiratory distress.      Breath sounds: Normal breath sounds.   Abdominal:      General: Abdomen is flat.   Musculoskeletal:      Right lower leg: No edema.      Left lower leg: No edema.   Skin:     General: Skin is warm and dry.   Neurological:      Mental Status: She is alert. Mental status is at baseline.   Psychiatric:         Thought Content: Thought content is paranoid.             Significant Labs: All pertinent labs within the past 24 hours have been reviewed.    Significant Imaging: I have reviewed all pertinent imaging results/findings within the past 24 hours.
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
## 2024-11-23 PROCEDURE — 25000003 PHARM REV CODE 250

## 2024-11-23 PROCEDURE — 11000001 HC ACUTE MED/SURG PRIVATE ROOM

## 2024-11-23 RX ADMIN — LACOSAMIDE 50 MG: 50 TABLET, FILM COATED ORAL at 09:11

## 2024-11-23 RX ADMIN — THERA TABS 1 TABLET: TAB at 08:11

## 2024-11-23 RX ADMIN — LACOSAMIDE 50 MG: 50 TABLET, FILM COATED ORAL at 08:11

## 2024-11-23 NOTE — SUBJECTIVE & OBJECTIVE
Interval History: NAEON. AF, HDS. No specific complaints this morning. Patient and sitter planning for a walk later in the day.     Objective:     Vital Signs (Most Recent):  Temp: 98.8 °F (37.1 °C) (11/22/24 2041)  Pulse: 93 (11/22/24 2041)  Resp: 18 (11/22/24 2041)  BP: 110/71 (11/22/24 2041)  SpO2: 97 % (11/22/24 2041) Vital Signs (24h Range):  Temp:  [98.5 °F (36.9 °C)-98.8 °F (37.1 °C)] 98.8 °F (37.1 °C)  Pulse:  [90-93] 93  Resp:  [17-18] 18  SpO2:  [97 %] 97 %  BP: (110-145)/(71-76) 110/71     Weight: 49.9 kg (110 lb 0.2 oz)  Body mass index is 20.12 kg/m².  No intake or output data in the 24 hours ending 11/23/24 1436      Physical Exam  Constitutional:       General: She is not in acute distress.     Appearance: She is not ill-appearing.   HENT:      Head: Normocephalic and atraumatic.   Cardiovascular:      Rate and Rhythm: Normal rate and regular rhythm.      Heart sounds: Normal heart sounds.   Pulmonary:      Effort: Pulmonary effort is normal. No respiratory distress.   Abdominal:      General: Abdomen is flat.   Musculoskeletal:      Right lower leg: No edema.      Left lower leg: No edema.   Skin:     General: Skin is warm and dry.   Neurological:      Mental Status: She is alert. Mental status is at baseline.   Psychiatric:         Thought Content: Thought content is paranoid.             Significant Labs: All pertinent labs within the past 24 hours have been reviewed.    Significant Imaging: I have reviewed all pertinent imaging results/findings within the past 24 hours.
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
## 2024-11-23 NOTE — PLAN OF CARE
Problem: Adult Inpatient Plan of Care  Goal: Plan of Care Review  Outcome: Not Progressing  Goal: Optimal Comfort and Wellbeing  Outcome: Not Progressing     Problem: Seizure, Active Management  Goal: Absence of Seizure/Seizure-Related Injury  Outcome: Progressing

## 2024-11-24 PROCEDURE — 25000003 PHARM REV CODE 250

## 2024-11-24 PROCEDURE — 11000001 HC ACUTE MED/SURG PRIVATE ROOM

## 2024-11-24 RX ADMIN — THERA TABS 1 TABLET: TAB at 09:11

## 2024-11-24 RX ADMIN — LACOSAMIDE 50 MG: 50 TABLET, FILM COATED ORAL at 09:11

## 2024-11-24 RX ADMIN — LACOSAMIDE 50 MG: 50 TABLET, FILM COATED ORAL at 08:11

## 2024-11-24 NOTE — SUBJECTIVE & OBJECTIVE
Interval History: NAEON. HDS, afebrile, on room air. Unfortunately unable to attend [facility] this morning.     Review of Systems   Respiratory:  Negative for shortness of breath.    Cardiovascular:  Negative for chest pain.   Gastrointestinal:  Negative for abdominal pain.     Objective:     Vital Signs (Most Recent):  Temp: 98.2 °F (36.8 °C) (11/24/24 0727)  Pulse: 80 (11/24/24 0727)  Resp: 17 (11/24/24 0727)  BP: 97/68 (11/24/24 0727)  SpO2: 97 % (11/24/24 0727) Vital Signs (24h Range):  Temp:  [98.2 °F (36.8 °C)-98.6 °F (37 °C)] 98.2 °F (36.8 °C)  Pulse:  [74-98] 80  Resp:  [17-18] 17  SpO2:  [93 %-98 %] 97 %  BP: ()/(68-80) 97/68     Weight: 49.9 kg (110 lb 0.2 oz)  Body mass index is 20.12 kg/m².  No intake or output data in the 24 hours ending 11/24/24 1351      Physical Exam  Constitutional:       General: She is not in acute distress.     Appearance: Normal appearance. She is not ill-appearing.   HENT:      Head: Normocephalic and atraumatic.      Nose: Nose normal.      Mouth/Throat:      Mouth: Mucous membranes are moist.   Eyes:      Conjunctiva/sclera: Conjunctivae normal.   Cardiovascular:      Rate and Rhythm: Normal rate and regular rhythm.      Heart sounds: No murmur heard.  Pulmonary:      Effort: No respiratory distress.   Abdominal:      General: Abdomen is flat.   Musculoskeletal:      Right lower leg: No edema.      Left lower leg: No edema.   Skin:     General: Skin is warm and dry.   Neurological:      Mental Status: She is alert.      Comments: Confused as to why she must remain in the hospital              Significant Labs: All pertinent labs within the past 24 hours have been reviewed.    Significant Imaging: I have reviewed all pertinent imaging results/findings within the past 24 hours.
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
## 2024-11-25 PROCEDURE — 25000003 PHARM REV CODE 250

## 2024-11-25 PROCEDURE — 11000001 HC ACUTE MED/SURG PRIVATE ROOM

## 2024-11-25 RX ADMIN — THERA TABS 1 TABLET: TAB at 08:11

## 2024-11-25 RX ADMIN — LACOSAMIDE 50 MG: 50 TABLET, FILM COATED ORAL at 08:11

## 2024-11-25 NOTE — PROGRESS NOTES
Resume care of patient until end of shift (1915). Pt in room with sitter watching tv in no distress.

## 2024-11-25 NOTE — PLAN OF CARE
[facility] - Internal Medicine Telemetry  Discharge Reassessment    Primary Care Provider: Edwar Castaneda II, MD    Expected Discharge Date: 12/23/2024    Reassessment (most recent)       Discharge Reassessment - 11/25/24 1207          Discharge Reassessment    Assessment Type Discharge Planning Reassessment (P)      Did the patient's condition or plan change since previous assessment? No (P)      Discharge Plan discussed with: Patient (P)      Communicated MARVEL with patient/caregiver No (P)      Discharge Plan A New Nursing Home placement - care home care facility (P)      Discharge Plan B New Nursing Home placement - care home care facility (P)      DME Needed Upon Discharge  none (P)      Transition of Care Barriers None (P)      Why the patient remains in the hospital Placement issues (P)         Post-Acute Status    Post-Acute Authorization Placement (P)      Post-Acute Placement Status Referrals Sent (P)      Coverage [facility] (P)      Discharge Delays Post-Acute Set-up (P)                  CM spoke with pt in room.  DC plan new nursing home placement.  Pt verbalized no questions or concerns.    MARTA Cantu, BS, RN, CCM      Discharge Plan A and Plan B have been determined by review of patient's clinical status, future medical and therapeutic needs, and coverage/benefits for post-acute care in coordination with multidisciplinary team members.
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
## 2024-11-25 NOTE — SUBJECTIVE & OBJECTIVE
Interval History: NAEON. VSS, afebrile. Feeling good today.     Review of Systems   Respiratory:  Negative for shortness of breath.    Cardiovascular:  Negative for chest pain.   Gastrointestinal:  Negative for abdominal pain.     Objective:     Vital Signs (Most Recent):  Temp: 98.3 °F (36.8 °C) (11/25/24 0739)  Pulse: 74 (11/25/24 0739)  Resp: 18 (11/25/24 0739)  BP: 113/75 (11/25/24 0739)  SpO2: (!) 92 % (11/25/24 0739) Vital Signs (24h Range):  Temp:  [98.3 °F (36.8 °C)-98.4 °F (36.9 °C)] 98.3 °F (36.8 °C)  Pulse:  [74-85] 74  Resp:  [18] 18  SpO2:  [92 %-95 %] 92 %  BP: (113-115)/(73-75) 113/75     Weight: 49.9 kg (110 lb 0.2 oz)  Body mass index is 20.12 kg/m².  No intake or output data in the 24 hours ending 11/25/24 1409      Physical Exam  Constitutional:       General: She is not in acute distress.     Appearance: Normal appearance. She is not ill-appearing.   HENT:      Head: Normocephalic and atraumatic.      Nose: Nose normal.      Mouth/Throat:      Mouth: Mucous membranes are moist.   Eyes:      Conjunctiva/sclera: Conjunctivae normal.   Cardiovascular:      Rate and Rhythm: Normal rate and regular rhythm.      Heart sounds: No murmur heard.  Pulmonary:      Effort: No respiratory distress.   Abdominal:      General: Abdomen is flat.   Musculoskeletal:      Right lower leg: No edema.      Left lower leg: No edema.   Skin:     General: Skin is warm and dry.   Neurological:      Mental Status: She is alert.      Comments: Confused as to why she must remain in the hospital              Significant Labs: All pertinent labs within the past 24 hours have been reviewed.    Significant Imaging: I have reviewed all pertinent imaging results/findings within the past 24 hours.

## 2024-11-26 PROCEDURE — 25000003 PHARM REV CODE 250

## 2024-11-26 PROCEDURE — 11000001 HC ACUTE MED/SURG PRIVATE ROOM

## 2024-11-26 RX ADMIN — THERA TABS 1 TABLET: TAB at 08:11

## 2024-11-26 RX ADMIN — LACOSAMIDE 50 MG: 50 TABLET, FILM COATED ORAL at 08:11

## 2024-11-26 NOTE — SUBJECTIVE & OBJECTIVE
Interval History: NAEON. HDS, on RA, afebrile. Enjoying picture of dog in room, tearful when reflecting on home pets. Trying to convince sitter to adopt an animal.    Review of Systems   Unable to perform ROS: Dementia   HENT:  Negative for rhinorrhea.    Respiratory:  Negative for cough and shortness of breath.    Cardiovascular:  Negative for chest pain.   Gastrointestinal:  Negative for abdominal pain.     Objective:     Vital Signs (Most Recent):  Temp: 97.6 °F (36.4 °C) (11/26/24 0800)  Pulse: 93 (11/26/24 0800)  Resp: 18 (11/26/24 0800)  BP: 131/62 (11/26/24 0800)  SpO2: 97 % (11/26/24 0800) Vital Signs (24h Range):  Temp:  [97.6 °F (36.4 °C)-98 °F (36.7 °C)] 97.6 °F (36.4 °C)  Pulse:  [85-93] 93  Resp:  [18] 18  SpO2:  [97 %] 97 %  BP: (106-131)/(62-68) 131/62     Weight: 49.9 kg (110 lb 0.2 oz)  Body mass index is 20.12 kg/m².    Intake/Output Summary (Last 24 hours) at 11/26/2024 1135  Last data filed at 11/25/2024 2043  Gross per 24 hour   Intake 240 ml   Output --   Net 240 ml         Physical Exam  Constitutional:       General: She is not in acute distress.     Appearance: She is not ill-appearing.   HENT:      Head: Normocephalic and atraumatic.      Nose: Nose normal.      Mouth/Throat:      Mouth: Mucous membranes are moist.   Eyes:      Conjunctiva/sclera: Conjunctivae normal.   Cardiovascular:      Rate and Rhythm: Normal rate and regular rhythm.      Heart sounds: Normal heart sounds.   Pulmonary:      Effort: Pulmonary effort is normal. No respiratory distress.      Breath sounds: Normal breath sounds.   Abdominal:      General: Abdomen is flat. There is no distension.   Musculoskeletal:      Right lower leg: No edema.      Left lower leg: No edema.   Skin:     General: Skin is warm and dry.   Neurological:      Mental Status: She is alert. She is disoriented.      Comments: Disoriented about why she is still in the hospital.              Significant Labs: All pertinent labs within the past 24  hours have been reviewed.    Significant Imaging: I have reviewed all pertinent imaging results/findings within the past 24 hours.
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
## 2024-11-26 NOTE — PLAN OF CARE
CM sent updated progress note to [facility].    MEGHAN CantuN, BS, RN, CCM

## 2024-11-27 PROCEDURE — 11000001 HC ACUTE MED/SURG PRIVATE ROOM

## 2024-11-27 PROCEDURE — 25000003 PHARM REV CODE 250

## 2024-11-27 RX ORDER — POLYETHYLENE GLYCOL 3350 17 G/17G
17 POWDER, FOR SOLUTION ORAL DAILY
Status: DISCONTINUED | OUTPATIENT
Start: 2024-11-27 | End: 2025-01-10 | Stop reason: HOSPADM

## 2024-11-27 RX ORDER — AMOXICILLIN 250 MG
1 CAPSULE ORAL 2 TIMES DAILY
Status: DISCONTINUED | OUTPATIENT
Start: 2024-11-27 | End: 2025-01-10 | Stop reason: HOSPADM

## 2024-11-27 RX ADMIN — LACOSAMIDE 50 MG: 50 TABLET, FILM COATED ORAL at 10:11

## 2024-11-27 RX ADMIN — THERA TABS 1 TABLET: TAB at 10:11

## 2024-11-27 NOTE — PLAN OF CARE
Recommendations     1) Continue current regular diet as tolerated, encourage PO intake     2) If PO intake <50%, recommend Boost Plus TID to help meet needs     3) RD to monitor wt, PO intake, skin, labs.     Goals: meet % een/epn by next RD f/u  Nutrition Goal Status: goal met  Communication of RD Recs: other (comment) (poc)
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
## 2024-11-27 NOTE — PROGRESS NOTES
"[facility] - Internal Medicine Telemetry  Adult Nutrition  Progress Note    SUMMARY       Recommendations    1) Continue current regular diet as tolerated, encourage PO intake     2) If PO intake <50%, recommend Boost Plus TID to help meet needs     3) RD to monitor wt, PO intake, skin, labs.    Goals: meet % een/epn by next RD f/u  Nutrition Goal Status: goal met  Communication of RD Recs: other (comment) (poc)     Assessment and Plan    No acute nutrition diagnosis at this time      Reason for Assessment    Reason For Assessment: RD follow-up  Diagnosis: other (see comments) (Cognitive and behavioral changes)  General Information Comments: Pt tolerating meals w/ % meal consumption. Pt w/ no indicators for malnutrition at this time.  Nutrition Discharge Planning: general healthful diet  Nutrition Related Social Determinants of Health: SDOH: None Identified       Nutrition Risk Screen    Nutrition Risk Screen: no indicators present    Nutrition/Diet History    Spiritual, Cultural Beliefs, Christian Practices, Values that Affect Care: no  Food Allergies: NKFA    Anthropometrics    Temp: 98.6 °F (37 °C)  Height Method: Stated  Height: 5' 2" (157.5 cm)  Height (inches): 62 in  Weight Method: Bed Scale  Weight: 49.9 kg (110 lb 0.2 oz)  Weight (lb): 110.01 lb  Ideal Body Weight (IBW), Female: 110 lb  % Ideal Body Weight, Female (lb): 100 %  BMI (Calculated): 20.1  BMI Grade: 18.5-24.9 - normal       Lab/Procedures/Meds    Pertinent Labs Reviewed: reviewed  Pertinent Labs Comments: Hct: 36.5, BUN: 27, eGFR: 51.8  Pertinent Medications Reviewed: reviewed  Pertinent Medications Comments: MVI    Estimated/Assessed Needs    Weight Used For Calorie Calculations: 49.9 kg (110 lb 0.2 oz)  Energy Calorie Requirements (kcal): 7094-7131 (25-30kcal/kg)  Energy Need Method: Kcal/kg  Protein Requirements: 60 (1.2 g/kg)  Weight Used For Protein Calculations: 49.9 kg (110 lb 0.2 oz)     Estimated Fluid Requirement Method: RDA " Method  RDA Method (mL): 1247         Nutrition Prescription Ordered    Current Diet Order: Regular    Evaluation of Received Nutrient/Fluid Intake    Energy Calories Required: meeting needs  Protein Required: meeting needs  Fluid Required:  (1 ml or fluid as per MD)  Tolerance: tolerating  % Intake of Estimated Energy Needs: 75 - 100 %  % Meal Intake: 75 - 100 %    Nutrition Risk    Level of Risk/Frequency of Follow-up: low (1x/week)    Monitor and Evaluation    Food and Nutrient Intake: energy intake, food and beverage intake  Food and Nutrient Adminstration: diet order  Physical Activity and Function: nutrition-related ADLs and IADLs  Anthropometric Measurements: height/length, weight, weight change, body mass index  Biochemical Data, Medical Tests and Procedures: electrolyte and renal panel, gastrointestinal profile, glucose/endocrine profile, inflammatory profile, lipid profile     Nutrition Follow-Up    RD Follow-up?: Yes  By Elizabeth Lewis, Registration Eligible, PL-LDN

## 2024-11-27 NOTE — SUBJECTIVE & OBJECTIVE
Interval History: NAEON. HDS, on RA, afebrile. No BM charted for 5 days, added Miralax and senna to medicines. She is wearing a fresh shirt today and is in a pleasant mood.     Objective:     Vital Signs (Most Recent):  Temp: 98.5 °F (36.9 °C) (11/27/24 1004)  Pulse: 82 (11/27/24 1004)  Resp: 18 (11/27/24 1004)  BP: 131/77 (11/27/24 1004)  SpO2: 100 % (11/27/24 1004) Vital Signs (24h Range):  Temp:  [98.5 °F (36.9 °C)-98.6 °F (37 °C)] 98.5 °F (36.9 °C)  Pulse:  [82-83] 82  Resp:  [18] 18  SpO2:  [96 %-100 %] 100 %  BP: (115-131)/(77) 131/77     Weight: 49.9 kg (110 lb 0.2 oz)  Body mass index is 20.12 kg/m².  No intake or output data in the 24 hours ending 11/27/24 1020      Physical Exam  Constitutional:       General: She is not in acute distress.     Appearance: Normal appearance. She is not ill-appearing.   HENT:      Head: Normocephalic and atraumatic.      Nose: Nose normal.      Mouth/Throat:      Mouth: Mucous membranes are moist.   Eyes:      Conjunctiva/sclera: Conjunctivae normal.   Cardiovascular:      Rate and Rhythm: Normal rate and regular rhythm.      Heart sounds: Normal heart sounds. No murmur heard.  Pulmonary:      Effort: Pulmonary effort is normal. No respiratory distress.      Breath sounds: Normal breath sounds.   Abdominal:      General: Abdomen is flat.      Palpations: Abdomen is soft.   Musculoskeletal:      Right lower leg: No edema.      Left lower leg: No edema.   Skin:     General: Skin is warm and dry.   Neurological:      Mental Status: She is alert. Mental status is at baseline.             Significant Labs: All pertinent labs within the past 24 hours have been reviewed.    Significant Imaging: I have reviewed all pertinent imaging results/findings within the past 24 hours.

## 2024-11-28 PROCEDURE — 25000003 PHARM REV CODE 250

## 2024-11-28 PROCEDURE — 11000001 HC ACUTE MED/SURG PRIVATE ROOM

## 2024-11-28 RX ADMIN — SENNOSIDES AND DOCUSATE SODIUM 1 TABLET: 50; 8.6 TABLET ORAL at 09:11

## 2024-11-28 RX ADMIN — LACOSAMIDE 50 MG: 50 TABLET, FILM COATED ORAL at 09:11

## 2024-11-28 RX ADMIN — THERA TABS 1 TABLET: TAB at 09:11
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
## 2024-11-28 NOTE — PLAN OF CARE
Problem: Adult Inpatient Plan of Care  Goal: Plan of Care Review  Outcome: Progressing  Goal: Patient-Specific Goal (Individualized)  Outcome: Progressing  Goal: Absence of Hospital-Acquired Illness or Injury  Outcome: Progressing  Goal: Optimal Comfort and Wellbeing  Outcome: Progressing  Goal: Readiness for Transition of Care  Outcome: Progressing     Problem: Fall Injury Risk  Goal: Absence of Fall and Fall-Related Injury  Outcome: Progressing     Problem: Functional Deficit  Goal: Optimal Cognitive Function  Outcome: Progressing     Problem: Comorbidity Management  Goal: Maintenance of Seizure Control  Outcome: Progressing     Problem: Seizure, Active Management  Goal: Absence of Seizure/Seizure-Related Injury  Outcome: Progressing   Pt progressing toward goals. No distress noted. No falls or injuries during shift. Pt bed in lowest position. Side rails x2.  Call bell and personal belongs within reach. Safety precautions maintained.

## 2024-11-28 NOTE — ASSESSMENT & PLAN NOTE
"Patient stated she had regular eye doctor that was taking care of her. States she previously was on drops and got laser treatment (Possibly SLT(?)). States she is no longer on eye drops. Patient denies eye pain, changes in vision, blurry vision.     Ophtho consulted. During interview, patient became visibly upset and yelling about being "locked in halfway". Interview terminated. Unable to assess intraocular pressure. Low suspicion for any acute event. Per chart review, cannot find any previous home eye meds. No complaints of vision     Plan  - Will re-consult ophthalmology if patient becomes more cooperative or acute concerns arise.   "

## 2024-11-29 PROCEDURE — 11000001 HC ACUTE MED/SURG PRIVATE ROOM

## 2024-11-29 PROCEDURE — 25000003 PHARM REV CODE 250

## 2024-11-29 RX ADMIN — SENNOSIDES AND DOCUSATE SODIUM 1 TABLET: 50; 8.6 TABLET ORAL at 09:11

## 2024-11-29 RX ADMIN — LACOSAMIDE 50 MG: 50 TABLET, FILM COATED ORAL at 09:11

## 2024-11-29 RX ADMIN — LACOSAMIDE 50 MG: 50 TABLET, FILM COATED ORAL at 08:11

## 2024-11-29 RX ADMIN — THERA TABS 1 TABLET: TAB at 09:11

## 2024-11-29 NOTE — PLAN OF CARE
Problem: Adult Inpatient Plan of Care  Goal: Plan of Care Review  Outcome: Progressing  Flowsheets (Taken 11/29/2024 0240)  Plan of Care Reviewed With: patient  Goal: Patient-Specific Goal (Individualized)  Outcome: Progressing  Goal: Absence of Hospital-Acquired Illness or Injury  Outcome: Progressing  Intervention: Prevent Skin Injury  Flowsheets (Taken 11/29/2024 0245)  Skin Protection: incontinence pads utilized  Device Skin Pressure Protection: absorbent pad utilized/changed  Intervention: Prevent and Manage VTE (Venous Thromboembolism) Risk  Flowsheets (Taken 11/29/2024 0245)  VTE Prevention/Management:   bleeding precautions maintained   bleeding risk assessed  Intervention: Prevent Infection  Flowsheets (Taken 11/29/2024 0245)  Infection Prevention: hand hygiene promoted  Goal: Optimal Comfort and Wellbeing  Outcome: Progressing  Intervention: Monitor Pain and Promote Comfort  Flowsheets (Taken 11/29/2024 0245)  Pain Management Interventions:   care clustered   pain management plan reviewed with patient/caregiver   quiet environment facilitated  Goal: Readiness for Transition of Care  Outcome: Progressing     Problem: Fall Injury Risk  Goal: Absence of Fall and Fall-Related Injury  Outcome: Progressing  Intervention: Identify and Manage Contributors  Flowsheets (Taken 11/29/2024 0245)  Self-Care Promotion:   independence encouraged   BADL personal objects within reach   BADL personal routines maintained   meal set-up provided     Problem: Seizure, Active Management  Goal: Absence of Seizure/Seizure-Related Injury  Outcome: Progressing     Problem: Functional Deficit  Goal: Optimal Cognitive Function  Outcome: Progressing  Intervention: Optimize Cognitive Function  Flowsheets (Taken 11/29/2024 0245)  Self-Care Promotion:   independence encouraged   BADL personal objects within reach   BADL personal routines maintained   meal set-up provided

## 2024-11-29 NOTE — SUBJECTIVE & OBJECTIVE
Interval History: NAEON. Hemodynamically stable, on room air, and afebrile. No complaints.     Objective:     Vital Signs (Most Recent):  Temp: 98 °F (36.7 °C) (11/29/24 0822)  Pulse: 81 (11/29/24 0822)  Resp: 18 (11/29/24 0822)  BP: 109/74 (11/29/24 0822)  SpO2: 98 % (11/29/24 0822) Vital Signs (24h Range):  Temp:  [98 °F (36.7 °C)-98.5 °F (36.9 °C)] 98 °F (36.7 °C)  Pulse:  [81-88] 81  Resp:  [18] 18  SpO2:  [98 %] 98 %  BP: (109-118)/(74-76) 109/74     Weight: 49.9 kg (110 lb 0.2 oz)  Body mass index is 20.12 kg/m².    Intake/Output Summary (Last 24 hours) at 11/29/2024 1245  Last data filed at 11/29/2024 0253  Gross per 24 hour   Intake 240 ml   Output --   Net 240 ml         Physical Exam        Significant Labs: All pertinent labs within the past 24 hours have been reviewed.    Significant Imaging: I have reviewed all pertinent imaging results/findings within the past 24 hours.
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
## 2024-11-30 PROCEDURE — 11000001 HC ACUTE MED/SURG PRIVATE ROOM

## 2024-11-30 PROCEDURE — 25000003 PHARM REV CODE 250

## 2024-11-30 RX ADMIN — LACOSAMIDE 50 MG: 50 TABLET, FILM COATED ORAL at 08:11

## 2024-11-30 RX ADMIN — THERA TABS 1 TABLET: TAB at 08:11
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
## 2024-11-30 NOTE — PLAN OF CARE
AAOx4; POC reviewed & verbalizes understanding.  Admit DX:  Cognitive & behavioral changes  Afebrile. No acute events on shift. No deficits noted  IV access & IVF: OK to keep out  BM: 11/29/24  : WNL  Appetite: adquate  Bed alarm set; fall precautions maintained.   Bed locked in lowest position, side rails up x2, call light within reach, environment clear. Encouraged pt to call nurse with any concerns.  Safety measures maintained

## 2024-11-30 NOTE — SUBJECTIVE & OBJECTIVE
"Interval History: NAEON. AF, VSS. Sitter present. Patient sitting in chair. Upset/agitated this morning: "What you are doing to me is crude. Imagine if your children were stuck like this."        Objective:     Vital Signs (Most Recent):  Temp: 98 °F (36.7 °C) (11/30/24 0817)  Pulse: 67 (11/30/24 0817)  Resp: 19 (11/30/24 0817)  BP: 120/68 (11/30/24 0817)  SpO2: 96 % (11/30/24 0817) Vital Signs (24h Range):  Temp:  [98 °F (36.7 °C)-98.3 °F (36.8 °C)] 98 °F (36.7 °C)  Pulse:  [67-81] 67  Resp:  [18-19] 19  SpO2:  [96 %] 96 %  BP: (106-120)/(61-68) 120/68     Weight: 49.9 kg (110 lb 0.2 oz)  Body mass index is 20.12 kg/m².    Intake/Output Summary (Last 24 hours) at 11/30/2024 1311  Last data filed at 11/30/2024 0800  Gross per 24 hour   Intake 360 ml   Output --   Net 360 ml         Physical Exam  Vitals and nursing note reviewed.   Constitutional:       General: She is not in acute distress.     Appearance: Normal appearance. She is not ill-appearing.   HENT:      Head: Normocephalic and atraumatic.      Nose: Nose normal.   Cardiovascular:      Rate and Rhythm: Normal rate.   Pulmonary:      Effort: Pulmonary effort is normal. No respiratory distress.   Abdominal:      General: Abdomen is flat. There is no distension.   Musculoskeletal:      Right lower leg: No edema.      Left lower leg: No edema.   Skin:     General: Skin is warm and dry.   Neurological:      Mental Status: She is alert. Mental status is at baseline.             Significant Labs: All pertinent labs within the past 24 hours have been reviewed.    Significant Imaging: I have reviewed all pertinent imaging results/findings within the past 24 hours.  "

## 2024-12-01 PROCEDURE — 25000003 PHARM REV CODE 250

## 2024-12-01 PROCEDURE — 11000001 HC ACUTE MED/SURG PRIVATE ROOM

## 2024-12-01 RX ADMIN — THERA TABS 1 TABLET: TAB at 09:12

## 2024-12-01 RX ADMIN — LACOSAMIDE 50 MG: 50 TABLET, FILM COATED ORAL at 09:12

## 2024-12-01 NOTE — SUBJECTIVE & OBJECTIVE
Interval History: NAEON. Hemodynamically stable, on room, afebrile. Ambulating well, traveled to [place] this morning and is enjoying her coffee, chatting with staff in hallway. In a pleasant mood, no complaints this morning.       Objective:     Vital Signs (Most Recent):  Temp: 98.2 °F (36.8 °C) (12/01/24 0816)  Pulse: 88 (12/01/24 0816)  Resp: 18 (12/01/24 0816)  BP: 111/72 (12/01/24 0816)  SpO2: 96 % (12/01/24 0816) Vital Signs (24h Range):  Temp:  [98.2 °F (36.8 °C)-98.4 °F (36.9 °C)] 98.2 °F (36.8 °C)  Pulse:  [75-88] 88  Resp:  [18] 18  SpO2:  [96 %-97 %] 96 %  BP: (101-111)/(63-72) 111/72     Weight: 49.9 kg (110 lb 0.2 oz)  Body mass index is 20.12 kg/m².    Intake/Output Summary (Last 24 hours) at 12/1/2024 0839  Last data filed at 11/30/2024 2000  Gross per 24 hour   Intake 600 ml   Output --   Net 600 ml         Physical Exam  Constitutional:       General: She is not in acute distress.     Appearance: Normal appearance. She is not ill-appearing.   HENT:      Head: Normocephalic and atraumatic.      Nose: Nose normal.      Mouth/Throat:      Mouth: Mucous membranes are moist.   Eyes:      Conjunctiva/sclera: Conjunctivae normal.   Cardiovascular:      Rate and Rhythm: Normal rate.   Pulmonary:      Effort: Pulmonary effort is normal. No respiratory distress.   Abdominal:      General: Abdomen is flat.   Musculoskeletal:      Right lower leg: No edema.      Left lower leg: No edema.   Neurological:      Mental Status: She is alert. Mental status is at baseline.             Significant Labs: All pertinent labs within the past 24 hours have been reviewed.    Significant Imaging: I have reviewed all pertinent imaging results/findings within the past 24 hours.
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
## 2024-12-01 NOTE — PLAN OF CARE
Problem: Adult Inpatient Plan of Care  Goal: Plan of Care Review  Outcome: Progressing  Goal: Patient-Specific Goal (Individualized)  Outcome: Progressing  Goal: Absence of Hospital-Acquired Illness or Injury  Outcome: Progressing  Goal: Optimal Comfort and Wellbeing  Outcome: Progressing  Goal: Readiness for Transition of Care  Outcome: Progressing     Problem: Fall Injury Risk  Goal: Absence of Fall and Fall-Related Injury  Outcome: Progressing     Problem: Functional Deficit  Goal: Optimal Cognitive Function  Outcome: Progressing     Problem: Comorbidity Management  Goal: Maintenance of Seizure Control  Outcome: Progressing     Problem: Seizure, Active Management  Goal: Absence of Seizure/Seizure-Related Injury  Outcome: Progressing       Pt had an uneventful night. VSS. Pt withdrawn this shift Pt denied any pain or discomfort  Saefty rounds done frequently. Pt is free of falls and injuries. Bed in lowest position and call light within reach. Safety maintained

## 2024-12-02 PROCEDURE — 25000003 PHARM REV CODE 250

## 2024-12-02 PROCEDURE — 11000001 HC ACUTE MED/SURG PRIVATE ROOM

## 2024-12-02 RX ADMIN — ACETAMINOPHEN 650 MG: 325 TABLET ORAL at 12:12

## 2024-12-02 RX ADMIN — THERA TABS 1 TABLET: TAB at 08:12

## 2024-12-02 RX ADMIN — Medication 6 MG: at 08:12

## 2024-12-02 RX ADMIN — LACOSAMIDE 50 MG: 50 TABLET, FILM COATED ORAL at 08:12

## 2024-12-02 RX ADMIN — SENNOSIDES AND DOCUSATE SODIUM 1 TABLET: 50; 8.6 TABLET ORAL at 08:12

## 2024-12-02 NOTE — PLAN OF CARE
Problem: Adult Inpatient Plan of Care  Goal: Optimal Comfort and Wellbeing  Outcome: Progressing     Problem: Seizure, Active Management  Goal: Absence of Seizure/Seizure-Related Injury  Outcome: Progressing    Pt sitting upright in chair. AAOX4 in no apparent distress. Tolerated meds appropriately. Pt c/o headache, Prn tylenol given. MD notified. Sitter at the bedside.   Bed locked, in lowest position, call light in pt reach.
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
## 2024-12-02 NOTE — NURSING
PtT 100.5 orally during vitals assessment. Nurse reacess T andT. 100.0 orally. Pt room is warm . Temp lowered in room Pt in good sprits and denied coughing or chills.  Nurse will reassess T at next vital sign check. Sitter at bed side for safety. Bedin lowest position and call light within reach Safety maintained

## 2024-12-02 NOTE — SUBJECTIVE & OBJECTIVE
Interval History: NAEON. Hemodynamically stable, on room, afebrile. In a pleasant mood, no complaints this morning. Sitter at bedside.     Objective:     Vital Signs (Most Recent):  Temp: 98.8 °F (37.1 °C) (12/02/24 0735)  Pulse: 84 (12/02/24 0807)  Resp: 17 (12/02/24 0735)  BP: (!) 106/59 (12/02/24 0735)  SpO2: 95 % (12/02/24 0735) Vital Signs (24h Range):  Temp:  [98.8 °F (37.1 °C)-100.5 °F (38.1 °C)] 98.8 °F (37.1 °C)  Pulse:  [84-95] 84  Resp:  [17-18] 17  SpO2:  [95 %] 95 %  BP: (106-127)/(59-66) 106/59     Weight: 49.9 kg (110 lb 0.2 oz)  Body mass index is 20.12 kg/m².    Intake/Output Summary (Last 24 hours) at 12/2/2024 1024  Last data filed at 12/1/2024 2000  Gross per 24 hour   Intake 120 ml   Output --   Net 120 ml         Physical Exam  Constitutional:       General: She is not in acute distress.     Appearance: Normal appearance. She is not ill-appearing.   HENT:      Head: Normocephalic and atraumatic.      Nose: Nose normal.      Mouth/Throat:      Mouth: Mucous membranes are moist.   Eyes:      Conjunctiva/sclera: Conjunctivae normal.   Cardiovascular:      Rate and Rhythm: Normal rate.   Pulmonary:      Effort: Pulmonary effort is normal. No respiratory distress.   Abdominal:      General: Abdomen is flat.   Musculoskeletal:      Right lower leg: No edema.      Left lower leg: No edema.   Neurological:      Mental Status: She is alert. Mental status is at baseline.             Significant Labs: All pertinent labs within the past 24 hours have been reviewed.    Significant Imaging: I have reviewed all pertinent imaging results/findings within the past 24 hours.  Review of Systems

## 2024-12-03 PROCEDURE — 25000003 PHARM REV CODE 250

## 2024-12-03 PROCEDURE — 11000001 HC ACUTE MED/SURG PRIVATE ROOM

## 2024-12-03 RX ADMIN — LACOSAMIDE 50 MG: 50 TABLET, FILM COATED ORAL at 10:12

## 2024-12-03 RX ADMIN — THERA TABS 1 TABLET: TAB at 10:12

## 2024-12-03 RX ADMIN — LACOSAMIDE 50 MG: 50 TABLET, FILM COATED ORAL at 08:12

## 2024-12-03 NOTE — SUBJECTIVE & OBJECTIVE
Interval History: NAEON. Pending dispo.     Objective:     Vital Signs (Most Recent):  Temp: 99.2 °F (37.3 °C) (12/03/24 0712)  Pulse: 86 (12/03/24 0712)  Resp: 17 (12/03/24 0712)  BP: 98/64 (12/03/24 0712)  SpO2: 96 % (12/03/24 0712) Vital Signs (24h Range):  Temp:  [98.6 °F (37 °C)-99.6 °F (37.6 °C)] 99.2 °F (37.3 °C)  Pulse:  [86-97] 86  Resp:  [16-17] 17  SpO2:  [96 %] 96 %  BP: ()/(64-76) 98/64     Weight: 49.9 kg (110 lb 0.2 oz)  Body mass index is 20.12 kg/m².    Intake/Output Summary (Last 24 hours) at 12/3/2024 1021  Last data filed at 12/2/2024 1930  Gross per 24 hour   Intake 200 ml   Output --   Net 200 ml         Physical Exam  Constitutional:       General: She is not in acute distress.     Appearance: Normal appearance. She is not ill-appearing.   HENT:      Head: Normocephalic and atraumatic.      Nose: Nose normal.      Mouth/Throat:      Mouth: Mucous membranes are moist.   Eyes:      Conjunctiva/sclera: Conjunctivae normal.   Cardiovascular:      Rate and Rhythm: Normal rate.   Pulmonary:      Effort: Pulmonary effort is normal. No respiratory distress.   Abdominal:      General: Abdomen is flat.   Musculoskeletal:      Right lower leg: No edema.      Left lower leg: No edema.   Neurological:      Mental Status: She is alert. Mental status is at baseline.             Significant Labs: All pertinent labs within the past 24 hours have been reviewed.    Significant Imaging: I have reviewed all pertinent imaging results/findings within the past 24 hours.
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
## 2024-12-04 ENCOUNTER — DOCUMENTATION ONLY (OUTPATIENT)
Dept: NEUROLOGY | Facility: CLINIC | Age: 79
End: 2024-12-04
Payer: MEDICARE

## 2024-12-04 LAB
ALBUMIN SERPL BCP-MCNC: 3.8 G/DL (ref 3.5–5.2)
ALP SERPL-CCNC: 67 U/L (ref 40–150)
ALT SERPL W/O P-5'-P-CCNC: 44 U/L (ref 10–44)
ANION GAP SERPL CALC-SCNC: 8 MMOL/L (ref 8–16)
AST SERPL-CCNC: 55 U/L (ref 10–40)
BACTERIA #/AREA URNS AUTO: NORMAL /HPF
BASOPHILS # BLD AUTO: 0.02 K/UL (ref 0–0.2)
BASOPHILS NFR BLD: 0.3 % (ref 0–1.9)
BILIRUB SERPL-MCNC: 0.4 MG/DL (ref 0.1–1)
BILIRUB UR QL STRIP: NEGATIVE
BUN SERPL-MCNC: 23 MG/DL (ref 8–23)
CALCIUM SERPL-MCNC: 8.8 MG/DL (ref 8.7–10.5)
CHLORIDE SERPL-SCNC: 108 MMOL/L (ref 95–110)
CK SERPL-CCNC: 161 U/L (ref 20–180)
CLARITY UR REFRACT.AUTO: CLEAR
CO2 SERPL-SCNC: 23 MMOL/L (ref 23–29)
COLOR UR AUTO: YELLOW
CREAT SERPL-MCNC: 1.3 MG/DL (ref 0.5–1.4)
DIFFERENTIAL METHOD BLD: ABNORMAL
EOSINOPHIL # BLD AUTO: 0 K/UL (ref 0–0.5)
EOSINOPHIL NFR BLD: 0.2 % (ref 0–8)
ERYTHROCYTE [DISTWIDTH] IN BLOOD BY AUTOMATED COUNT: 13.5 % (ref 11.5–14.5)
EST. GFR  (NO RACE VARIABLE): 42.4 ML/MIN/1.73 M^2
GLUCOSE SERPL-MCNC: 103 MG/DL (ref 70–110)
GLUCOSE UR QL STRIP: NEGATIVE
HCT VFR BLD AUTO: 37.9 % (ref 37–48.5)
HGB BLD-MCNC: 12.6 G/DL (ref 12–16)
HGB UR QL STRIP: NEGATIVE
IMM GRANULOCYTES # BLD AUTO: 0.02 K/UL (ref 0–0.04)
IMM GRANULOCYTES NFR BLD AUTO: 0.3 % (ref 0–0.5)
KETONES UR QL STRIP: NEGATIVE
LACTATE SERPL-SCNC: 1.3 MMOL/L (ref 0.5–2.2)
LEUKOCYTE ESTERASE UR QL STRIP: ABNORMAL
LYMPHOCYTES # BLD AUTO: 1 K/UL (ref 1–4.8)
LYMPHOCYTES NFR BLD: 15.2 % (ref 18–48)
MAGNESIUM SERPL-MCNC: 1.9 MG/DL (ref 1.6–2.6)
MCH RBC QN AUTO: 32.8 PG (ref 27–31)
MCHC RBC AUTO-ENTMCNC: 33.2 G/DL (ref 32–36)
MCV RBC AUTO: 99 FL (ref 82–98)
MICROSCOPIC COMMENT: NORMAL
MONOCYTES # BLD AUTO: 0.6 K/UL (ref 0.3–1)
MONOCYTES NFR BLD: 9.8 % (ref 4–15)
NEUTROPHILS # BLD AUTO: 4.8 K/UL (ref 1.8–7.7)
NEUTROPHILS NFR BLD: 74.2 % (ref 38–73)
NITRITE UR QL STRIP: NEGATIVE
NRBC BLD-RTO: 0 /100 WBC
PH UR STRIP: 5 [PH] (ref 5–8)
PLATELET # BLD AUTO: 202 K/UL (ref 150–450)
PMV BLD AUTO: 10.2 FL (ref 9.2–12.9)
POTASSIUM SERPL-SCNC: 3.6 MMOL/L (ref 3.5–5.1)
PROT SERPL-MCNC: 6.4 G/DL (ref 6–8.4)
PROT UR QL STRIP: ABNORMAL
RBC # BLD AUTO: 3.84 M/UL (ref 4–5.4)
RBC #/AREA URNS AUTO: 0 /HPF (ref 0–4)
SODIUM SERPL-SCNC: 139 MMOL/L (ref 136–145)
SP GR UR STRIP: 1.02 (ref 1–1.03)
SQUAMOUS #/AREA URNS AUTO: 0 /HPF
URN SPEC COLLECT METH UR: ABNORMAL
WBC # BLD AUTO: 6.45 K/UL (ref 3.9–12.7)
WBC #/AREA URNS AUTO: 2 /HPF (ref 0–5)

## 2024-12-04 PROCEDURE — 25000003 PHARM REV CODE 250

## 2024-12-04 PROCEDURE — 81001 URINALYSIS AUTO W/SCOPE: CPT

## 2024-12-04 PROCEDURE — 83735 ASSAY OF MAGNESIUM: CPT

## 2024-12-04 PROCEDURE — 95813 EEG EXTND MNTR 61-119 MIN: CPT

## 2024-12-04 PROCEDURE — 83605 ASSAY OF LACTIC ACID: CPT

## 2024-12-04 PROCEDURE — 95813 EEG EXTND MNTR 61-119 MIN: CPT | Mod: 26,,, | Performed by: PSYCHIATRY & NEUROLOGY

## 2024-12-04 PROCEDURE — 82550 ASSAY OF CK (CPK): CPT

## 2024-12-04 PROCEDURE — 11000001 HC ACUTE MED/SURG PRIVATE ROOM

## 2024-12-04 PROCEDURE — 85025 COMPLETE CBC W/AUTO DIFF WBC: CPT

## 2024-12-04 PROCEDURE — 80053 COMPREHEN METABOLIC PANEL: CPT

## 2024-12-04 RX ADMIN — LACOSAMIDE 50 MG: 50 TABLET, FILM COATED ORAL at 09:12

## 2024-12-04 RX ADMIN — THERA TABS 1 TABLET: TAB at 09:12

## 2024-12-04 RX ADMIN — ACETAMINOPHEN 650 MG: 325 TABLET ORAL at 08:12

## 2024-12-04 RX ADMIN — SENNOSIDES AND DOCUSATE SODIUM 1 TABLET: 50; 8.6 TABLET ORAL at 09:12

## 2024-12-04 NOTE — PROGRESS NOTES
"[facility] - Internal Medicine Telemetry  Adult Nutrition  Progress Note    SUMMARY     Recommendations    1) Continue current regular diet as tolerated, encourage PO intake     2) If PO intake <50%, recommend Boost Plus TID to help meet needs     3) RD to monitor wt, PO intake, skin, labs.     Goals: meet % een/epn by next RD f/u  Nutrition Goal Status: goal met  Communication of RD Recs: other (comment) (poc)    Assessment and Plan    No acute nutrition diagnosis at this time      Reason for Assessment    Reason For Assessment: RD follow-up  Diagnosis: other (see comments) (Cognitive and behavioral changes)  General Information Comments: Pt tolerating 100% of meals provided. Pt w/ no indicators for malnutrition. RD following.  Nutrition Discharge Planning: genral healthful diet    Nutrition Risk Screen    Nutrition Risk Screen: no indicators present    Nutrition/Diet History    Spiritual, Cultural Beliefs, Anabaptist Practices, Values that Affect Care: no  Food Allergies: NKFA    Anthropometrics    Temp: 98.9 °F (37.2 °C)  Height Method: Stated  Height: 5' 2" (157.5 cm)  Height (inches): 62 in  Weight Method: Bed Scale  Weight: 49.9 kg (110 lb 0.2 oz)  Weight (lb): 110.01 lb  Ideal Body Weight (IBW), Female: 110 lb  % Ideal Body Weight, Female (lb): 100 %  BMI (Calculated): 20.1  BMI Grade: 18.5-24.9 - normal       Lab/Procedures/Meds    Pertinent Labs Reviewed: reviewed  Pertinent Labs Comments: Hct: 36.5, BUN: 27, eGFR: 51.8  Pertinent Medications Reviewed: reviewed  Pertinent Medications Comments: MVI    Estimated/Assessed Needs    Weight Used For Calorie Calculations: 49.9 kg (110 lb 0.2 oz)  Energy Calorie Requirements (kcal): 0877-8866 (25-30kcal/kg)  Energy Need Method: Kcal/kg  Protein Requirements: 60 (1.2 g/kg)  Weight Used For Protein Calculations: 49.9 kg (110 lb 0.2 oz)  Estimated Fluid Requirement Method: RDA Method  RDA Method (mL): 1247     Nutrition Prescription Ordered    Current Diet Order: " Regular    Evaluation of Received Nutrient/Fluid Intake    I/O: + 4.6 L since 11/6  Energy Calories Required: meeting needs  Protein Required: meeting needs  Fluid Required:  (1 ml or fluid as per MD)  Tolerance: tolerating  % Intake of Estimated Energy Needs: 75 - 100 %  % Meal Intake: 75 - 100 %    Nutrition Risk    Level of Risk/Frequency of Follow-up: (1x/week)    Monitor and Evaluation    Food and Nutrient Intake: energy intake, food and beverage intake  Food and Nutrient Adminstration: diet order  Physical Activity and Function: nutrition-related ADLs and IADLs  Anthropometric Measurements: height/length, weight, weight change, body mass index  Biochemical Data, Medical Tests and Procedures: electrolyte and renal panel, gastrointestinal profile, glucose/endocrine profile, inflammatory profile, lipid profile     Nutrition Follow-Up    RD Follow-up?: Yes  By Elizabeth Lewis, Registration eligible, PL-LDN

## 2024-12-04 NOTE — PROCEDURES
Date of service  12/4/24    Introduction  Electroencephalographic (EEG) is recorded with electrodes placed according to the International 10-20 placement system.  Thirty two (32) channels  of digital signal (sampling rate of 512/sec), including T1 and T2, were simultaneously recorded from the scalp and may include EKG, EMG, and/or eye monitors.  Recording band pass was 0.1 to 512 Hz.  Digital video recording of the patient is simultaneously recorded with the EEG.  The patient is instructed to report clinical symptoms which may occur during the recording session.  EEG and video recording are stored and archived in digital format.  Activation procedures, which include photic stimulation, hyperventilation and instructing patients to perform simple tasks, are done in selected patients.    The EEG is displayed on a monitor screen and can be reviewed using different montages.  Computer-assisted analysis is employed to detect spike and electrographic seizure activity.   The entire record is submitted for computer analysis.  The entire recording is visually reviewed, and the times identified by computer analysis as being spikes or seizures are reviewed again.      Compressed spectral analysis (CSA) is also performed on the activity recorded from each individual channel.  This is displayed as a power display of frequencies from 0 to 30 Hz over time.   The CSA is reviewed looking for asymmetries in power between homologous areas of the scalp, then compared with the original EEG recording.      Scour Prevention software was also utilized in the review of this study. This software suite analyzes the EEG recording in multiple domains. Coherence and rhythmicity are computed to identify EEG sections which may contain organized seizures. Each channel undergoes analysis to detect the presence of spike and sharp waves which have special and morphological characteristics of epileptic activity. The routine EEG recording is converted from  special into frequency domain. This is then displayed comparing homologous areas to identify areas of significant asymmetry. Algorithm to identify non-cortically generated artifact is used to separate artifact from the EEG.    Recording Times  12/4/24 15:05 - 16:20  A total of 1 hour and 12 minutes of EEG/video telemetry was recorded.    Findings  The patient's background consists of diffuse 5-7 Hz theta slowing, with superimposed 2-4 Hz delta slowing in bitemporal regions. Up to 8 Hz posteriorly was seen.     The patient was awake and fell asleep with symmetric sleep architecture seen.     Hyperventilation and photic stimulation were not performed.    There were occasional right tempral (T4/T6) sharp waves seen. No electrographic seizures are seen.    EKG demonstrates regular rhythm.    Interpretation  This is an abnormal LTM-EEG due to the presence of right temporal sharp waves indicative of focal seizure disorder and diffuse slowing, indicative of encephalopathy, though they are not specific for a particular underlying etiology.

## 2024-12-04 NOTE — PLAN OF CARE
Problem: Adult Inpatient Plan of Care  Goal: Plan of Care Review  12/4/2024 0408 by Julia He RN  Outcome: Progressing     Problem: Adult Inpatient Plan of Care  Goal: Patient-Specific Goal (Individualized)  12/4/2024 0407 by Julia He RN  Outcome: Progressing     Problem: Adult Inpatient Plan of Care  Goal: Optimal Comfort and Wellbeing  12/4/2024 0407 by Julia He RN  Outcome: Progressing     Problem: Adult Inpatient Plan of Care  Goal: Readiness for Transition of Care  12/4/2024 0407 by Julia He RN  Outcome: Progressing     Problem: Fall Injury Risk  Goal: Absence of Fall and Fall-Related Injury  12/4/2024 0407 by Julia He RN  Outcome: Progressing     Problem: Functional Deficit  Goal: Optimal Cognitive Function  12/4/2024 0407 by Julia He RN  Outcome: Progressing     Problem: Comorbidity Management  Goal: Maintenance of Seizure Control  12/4/2024 0407 by Julia He RN  Outcome: Progressing     Problem: Seizure, Active Management  Goal: Absence of Seizure/Seizure-Related Injury  12/4/2024 0407 by Julia He RN  Outcome: Progressing

## 2024-12-04 NOTE — PROGRESS NOTES
EEG Hook up  No sign of skin breakdown noticed    Skin Integrity: Normal     Bryant Rader   12/04/2024 3:47 PM
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
## 2024-12-04 NOTE — PLAN OF CARE
Problem: Adult Inpatient Plan of Care  Goal: Plan of Care Review  Outcome: Progressing  Flowsheets (Taken 12/3/2024 2506)  Plan of Care Reviewed With: patient  Goal: Patient-Specific Goal (Individualized)  Outcome: Progressing  Goal: Absence of Hospital-Acquired Illness or Injury  Outcome: Progressing  Goal: Optimal Comfort and Wellbeing  Outcome: Progressing  Goal: Readiness for Transition of Care  Outcome: Progressing     Problem: Fall Injury Risk  Goal: Absence of Fall and Fall-Related Injury  Outcome: Progressing  Intervention: Identify and Manage Contributors  Flowsheets (Taken 12/3/2024 1005)  Self-Care Promotion:   independence encouraged   BADL personal objects within reach   BADL personal routines maintained     Problem: Seizure, Active Management  Goal: Absence of Seizure/Seizure-Related Injury  Outcome: Progressing     Problem: Functional Deficit  Goal: Optimal Cognitive Function  Outcome: Progressing  Intervention: Optimize Cognitive Function  Flowsheets (Taken 12/3/2024 1005)  Self-Care Promotion:   independence encouraged   BADL personal objects within reach   BADL personal routines maintained     Problem: Comorbidity Management  Goal: Maintenance of Seizure Control  Outcome: Progressing

## 2024-12-04 NOTE — SUBJECTIVE & OBJECTIVE
Interval History: NAEON. Patient remains HDS and AF. More confused today surrounding her situation. Will continue to monitor. Pending disposition.      Objective:     Vital Signs (Most Recent):  Temp: 98.9 °F (37.2 °C) (12/04/24 0722)  Pulse: 84 (12/04/24 0722)  Resp: 18 (12/04/24 0722)  BP: 118/78 (12/04/24 0722)  SpO2: (!) 94 % (12/04/24 0722) Vital Signs (24h Range):  Temp:  [98.6 °F (37 °C)-100 °F (37.8 °C)] 98.9 °F (37.2 °C)  Pulse:  [84-94] 84  Resp:  [18] 18  SpO2:  [94 %] 94 %  BP: (118-134)/(78-86) 118/78     Weight: 49.9 kg (110 lb 0.2 oz)  Body mass index is 20.12 kg/m².  No intake or output data in the 24 hours ending 12/04/24 1038        Physical Exam  Constitutional:       General: She is not in acute distress.     Appearance: Normal appearance. She is not ill-appearing.   HENT:      Head: Normocephalic and atraumatic.      Nose: Nose normal.      Mouth/Throat:      Mouth: Mucous membranes are moist.   Eyes:      Conjunctiva/sclera: Conjunctivae normal.   Cardiovascular:      Rate and Rhythm: Normal rate.   Pulmonary:      Effort: Pulmonary effort is normal. No respiratory distress.   Abdominal:      General: Abdomen is flat.   Musculoskeletal:      Right lower leg: No edema.      Left lower leg: No edema.   Neurological:      Mental Status: She is alert. Mental status is at baseline.             Significant Labs: All pertinent labs within the past 24 hours have been reviewed.    Significant Imaging: I have reviewed all pertinent imaging results/findings within the past 24 hours.  Review of Systems

## 2024-12-04 NOTE — PLAN OF CARE
Problem: Adult Inpatient Plan of Care  Goal: Plan of Care Review  Outcome: Progressing     Problem: Fall Injury Risk  Goal: Absence of Fall and Fall-Related Injury  Outcome: Progressing     Problem: Seizure, Active Management  Goal: Absence of Seizure/Seizure-Related Injury  Outcome: Progressing     Problem: Functional Deficit  Goal: Optimal Cognitive Function  Outcome: Progressing

## 2024-12-05 LAB
OHS QRS DURATION: 76 MS
OHS QTC CALCULATION: 421 MS
POCT GLUCOSE: 146 MG/DL (ref 70–110)
POCT GLUCOSE: 95 MG/DL (ref 70–110)

## 2024-12-05 PROCEDURE — 25000003 PHARM REV CODE 250

## 2024-12-05 PROCEDURE — 11000001 HC ACUTE MED/SURG PRIVATE ROOM

## 2024-12-05 PROCEDURE — 93010 ELECTROCARDIOGRAM REPORT: CPT | Mod: ,,, | Performed by: INTERNAL MEDICINE

## 2024-12-05 PROCEDURE — 93005 ELECTROCARDIOGRAM TRACING: CPT

## 2024-12-05 RX ADMIN — LEVETIRACETAM 750 MG: 500 TABLET, FILM COATED ORAL at 08:12

## 2024-12-05 RX ADMIN — THERA TABS 1 TABLET: TAB at 09:12

## 2024-12-05 RX ADMIN — LACOSAMIDE 50 MG: 50 TABLET, FILM COATED ORAL at 09:12
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
## 2024-12-05 NOTE — ASSESSMENT & PLAN NOTE
8/30 patient had a witnessed seizure for 3 minutes with jerking of the left arm and right leg, with post-ictal state. Labs with anion gap acidosis, otherwise no findings. Glucose 124. CMP, CBC, lactic acid, CPK WNL. Ionized calcium 1.02. CT head no evidence of acute intracranial abnormalities. No provoking factor identified in labs/history.  Per Neuro, low suspicion that rivastigmine triggered seizure, but not opposed to holding medication.     EEG: abnormal study due to moderate diffuse background slowing consistent with diffuse cerebral dysfunction and encephalopathy which may be on the basis of toxic, metabolic, or primary neuronal disorder.     9/13 patient suffered a second witnessed seizure. Episode lasted approx 10 minutes, during which patient was foaming at the mouth and leaning to the right. She received 1 mg Ativan IM. Lactate elevated. On evaluation 9/13 AM at baseline. Given she has now had two clinical events, will start AED for seizure prevention per neuro recs.     12/4 - Patient with an episode of incontinence (urine and bowel), concerning for another potential seizures. CBC and CMP unremarkable. UA without signs of infection. CK, lactic acid and magnesium were all WNL. CXR without evidence of focal consolidation or infective focus. Patient seemed to return to baseline. CTH pending. No antibiotics or further investigations pursued. Will curbside neurology for further advice.     Plan  - Continue Lacosamide 50 mg BID PO   - Outpatient f/u with neurology   - Seizure precautions   - PRN rectal diazepam for GTC>5 min

## 2024-12-05 NOTE — SUBJECTIVE & OBJECTIVE
Interval History: NAEON. Patient remains HDS and AF.     Patient with an episode of incontinence (urine and bowel) on 12/4, concerning for another potential seizures. CBC and CMP unremarkable. UA without signs of infection. CK, lactic acid and magnesium were all WNL. CXR without evidence of focal consolidation or infective focus. Patient seemed to return to baseline this AM. Remains continent today. No antibiotics or further investigations pursued.    Will curbside neurology for further advice.       Objective:     Vital Signs (Most Recent):  Temp: 99.5 °F (37.5 °C) (12/05/24 0825)  Pulse: 77 (12/05/24 0825)  Resp: 17 (12/05/24 0825)  BP: 106/71 (12/05/24 0825)  SpO2: 96 % (12/05/24 0825) Vital Signs (24h Range):  Temp:  [96.9 °F (36.1 °C)-100.2 °F (37.9 °C)] 99.5 °F (37.5 °C)  Pulse:  [77-79] 77  Resp:  [17-18] 17  SpO2:  [95 %-96 %] 96 %  BP: (106-111)/(71-77) 106/71     Weight: 49.9 kg (110 lb 0.2 oz)  Body mass index is 20.12 kg/m².    Intake/Output Summary (Last 24 hours) at 12/5/2024 0948  Last data filed at 12/4/2024 1835  Gross per 24 hour   Intake 440 ml   Output --   Net 440 ml           Physical Exam  Constitutional:       General: She is not in acute distress.     Appearance: Normal appearance. She is not ill-appearing.   HENT:      Head: Normocephalic and atraumatic.      Nose: Nose normal.      Mouth/Throat:      Mouth: Mucous membranes are moist.   Eyes:      Conjunctiva/sclera: Conjunctivae normal.   Cardiovascular:      Rate and Rhythm: Normal rate.   Pulmonary:      Effort: Pulmonary effort is normal. No respiratory distress.   Abdominal:      General: Abdomen is flat.   Musculoskeletal:      Right lower leg: No edema.      Left lower leg: No edema.   Neurological:      Mental Status: She is alert. Mental status is at baseline.             Significant Labs: All pertinent labs within the past 24 hours have been reviewed.    Significant Imaging: I have reviewed all pertinent imaging results/findings  within the past 24 hours.  Review of Systems

## 2024-12-06 LAB — POCT GLUCOSE: 104 MG/DL (ref 70–110)

## 2024-12-06 PROCEDURE — 25000003 PHARM REV CODE 250

## 2024-12-06 PROCEDURE — 11000001 HC ACUTE MED/SURG PRIVATE ROOM

## 2024-12-06 RX ADMIN — LEVETIRACETAM 750 MG: 500 TABLET, FILM COATED ORAL at 09:12

## 2024-12-06 RX ADMIN — LEVETIRACETAM 750 MG: 500 TABLET, FILM COATED ORAL at 08:12

## 2024-12-06 RX ADMIN — THERA TABS 1 TABLET: TAB at 09:12

## 2024-12-06 NOTE — PLAN OF CARE
Problem: Adult Inpatient Plan of Care  Goal: Plan of Care Review  Outcome: Progressing     Problem: Adult Inpatient Plan of Care  Goal: Patient-Specific Goal (Individualized)  Outcome: Progressing     Problem: Functional Deficit  Goal: Optimal Cognitive Function  Outcome: Progressing     Problem: Seizure, Active Management  Goal: Absence of Seizure/Seizure-Related Injury  Outcome: Progressing

## 2024-12-06 NOTE — ASSESSMENT & PLAN NOTE
8/30 patient had a witnessed seizure for 3 minutes with jerking of the left arm and right leg, with post-ictal state. Labs with anion gap acidosis, otherwise no findings. Glucose 124. CMP, CBC, lactic acid, CPK WNL. Ionized calcium 1.02. CT head no evidence of acute intracranial abnormalities. No provoking factor identified in labs/history.  Per Neuro, low suspicion that rivastigmine triggered seizure, but not opposed to holding medication.     EEG: abnormal study due to moderate diffuse background slowing consistent with diffuse cerebral dysfunction and encephalopathy which may be on the basis of toxic, metabolic, or primary neuronal disorder.     9/13 patient suffered a second witnessed seizure. Episode lasted approx 10 minutes, during which patient was foaming at the mouth and leaning to the right. She received 1 mg Ativan IM. Lactate elevated. On evaluation 9/13 AM at baseline. Given she has now had two clinical events, will start AED for seizure prevention per neuro recs.     12/4 - Patient with an episode of incontinence (urine and bowel), concerning for another potential seizures. CBC and CMP unremarkable. UA without signs of infection. CK, lactic acid and magnesium were all WNL. CXR without evidence of focal consolidation or infective focus. Patient seemed to return to baseline. CTH pending. No antibiotics or further investigations pursued. Neurology curbsided and recommended transitioning from Vimpat to Keppra given heart block noted on EKG    Plan  - Start Keppra 750 mg BID  - Outpatient f/u with neurology   - Seizure precautions   - PRN rectal diazepam for GTC>5 min

## 2024-12-06 NOTE — PLAN OF CARE
Problem: Adult Inpatient Plan of Care  Goal: Plan of Care Review  Outcome: Progressing     Problem: Seizure, Active Management  Goal: Absence of Seizure/Seizure-Related Injury  Outcome: Progressing     Problem: Fall Injury Risk  Goal: Absence of Fall and Fall-Related Injury  Outcome: Progressing       Pt A&O x3, remain on RA, no complaints. VVS
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
## 2024-12-06 NOTE — SUBJECTIVE & OBJECTIVE
Interval History: NAEON and VSS. No acute changes and feeling well this AM. Denies chest pain, nausea, vomiting, fevers, chills, constipation, diarrhea, incontinence     Review of Systems  Objective:     Vital Signs (Most Recent):  Temp: 98.3 °F (36.8 °C) (12/05/24 1949)  Pulse: 83 (12/05/24 1949)  Resp: 18 (12/05/24 1949)  BP: (!) 107/55 (12/05/24 1949)  SpO2: 95 % (12/05/24 1949) Vital Signs (24h Range):  Temp:  [98.3 °F (36.8 °C)-99.5 °F (37.5 °C)] 98.3 °F (36.8 °C)  Pulse:  [77-83] 83  Resp:  [17-18] 18  SpO2:  [95 %-96 %] 95 %  BP: (106-107)/(55-71) 107/55     Weight: 49.9 kg (110 lb 0.2 oz)  Body mass index is 20.12 kg/m².    Intake/Output Summary (Last 24 hours) at 12/6/2024 0716  Last data filed at 12/5/2024 1725  Gross per 24 hour   Intake 720 ml   Output --   Net 720 ml         Physical Exam  Constitutional:       General: She is not in acute distress.     Appearance: Normal appearance. She is not ill-appearing.   HENT:      Head: Normocephalic and atraumatic.      Nose: Nose normal.      Mouth/Throat:      Mouth: Mucous membranes are moist.   Eyes:      Conjunctiva/sclera: Conjunctivae normal.   Cardiovascular:      Rate and Rhythm: Normal rate and regular rhythm.      Pulses: Normal pulses.      Heart sounds: Normal heart sounds. No murmur heard.     No friction rub. No gallop.   Pulmonary:      Effort: Pulmonary effort is normal. No respiratory distress.      Breath sounds: No stridor. No wheezing, rhonchi or rales.   Chest:      Chest wall: No tenderness.   Abdominal:      General: Abdomen is flat. Bowel sounds are normal. There is no distension.      Palpations: Abdomen is soft.      Tenderness: There is no abdominal tenderness. There is no guarding or rebound.   Musculoskeletal:      Right lower leg: No edema.      Left lower leg: No edema.   Neurological:      Mental Status: She is alert. Mental status is at baseline.             Significant Labs: All pertinent labs within the past 24 hours have  been reviewed.    Significant Imaging: I have reviewed all pertinent imaging results/findings within the past 24 hours.

## 2024-12-06 NOTE — ASSESSMENT & PLAN NOTE
"Patient stated she had regular eye doctor that was taking care of her. States she previously was on drops and got laser treatment (Possibly SLT(?)). States she is no longer on eye drops. Patient denies eye pain, changes in vision, blurry vision.     Ophtho consulted. During interview, patient became visibly upset and yelling about being "locked in shelter". Interview terminated. Unable to assess intraocular pressure. Low suspicion for any acute event. Per chart review, cannot find any previous home eye meds. No complaints of vision     Plan  - Will re-consult ophthalmology if patient becomes more cooperative or acute concerns arise.   "

## 2024-12-07 PROCEDURE — 25000003 PHARM REV CODE 250

## 2024-12-07 PROCEDURE — 11000001 HC ACUTE MED/SURG PRIVATE ROOM

## 2024-12-07 RX ADMIN — THERA TABS 1 TABLET: TAB at 09:12

## 2024-12-07 RX ADMIN — SENNOSIDES AND DOCUSATE SODIUM 1 TABLET: 50; 8.6 TABLET ORAL at 09:12

## 2024-12-07 RX ADMIN — LEVETIRACETAM 750 MG: 500 TABLET, FILM COATED ORAL at 09:12
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
## 2024-12-07 NOTE — ASSESSMENT & PLAN NOTE
"Patient stated she had regular eye doctor that was taking care of her. States she previously was on drops and got laser treatment (Possibly SLT(?)). States she is no longer on eye drops. Patient denies eye pain, changes in vision, blurry vision.     Ophtho consulted. During interview, patient became visibly upset and yelling about being "locked in long term". Interview terminated. Unable to assess intraocular pressure. Low suspicion for any acute event. Per chart review, cannot find any previous home eye meds. No complaints of vision     Plan  - Will re-consult ophthalmology if patient becomes more cooperative or acute concerns arise.   "

## 2024-12-07 NOTE — SUBJECTIVE & OBJECTIVE
Interval History: NAEON and VSS. Patient without complaints this AM    Review of Systems  Objective:     Vital Signs (Most Recent):  Temp: 99.6 °F (37.6 °C) (12/07/24 0705)  Pulse: 76 (12/07/24 0938)  Resp: 16 (12/07/24 0532)  BP: 101/70 (12/07/24 0705)  SpO2: 96 % (12/07/24 0705) Vital Signs (24h Range):  Temp:  [98.9 °F (37.2 °C)-99.6 °F (37.6 °C)] 99.6 °F (37.6 °C)  Pulse:  [] 76  Resp:  [16-18] 16  SpO2:  [96 %-98 %] 96 %  BP: (101-118)/(70-74) 101/70     Weight: 49.9 kg (110 lb 0.2 oz)  Body mass index is 20.12 kg/m².    Intake/Output Summary (Last 24 hours) at 12/7/2024 1027  Last data filed at 12/7/2024 0602  Gross per 24 hour   Intake 260 ml   Output 0 ml   Net 260 ml         Physical Exam  Vitals and nursing note reviewed.   Constitutional:       General: She is not in acute distress.     Appearance: Normal appearance. She is not ill-appearing.   HENT:      Head: Normocephalic and atraumatic.      Nose: Nose normal.      Mouth/Throat:      Mouth: Mucous membranes are moist.   Eyes:      Conjunctiva/sclera: Conjunctivae normal.   Cardiovascular:      Rate and Rhythm: Normal rate and regular rhythm.      Pulses: Normal pulses.      Heart sounds: Normal heart sounds. No murmur heard.     No friction rub. No gallop.   Pulmonary:      Effort: Pulmonary effort is normal. No respiratory distress.      Breath sounds: No stridor. No wheezing, rhonchi or rales.   Chest:      Chest wall: No tenderness.   Abdominal:      General: Abdomen is flat. Bowel sounds are normal. There is no distension.      Palpations: Abdomen is soft.      Tenderness: There is no abdominal tenderness. There is no guarding or rebound.   Musculoskeletal:      Right lower leg: No edema.      Left lower leg: No edema.   Neurological:      Mental Status: She is alert. Mental status is at baseline.             Significant Labs: All pertinent labs within the past 24 hours have been reviewed.    Significant Imaging: I have reviewed all  pertinent imaging results/findings within the past 24 hours.

## 2024-12-07 NOTE — ASSESSMENT & PLAN NOTE
8/30 patient had a witnessed seizure for 3 minutes with jerking of the left arm and right leg, with post-ictal state. Labs with anion gap acidosis, otherwise no findings. Glucose 124. CMP, CBC, lactic acid, CPK WNL. Ionized calcium 1.02. CT head no evidence of acute intracranial abnormalities. No provoking factor identified in labs/history.  Per Neuro, low suspicion that rivastigmine triggered seizure, but not opposed to holding medication.     EEG: abnormal study due to moderate diffuse background slowing consistent with diffuse cerebral dysfunction and encephalopathy which may be on the basis of toxic, metabolic, or primary neuronal disorder.     9/13 patient suffered a second witnessed seizure. Episode lasted approx 10 minutes, during which patient was foaming at the mouth and leaning to the right. She received 1 mg Ativan IM. Lactate elevated. On evaluation 9/13 AM at baseline. Given she has now had two clinical events, will start AED for seizure prevention per neuro recs.     12/4 - Patient with an episode of incontinence (urine and bowel), concerning for another potential seizures. CBC and CMP unremarkable. UA without signs of infection. CK, lactic acid and magnesium were all WNL. CXR without evidence of focal consolidation or infective focus. Patient seemed to return to baseline. CTH pending. No antibiotics or further investigations pursued. Neurology curbsided and recommended transitioning from Vimpat to Keppra given heart block noted on EKG    Plan  - Continue Keppra 750 mg BID  - Outpatient f/u with neurology   - Seizure precautions   - PRN rectal diazepam for GTC>5 min

## 2024-12-08 PROCEDURE — 11000001 HC ACUTE MED/SURG PRIVATE ROOM

## 2024-12-08 PROCEDURE — 25000003 PHARM REV CODE 250

## 2024-12-08 RX ADMIN — THERA TABS 1 TABLET: TAB at 08:12

## 2024-12-08 RX ADMIN — LEVETIRACETAM 750 MG: 500 TABLET, FILM COATED ORAL at 08:12

## 2024-12-08 NOTE — SUBJECTIVE & OBJECTIVE
Interval History: NAEON and VSS. No complaints this AM.    Review of Systems  Objective:     Vital Signs (Most Recent):  Temp: 98.7 °F (37.1 °C) (12/08/24 0735)  Pulse: 69 (12/08/24 0735)  Resp: 17 (12/08/24 0735)  BP: 109/65 (12/08/24 0735)  SpO2: 97 % (12/08/24 0735) Vital Signs (24h Range):  Temp:  [98.7 °F (37.1 °C)-99.6 °F (37.6 °C)] 98.7 °F (37.1 °C)  Pulse:  [69-78] 69  Resp:  [17-18] 17  SpO2:  [96 %-97 %] 97 %  BP: (101-109)/(65-70) 109/65     Weight: 49.9 kg (110 lb 0.2 oz)  Body mass index is 20.12 kg/m².  No intake or output data in the 24 hours ending 12/08/24 1127      Physical Exam  Vitals and nursing note reviewed.   Constitutional:       General: She is not in acute distress.     Appearance: Normal appearance. She is not ill-appearing.   HENT:      Head: Normocephalic and atraumatic.      Nose: Nose normal.      Mouth/Throat:      Mouth: Mucous membranes are moist.   Eyes:      Conjunctiva/sclera: Conjunctivae normal.   Cardiovascular:      Rate and Rhythm: Normal rate and regular rhythm.      Pulses: Normal pulses.      Heart sounds: Normal heart sounds. No murmur heard.     No friction rub. No gallop.   Pulmonary:      Effort: Pulmonary effort is normal. No respiratory distress.      Breath sounds: No stridor. No wheezing, rhonchi or rales.   Chest:      Chest wall: No tenderness.   Abdominal:      General: Abdomen is flat. Bowel sounds are normal. There is no distension.      Palpations: Abdomen is soft.      Tenderness: There is no abdominal tenderness. There is no guarding or rebound.   Musculoskeletal:      Right lower leg: No edema.      Left lower leg: No edema.   Neurological:      Mental Status: She is alert. Mental status is at baseline.             Significant Labs: All pertinent labs within the past 24 hours have been reviewed.    Significant Imaging: I have reviewed all pertinent imaging results/findings within the past 24 hours.

## 2024-12-08 NOTE — PLAN OF CARE
"  Problem: Adult Inpatient Plan of Care  Goal: Optimal Comfort and Wellbeing  Outcome: Progressing     Pt sitting on side of bed eating breakfast, AAOX4 in no apparent distress. No acute changes noted. Pt c/o Keppra making her feel "Weary" MD notified.   Call light in reach, sitter Jewel at the bedside.   "

## 2024-12-08 NOTE — ASSESSMENT & PLAN NOTE
"Patient stated she had regular eye doctor that was taking care of her. States she previously was on drops and got laser treatment (Possibly SLT(?)). States she is no longer on eye drops. Patient denies eye pain, changes in vision, blurry vision.     Ophtho consulted. During interview, patient became visibly upset and yelling about being "locked in snf". Interview terminated. Unable to assess intraocular pressure. Low suspicion for any acute event. Per chart review, cannot find any previous home eye meds. No complaints of vision     Plan  - Will re-consult ophthalmology if patient becomes more cooperative or acute concerns arise.   "
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
## 2024-12-09 PROCEDURE — 94799 UNLISTED PULMONARY SVC/PX: CPT

## 2024-12-09 PROCEDURE — 11000001 HC ACUTE MED/SURG PRIVATE ROOM

## 2024-12-09 PROCEDURE — 94761 N-INVAS EAR/PLS OXIMETRY MLT: CPT

## 2024-12-09 PROCEDURE — 25000003 PHARM REV CODE 250

## 2024-12-09 PROCEDURE — 99900035 HC TECH TIME PER 15 MIN (STAT)

## 2024-12-09 RX ADMIN — LEVETIRACETAM 750 MG: 500 TABLET, FILM COATED ORAL at 08:12

## 2024-12-09 RX ADMIN — THERA TABS 1 TABLET: TAB at 08:12

## 2024-12-09 RX ADMIN — LEVETIRACETAM 750 MG: 500 TABLET, FILM COATED ORAL at 09:12

## 2024-12-09 NOTE — PLAN OF CARE
Problem: Adult Inpatient Plan of Care  Goal: Plan of Care Review  Outcome: Adequate for Care Transition  Goal: Patient-Specific Goal (Individualized)  Outcome: Adequate for Care Transition  Goal: Absence of Hospital-Acquired Illness or Injury  Outcome: Adequate for Care Transition  Goal: Optimal Comfort and Wellbeing  Outcome: Adequate for Care Transition  Goal: Readiness for Transition of Care  Outcome: Adequate for Care Transition     Problem: Fall Injury Risk  Goal: Absence of Fall and Fall-Related Injury  Outcome: Adequate for Care Transition     Problem: Seizure, Active Management  Goal: Absence of Seizure/Seizure-Related Injury  Outcome: Adequate for Care Transition     Problem: Functional Deficit  Goal: Optimal Cognitive Function  Outcome: Adequate for Care Transition     Problem: Comorbidity Management  Goal: Maintenance of Seizure Control  Outcome: Adequate for Care Transition

## 2024-12-09 NOTE — SUBJECTIVE & OBJECTIVE
Interval History: [place]. Pending court date.       Objective:     Vital Signs (Most Recent):  Temp: 98.8 °F (37.1 °C) (12/09/24 0748)  Pulse: 75 (12/09/24 0748)  Resp: 17 (12/09/24 0748)  BP: 129/61 (12/09/24 0748)  SpO2: 95 % (12/09/24 0748) Vital Signs (24h Range):  Temp:  [98.7 °F (37.1 °C)-98.8 °F (37.1 °C)] 98.8 °F (37.1 °C)  Pulse:  [67-75] 75  Resp:  [17-18] 17  SpO2:  [95 %-98 %] 95 %  BP: (121-129)/(61-75) 129/61     Weight: 49.9 kg (110 lb 0.2 oz)  Body mass index is 20.12 kg/m².    Intake/Output Summary (Last 24 hours) at 12/9/2024 1316  Last data filed at 12/9/2024 0420  Gross per 24 hour   Intake 240 ml   Output 1 ml   Net 239 ml         Physical Exam  Vitals and nursing note reviewed.   Constitutional:       General: She is not in acute distress.     Appearance: Normal appearance. She is not ill-appearing.   HENT:      Head: Normocephalic and atraumatic.      Nose: Nose normal.      Mouth/Throat:      Mouth: Mucous membranes are moist.   Eyes:      Conjunctiva/sclera: Conjunctivae normal.   Cardiovascular:      Rate and Rhythm: Normal rate and regular rhythm.      Pulses: Normal pulses.      Heart sounds: Normal heart sounds. No murmur heard.     No friction rub. No gallop.   Pulmonary:      Effort: Pulmonary effort is normal. No respiratory distress.      Breath sounds: No stridor. No wheezing, rhonchi or rales.   Chest:      Chest wall: No tenderness.   Abdominal:      General: Abdomen is flat. Bowel sounds are normal. There is no distension.      Palpations: Abdomen is soft.      Tenderness: There is no abdominal tenderness. There is no guarding or rebound.   Musculoskeletal:      Right lower leg: No edema.      Left lower leg: No edema.   Neurological:      Mental Status: She is alert. Mental status is at baseline.             Significant Labs: None    Significant Imaging: I have reviewed all pertinent imaging results/findings within the past 24 hours.

## 2024-12-09 NOTE — CARE UPDATE
"RAPID RESPONSE NURSE CHART REVIEW        Chart Reviewed: 12/09/2024, 10:11 AM    MRN: [id number]  Bed: 1142/1142 A    Dx: Cognitive and behavioral changes    Mohini Dougherty has no past medical history on file.    Last VS: /61   Pulse (!) 130   Temp 98.8 °F (37.1 °C) (Oral)   Resp 17   Ht 5' 2" (1.575 m)   Wt 49.9 kg (110 lb 0.2 oz)   SpO2 95%   Breastfeeding No   BMI 20.12 kg/m²     24H Vital Sign Range:  Temp:  [98.7 °F (37.1 °C)-98.8 °F (37.1 °C)]   Pulse:  []   Resp:  [17-18]   BP: (121-129)/(61-75)   SpO2:  [95 %-98 %]     Level of Consciousness (AVPU): alert      OXYGEN:  Flow (L/min) (Oxygen Therapy): 0          MEWS score: 4    Contacted at 1011.    Bedside nurse, Shira contacted due to HR charted 130. Plan to recheck patient and will reach back out to the rapid response team with any concerns.   No acute concerns verbalized at this time. Instructed to call [phone] for further concerns or assistance.    Tiara Schneider RN        "
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
## 2024-12-10 LAB — POCT GLUCOSE: 110 MG/DL (ref 70–110)

## 2024-12-10 PROCEDURE — 25000003 PHARM REV CODE 250

## 2024-12-10 PROCEDURE — 11000001 HC ACUTE MED/SURG PRIVATE ROOM

## 2024-12-10 PROCEDURE — 94761 N-INVAS EAR/PLS OXIMETRY MLT: CPT

## 2024-12-10 RX ADMIN — LEVETIRACETAM 750 MG: 500 TABLET, FILM COATED ORAL at 09:12

## 2024-12-10 RX ADMIN — LEVETIRACETAM 750 MG: 500 TABLET, FILM COATED ORAL at 07:12

## 2024-12-10 RX ADMIN — THERA TABS 1 TABLET: TAB at 07:12

## 2024-12-10 RX ADMIN — SENNOSIDES AND DOCUSATE SODIUM 1 TABLET: 50; 8.6 TABLET ORAL at 09:12

## 2024-12-10 NOTE — ASSESSMENT & PLAN NOTE
"Patient stated she had regular eye doctor that was taking care of her. States she previously was on drops and got laser treatment (Possibly SLT(?)). States she is no longer on eye drops. Patient denies eye pain, changes in vision, blurry vision.     Ophtho consulted. During interview, patient became visibly upset and yelling about being "locked in senior living". Interview terminated. Unable to assess intraocular pressure. Low suspicion for any acute event. Per chart review, cannot find any previous home eye meds. No complaints of vision     Plan  - Will re-consult ophthalmology if patient becomes more cooperative or acute concerns arise.   "
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
## 2024-12-10 NOTE — SUBJECTIVE & OBJECTIVE
Interval History: NAEON and VSS. Patient feeling well this AM and watching TV in her bed. Without any other complaints. Denies chest pain, nausea, vomiting, shortness of breath, abdominal pain, constipation, diarrhea    Review of Systems  Objective:     Vital Signs (Most Recent):  Temp: 98 °F (36.7 °C) (12/10/24 0745)  Pulse: 81 (12/10/24 0745)  Resp: 18 (12/10/24 0745)  BP: 126/78 (12/10/24 0745)  SpO2: 98 % (12/10/24 0745) Vital Signs (24h Range):  Temp:  [98 °F (36.7 °C)-98.4 °F (36.9 °C)] 98 °F (36.7 °C)  Pulse:  [71-81] 81  Resp:  [17-18] 18  SpO2:  [97 %-98 %] 98 %  BP: (118-126)/(63-78) 126/78     Weight: 49.9 kg (110 lb 0.2 oz)  Body mass index is 20.12 kg/m².  No intake or output data in the 24 hours ending 12/10/24 0912      Physical Exam  Vitals and nursing note reviewed.   Constitutional:       General: She is not in acute distress.     Appearance: Normal appearance. She is not ill-appearing.   HENT:      Head: Normocephalic and atraumatic.      Nose: Nose normal.      Mouth/Throat:      Mouth: Mucous membranes are moist.   Eyes:      Conjunctiva/sclera: Conjunctivae normal.   Cardiovascular:      Rate and Rhythm: Normal rate and regular rhythm.      Pulses: Normal pulses.      Heart sounds: Normal heart sounds. No murmur heard.     No friction rub. No gallop.   Pulmonary:      Effort: Pulmonary effort is normal. No respiratory distress.      Breath sounds: No stridor. No wheezing, rhonchi or rales.   Chest:      Chest wall: No tenderness.   Abdominal:      General: Abdomen is flat. Bowel sounds are normal. There is no distension.      Palpations: Abdomen is soft.      Tenderness: There is no abdominal tenderness. There is no guarding or rebound.   Musculoskeletal:      Right lower leg: No edema.      Left lower leg: No edema.   Neurological:      Mental Status: She is alert. Mental status is at baseline.             Significant Labs: All pertinent labs within the past 24 hours have been  reviewed.    Significant Imaging: I have reviewed all pertinent imaging results/findings within the past 24 hours.

## 2024-12-11 PROCEDURE — 25000003 PHARM REV CODE 250

## 2024-12-11 PROCEDURE — 11000001 HC ACUTE MED/SURG PRIVATE ROOM

## 2024-12-11 RX ADMIN — LEVETIRACETAM 750 MG: 500 TABLET, FILM COATED ORAL at 11:12

## 2024-12-11 RX ADMIN — SENNOSIDES AND DOCUSATE SODIUM 1 TABLET: 50; 8.6 TABLET ORAL at 08:12

## 2024-12-11 RX ADMIN — THERA TABS 1 TABLET: TAB at 11:12

## 2024-12-11 RX ADMIN — LEVETIRACETAM 750 MG: 500 TABLET, FILM COATED ORAL at 08:12

## 2024-12-11 NOTE — SUBJECTIVE & OBJECTIVE
Interval History: NAEON and VSS. Patient feeling well without complaints this AM. Deneis chest pain, fevers, chills, nausea,vomiting, constipation, diarrhea. [facility] court date on 12/16    Review of Systems: Negative unless stated above  Objective:     Vital Signs (Most Recent):  Temp: 98 °F (36.7 °C) (12/11/24 0755)  Pulse: 77 (12/11/24 0755)  Resp: 18 (12/11/24 0755)  BP: 114/78 (12/11/24 0755)  SpO2: 98 % (12/11/24 0755) Vital Signs (24h Range):  Temp:  [98 °F (36.7 °C)-98.3 °F (36.8 °C)] 98 °F (36.7 °C)  Pulse:  [70-77] 77  Resp:  [17-18] 18  SpO2:  [95 %-99 %] 98 %  BP: (114)/(73-78) 114/78     Weight: 49.9 kg (110 lb 0.2 oz)  Body mass index is 20.12 kg/m².  No intake or output data in the 24 hours ending 12/11/24 0815      Physical Exam  Vitals and nursing note reviewed.   Constitutional:       General: She is not in acute distress.     Appearance: Normal appearance. She is not ill-appearing.   HENT:      Head: Normocephalic and atraumatic.      Nose: Nose normal.      Mouth/Throat:      Mouth: Mucous membranes are moist.   Eyes:      Conjunctiva/sclera: Conjunctivae normal.   Cardiovascular:      Rate and Rhythm: Normal rate and regular rhythm.      Pulses: Normal pulses.      Heart sounds: Normal heart sounds. No murmur heard.     No friction rub. No gallop.   Pulmonary:      Effort: Pulmonary effort is normal. No respiratory distress.      Breath sounds: No stridor. No wheezing, rhonchi or rales.   Chest:      Chest wall: No tenderness.   Abdominal:      General: Abdomen is flat. Bowel sounds are normal. There is no distension.      Palpations: Abdomen is soft.      Tenderness: There is no abdominal tenderness. There is no guarding or rebound.   Musculoskeletal:      Right lower leg: No edema.      Left lower leg: No edema.   Neurological:      Mental Status: She is alert. Mental status is at baseline.             Significant Labs: All pertinent labs within the past 24 hours have been  reviewed.    Significant Imaging: I have reviewed all pertinent imaging results/findings within the past 24 hours.
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
## 2024-12-11 NOTE — PLAN OF CARE
Recommendations  Continue Regular diet   RD following      Goals: meet % een/epn by next RD f/u  Nutrition Goal Status: goal met  Communication of RD Recs:  (poc)

## 2024-12-11 NOTE — PROGRESS NOTES
"[facility] - Internal Medicine Telemetry  Adult Nutrition  Progress Note    SUMMARY       Recommendations  Continue Regular diet   RD following     Goals: meet % een/epn by next RD f/u  Nutrition Goal Status: goal met  Communication of RD Recs:  (poc)    Assessment and Plan    No acute nutrition diagnosis at this time      Reason for Assessment    Reason For Assessment: RD follow-up  Diagnosis: other (see comments) (Cognitive and behavioral changes)  General Information Comments: Spoke w/ pt at bedside, reports a fine appetite tolerating 100% of meals provided. RD following.  Nutrition Discharge Planning: general healthful diet  Nutrition Related Social Determinants of Health: SDOH: Unable to assess at this time.       Nutrition Risk Screen    Nutrition Risk Screen: no indicators present    Nutrition/Diet History    Spiritual, Cultural Beliefs, Protestant Practices, Values that Affect Care: no  Food Allergies: NKFA    Anthropometrics    Temp: 98 °F (36.7 °C)  Height Method: Stated  Height: 5' 2" (157.5 cm)  Height (inches): 62 in  Weight Method: Bed Scale  Weight: 49.9 kg (110 lb 0.2 oz)  Weight (lb): 110.01 lb  Ideal Body Weight (IBW), Female: 110 lb  % Ideal Body Weight, Female (lb): 100 %  BMI (Calculated): 20.1  BMI Grade: 18.5-24.9 - normal       Lab/Procedures/Meds    Pertinent Labs Reviewed: reviewed  Pertinent Labs Comments: Hct: 36.5, BUN: 27, eGFR: 51.8  Pertinent Medications Reviewed: reviewed  Pertinent Medications Comments: MVI    Estimated/Assessed Needs    Weight Used For Calorie Calculations: 49.9 kg (110 lb 0.2 oz)  Energy Calorie Requirements (kcal): 4987-9641 (25-30kcal/kg)  Energy Need Method: Kcal/kg  Protein Requirements: 60 (1.2 g/kg)  Weight Used For Protein Calculations: 49.9 kg (110 lb 0.2 oz)     Estimated Fluid Requirement Method: RDA Method  RDA Method (mL): 1247       Nutrition Prescription Ordered    Current Diet Order: Regular    Evaluation of Received Nutrient/Fluid Intake    I/O: " + 4.6 L since 11/6  Energy Calories Required: meeting needs  Protein Required: meeting needs  Fluid Required:  (as per MD)  Tolerance: tolerating  % Intake of Estimated Energy Needs: 75 - 100 %  % Meal Intake: 75 - 100 %    Nutrition Risk    Level of Risk/Frequency of Follow-up: low - moderate (1x/week)     Monitor and Evaluation    Food and Nutrient Intake: energy intake, food and beverage intake  Food and Nutrient Adminstration: diet order  Physical Activity and Function: nutrition-related ADLs and IADLs  Anthropometric Measurements: height/length, weight, weight change, body mass index  Biochemical Data, Medical Tests and Procedures: electrolyte and renal panel, gastrointestinal profile, glucose/endocrine profile, inflammatory profile, lipid profile     Nutrition Follow-Up    RD Follow-up?: Yes

## 2024-12-12 PROCEDURE — 25000003 PHARM REV CODE 250

## 2024-12-12 PROCEDURE — 11000001 HC ACUTE MED/SURG PRIVATE ROOM

## 2024-12-12 RX ADMIN — SENNOSIDES AND DOCUSATE SODIUM 1 TABLET: 50; 8.6 TABLET ORAL at 08:12

## 2024-12-12 RX ADMIN — LEVETIRACETAM 750 MG: 500 TABLET, FILM COATED ORAL at 08:12

## 2024-12-12 RX ADMIN — THERA TABS 1 TABLET: TAB at 08:12

## 2024-12-12 RX ADMIN — POLYETHYLENE GLYCOL 3350 17 G: 17 POWDER, FOR SOLUTION ORAL at 08:12

## 2024-12-12 NOTE — SUBJECTIVE & OBJECTIVE
Interval History: NAEON and VSS. Patient feeling well without complaints this AM. Deneis chest pain, fevers, chills, nausea,vomiting, constipation, diarrhea. Awaiting court date on 12/16     Review of Systems  Objective:     Vital Signs (Most Recent):  Temp: 98.1 °F (36.7 °C) (12/12/24 0748)  Pulse: 68 (12/12/24 0748)  Resp: 18 (12/12/24 0748)  BP: (!) 108/55 (12/12/24 0748)  SpO2: 98 % (12/12/24 0748) Vital Signs (24h Range):  Temp:  [98.1 °F (36.7 °C)-98.4 °F (36.9 °C)] 98.1 °F (36.7 °C)  Pulse:  [57-68] 68  Resp:  [18] 18  SpO2:  [94 %-98 %] 98 %  BP: (105-108)/(55-58) 108/55     Weight: 49.9 kg (110 lb 0.2 oz)  Body mass index is 20.12 kg/m².  No intake or output data in the 24 hours ending 12/12/24 1103      Physical Exam  Vitals and nursing note reviewed.   Constitutional:       General: She is not in acute distress.     Appearance: Normal appearance. She is not ill-appearing.   HENT:      Head: Normocephalic and atraumatic.      Nose: Nose normal.      Mouth/Throat:      Mouth: Mucous membranes are moist.   Eyes:      Conjunctiva/sclera: Conjunctivae normal.   Cardiovascular:      Rate and Rhythm: Normal rate and regular rhythm.      Pulses: Normal pulses.      Heart sounds: Normal heart sounds. No murmur heard.     No friction rub. No gallop.   Pulmonary:      Effort: Pulmonary effort is normal. No respiratory distress.      Breath sounds: No stridor. No wheezing, rhonchi or rales.   Chest:      Chest wall: No tenderness.   Abdominal:      General: Abdomen is flat. Bowel sounds are normal. There is no distension.      Palpations: Abdomen is soft.      Tenderness: There is no abdominal tenderness. There is no guarding or rebound.   Musculoskeletal:      Right lower leg: No edema.      Left lower leg: No edema.   Neurological:      Mental Status: She is alert. Mental status is at baseline.             Significant Labs: All pertinent labs within the past 24 hours have been reviewed.    Significant Imaging: I  have reviewed all pertinent imaging results/findings within the past 24 hours.
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
## 2024-12-12 NOTE — ASSESSMENT & PLAN NOTE
"Patient stated she had regular eye doctor that was taking care of her. States she previously was on drops and got laser treatment (Possibly SLT(?)). States she is no longer on eye drops. Patient denies eye pain, changes in vision, blurry vision.     Ophtho consulted. During interview, patient became visibly upset and yelling about being "locked in group home". Interview terminated. Unable to assess intraocular pressure. Low suspicion for any acute event. Per chart review, cannot find any previous home eye meds. No complaints of vision     Plan  - Will re-consult ophthalmology if patient becomes more cooperative or acute concerns arise.   "

## 2024-12-12 NOTE — PLAN OF CARE
Problem: Adult Inpatient Plan of Care  Goal: Plan of Care Review  Outcome: Progressing  Flowsheets (Taken 12/11/2024 1831)  Plan of Care Reviewed With: patient  Goal: Patient-Specific Goal (Individualized)  Outcome: Progressing  Goal: Absence of Hospital-Acquired Illness or Injury  Outcome: Progressing  Goal: Optimal Comfort and Wellbeing  Outcome: Progressing  Goal: Readiness for Transition of Care  Outcome: Progressing     Problem: Fall Injury Risk  Goal: Absence of Fall and Fall-Related Injury  Outcome: Progressing  Intervention: Identify and Manage Contributors  Flowsheets (Taken 12/11/2024 1125)  Self-Care Promotion:   independence encouraged   BADL personal objects within reach   BADL personal routines maintained     Problem: Seizure, Active Management  Goal: Absence of Seizure/Seizure-Related Injury  Outcome: Progressing     Problem: Functional Deficit  Goal: Optimal Cognitive Function  Outcome: Progressing  Intervention: Optimize Cognitive Function  Flowsheets (Taken 12/11/2024 1125)  Self-Care Promotion:   independence encouraged   BADL personal objects within reach   BADL personal routines maintained     Problem: Comorbidity Management  Goal: Maintenance of Seizure Control  Outcome: Progressing

## 2024-12-13 PROCEDURE — 25000003 PHARM REV CODE 250

## 2024-12-13 PROCEDURE — 11000001 HC ACUTE MED/SURG PRIVATE ROOM

## 2024-12-13 RX ADMIN — LEVETIRACETAM 750 MG: 500 TABLET, FILM COATED ORAL at 08:12

## 2024-12-13 RX ADMIN — LEVETIRACETAM 750 MG: 500 TABLET, FILM COATED ORAL at 09:12

## 2024-12-13 RX ADMIN — SENNOSIDES AND DOCUSATE SODIUM 1 TABLET: 50; 8.6 TABLET ORAL at 09:12

## 2024-12-13 RX ADMIN — SENNOSIDES AND DOCUSATE SODIUM 1 TABLET: 50; 8.6 TABLET ORAL at 08:12

## 2024-12-13 RX ADMIN — THERA TABS 1 TABLET: TAB at 09:12

## 2024-12-13 RX ADMIN — POLYETHYLENE GLYCOL 3350 17 G: 17 POWDER, FOR SOLUTION ORAL at 09:12

## 2024-12-13 NOTE — SUBJECTIVE & OBJECTIVE
Interval History: NAEON and VSS. Patient feeling well without complaints this AM. She is awaiting breakfast and coffee. Deneis chest pain, fevers, chills, nausea,vomiting, constipation, diarrhea. Awaiting court date on 12/16     Review of Systems  Objective:     Vital Signs (Most Recent):  Temp: 98.1 °F (36.7 °C) (12/13/24 0757)  Pulse: 79 (12/13/24 0757)  Resp: 18 (12/13/24 0757)  BP: 106/72 (12/13/24 0757)  SpO2: 98 % (12/13/24 0757) Vital Signs (24h Range):  Temp:  [98.1 °F (36.7 °C)] 98.1 °F (36.7 °C)  Pulse:  [73-79] 79  Resp:  [17-18] 18  SpO2:  [98 %-99 %] 98 %  BP: (106-123)/(72-75) 106/72     Weight: 49.9 kg (110 lb 0.2 oz)  Body mass index is 20.12 kg/m².  No intake or output data in the 24 hours ending 12/13/24 5429      Physical Exam  Vitals and nursing note reviewed.   Constitutional:       General: She is not in acute distress.     Appearance: Normal appearance. She is not ill-appearing.   HENT:      Head: Normocephalic and atraumatic.      Nose: Nose normal.      Mouth/Throat:      Mouth: Mucous membranes are moist.      Pharynx: Oropharynx is clear. No oropharyngeal exudate.   Eyes:      Conjunctiva/sclera: Conjunctivae normal.   Cardiovascular:      Rate and Rhythm: Normal rate and regular rhythm.      Pulses: Normal pulses.      Heart sounds: Normal heart sounds. No murmur heard.     No friction rub. No gallop.   Pulmonary:      Effort: Pulmonary effort is normal. No respiratory distress.      Breath sounds: No stridor. No wheezing, rhonchi or rales.   Chest:      Chest wall: No tenderness.   Abdominal:      General: Abdomen is flat. Bowel sounds are normal. There is no distension.      Palpations: Abdomen is soft.      Tenderness: There is no abdominal tenderness. There is no guarding or rebound.   Musculoskeletal:      Right lower leg: No edema.      Left lower leg: No edema.   Neurological:      Mental Status: She is alert. Mental status is at baseline.             Significant Labs: All  pertinent labs within the past 24 hours have been reviewed.    Significant Imaging: I have reviewed all pertinent imaging results/findings within the past 24 hours.
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
## 2024-12-13 NOTE — PLAN OF CARE
Problem: Adult Inpatient Plan of Care  Goal: Plan of Care Review  Outcome: Progressing     Problem: Adult Inpatient Plan of Care  Goal: Patient-Specific Goal (Individualized)  Outcome: Progressing     Problem: Fall Injury Risk  Goal: Absence of Fall and Fall-Related Injury  Outcome: Progressing     Problem: Functional Deficit  Goal: Optimal Cognitive Function  Outcome: Progressing     Problem: Seizure, Active Management  Goal: Absence of Seizure/Seizure-Related Injury  Outcome: Progressing     Pt seems frustrated with care, refusing VS to be completed. Wants to be left alone.

## 2024-12-13 NOTE — ASSESSMENT & PLAN NOTE
"Patient stated she had regular eye doctor that was taking care of her. States she previously was on drops and got laser treatment (Possibly SLT(?)). States she is no longer on eye drops. Patient denies eye pain, changes in vision, blurry vision.     Ophtho consulted. During interview, patient became visibly upset and yelling about being "locked in correction". Interview terminated. Unable to assess intraocular pressure. Low suspicion for any acute event. Per chart review, cannot find any previous home eye meds. No complaints of vision     Plan  - Will re-consult ophthalmology if patient becomes more cooperative or acute concerns arise.   "

## 2024-12-13 NOTE — ASSESSMENT & PLAN NOTE
8/30 patient had a witnessed seizure for 3 minutes with jerking of the left arm and right leg, with post-ictal state. Labs with anion gap acidosis, otherwise no findings. Glucose 124. CMP, CBC, lactic acid, CPK WNL. Ionized calcium 1.02. CT head no evidence of acute intracranial abnormalities. No provoking factor identified in labs/history.  Per Neuro, low suspicion that rivastigmine triggered seizure, but not opposed to holding medication.     EEG: abnormal study due to moderate diffuse background slowing consistent with diffuse cerebral dysfunction and encephalopathy which may be on the basis of toxic, metabolic, or primary neuronal disorder.     9/13 patient suffered a second witnessed seizure. Episode lasted approx 10 minutes, during which patient was foaming at the mouth and leaning to the right. She received 1 mg Ativan IM. Lactate elevated. On evaluation 9/13 AM at baseline. Given she has now had two clinical events, will start AED for seizure prevention per neuro recs.     12/4 - Patient with an episode of incontinence (urine and bowel), concerning for another potential seizures. CBC and CMP unremarkable. UA without signs of infection. CK, lactic acid and magnesium were all WNL. CXR without evidence of focal consolidation or infective focus. Patient seemed to return to baseline. CTH pending. No antibiotics or further investigations pursued. Neurology curbsided and recommended transitioning from Vimpat to Keppra given heart block noted on EKG    Plan  - Continue Keppra 750 mg BID  - Outpatient f/u with neurology   - Seizure precautions   - PRN rectal diazepam for GTC>5 min   0 1-2 drinks

## 2024-12-14 PROCEDURE — 25000003 PHARM REV CODE 250

## 2024-12-14 PROCEDURE — 11000001 HC ACUTE MED/SURG PRIVATE ROOM

## 2024-12-14 RX ADMIN — SENNOSIDES AND DOCUSATE SODIUM 1 TABLET: 50; 8.6 TABLET ORAL at 08:12

## 2024-12-14 RX ADMIN — THERA TABS 1 TABLET: TAB at 09:12

## 2024-12-14 RX ADMIN — LEVETIRACETAM 750 MG: 500 TABLET, FILM COATED ORAL at 09:12

## 2024-12-14 RX ADMIN — LEVETIRACETAM 750 MG: 500 TABLET, FILM COATED ORAL at 08:12
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
## 2024-12-14 NOTE — SUBJECTIVE & OBJECTIVE
Interval History: NAEON and VSS. Pending court date    Objective:     Vital Signs (Most Recent):  Temp: 98.4 °F (36.9 °C) (12/14/24 0800)  Pulse: 71 (12/14/24 0800)  Resp: 17 (12/14/24 0800)  BP: (!) 95/55 (12/14/24 0800)  SpO2: 100 % (12/14/24 0800) Vital Signs (24h Range):  Temp:  [98.4 °F (36.9 °C)] 98.4 °F (36.9 °C)  Pulse:  [71-82] 71  Resp:  [17-18] 17  SpO2:  [97 %-100 %] 100 %  BP: ()/(47-55) 95/55     Weight: 49.9 kg (110 lb 0.2 oz)  Body mass index is 20.12 kg/m².    Intake/Output Summary (Last 24 hours) at 12/14/2024 0973  Last data filed at 12/13/2024 2355  Gross per 24 hour   Intake 0 ml   Output --   Net 0 ml         Physical Exam  Vitals and nursing note reviewed.   Constitutional:       General: She is not in acute distress.     Appearance: Normal appearance. She is not ill-appearing.   HENT:      Head: Normocephalic and atraumatic.      Nose: Nose normal.      Mouth/Throat:      Mouth: Mucous membranes are moist.      Pharynx: Oropharynx is clear. No oropharyngeal exudate.   Eyes:      Conjunctiva/sclera: Conjunctivae normal.   Cardiovascular:      Rate and Rhythm: Normal rate and regular rhythm.      Pulses: Normal pulses.      Heart sounds: Normal heart sounds. No murmur heard.     No friction rub. No gallop.   Pulmonary:      Effort: Pulmonary effort is normal. No respiratory distress.      Breath sounds: No stridor. No wheezing, rhonchi or rales.   Chest:      Chest wall: No tenderness.   Abdominal:      General: Abdomen is flat. Bowel sounds are normal. There is no distension.      Palpations: Abdomen is soft.      Tenderness: There is no abdominal tenderness. There is no guarding or rebound.   Musculoskeletal:      Right lower leg: No edema.      Left lower leg: No edema.   Neurological:      Mental Status: She is alert. Mental status is at baseline.             Significant Labs: All pertinent labs within the past 24 hours have been reviewed.    Significant Imaging: I have reviewed all  pertinent imaging results/findings within the past 24 hours.

## 2024-12-15 PROCEDURE — 25000003 PHARM REV CODE 250

## 2024-12-15 PROCEDURE — 11000001 HC ACUTE MED/SURG PRIVATE ROOM

## 2024-12-15 RX ADMIN — POLYETHYLENE GLYCOL 3350 17 G: 17 POWDER, FOR SOLUTION ORAL at 09:12

## 2024-12-15 RX ADMIN — SENNOSIDES AND DOCUSATE SODIUM 1 TABLET: 50; 8.6 TABLET ORAL at 09:12

## 2024-12-15 RX ADMIN — LEVETIRACETAM 750 MG: 500 TABLET, FILM COATED ORAL at 09:12

## 2024-12-15 RX ADMIN — THERA TABS 1 TABLET: TAB at 09:12
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
## 2024-12-15 NOTE — SUBJECTIVE & OBJECTIVE
Interval History: NAEON and VSS. Patient doing well this AM and without any complaints. Pending court date    Review of Systems   Constitutional:  Negative for activity change, appetite change, chills and fever.   Respiratory:  Negative for cough and shortness of breath.    Cardiovascular:  Negative for chest pain and palpitations.   Gastrointestinal:  Negative for abdominal distention, abdominal pain, constipation, diarrhea, nausea and vomiting.   All other systems reviewed and are negative.    Objective:     Vital Signs (Most Recent):  Temp: 98.7 °F (37.1 °C) (12/14/24 2037)  Pulse: 64 (12/14/24 2037)  Resp: 18 (12/14/24 2037)  BP: 113/72 (12/14/24 2037)  SpO2: 98 % (12/14/24 2037) Vital Signs (24h Range):  Temp:  [98.4 °F (36.9 °C)-98.7 °F (37.1 °C)] 98.7 °F (37.1 °C)  Pulse:  [64-71] 64  Resp:  [17-18] 18  SpO2:  [98 %-100 %] 98 %  BP: ()/(55-72) 113/72     Weight: 49.9 kg (110 lb 0.2 oz)  Body mass index is 20.12 kg/m².  No intake or output data in the 24 hours ending 12/15/24 0655      Physical Exam  Vitals and nursing note reviewed.   Constitutional:       General: She is not in acute distress.     Appearance: Normal appearance. She is not ill-appearing.   HENT:      Head: Normocephalic and atraumatic.      Nose: Nose normal.      Mouth/Throat:      Mouth: Mucous membranes are moist.      Pharynx: Oropharynx is clear. No oropharyngeal exudate.   Eyes:      Conjunctiva/sclera: Conjunctivae normal.   Cardiovascular:      Rate and Rhythm: Normal rate and regular rhythm.      Pulses: Normal pulses.      Heart sounds: Normal heart sounds. No murmur heard.     No friction rub. No gallop.   Pulmonary:      Effort: Pulmonary effort is normal. No respiratory distress.      Breath sounds: No stridor. No wheezing, rhonchi or rales.   Chest:      Chest wall: No tenderness.   Abdominal:      General: Abdomen is flat. Bowel sounds are normal. There is no distension.      Palpations: Abdomen is soft.      Tenderness:  There is no abdominal tenderness. There is no guarding or rebound.   Musculoskeletal:      Right lower leg: No edema.      Left lower leg: No edema.   Neurological:      Mental Status: She is alert. Mental status is at baseline.             Significant Labs: All pertinent labs within the past 24 hours have been reviewed.    Significant Imaging: I have reviewed all pertinent imaging results/findings within the past 24 hours.

## 2024-12-15 NOTE — ASSESSMENT & PLAN NOTE
"Patient stated she had regular eye doctor that was taking care of her. States she previously was on drops and got laser treatment (Possibly SLT(?)). States she is no longer on eye drops. Patient denies eye pain, changes in vision, blurry vision.     Ophtho consulted. During interview, patient became visibly upset and yelling about being "locked in detention". Interview terminated. Unable to assess intraocular pressure. Low suspicion for any acute event. Per chart review, cannot find any previous home eye meds. No complaints of vision     Plan  - Will re-consult ophthalmology if patient becomes more cooperative or acute concerns arise.   "

## 2024-12-16 PROCEDURE — 25000003 PHARM REV CODE 250

## 2024-12-16 PROCEDURE — 11000001 HC ACUTE MED/SURG PRIVATE ROOM

## 2024-12-16 RX ADMIN — LEVETIRACETAM 750 MG: 500 TABLET, FILM COATED ORAL at 08:12

## 2024-12-16 RX ADMIN — THERA TABS 1 TABLET: TAB at 08:12

## 2024-12-16 RX ADMIN — SENNOSIDES AND DOCUSATE SODIUM 1 TABLET: 50; 8.6 TABLET ORAL at 08:12

## 2024-12-16 NOTE — SUBJECTIVE & OBJECTIVE
Interval History: NAEON and VSS. Patient doing well this MA without complaints. Resting comfortably in chair. Court date today    Review of Systems   Constitutional:  Negative for activity change, appetite change, chills and fever.   Respiratory:  Negative for cough and shortness of breath.    Cardiovascular:  Negative for chest pain and palpitations.   Gastrointestinal:  Negative for abdominal distention, abdominal pain, constipation, diarrhea, nausea and vomiting.   All other systems reviewed and are negative.    Objective:     Vital Signs (Most Recent):  Temp: 97.5 °F (36.4 °C) (12/15/24 2053)  Pulse: 70 (12/15/24 2053)  Resp: 18 (12/15/24 2053)  BP: (!) 101/57 (12/15/24 2053)  SpO2: 98 % (12/15/24 2053) Vital Signs (24h Range):  Temp:  [97.5 °F (36.4 °C)-97.9 °F (36.6 °C)] 97.5 °F (36.4 °C)  Pulse:  [70-79] 70  Resp:  [18] 18  SpO2:  [98 %-100 %] 98 %  BP: (101-143)/(57-92) 101/57     Weight: 49.9 kg (110 lb 0.2 oz)  Body mass index is 20.12 kg/m².    Intake/Output Summary (Last 24 hours) at 12/16/2024 0615  Last data filed at 12/15/2024 1821  Gross per 24 hour   Intake 720 ml   Output --   Net 720 ml         Physical Exam  Vitals and nursing note reviewed.   Constitutional:       General: She is not in acute distress.     Appearance: Normal appearance. She is not ill-appearing.   HENT:      Head: Normocephalic and atraumatic.      Nose: Nose normal.      Mouth/Throat:      Mouth: Mucous membranes are moist.      Pharynx: Oropharynx is clear. No oropharyngeal exudate.   Eyes:      Conjunctiva/sclera: Conjunctivae normal.   Cardiovascular:      Rate and Rhythm: Normal rate and regular rhythm.      Pulses: Normal pulses.      Heart sounds: Normal heart sounds. No murmur heard.     No friction rub. No gallop.   Pulmonary:      Effort: Pulmonary effort is normal. No respiratory distress.      Breath sounds: No stridor. No wheezing, rhonchi or rales.   Chest:      Chest wall: No tenderness.   Abdominal:      General:  Abdomen is flat. Bowel sounds are normal. There is no distension.      Palpations: Abdomen is soft.      Tenderness: There is no abdominal tenderness. There is no guarding or rebound.   Musculoskeletal:      Right lower leg: No edema.      Left lower leg: No edema.   Neurological:      Mental Status: She is alert. Mental status is at baseline.             Significant Labs: All pertinent labs within the past 24 hours have been reviewed.    Significant Imaging: I have reviewed all pertinent imaging results/findings within the past 24 hours.
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
## 2024-12-16 NOTE — PLAN OF CARE
Problem: Adult Inpatient Plan of Care  Goal: Plan of Care Review  12/15/2024 1819 by Barthelemy, Racquel, LPN  Outcome: Not Progressing  12/15/2024 1729 by Barthelemy, Racquel, LPN  Outcome: Progressing  Goal: Patient-Specific Goal (Individualized)  12/15/2024 1819 by Barthelemy, Racquel, LPN  Outcome: Not Progressing  12/15/2024 1729 by Barthelemy, Racquel, LPN  Outcome: Progressing  Goal: Absence of Hospital-Acquired Illness or Injury  12/15/2024 1819 by Barthelemy, Racquel, LPN  Outcome: Not Progressing  12/15/2024 1729 by Barthelemy, Racquel, LPN  Outcome: Progressing  Goal: Optimal Comfort and Wellbeing  12/15/2024 1819 by Barthelemy, Racquel, LPN  Outcome: Not Progressing  12/15/2024 1729 by Barthelemy, Racquel, LPN  Outcome: Progressing  Goal: Readiness for Transition of Care  12/15/2024 1819 by Barthelemy, Racquel, LPN  Outcome: Not Progressing  12/15/2024 1729 by Barthelemy, Racquel, LPN  Outcome: Progressing     Problem: Fall Injury Risk  Goal: Absence of Fall and Fall-Related Injury  12/15/2024 1819 by Barthelemy, Racquel, LPN  Outcome: Not Progressing  12/15/2024 1729 by Barthelemy, Racquel, LPN  Outcome: Progressing     Problem: Functional Deficit  Goal: Optimal Cognitive Function  12/15/2024 1819 by Barthelemy, Racquel, LPN  Outcome: Not Progressing  12/15/2024 1729 by Barthelemy, Racquel, LPN  Outcome: Progressing     Problem: Comorbidity Management  Goal: Maintenance of Seizure Control  12/15/2024 1819 by Barthelemy, Racquel, LPN  Outcome: Not Progressing  12/15/2024 1729 by Barthelemy, Racquel, LPN  Outcome: Progressing     Problem: Seizure, Active Management  Goal: Absence of Seizure/Seizure-Related Injury  12/15/2024 1819 by Barthelemy, Racquel, LPN  Outcome: Not Progressing  12/15/2024 1729 by Barthelemy, Racquel, LPN  Outcome: Progressing

## 2024-12-17 LAB — POCT GLUCOSE: 120 MG/DL (ref 70–110)

## 2024-12-17 PROCEDURE — 11000001 HC ACUTE MED/SURG PRIVATE ROOM

## 2024-12-17 PROCEDURE — 25000003 PHARM REV CODE 250

## 2024-12-17 RX ADMIN — LEVETIRACETAM 750 MG: 500 TABLET, FILM COATED ORAL at 09:12

## 2024-12-17 RX ADMIN — THERA TABS 1 TABLET: TAB at 09:12
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
## 2024-12-17 NOTE — SUBJECTIVE & OBJECTIVE
Interval History: Resting comfortably in chair. Planning to go on walk around the hospital today. SARIKA. VSS on RA.    Review of Systems   Unable to perform ROS: Dementia     Objective:     Vital Signs (Most Recent):  Temp: 98.1 °F (36.7 °C) (12/17/24 0705)  Pulse: 72 (12/17/24 0705)  Resp: 17 (12/17/24 0705)  BP: 111/73 (12/17/24 0705)  SpO2: 96 % (12/17/24 0705) Vital Signs (24h Range):  Temp:  [98.1 °F (36.7 °C)-98.7 °F (37.1 °C)] 98.1 °F (36.7 °C)  Pulse:  [65-78] 72  Resp:  [17-18] 17  SpO2:  [96 %-98 %] 96 %  BP: (111-131)/(63-82) 111/73     Weight: 49.9 kg (110 lb 0.2 oz)  Body mass index is 20.12 kg/m².    Intake/Output Summary (Last 24 hours) at 12/17/2024 1200  Last data filed at 12/16/2024 1525  Gross per 24 hour   Intake 240 ml   Output --   Net 240 ml         Physical Exam  Vitals and nursing note reviewed.   Constitutional:       General: She is not in acute distress.     Appearance: Normal appearance. She is not ill-appearing.   HENT:      Head: Normocephalic and atraumatic.      Nose: Nose normal.      Mouth/Throat:      Mouth: Mucous membranes are moist.      Pharynx: Oropharynx is clear. No oropharyngeal exudate.   Eyes:      Conjunctiva/sclera: Conjunctivae normal.   Cardiovascular:      Rate and Rhythm: Normal rate and regular rhythm.      Pulses: Normal pulses.      Heart sounds: Normal heart sounds. No murmur heard.     No friction rub. No gallop.   Pulmonary:      Effort: Pulmonary effort is normal. No respiratory distress.      Breath sounds: No stridor. No wheezing, rhonchi or rales.   Chest:      Chest wall: No tenderness.   Abdominal:      General: Abdomen is flat. Bowel sounds are normal. There is no distension.      Palpations: Abdomen is soft.      Tenderness: There is no abdominal tenderness. There is no guarding or rebound.   Musculoskeletal:      Right lower leg: No edema.      Left lower leg: No edema.   Neurological:      Mental Status: She is alert. Mental status is at baseline.

## 2024-12-17 NOTE — MEDICARE RECERTIFICATION
MEDICARE INPATIENT  PHYSICIAN RECERTIFICATION  OF MEDICAL NECESSITY       I certify that this patient's continued medical needs require pending NH placement. Pt lacks capacity and is unsafe for discharge to home and history of repeat hospitalizations given lack of safe discharge. Legal consulted and recommended interdiction at this time to proceed with placement. S/p interdiction on 12/16. Patient subsequently accepted to [facility]. Will continue to work on placement. While she is here, we are monitoring her for any new or recurrent neurologic symptoms or signs.

## 2024-12-17 NOTE — PROGRESS NOTES
This writer received a call from [facility].  They can accept pt. Will inform .

## 2024-12-17 NOTE — ASSESSMENT & PLAN NOTE
"Patient stated she had regular eye doctor that was taking care of her. States she previously was on drops and got laser treatment (Possibly SLT(?)). States she is no longer on eye drops. Patient denies eye pain, changes in vision, blurry vision.     Ophtho consulted. During interview, patient became visibly upset and yelling about being "locked in intermediate". Interview terminated. Unable to assess intraocular pressure. Low suspicion for any acute event. Per chart review, cannot find any previous home eye meds. No complaints of vision     Plan  - Will re-consult ophthalmology if patient becomes more cooperative or acute concerns arise.   "

## 2024-12-17 NOTE — NURSING
Spoke w/ Ca Merchant  who was appointed her  by the court.  I did give her the patient son's number- Jose Alejandro Dougherty- [phone]/ [phone]

## 2024-12-17 NOTE — ASSESSMENT & PLAN NOTE
78-year-old lady here with encephalopathy, secondary to worsening dementia.  Clinically her presentation is not concering for acute sepsis, or stroke.        Extensive workup for AMS has been negative. Neurology suspects her underlying dementia is driving her presentation. Patient very resistant to discharging to SNF or any facility. Per our team and Psychiatry the patient does not show decision making capacity. Son prefers SNF or facility placement. However, patient threatened and attempted to leave AMA on 7/15 so she was PEC'd.  PEC is to prevent AMA but she does not require further psychological stabilization, discuss with legal need for PEC regarding capacity to leave AMA.     Plan:  - CEC  on . Patient has situational capacity. Friend David resigned as MPOA.    - PRN zyprexa.   - Court date ()   - Once she is assigned a legal guardian, will attempt safe placement possibly into memory care.  - She has accepting facilities, her paperwork is up to date. Unable to move forward until pt has a legal guardian.

## 2024-12-18 PROCEDURE — 25000003 PHARM REV CODE 250

## 2024-12-18 PROCEDURE — 11000001 HC ACUTE MED/SURG PRIVATE ROOM

## 2024-12-18 RX ADMIN — LEVETIRACETAM 750 MG: 500 TABLET, FILM COATED ORAL at 08:12

## 2024-12-18 RX ADMIN — SENNOSIDES AND DOCUSATE SODIUM 1 TABLET: 50; 8.6 TABLET ORAL at 08:12

## 2024-12-18 RX ADMIN — THERA TABS 1 TABLET: TAB at 08:12

## 2024-12-18 NOTE — PLAN OF CARE
12/18/24 0807   Discharge Reassessment   Assessment Type Discharge Planning Reassessment   Did the patient's condition or plan change since previous assessment? No   Discharge Plan discussed with: Patient   Discharge Plan A New Nursing Home placement - [facility] care facility   Discharge Plan B New Nursing Home placement - [facility] care facility   Post-Acute Status   Post-Acute Authorization Placement   Post-Acute Placement Status Pending state direction/certification   Coverage Medicare A/B       Pt accepted by [facility] and bed is available.  Will inform  Ca Merchant [phone].

## 2024-12-18 NOTE — PROGRESS NOTES
1220- CM visited by Curator Mohsen. ([phone]). She stated that ultimately, she would like for pt to be able to return to her home if possible, but understands that pt needs a safe place to discharge.  She stated that she will visit pt's bank to acquire needed access and then visit [facility].        1525- CM spoke to Kary of [facility].  She stated that she has not yet had contact with Ca but will call her to find out the plan.

## 2024-12-18 NOTE — PROGRESS NOTES
CM spoke to Ca , who stated that she began process to establish an interdiction account today but will have to follow up with the bank in the morning.  At this time, she does not know how much money, if any, pt has in her account.      Per Fahad Shannon, if bank statements received, application complete, and payment able to be made, pt will be able to transition tomorrow 12/19.     CM will follow up in the morning.

## 2024-12-18 NOTE — PROGRESS NOTES
"[facility] - Internal Medicine Telemetry  Adult Nutrition  Progress Note    SUMMARY       Recommendations    1. Continue regular diet as tolerated   2. Encourage good intake   3. RD to monitor weight, labs, and PO intake    Goals: meet % een/epn by next RD f/u  Nutrition Goal Status: goal met  Communication of RD Recs: other (comment) (poc)    Assessment and Plan    No nutrition diagnosis at this time.     Reason for Assessment    Reason For Assessment: RD follow-up  Diagnosis: other (see comments) (Cognitive and behavioral changes)  General Information Comments: Pt admitted with cognitive and behavioral changes. 100% PO intake. Pt reported having a good appetite. Pt stated she eats normally 2 meals and sometimes 3 if she likes the meal. Pt denied chewing and swallowing difficulties - denied nausea, vomiting, constipation, and diarrhea. BM: 12/18. No past weight hx.  Nutrition Discharge Planning: general healthy diet    Nutrition Risk Screen    Nutrition Risk Screen: no indicators present    Nutrition Related Social Determinants of Health: SDOH: Adequate food in home environment    Nutrition/Diet History    Spiritual, Cultural Beliefs, Muslim Practices, Values that Affect Care: no  Food Allergies: NKFA    Anthropometrics    Temp: 98.2 °F (36.8 °C)  Height Method: Stated  Height: 5' 2" (157.5 cm)  Height (inches): 62 in  Weight Method: Bed Scale  Weight: 49.9 kg (110 lb 0.2 oz)  Weight (lb): 110.01 lb  Ideal Body Weight (IBW), Female: 110 lb  % Ideal Body Weight, Female (lb): 100 %  BMI (Calculated): 20.1  BMI Grade: 18.5-24.9 - normal     Lab/Procedures/Meds    Pertinent Labs Reviewed: reviewed  Pertinent Labs Comments: RBC 3.84 low, GFR 42.4 low, AST 55 high  Pertinent Medications Reviewed: reviewed  Pertinent Medications Comments: levetiracetam, MVI, senna    Estimated/Assessed Needs    Weight Used For Calorie Calculations: 49.9 kg (110 lb 0.2 oz)  Energy Calorie Requirements (kcal): 2241-2945 " (25-30kcal/kg)  Energy Need Method: Kcal/kg  Protein Requirements: 60 (1.2 g/kg)  Weight Used For Protein Calculations: 49.9 kg (110 lb 0.2 oz)     Estimated Fluid Requirement Method: RDA Method  RDA Method (mL): 1247     Nutrition Prescription Ordered    Current Diet Order: Regular    Evaluation of Received Nutrient/Fluid Intake    Energy Calories Required: meeting needs  Protein Required: meeting needs  Fluid Required:  (as per MD)  Tolerance: tolerating  % Intake of Estimated Energy Needs: 75 - 100 %  % Meal Intake: 75 - 100 %    Nutrition Risk    Level of Risk/Frequency of Follow-up: low     Monitor and Evaluation    Food and Nutrient Intake: energy intake, food and beverage intake  Food and Nutrient Adminstration: diet order  Physical Activity and Function: nutrition-related ADLs and IADLs  Anthropometric Measurements: height/length, weight, weight change, body mass index  Biochemical Data, Medical Tests and Procedures: electrolyte and renal panel, gastrointestinal profile, glucose/endocrine profile, inflammatory profile, lipid profile     Nutrition Follow-Up    RD Follow-up?: Yes

## 2024-12-18 NOTE — ASSESSMENT & PLAN NOTE
78-year-old lady here with encephalopathy, secondary to worsening dementia.  Clinically her presentation is not concering for acute sepsis, or stroke.        Extensive workup for AMS has been negative. Neurology suspects her underlying dementia is driving her presentation. Patient very resistant to discharging to SNF or any facility. Per our team and Psychiatry the patient does not show decision making capacity. Son prefers SNF or facility placement. However, patient threatened and attempted to leave AMA on 7/15 so she was PEC'd.  PEC is to prevent AMA but she does not require further psychological stabilization, discuss with legal need for PEC regarding capacity to leave AMA.     Plan:  - CEC  on . Patient has situational capacity. Friend David resigned as MPOA.    - PRN zyprexa.   - Court date ()   - Once she is assigned a legal guardian, will attempt safe placement possibly into memory care.  - She has accepting to [facility] and bed is available. JULIANNA coordinating with .
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
## 2024-12-18 NOTE — SUBJECTIVE & OBJECTIVE
Interval History: NAEON.    Review of Systems   Unable to perform ROS: Dementia     Objective:     Vital Signs (Most Recent):  Temp: 98.2 °F (36.8 °C) (12/18/24 0724)  Pulse: 69 (12/18/24 0724)  Resp: 18 (12/18/24 0724)  BP: 132/65 (12/18/24 0724)  SpO2: (!) 94 % (12/18/24 0724) Vital Signs (24h Range):  Temp:  [98.2 °F (36.8 °C)-98.4 °F (36.9 °C)] 98.2 °F (36.8 °C)  Pulse:  [69-77] 69  Resp:  [18] 18  SpO2:  [94 %-98 %] 94 %  BP: (126-132)/(65-80) 132/65     Weight: 49.9 kg (110 lb 0.2 oz)  Body mass index is 20.12 kg/m².    Intake/Output Summary (Last 24 hours) at 12/18/2024 0938  Last data filed at 12/17/2024 1416  Gross per 24 hour   Intake 240 ml   Output --   Net 240 ml         Physical Exam  Vitals and nursing note reviewed.   Constitutional:       General: She is not in acute distress.     Appearance: Normal appearance. She is not ill-appearing.   HENT:      Head: Normocephalic and atraumatic.      Nose: Nose normal.      Mouth/Throat:      Mouth: Mucous membranes are moist.      Pharynx: Oropharynx is clear. No oropharyngeal exudate.   Eyes:      Conjunctiva/sclera: Conjunctivae normal.   Cardiovascular:      Rate and Rhythm: Normal rate and regular rhythm.      Heart sounds: Normal heart sounds.   Pulmonary:      Effort: Pulmonary effort is normal. No respiratory distress.   Abdominal:      General: Abdomen is flat.      Palpations: Abdomen is soft.   Musculoskeletal:      Right lower leg: No edema.      Left lower leg: No edema.   Neurological:      General: No focal deficit present.      Mental Status: She is alert. Mental status is at baseline.   Psychiatric:         Mood and Affect: Mood normal.             Significant Labs: All pertinent labs within the past 24 hours have been reviewed.    Significant Imaging: I have reviewed all pertinent imaging results/findings within the past 24 hours.

## 2024-12-18 NOTE — PLAN OF CARE
Recommendations     1. Continue regular diet as tolerated   2. Encourage good intake   3. RD to monitor weight, labs, and PO intake     Goals: meet % een/epn by next RD f/u  Nutrition Goal Status: goal met  Communication of RD Recs: other (comment) (poc)

## 2024-12-18 NOTE — NURSING
Valuables:  Checkbook- checks-  Checks were given to court appointed  Ca Merchant- contact no [phone]  Pt is pending placement to [facility].   Ca did speak w.  prior talking to pt
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
## 2024-12-19 LAB — POCT GLUCOSE: 123 MG/DL (ref 70–110)

## 2024-12-19 PROCEDURE — 11000001 HC ACUTE MED/SURG PRIVATE ROOM

## 2024-12-19 PROCEDURE — 25000003 PHARM REV CODE 250

## 2024-12-19 RX ADMIN — LEVETIRACETAM 750 MG: 500 TABLET, FILM COATED ORAL at 09:12

## 2024-12-19 RX ADMIN — THERA TABS 1 TABLET: TAB at 09:12
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
## 2024-12-19 NOTE — PROGRESS NOTES
1338 NICHOLAS spoke to Ca, , who stated that she had yet to receive the application packet from [facility]. She still at this time had yet to receive bank account access. Ca stated that it was explained to her that pt may be able to admit using her MCR if bank statements not available.     1352 This writer spoke to Silver of [facility] and explained that pt has no need for skilled services and requested that application packet be sent to Ca.      1354 This writer spoke to Ca and let her know that the packet will be sent, pt does not have a skilled need.     1700 No answer when this writer phoned Ca at [phone].  NICHOLAS spoke to Silver and Jose of [facility].  Per Jose, Ca said that pt receives $4k monthly and that she (Ca) wants to use some of that to restore the pt's home for her to possibly return or to sell it.  Jose stated that she explained that all of the pt's finances/assets will be seized and pt will receive $38 monthly.  [facility] has not received the needed documents from the  as of this time and they have not been able to get back in contact with her.     Will inform the care team and CM leadership.

## 2024-12-19 NOTE — SUBJECTIVE & OBJECTIVE
Interval History: NAEON. Pending dispo. Legal guardian is evaluating patient's finances.       Objective:     Vital Signs (Most Recent):  Temp: 98.4 °F (36.9 °C) (12/19/24 0816)  Pulse: 75 (12/19/24 0816)  Resp: 16 (12/19/24 0816)  BP: 105/73 (12/19/24 0818)  SpO2: 100 % (12/19/24 0816) Vital Signs (24h Range):  Temp:  [98.2 °F (36.8 °C)-98.4 °F (36.9 °C)] 98.4 °F (36.9 °C)  Pulse:  [75-76] 75  Resp:  [16-18] 16  SpO2:  [99 %-100 %] 100 %  BP: (105-114)/(64-73) 105/73     Weight: 49.9 kg (110 lb 0.2 oz)  Body mass index is 20.12 kg/m².    Intake/Output Summary (Last 24 hours) at 12/19/2024 0950  Last data filed at 12/18/2024 1354  Gross per 24 hour   Intake 240 ml   Output --   Net 240 ml         Physical Exam  Vitals and nursing note reviewed.   Constitutional:       General: She is not in acute distress.     Appearance: Normal appearance. She is not ill-appearing.   HENT:      Head: Normocephalic and atraumatic.      Nose: Nose normal.      Mouth/Throat:      Mouth: Mucous membranes are moist.      Pharynx: Oropharynx is clear. No oropharyngeal exudate.   Eyes:      Conjunctiva/sclera: Conjunctivae normal.   Cardiovascular:      Rate and Rhythm: Normal rate and regular rhythm.      Heart sounds: Normal heart sounds.   Pulmonary:      Effort: Pulmonary effort is normal. No respiratory distress.   Abdominal:      General: Abdomen is flat.      Palpations: Abdomen is soft.   Musculoskeletal:      Right lower leg: No edema.      Left lower leg: No edema.   Neurological:      General: No focal deficit present.      Mental Status: She is alert. Mental status is at baseline.   Psychiatric:         Mood and Affect: Mood normal.             Significant Labs: All pertinent labs within the past 24 hours have been reviewed.    Significant Imaging: I have reviewed all pertinent imaging results/findings within the past 24 hours.

## 2024-12-20 PROCEDURE — 25000003 PHARM REV CODE 250

## 2024-12-20 PROCEDURE — 11000001 HC ACUTE MED/SURG PRIVATE ROOM

## 2024-12-20 RX ADMIN — SENNOSIDES AND DOCUSATE SODIUM 1 TABLET: 50; 8.6 TABLET ORAL at 10:12

## 2024-12-20 RX ADMIN — LEVETIRACETAM 750 MG: 500 TABLET, FILM COATED ORAL at 09:12

## 2024-12-20 RX ADMIN — THERA TABS 1 TABLET: TAB at 09:12

## 2024-12-20 RX ADMIN — SENNOSIDES AND DOCUSATE SODIUM 1 TABLET: 50; 8.6 TABLET ORAL at 09:12

## 2024-12-20 NOTE — PROGRESS NOTES
This writer phoned  Ca [phone].  Call forwarded to [employer]. Message left requesting call back.
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
## 2024-12-21 PROCEDURE — 11000001 HC ACUTE MED/SURG PRIVATE ROOM

## 2024-12-21 PROCEDURE — 25000003 PHARM REV CODE 250

## 2024-12-21 RX ADMIN — LEVETIRACETAM 750 MG: 500 TABLET, FILM COATED ORAL at 08:12

## 2024-12-21 RX ADMIN — THERA TABS 1 TABLET: TAB at 08:12

## 2024-12-21 RX ADMIN — SENNOSIDES AND DOCUSATE SODIUM 1 TABLET: 50; 8.6 TABLET ORAL at 08:12

## 2024-12-21 NOTE — ASSESSMENT & PLAN NOTE
78-year-old lady here with encephalopathy, secondary to worsening dementia.  Clinically her presentation is not concering for acute sepsis, or stroke.        Extensive workup for AMS has been negative. Neurology suspects her underlying dementia is driving her presentation. Patient very resistant to discharging to SNF or any facility. Per our team and Psychiatry the patient does not show decision making capacity. Son prefers SNF or facility placement. However, patient threatened and attempted to leave AMA on 7/15 so she was PEC'd.  PEC is to prevent AMA but she does not require further psychological stabilization, discuss with legal need for PEC regarding capacity to leave AMA.     Plan:  - CEC  on . Patient has situational capacity. Friend David resigned as MPOA.    - PRN zyprexa.   - Court date ()   - Court appointed curator Ca Merchant [phone]   - She has accepting to [facility] and bed is available.  coordinating with .
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
## 2024-12-21 NOTE — PLAN OF CARE
Problem: Adult Inpatient Plan of Care  Goal: Plan of Care Review  Outcome: Progressing  Flowsheets (Taken 12/20/2024 1950)  Plan of Care Reviewed With: patient  Goal: Patient-Specific Goal (Individualized)  Outcome: Progressing  Goal: Absence of Hospital-Acquired Illness or Injury  Outcome: Progressing  Goal: Optimal Comfort and Wellbeing  Outcome: Progressing  Goal: Readiness for Transition of Care  Outcome: Progressing     Problem: Seizure, Active Management  Goal: Absence of Seizure/Seizure-Related Injury  Outcome: Progressing

## 2024-12-21 NOTE — PROGRESS NOTES
Care team and CM leadership aware that no progress made toward pt placement.  ([facility] has yet to receive needed documents from ).  CM leadership has informed hospital legal.

## 2024-12-21 NOTE — SUBJECTIVE & OBJECTIVE
Interval History: NAEON. VSS on RA. Sitting up on the side of the bed preparing to eat breakfast. In a pleasant mood today with goals of going on a walk outside. Pending disposition.    Objective:     Vital Signs (Most Recent):  Temp: 98.2 °F (36.8 °C) (12/21/24 0821)  Pulse: 101 (12/21/24 0821)  Resp: 18 (12/20/24 1941)  BP: 108/67 (12/21/24 0821)  SpO2: 97 % (12/21/24 0821) Vital Signs (24h Range):  Temp:  [98 °F (36.7 °C)-98.2 °F (36.8 °C)] 98.2 °F (36.8 °C)  Pulse:  [] 101  Resp:  [18] 18  SpO2:  [96 %-97 %] 97 %  BP: (108-115)/(63-67) 108/67     Weight: 49.9 kg (110 lb 0.2 oz)  Body mass index is 20.12 kg/m².  No intake or output data in the 24 hours ending 12/21/24 1117      Physical Exam  Vitals and nursing note reviewed.   Constitutional:       General: She is not in acute distress.     Appearance: Normal appearance. She is not ill-appearing.   HENT:      Head: Normocephalic and atraumatic.      Nose: Nose normal.      Mouth/Throat:      Mouth: Mucous membranes are moist.      Pharynx: Oropharynx is clear. No oropharyngeal exudate.   Eyes:      Conjunctiva/sclera: Conjunctivae normal.   Cardiovascular:      Rate and Rhythm: Normal rate and regular rhythm.      Heart sounds: Normal heart sounds.   Pulmonary:      Effort: Pulmonary effort is normal. No respiratory distress.   Abdominal:      General: Abdomen is flat.      Palpations: Abdomen is soft.   Musculoskeletal:      Right lower leg: No edema.      Left lower leg: No edema.   Neurological:      General: No focal deficit present.      Mental Status: She is alert. Mental status is at baseline.   Psychiatric:         Mood and Affect: Mood normal.         Behavior: Behavior is cooperative.         Cognition and Memory: Memory is impaired.             Significant Labs: All pertinent labs within the past 24 hours have been reviewed.    Significant Imaging: I have reviewed all pertinent imaging results/findings within the past 24 hours.

## 2024-12-22 PROCEDURE — 25000003 PHARM REV CODE 250

## 2024-12-22 PROCEDURE — 11000001 HC ACUTE MED/SURG PRIVATE ROOM

## 2024-12-22 RX ADMIN — LEVETIRACETAM 750 MG: 500 TABLET, FILM COATED ORAL at 08:12

## 2024-12-22 RX ADMIN — LEVETIRACETAM 750 MG: 500 TABLET, FILM COATED ORAL at 09:12

## 2024-12-22 RX ADMIN — THERA TABS 1 TABLET: TAB at 08:12

## 2024-12-22 NOTE — SUBJECTIVE & OBJECTIVE
Interval History: NAEON and VSS. Patient received pictures of dogs for emma and is in high spirits. Plans to go to [facility] today. No other complaints at this time.     Review of Systems   Respiratory:  Negative for shortness of breath.    Cardiovascular:  Negative for chest pain.   Gastrointestinal:  Negative for abdominal pain.     Objective:     Vital Signs (Most Recent):  Temp: 98.3 °F (36.8 °C) (12/21/24 1925)  Pulse: 89 (12/21/24 1925)  Resp: 18 (12/21/24 1925)  BP: 106/74 (12/21/24 1925)  SpO2: 99 % (12/21/24 1925) Vital Signs (24h Range):  Temp:  [98.3 °F (36.8 °C)] 98.3 °F (36.8 °C)  Pulse:  [89] 89  Resp:  [18] 18  SpO2:  [99 %] 99 %  BP: (106)/(74) 106/74     Weight: 49.9 kg (110 lb 0.2 oz)  Body mass index is 20.12 kg/m².  No intake or output data in the 24 hours ending 12/22/24 1305      Physical Exam  Vitals and nursing note reviewed.   Constitutional:       General: She is not in acute distress.     Appearance: Normal appearance. She is not ill-appearing.   HENT:      Head: Normocephalic and atraumatic.      Nose: Nose normal.      Mouth/Throat:      Mouth: Mucous membranes are moist.      Pharynx: Oropharynx is clear. No oropharyngeal exudate.   Eyes:      Extraocular Movements: Extraocular movements intact.      Conjunctiva/sclera: Conjunctivae normal.   Cardiovascular:      Rate and Rhythm: Normal rate and regular rhythm.      Pulses: Normal pulses.      Heart sounds: Normal heart sounds. No murmur heard.     No friction rub. No gallop.   Pulmonary:      Effort: Pulmonary effort is normal. No respiratory distress.      Breath sounds: No stridor. No wheezing, rhonchi or rales.   Chest:      Chest wall: No tenderness.   Abdominal:      General: Abdomen is flat.      Palpations: Abdomen is soft.   Musculoskeletal:      Right lower leg: No edema.      Left lower leg: No edema.   Neurological:      General: No focal deficit present.      Mental Status: She is alert. Mental status is at baseline.    Psychiatric:         Mood and Affect: Mood normal.         Behavior: Behavior is cooperative.         Cognition and Memory: Memory is impaired.             Significant Labs: All pertinent labs within the past 24 hours have been reviewed.    Significant Imaging: I have reviewed all pertinent imaging results/findings within the past 24 hours.

## 2024-12-22 NOTE — PLAN OF CARE
Problem: Adult Inpatient Plan of Care  Goal: Plan of Care Review  Outcome: Progressing  Flowsheets (Taken 12/21/2024 1936)  Plan of Care Reviewed With: patient  Goal: Patient-Specific Goal (Individualized)  Outcome: Progressing  Goal: Absence of Hospital-Acquired Illness or Injury  Outcome: Progressing  Goal: Optimal Comfort and Wellbeing  Outcome: Progressing  Goal: Readiness for Transition of Care  Outcome: Progressing     Problem: Fall Injury Risk  Goal: Absence of Fall and Fall-Related Injury  Outcome: Progressing
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
## 2024-12-22 NOTE — ASSESSMENT & PLAN NOTE
78-year-old lady here with encephalopathy, secondary to worsening dementia.  Clinically her presentation is not concering for acute sepsis, or stroke.        Extensive workup for AMS has been negative. Neurology suspects her underlying dementia is driving her presentation. Patient very resistant to discharging to SNF or any facility. Per our team and Psychiatry the patient does not show decision making capacity. Son prefers SNF or facility placement. However, patient threatened and attempted to leave AMA on 7/15 so she was PEC'd.  PEC is to prevent AMA but she does not require further psychological stabilization, discuss with legal need for PEC regarding capacity to leave AMA.     Plan:  - CEC  on . Patient has situational capacity. Friend David resigned as MPOA.    - PRN zyprexa.   - Court date ()   - Court appointed curator [employer] [phone]   - She has accepting to [facility] and bed is available.  coordinating with .

## 2024-12-23 PROCEDURE — 25000003 PHARM REV CODE 250

## 2024-12-23 PROCEDURE — 11000001 HC ACUTE MED/SURG PRIVATE ROOM

## 2024-12-23 RX ADMIN — LEVETIRACETAM 750 MG: 500 TABLET, FILM COATED ORAL at 09:12

## 2024-12-23 RX ADMIN — LEVETIRACETAM 750 MG: 500 TABLET, FILM COATED ORAL at 08:12

## 2024-12-23 RX ADMIN — THERA TABS 1 TABLET: TAB at 08:12

## 2024-12-23 RX ADMIN — Medication 6 MG: at 09:12

## 2024-12-23 NOTE — ASSESSMENT & PLAN NOTE
"Patient stated she had regular eye doctor that was taking care of her. States she previously was on drops and got laser treatment (Possibly SLT(?)). States she is no longer on eye drops. Patient denies eye pain, changes in vision, blurry vision.     Ophtho consulted. During interview, patient became visibly upset and yelling about being "locked in custodial". Interview terminated. Unable to assess intraocular pressure. Low suspicion for any acute event. Per chart review, cannot find any previous home eye meds. No complaints of vision     Plan  - Will re-consult ophthalmology if patient becomes more cooperative or acute concerns arise.   "
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
## 2024-12-23 NOTE — ASSESSMENT & PLAN NOTE
78-year-old lady here with encephalopathy, secondary to worsening dementia.  Clinically her presentation is not concering for acute sepsis, or stroke.        Extensive workup for AMS has been negative. Neurology suspects her underlying dementia is driving her presentation. Patient very resistant to discharging to SNF or any facility. Per our team and Psychiatry the patient does not show decision making capacity. Son prefers SNF or facility placement. However, patient threatened and attempted to leave AMA on 7/15 so she was PEC'd.  PEC is to prevent AMA but she does not require further psychological stabilization, discuss with legal need for PEC regarding capacity to leave AMA.     Plan:  - CEC  on . Patient has situational capacity. Friend David resigned as MPOA.    - PRN zyprexa.   - Court date ()   - Court appointed curator [employer] [phone]   - She has accepting to [facility] and bed is available.  coordinating with .

## 2024-12-23 NOTE — SUBJECTIVE & OBJECTIVE
Interval History: NAEON and VSS on RA. Patient received pictures of dogs for emma yesterday and remains in high spirits. No other complaints at this time.       Objective:     Vital Signs (Most Recent):  Temp: 97.9 °F (36.6 °C) (12/23/24 0802)  Pulse: 81 (12/23/24 0802)  Resp: 18 (12/22/24 2032)  BP: 107/61 (12/23/24 0802)  SpO2: 99 % (12/23/24 0802) Vital Signs (24h Range):  Temp:  [97.9 °F (36.6 °C)-98.7 °F (37.1 °C)] 97.9 °F (36.6 °C)  Pulse:  [62-81] 81  Resp:  [18] 18  SpO2:  [95 %-99 %] 99 %  BP: (107-131)/(61-86) 107/61     Weight: 49.9 kg (110 lb 0.2 oz)  Body mass index is 20.12 kg/m².    Intake/Output Summary (Last 24 hours) at 12/23/2024 0919  Last data filed at 12/23/2024 0814  Gross per 24 hour   Intake 110 ml   Output --   Net 110 ml         Physical Exam  Vitals and nursing note reviewed.   Constitutional:       General: She is awake. She is not in acute distress.     Appearance: Normal appearance. She is not ill-appearing.   HENT:      Head: Normocephalic and atraumatic.      Nose: Nose normal.      Mouth/Throat:      Mouth: Mucous membranes are moist.      Pharynx: Oropharynx is clear. No oropharyngeal exudate.   Eyes:      Extraocular Movements: Extraocular movements intact.      Conjunctiva/sclera: Conjunctivae normal.   Cardiovascular:      Pulses: Normal pulses.   Pulmonary:      Effort: Pulmonary effort is normal. No respiratory distress.   Abdominal:      General: Abdomen is flat.      Palpations: Abdomen is soft.   Musculoskeletal:      Right lower leg: No edema.      Left lower leg: No edema.   Neurological:      General: No focal deficit present.      Mental Status: She is alert. Mental status is at baseline. She is disoriented.   Psychiatric:         Mood and Affect: Mood normal.         Behavior: Behavior is cooperative.         Cognition and Memory: Memory is impaired.             Significant Labs: All pertinent labs within the past 24 hours have been reviewed.    Significant  Imaging: I have reviewed all pertinent imaging results/findings within the past 24 hours.

## 2024-12-24 PROCEDURE — 25000003 PHARM REV CODE 250

## 2024-12-24 PROCEDURE — 11000001 HC ACUTE MED/SURG PRIVATE ROOM

## 2024-12-24 RX ADMIN — THERA TABS 1 TABLET: TAB at 10:12

## 2024-12-24 RX ADMIN — LEVETIRACETAM 750 MG: 500 TABLET, FILM COATED ORAL at 08:12

## 2024-12-24 RX ADMIN — LEVETIRACETAM 750 MG: 500 TABLET, FILM COATED ORAL at 10:12

## 2024-12-24 NOTE — PLAN OF CARE
Patient updated on plan of care. Sitter bedside with patient. Patient with call light in reach and educated to comply with hospital rules and standards as well to ask for help from staff if needed.
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
## 2024-12-24 NOTE — NURSING
"Patient resting comfortably in bed, patient  stated she was going to "get the car" in the morning. Patient has not put her car keys/house keys down all shift. Increased supervision in force.  "

## 2024-12-25 PROCEDURE — 25000003 PHARM REV CODE 250

## 2024-12-25 PROCEDURE — 11000001 HC ACUTE MED/SURG PRIVATE ROOM

## 2024-12-25 RX ORDER — TALC
6 POWDER (GRAM) TOPICAL NIGHTLY PRN
COMMUNITY
Start: 2024-12-25 | End: 2024-12-31 | Stop reason: HOSPADM

## 2024-12-25 RX ORDER — LEVETIRACETAM 750 MG/1
750 TABLET ORAL 2 TIMES DAILY
Start: 2024-12-25 | End: 2024-12-31

## 2024-12-25 RX ADMIN — THERA TABS 1 TABLET: TAB at 08:12

## 2024-12-25 RX ADMIN — LEVETIRACETAM 750 MG: 500 TABLET, FILM COATED ORAL at 08:12

## 2024-12-25 RX ADMIN — LEVETIRACETAM 750 MG: 500 TABLET, FILM COATED ORAL at 10:12
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
## 2024-12-25 NOTE — SUBJECTIVE & OBJECTIVE
Interval History: NAEON. VSS on RA. Sitter at bedside. No complaints at this time.      Objective:     Vital Signs (Most Recent):  Temp: 98.2 °F (36.8 °C) (12/24/24 1938)  Pulse: 76 (12/24/24 1938)  Resp: 18 (12/24/24 1938)  BP: 106/68 (12/24/24 1938)  SpO2: 98 % (12/24/24 1938) Vital Signs (24h Range):  Temp:  [98.2 °F (36.8 °C)] 98.2 °F (36.8 °C)  Pulse:  [76] 76  Resp:  [18] 18  SpO2:  [98 %] 98 %  BP: (106)/(68) 106/68     Weight: 49.9 kg (110 lb 0.2 oz)  Body mass index is 20.12 kg/m².  No intake or output data in the 24 hours ending 12/25/24 0725      Physical Exam  Vitals and nursing note reviewed.   Constitutional:       General: She is awake. She is not in acute distress.     Appearance: Normal appearance. She is not ill-appearing.   HENT:      Head: Normocephalic and atraumatic.      Nose: Nose normal.      Mouth/Throat:      Mouth: Mucous membranes are moist.      Pharynx: Oropharynx is clear. No oropharyngeal exudate.   Eyes:      Extraocular Movements: Extraocular movements intact.      Conjunctiva/sclera: Conjunctivae normal.   Cardiovascular:      Pulses: Normal pulses.   Pulmonary:      Effort: Pulmonary effort is normal. No respiratory distress.   Abdominal:      General: Abdomen is flat.      Palpations: Abdomen is soft.   Musculoskeletal:      Right lower leg: No edema.      Left lower leg: No edema.   Neurological:      General: No focal deficit present.      Mental Status: She is alert. Mental status is at baseline. She is disoriented.   Psychiatric:         Mood and Affect: Mood normal.         Behavior: Behavior is cooperative.         Cognition and Memory: Memory is impaired.             Significant Labs: All pertinent labs within the past 24 hours have been reviewed.    Significant Imaging: I have reviewed all pertinent imaging results/findings within the past 24 hours.

## 2024-12-26 PROCEDURE — 25000003 PHARM REV CODE 250

## 2024-12-26 PROCEDURE — 11000001 HC ACUTE MED/SURG PRIVATE ROOM

## 2024-12-26 RX ADMIN — LEVETIRACETAM 750 MG: 500 TABLET, FILM COATED ORAL at 09:12

## 2024-12-26 RX ADMIN — THERA TABS 1 TABLET: TAB at 09:12

## 2024-12-26 NOTE — SUBJECTIVE & OBJECTIVE
Interval History: NAEON. VSS on RA. Patient in high spirits after attending [facility] this morning.      Objective:     Vital Signs (Most Recent):  Temp: 98.3 °F (36.8 °C) (12/26/24 0739)  Pulse: 96 (12/26/24 0739)  Resp: 18 (12/26/24 0739)  BP: 107/62 (12/26/24 0739)  SpO2: 98 % (12/26/24 0739) Vital Signs (24h Range):  Temp:  [98.3 °F (36.8 °C)] 98.3 °F (36.8 °C)  Pulse:  [80-96] 96  Resp:  [18] 18  SpO2:  [98 %] 98 %  BP: (107)/(62) 107/62     Weight: 49.9 kg (110 lb 0.2 oz)  Body mass index is 20.12 kg/m².    Intake/Output Summary (Last 24 hours) at 12/26/2024 1000  Last data filed at 12/26/2024 0948  Gross per 24 hour   Intake 240 ml   Output --   Net 240 ml         Physical Exam  Vitals and nursing note reviewed.   Constitutional:       General: She is awake. She is not in acute distress.     Appearance: Normal appearance. She is not ill-appearing.   HENT:      Head: Normocephalic and atraumatic.      Nose: Nose normal.      Mouth/Throat:      Mouth: Mucous membranes are moist.      Pharynx: Oropharynx is clear. No oropharyngeal exudate.   Eyes:      Extraocular Movements: Extraocular movements intact.      Conjunctiva/sclera: Conjunctivae normal.   Cardiovascular:      Pulses: Normal pulses.   Pulmonary:      Effort: Pulmonary effort is normal. No respiratory distress.   Abdominal:      General: Abdomen is flat.      Palpations: Abdomen is soft.   Musculoskeletal:      Right lower leg: No edema.      Left lower leg: No edema.   Neurological:      General: No focal deficit present.      Mental Status: She is alert. Mental status is at baseline. She is disoriented.   Psychiatric:         Mood and Affect: Mood normal.         Behavior: Behavior is cooperative.         Cognition and Memory: Memory is impaired.             Significant Labs: All pertinent labs within the past 24 hours have been reviewed.  None    Significant Imaging: I have reviewed all pertinent imaging results/findings within the past 24 hours.

## 2024-12-26 NOTE — PROGRESS NOTES
"[facility] - Internal Medicine Telemetry  Adult Nutrition  Progress Note    SUMMARY       Recommendations  1. Continue regular diet as tolerated   2. RD to monitor weight, labs, and PO intake    Goals: meet % een/epn by next RD f/u  Nutrition Goal Status: goal met  Communication of RD Recs: POC    Assessment and Plan    No nutrition diagnosis at this time.      Reason for Assessment    Reason For Assessment: RD follow-up  Diagnosis: other (see comments) (Cognitive and behavioral changes)  General Information Comments: RD follow-up: pt consuming 100% of meals provided. Pt w/ no issues chewing/swallowing. Pt w/ no indicators for malnutrition at this time. RD following.  Nutrition Discharge Planning: general healthy diet    Nutrition Risk Screen    Nutrition Risk Screen: no indicators present    Nutrition/Diet History    Spiritual, Cultural Beliefs, Congregation Practices, Values that Affect Care: no  Food Allergies: NKFA    Anthropometrics    Temp: 98.3 °F (36.8 °C)  Height Method: Stated  Height: 5' 2" (157.5 cm)  Height (inches): 62 in  Weight Method: Bed Scale  Weight: 49.9 kg (110 lb 0.2 oz)  Weight (lb): 110.01 lb  Ideal Body Weight (IBW), Female: 110 lb  % Ideal Body Weight, Female (lb): 100 %  BMI (Calculated): 20.1  BMI Grade: 18.5-24.9 - normal       Lab/Procedures/Meds    Pertinent Labs Reviewed: reviewed  Pertinent Labs Comments: RBC 3.84 low, GFR 42.4 low, AST 55 high  Pertinent Medications Reviewed: reviewed  Pertinent Medications Comments: levetiracetam, MVI, senna    Estimated/Assessed Needs    Weight Used For Calorie Calculations: 49.9 kg (110 lb 0.2 oz)  Energy Calorie Requirements (kcal): 2001-1535 (25-30kcal/kg)  Energy Need Method: Kcal/kg  Protein Requirements: 60 (1.2 g/kg)  Weight Used For Protein Calculations: 49.9 kg (110 lb 0.2 oz)     Estimated Fluid Requirement Method: RDA Method  RDA Method (mL): 1247         Nutrition Prescription Ordered    Current Diet Order: Regular    Evaluation of " Received Nutrient/Fluid Intake    Energy Calories Required: meeting needs  Protein Required: meeting needs  Fluid Required:  (as per MD)  Comments: LBM 11/19  Tolerance: tolerating  % Intake of Estimated Energy Needs: 100%  % Meal Intake: 100%    Nutrition Risk    Level of Risk/Frequency of Follow-up: low     Monitor and Evaluation    Food and Nutrient Intake: energy intake, food and beverage intake  Food and Nutrient Adminstration: diet order  Physical Activity and Function: nutrition-related ADLs and IADLs  Anthropometric Measurements: height/length, weight, weight change, body mass index  Biochemical Data, Medical Tests and Procedures: electrolyte and renal panel, gastrointestinal profile, glucose/endocrine profile, inflammatory profile, lipid profile     Nutrition Follow-Up    RD Follow-up?: Yes

## 2024-12-26 NOTE — PLAN OF CARE
Recommendations  1. Continue regular diet as tolerated   2. RD to monitor weight, labs, and PO intake     Goals: meet % een/epn by next RD f/u  Nutrition Goal Status: goal met  Communication of RD Recs: POC
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
## 2024-12-27 PROCEDURE — 25000003 PHARM REV CODE 250

## 2024-12-27 PROCEDURE — 11000001 HC ACUTE MED/SURG PRIVATE ROOM

## 2024-12-27 RX ADMIN — LEVETIRACETAM 750 MG: 500 TABLET, FILM COATED ORAL at 09:12

## 2024-12-27 RX ADMIN — LEVETIRACETAM 750 MG: 500 TABLET, FILM COATED ORAL at 08:12

## 2024-12-27 RX ADMIN — SENNOSIDES AND DOCUSATE SODIUM 1 TABLET: 50; 8.6 TABLET ORAL at 08:12

## 2024-12-27 RX ADMIN — THERA TABS 1 TABLET: TAB at 09:12
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
## 2024-12-27 NOTE — SUBJECTIVE & OBJECTIVE
Interval History: NAEON. VSS on RA. Patient sitting at bedside with keys in hand watching the weather report on TV. No concerns at this time. Sitter at bedside.    Review of Systems   Constitutional:  Negative for chills and fever.   Respiratory:  Negative for cough and shortness of breath.    Cardiovascular:  Negative for chest pain.   Gastrointestinal:  Negative for abdominal pain.     Objective:     Vital Signs (Most Recent):  Temp: 98.1 °F (36.7 °C) (12/26/24 1944)  Pulse: 77 (12/26/24 2145)  Resp: 18 (12/26/24 1944)  BP: 109/72 (12/26/24 1944)  SpO2: 98 % (12/26/24 1944) Vital Signs (24h Range):  Temp:  [98.1 °F (36.7 °C)] 98.1 °F (36.7 °C)  Pulse:  [77] 77  Resp:  [18] 18  SpO2:  [98 %] 98 %  BP: (109)/(72) 109/72     Weight: 49.9 kg (110 lb 0.2 oz)  Body mass index is 20.12 kg/m².    Intake/Output Summary (Last 24 hours) at 12/27/2024 0857  Last data filed at 12/26/2024 1744  Gross per 24 hour   Intake 680 ml   Output --   Net 680 ml         Physical Exam  Vitals and nursing note reviewed.   Constitutional:       General: She is awake. She is not in acute distress.     Appearance: Normal appearance. She is not ill-appearing.   HENT:      Head: Normocephalic and atraumatic.      Nose: Nose normal.      Mouth/Throat:      Mouth: Mucous membranes are moist.      Pharynx: Oropharynx is clear. No oropharyngeal exudate.   Eyes:      Extraocular Movements: Extraocular movements intact.      Conjunctiva/sclera: Conjunctivae normal.   Cardiovascular:      Pulses: Normal pulses.   Pulmonary:      Effort: Pulmonary effort is normal. No respiratory distress.   Chest:      Chest wall: Swelling (chronic) present.       Abdominal:      General: Abdomen is flat.      Palpations: Abdomen is soft.   Musculoskeletal:      Right lower leg: No edema.      Left lower leg: No edema.   Neurological:      General: No focal deficit present.      Mental Status: She is alert. Mental status is at baseline. She is disoriented.    Psychiatric:         Mood and Affect: Mood normal.         Behavior: Behavior is cooperative.         Cognition and Memory: Memory is impaired.             Significant Labs: All pertinent labs within the past 24 hours have been reviewed.    Significant Imaging: I have reviewed all pertinent imaging results/findings within the past 24 hours.

## 2024-12-28 PROCEDURE — 25000003 PHARM REV CODE 250

## 2024-12-28 PROCEDURE — 11000001 HC ACUTE MED/SURG PRIVATE ROOM

## 2024-12-28 RX ADMIN — THERA TABS 1 TABLET: TAB at 09:12

## 2024-12-28 RX ADMIN — LEVETIRACETAM 750 MG: 500 TABLET, FILM COATED ORAL at 09:12

## 2024-12-28 RX ADMIN — LEVETIRACETAM 750 MG: 500 TABLET, FILM COATED ORAL at 08:12

## 2024-12-28 NOTE — PLAN OF CARE
12/27/24 1820   Discharge Reassessment   Assessment Type Discharge Planning Reassessment   Did the patient's condition or plan change since previous assessment? No   Discharge Plan discussed with: Patient   Communicated MARVEL with patient/caregiver Date not available/Unable to determine   Discharge Plan A New Nursing Home placement - long term care facility  ([facility])   Discharge Plan B New Nursing Home placement - long term care facility   Transition of Care Barriers No family/friends to help;Other (see comments)  ( yet to sign NH paperwork or provide financials)   Why the patient remains in the hospital Social issues   Post-Acute Status   Discharge Delays (!) Patient and Family Barriers  ( assigned on 12/16.  Yet to provide NH with financials or complete application)
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
## 2024-12-29 PROCEDURE — 25000003 PHARM REV CODE 250

## 2024-12-29 PROCEDURE — 11000001 HC ACUTE MED/SURG PRIVATE ROOM

## 2024-12-29 RX ADMIN — SENNOSIDES AND DOCUSATE SODIUM 1 TABLET: 50; 8.6 TABLET ORAL at 09:12

## 2024-12-29 RX ADMIN — LEVETIRACETAM 750 MG: 500 TABLET, FILM COATED ORAL at 09:12

## 2024-12-29 RX ADMIN — THERA TABS 1 TABLET: TAB at 10:12

## 2024-12-29 RX ADMIN — LEVETIRACETAM 750 MG: 500 TABLET, FILM COATED ORAL at 10:12

## 2024-12-29 RX ADMIN — POLYETHYLENE GLYCOL 3350 17 G: 17 POWDER, FOR SOLUTION ORAL at 10:12

## 2024-12-29 NOTE — SUBJECTIVE & OBJECTIVE
Interval History: NAEON. VSS on RA. No complaints at this time, patient resting comfortably in bed watching TV. Pending dispo.    Objective:     Vital Signs (Most Recent):  Temp: 98.1 °F (36.7 °C) (12/29/24 0748)  Pulse: 79 (12/29/24 0748)  Resp: 18 (12/29/24 0748)  BP: 96/65 (12/29/24 0748)  SpO2: 97 % (12/28/24 1930) Vital Signs (24h Range):  Temp:  [98.1 °F (36.7 °C)-98.6 °F (37 °C)] 98.1 °F (36.7 °C)  Pulse:  [73-79] 79  Resp:  [18] 18  SpO2:  [97 %] 97 %  BP: (96-97)/(65-67) 96/65     Weight: 49.9 kg (110 lb 0.2 oz)  Body mass index is 20.12 kg/m².    Intake/Output Summary (Last 24 hours) at 12/29/2024 0938  Last data filed at 12/29/2024 0800  Gross per 24 hour   Intake 720 ml   Output --   Net 720 ml         Physical Exam  Vitals and nursing note reviewed.   Constitutional:       General: She is awake. She is not in acute distress.     Appearance: Normal appearance. She is not ill-appearing.   HENT:      Head: Normocephalic and atraumatic.      Nose: Nose normal.      Mouth/Throat:      Mouth: Mucous membranes are moist.      Pharynx: Oropharynx is clear. No oropharyngeal exudate.   Eyes:      Extraocular Movements: Extraocular movements intact.      Conjunctiva/sclera: Conjunctivae normal.   Cardiovascular:      Pulses: Normal pulses.   Pulmonary:      Effort: Pulmonary effort is normal. No respiratory distress.   Abdominal:      General: Abdomen is flat.      Palpations: Abdomen is soft.   Musculoskeletal:      Right lower leg: No edema.      Left lower leg: No edema.   Neurological:      General: No focal deficit present.      Mental Status: She is alert. Mental status is at baseline. She is disoriented.   Psychiatric:         Mood and Affect: Mood normal.         Behavior: Behavior is cooperative.         Cognition and Memory: Memory is impaired.             Significant Labs: All pertinent labs within the past 24 hours have been reviewed.    Significant Imaging: I have reviewed all pertinent imaging  results/findings within the past 24 hours.

## 2024-12-29 NOTE — PLAN OF CARE
Problem: Adult Inpatient Plan of Care  Goal: Plan of Care Review  Outcome: Progressing     Problem: Fall Injury Risk  Goal: Absence of Fall and Fall-Related Injury  Outcome: Progressing     Problem: Seizure, Active Management  Goal: Absence of Seizure/Seizure-Related Injury  Outcome: Progressing     Problem: Functional Deficit  Goal: Optimal Cognitive Function  Outcome: Progressing     Problem: Comorbidity Management  Goal: Maintenance of Seizure Control  Outcome: Progressing
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
## 2024-12-30 PROCEDURE — 25000003 PHARM REV CODE 250

## 2024-12-30 PROCEDURE — 11000001 HC ACUTE MED/SURG PRIVATE ROOM

## 2024-12-30 RX ADMIN — SENNOSIDES AND DOCUSATE SODIUM 1 TABLET: 50; 8.6 TABLET ORAL at 08:12

## 2024-12-30 RX ADMIN — LEVETIRACETAM 750 MG: 500 TABLET, FILM COATED ORAL at 08:12

## 2024-12-30 NOTE — NURSING
SS#    Spoke with [facility] [employer] at [phone] over the phone and gave mrs. Rajan her ss#.  She is currently at the  admin office.  Also she is aware to expect at phone call from  at 3pm.

## 2024-12-30 NOTE — PROGRESS NOTES
NICHOLAS spoke to [facility] and confirmed that bed is still available.   This writer phoned  [employer] at [phone] and there was no answer. Voicemail left.

## 2024-12-30 NOTE — ASSESSMENT & PLAN NOTE
8/30 patient had a witnessed seizure for 3 minutes with jerking of the left arm and right leg, with post-ictal state. Labs with anion gap acidosis, otherwise no findings. Glucose 124. CMP, CBC, lactic acid, CPK WNL. Ionized calcium 1.02. CT head no evidence of acute intracranial abnormalities. No provoking factor identified in labs/history.  Per Neuro, low suspicion that rivastigmine triggered seizure, but not opposed to holding medication.     EEG: abnormal study due to moderate diffuse background slowing consistent with diffuse cerebral dysfunction and encephalopathy which may be on the basis of toxic, metabolic, or primary neuronal disorder.     9/13 patient suffered a second witnessed seizure. Episode lasted approx 10 minutes, during which patient was foaming at the mouth and leaning to the right. She received 1 mg Ativan IM. Lactate elevated. On evaluation 9/13 AM at baseline. Given she has now had two clinical events, will start AED (lacosamide) for seizure prevention per neuro recs.     12/4 - Patient with an episode of incontinence (urine and bowel), concerning for another potential seizures. CBC and CMP unremarkable. UA without signs of infection. CK, lactic acid and magnesium were all WNL. CXR without evidence of focal consolidation or infective focus. Patient seemed to return to baseline. CTH pending. No antibiotics or further investigations pursued. Neurology curbsided and recommended transitioning from Vimpat to Keppra given heart block noted on EKG    Plan  - Continue Keppra 750 mg BID  - Outpatient f/u with neurology   - Seizure precautions   - PRN rectal diazepam for GTC>5 min
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
## 2024-12-30 NOTE — SUBJECTIVE & OBJECTIVE
Interval History: No acute events overnight, VSS. Patient denies any complaints, resting in chair watching television. Pending dispo, bed still available at [facility]. Message left on voicemail of .      Objective:     Vital Signs (Most Recent):  Temp: 98 °F (36.7 °C) (12/30/24 0803)  Pulse: 89 (12/30/24 0803)  Resp: 17 (12/29/24 1934)  BP: 116/78 (12/30/24 0803)  SpO2: 98 % (12/30/24 0803) Vital Signs (24h Range):  Temp:  [98 °F (36.7 °C)] 98 °F (36.7 °C)  Pulse:  [79-89] 89  Resp:  [17] 17  SpO2:  [97 %-98 %] 98 %  BP: (116-118)/(65-78) 116/78     Weight: 49.9 kg (110 lb 0.2 oz)  Body mass index is 20.12 kg/m².    Intake/Output Summary (Last 24 hours) at 12/30/2024 0825  Last data filed at 12/29/2024 1300  Gross per 24 hour   Intake 240 ml   Output --   Net 240 ml         Physical Exam  Constitutional:       Appearance: She is normal weight.   HENT:      Head: Normocephalic and atraumatic.      Nose: Nose normal.   Eyes:      Conjunctiva/sclera: Conjunctivae normal.   Cardiovascular:      Rate and Rhythm: Normal rate and regular rhythm.      Heart sounds: Normal heart sounds. No murmur heard.  Pulmonary:      Effort: Pulmonary effort is normal. No respiratory distress.      Breath sounds: Normal breath sounds.   Abdominal:      General: Abdomen is flat.   Musculoskeletal:         General: No swelling or deformity.   Skin:     General: Skin is warm and dry.   Neurological:      Mental Status: She is alert. Mental status is at baseline.             Significant Labs: All pertinent labs within the past 24 hours have been reviewed.    Significant Imaging: I have reviewed all pertinent imaging results/findings within the past 24 hours.

## 2024-12-30 NOTE — PROGRESS NOTES
Noted missed 1021 call from  Ca [employer].  This writer phoned her at [phone].  No answer at this time.

## 2024-12-31 LAB — POCT GLUCOSE: 106 MG/DL (ref 70–110)

## 2024-12-31 PROCEDURE — 25000003 PHARM REV CODE 250

## 2024-12-31 PROCEDURE — 11000001 HC ACUTE MED/SURG PRIVATE ROOM

## 2024-12-31 RX ORDER — LEVETIRACETAM 750 MG/1
750 TABLET ORAL 2 TIMES DAILY
Qty: 180 TABLET | Refills: 3 | Status: SHIPPED | OUTPATIENT
Start: 2024-12-31 | End: 2025-12-31

## 2024-12-31 RX ADMIN — LEVETIRACETAM 750 MG: 500 TABLET, FILM COATED ORAL at 08:12

## 2024-12-31 RX ADMIN — POLYETHYLENE GLYCOL 3350 17 G: 17 POWDER, FOR SOLUTION ORAL at 10:12

## 2024-12-31 RX ADMIN — LEVETIRACETAM 750 MG: 500 TABLET, FILM COATED ORAL at 10:12

## 2024-12-31 RX ADMIN — THERA TABS 1 TABLET: TAB at 10:12

## 2024-12-31 RX ADMIN — SENNOSIDES AND DOCUSATE SODIUM 1 TABLET: 50; 8.6 TABLET ORAL at 08:12
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
## 2024-12-31 NOTE — SUBJECTIVE & OBJECTIVE
Interval History: No acute events over night. Patient hemodynamically stable, satting well on room air, and afebrile. Pending placement.       Objective:     Vital Signs (Most Recent):  Temp: 98.5 °F (36.9 °C) (12/31/24 0819)  Pulse: 80 (12/31/24 0821)  Resp: 18 (12/30/24 2059)  BP: 98/64 (12/31/24 0821)  SpO2: 99 % (12/31/24 0821) Vital Signs (24h Range):  Temp:  [97.2 °F (36.2 °C)-98.5 °F (36.9 °C)] 98.5 °F (36.9 °C)  Pulse:  [77-84] 80  Resp:  [18] 18  SpO2:  [97 %-99 %] 99 %  BP: ()/(49-64) 98/64     Weight: 49.9 kg (110 lb 0.2 oz)  Body mass index is 20.12 kg/m².  No intake or output data in the 24 hours ending 12/31/24 0848      Physical Exam  Constitutional:       General: She is not in acute distress.     Appearance: She is normal weight. She is not diaphoretic.   HENT:      Head: Normocephalic and atraumatic.      Nose: Nose normal.      Mouth/Throat:      Mouth: Mucous membranes are moist.   Eyes:      Conjunctiva/sclera: Conjunctivae normal.   Cardiovascular:      Rate and Rhythm: Normal rate and regular rhythm.      Heart sounds: Normal heart sounds. No murmur heard.  Pulmonary:      Effort: Pulmonary effort is normal. No respiratory distress.      Breath sounds: Normal breath sounds.   Abdominal:      General: Abdomen is flat.   Musculoskeletal:      Right lower leg: No edema.      Left lower leg: No edema.   Skin:     General: Skin is warm and dry.   Neurological:      Mental Status: She is alert. Mental status is at baseline.             Significant Labs: All pertinent labs within the past 24 hours have been reviewed.    Significant Imaging: I have reviewed all pertinent imaging results/findings within the past 24 hours.

## 2024-12-31 NOTE — PLAN OF CARE
[facility] - Internal Medicine Telemetry      HOME HEALTH ORDERS  FACE TO FACE ENCOUNTER    Patient Name: Mohini Dougherty  YOB: 1945    PCP: Edwar Castaneda II, MD   PCP Address: [address] [place] [id number]  PCP Phone Number: [phone]  PCP Fax: [phone]    Encounter Date: 6/24/24    Admit to Home Health    Diagnoses:  Active Hospital Problems    Diagnosis  POA    *Cognitive and behavioral changes [R41.89, R46.89]  Yes    Glaucoma (increased eye pressure) [H40.9]  Yes    Seizure [R56.9]  No    Agitation [R45.1]  No      Resolved Hospital Problems    Diagnosis Date Resolved POA    Leukocytosis [D72.829] 09/04/2024 No       Follow Up Appointments:  No future appointments.    Allergies:Review of patient's allergies indicates:  No Known Allergies    Medications: Review discharge medications with patient and family and provide education.    Current Facility-Administered Medications   Medication Dose Route Frequency Provider Last Rate Last Admin    acetaminophen tablet 650 mg  650 mg Oral Q8H PRN Apolinar Rader, DO   650 mg at 12/04/24 2032    acetaminophen tablet 650 mg  650 mg Oral Q4H PRN Apolinar Rader, DO   650 mg at 12/02/24 1236    diazePAM 5-7.5-10 mg (DIASTAT ACUDIAL) rectal kit 10 mg  10 mg Rectal Once PRN Felecia Lizama MD        glucagon (human recombinant) injection 1 mg  1 mg Intramuscular PRN Apolinar Rader, DO        glucose chewable tablet 16 g  16 g Oral PRN Apolinar Rader, DO        glucose chewable tablet 24 g  24 g Oral PRN Apolinar Radre, DO        levETIRAcetam tablet 750 mg  750 mg Oral BID Lobo Rader MD   750 mg at 12/31/24 1059    melatonin tablet 6 mg  6 mg Oral Nightly PRN Apolinar Rader, DO   6 mg at 12/23/24 2106    multivitamin tablet  1 tablet Oral Daily Ezio Woodall MD   1 tablet at 12/31/24 1059    naloxone 0.4 mg/mL injection 0.02 mg  0.02 mg Intravenous PRN Apolinar Rader, DO        OLANZapine zydis disintegrating tablet 5 mg  5 mg Oral Q8H PRN  Minerva Brewer MD        polyethylene glycol packet 17 g  17 g Oral Daily Rosanne Cox DO   17 g at 12/31/24 1059    senna-docusate 8.6-50 mg per tablet 1 tablet  1 tablet Oral BID Rosanne Cox DO   1 tablet at 12/30/24 2056    sodium chloride 0.9% flush 2 mL  2 mL Intravenous Q12H PRN Apolinar Rader,             Medication List        START taking these medications      levETIRAcetam 750 MG Tab  Commonly known as: KEPPRA  Take 1 tablet (750 mg total) by mouth 2 (two) times daily.     multivitamin Tab  Take 1 tablet by mouth once daily.                I have seen and examined this patient within the last 30 days. My clinical findings that support the need for the home health skilled services and home bound status are the following:no   Mental confusion making it unsafe for patient to leave home alone due to  Dementia.     Diet:   regular diet    Labs:  N/a    Referrals/ Consults  Aide to provide assistance with personal care, ADLs, and vital signs.    Activities:   activity as tolerated    Nursing:   Agency to admit patient within 24 hours of hospital discharge unless specified on physician order or at patient request    SN to complete comprehensive assessment including routine vital signs. Instruct on disease process and s/s of complications to report to MD. Review/verify medication list sent home with the patient at time of discharge  and instruct patient/caregiver as needed. Frequency may be adjusted depending on start of care date.     Skilled nurse to perform up to 3 visits PRN for symptoms related to diagnosis    Notify MD if SBP > 160 or < 90; DBP > 90 or < 50; HR > 120 or < 50; Temp > 101; O2 < 88%;     Ok to schedule additional visits based on staff availability and patient request on consecutive days within the home health episode.    When multiple disciplines ordered:    Start of Care occurs on Sunday - Wednesday schedule remaining discipline evaluations as ordered on separate consecutive days  following the start of care.    Thursday SOC -schedule subsequent evaluations Friday and Monday the following week.     Friday - Saturday SOC - schedule subsequent discipline evaluations on consecutive days starting Monday of the following week.    For all post-discharge communication and subsequent orders please contact patient's primary care physician. If unable to reach primary care physician or do not receive response within 30 minutes, please contact [phone] for clinical staff order clarification    Miscellaneous   N/a    Home Health Aide:  Home Health Aide Three times weekly    Wound Care Orders  no    I certify that this patient is confined to her home and needs intermittent skilled nursing care.

## 2024-12-31 NOTE — PROGRESS NOTES
This writer informed that  is present at bedside.  Team informed.  This writer noted  and pt having a conversation.  Will allow time for them to speak.  stated that she will see CM prior to her departure.

## 2024-12-31 NOTE — PLAN OF CARE
12/31/24 1610   Final Note   Assessment Type Final Discharge Note   Anticipated Discharge Disposition Home-Health  (Per  plan.)   What phone number can be called within the next 1-3 days to see how you are doing after discharge? [phone]   Post-Acute Status   Post-Acute Authorization Home Health   Home Health Status Set-up Complete/Auth obtained   Coverage Medicare A/B   Discharge Delays (!) Medication Delivery

## 2024-12-31 NOTE — PSYCH EVALUATION
"CM had a lengthy discussion with , Ca [phone]. She said that she has secured a furnished apartment for Ms. Rajan with "24/7 monitoring" where she will be "in her regular neighborhood and able to have her dogs". Ms. Rajan will be in the apartment alone, but "someone will have access to go in to check on her". Ca would not disclose the name or address of the apartment "related to privacy concerns". She did say that it is near pt's home and a couple of blocks to her [facility]. She did confirm that it is not assisted living or NH. This writer explained the importance that though pt is physically ind with Adls, she needs monitoring and medication management for her safety.     Ca requested assistance having an aide to come the home "for a few hours a day". This writer explained that in order to set up any home service, I would need the address and pt would have to be in need of the service. It was also explained that HH is not a daily service. Ca requested the names of the agencies that I intend to contact and she will reach out to them rather than providing the address. This writer further explained that the referral would need to be initiated by the hospital and it cannot be guaranteed.  It was further explained that pt's mental stated is the reason that her care team feels that NH placement will be a safe discharge for her, not home alone. I explained to Ca that she will need to insure that pt is receiving her scheduled medications daily and timely and that will require out of pocket expense to which she stated that it can be arranged.  This writer reinforced that pt needs constant monitoring for her safety. Ca stated that the apartment is available immediately and  requested that pt be discharged with 2-4 weeks of medication if possible.      Will inform team.     "

## 2024-12-31 NOTE — PLAN OF CARE
AAOx4; POC reviewed & verbalizes understanding.  Admit DX: cognitive & behavioral changes  Afebrile. No acute events on shift. No deficits noted  IV access & IVF: no IV access  BM: 12/28/24, incontinent  : incontinent  Appetite: adequate  Bed alarm set; fall precautions maintained.   Bed locked in lowest position, side rails up x2, call light within reach, environment clear. Encouraged pt to call nurse with any concerns.  Safety measures maintained  Verbalized to  the importance of finding a safe place that is appropriate for pt to go. Explained the issue of incontinence, the need for assistance w/ ADLs, and progression of her dementia disease process, pt will need 24 hour supervised care.

## 2024-12-31 NOTE — PROGRESS NOTES
NICHOLAS spoke to  Director and received message from Attending. Plan is to move forward with 's plan.   has no  agency preferrence. Will refer to [facility].

## 2024-12-31 NOTE — PROGRESS NOTES
"CM received visit from curator Penaloza.  This writer informed her that pt is under review by [facility] and that they will be in contact with her for address and if able to accept, start date. She asked if it would be better to wait until tomorrow "to avoid the craziness today".  This writer explained that discharge is moving forward for today.   stated that she will make space in her personal vehicle for pt so that she is comfortable at discharge.      "

## 2024-12-31 NOTE — PLAN OF CARE
Called  Ca Merchant at [phone]. No answer. VM left.     Alexia Gaytan, MSW, LCSW, ACM-SW  Director - Case Management

## 2025-01-01 PROCEDURE — 25000003 PHARM REV CODE 250

## 2025-01-01 PROCEDURE — 11000001 HC ACUTE MED/SURG PRIVATE ROOM

## 2025-01-01 RX ADMIN — THERA TABS 1 TABLET: TAB at 10:01

## 2025-01-01 RX ADMIN — LEVETIRACETAM 750 MG: 500 TABLET, FILM COATED ORAL at 10:01

## 2025-01-01 RX ADMIN — LEVETIRACETAM 750 MG: 500 TABLET, FILM COATED ORAL at 09:01

## 2025-01-01 RX ADMIN — SENNOSIDES AND DOCUSATE SODIUM 1 TABLET: 50; 8.6 TABLET ORAL at 09:01

## 2025-01-01 NOTE — PLAN OF CARE
Sw asked to check the status of dc with the above Pt, Sw reviewed CM notes, Pt was setup with HH and Pt was set to dc yesterday pm. Added floor nurse, OCC and staff to secure chat with residents. Nursing updated all that Pt's  is having to come up with resources for Pt's apartment as she will need 24/hr care with HH services. Sw informed team due to the extensive stay and the holiday, dc will likely have to wait till tomorrow to be sure Pt has a safe a set dc plan, IM team aware, MARVEL moved and will updated CM/Sw on 11th floor tomorrow.

## 2025-01-01 NOTE — ASSESSMENT & PLAN NOTE
78-year-old lady here with encephalopathy, secondary to worsening dementia.  Clinically her presentation is not concering for acute sepsis, or stroke.        Extensive workup for AMS has been negative. Neurology suspects her underlying dementia is driving her presentation. Patient very resistant to discharging to SNF or any facility. Per our team and Psychiatry the patient does not show decision making capacity. Son prefers SNF or facility placement. However, patient threatened and attempted to leave AMA on 7/15 so she was PEC'd.  PEC is to prevent AMA but she does not require further psychological stabilization, discuss with legal need for PEC regarding capacity to leave AMA.     Plan:  - CEC  on . Patient has situational capacity. Friend David resigned as MPOA.    - PRN zyprexa.   - Court date ()   - Court appointed  Ca Merchant [phone]   - SW coordinating discharge with .

## 2025-01-01 NOTE — SUBJECTIVE & OBJECTIVE
Interval History: NAEON. Hemodynamically stable, on room air, afebrile.  visited yesterday, pending dispo. No complaints. If no BM by tomorrow will consider adding suppository.       Objective:     Vital Signs (Most Recent):  Temp: 99 °F (37.2 °C) (01/01/25 0832)  Pulse: 85 (01/01/25 0832)  Resp: 18 (01/01/25 0832)  BP: 112/60 (01/01/25 0832)  SpO2: 98 % (01/01/25 0832) Vital Signs (24h Range):  Temp:  [99 °F (37.2 °C)] 99 °F (37.2 °C)  Pulse:  [85] 85  Resp:  [18] 18  SpO2:  [98 %] 98 %  BP: (112)/(60) 112/60     Weight: 49.9 kg (110 lb 0.2 oz)  Body mass index is 20.12 kg/m².    Intake/Output Summary (Last 24 hours) at 1/1/2025 0954  Last data filed at 12/31/2024 1048  Gross per 24 hour   Intake 240 ml   Output --   Net 240 ml         Physical Exam  Constitutional:       General: She is not in acute distress.     Appearance: She is normal weight. She is not diaphoretic.   HENT:      Head: Normocephalic and atraumatic.      Nose: Nose normal.      Mouth/Throat:      Mouth: Mucous membranes are moist.   Eyes:      Conjunctiva/sclera: Conjunctivae normal.   Cardiovascular:      Rate and Rhythm: Normal rate and regular rhythm.      Heart sounds: Normal heart sounds. No murmur heard.  Pulmonary:      Effort: Pulmonary effort is normal. No respiratory distress.      Breath sounds: Normal breath sounds.   Abdominal:      General: Abdomen is flat.   Musculoskeletal:         General: Deformity (right anterior neck/trapezius lipoma) present. No signs of injury.      Right lower leg: No edema.      Left lower leg: No edema.   Skin:     General: Skin is warm and dry.   Neurological:      Mental Status: She is alert. Mental status is at baseline. She is disoriented.      Comments: Slightly more disoriented speech this morning, difficult to follow conversation              Significant Labs: All pertinent labs within the past 24 hours have been reviewed.    Significant Imaging: I have reviewed all pertinent imaging  results/findings within the past 24 hours.

## 2025-01-02 LAB — POCT GLUCOSE: 105 MG/DL (ref 70–110)

## 2025-01-02 PROCEDURE — 11000001 HC ACUTE MED/SURG PRIVATE ROOM

## 2025-01-02 PROCEDURE — 25000003 PHARM REV CODE 250

## 2025-01-02 RX ADMIN — LEVETIRACETAM 750 MG: 500 TABLET, FILM COATED ORAL at 09:01

## 2025-01-02 RX ADMIN — LEVETIRACETAM 750 MG: 500 TABLET, FILM COATED ORAL at 08:01

## 2025-01-02 RX ADMIN — THERA TABS 1 TABLET: TAB at 08:01

## 2025-01-02 NOTE — PROGRESS NOTES
CM phone  Ca ([phone]) to check status of plan and to provide additional care resources. No answer.  Voicemail left requesting call back.
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
## 2025-01-02 NOTE — SUBJECTIVE & OBJECTIVE
Interval History: NAEON. Hemodynamically stable, afebrile, on room air. No complaints. Last BM 12/31. Pending dispo plans with .       Objective:     Vital Signs (Most Recent):  Temp: 98.7 °F (37.1 °C) (01/02/25 0804)  Pulse: 75 (01/02/25 0804)  Resp: 18 (01/02/25 0501)  BP: 109/65 (01/02/25 0804)  SpO2: 98 % (01/02/25 0804) Vital Signs (24h Range):  Temp:  [98.7 °F (37.1 °C)-99 °F (37.2 °C)] 98.7 °F (37.1 °C)  Pulse:  [68-75] 75  Resp:  [18] 18  SpO2:  [98 %] 98 %  BP: (109-122)/(57-65) 109/65     Weight: 49.9 kg (110 lb 0.2 oz)  Body mass index is 20.12 kg/m².  No intake or output data in the 24 hours ending 01/02/25 1219      Physical Exam  Constitutional:       General: She is not in acute distress.  HENT:      Head: Normocephalic and atraumatic.      Nose: Nose normal.   Eyes:      Conjunctiva/sclera: Conjunctivae normal.   Cardiovascular:      Rate and Rhythm: Normal rate and regular rhythm.      Heart sounds: Normal heart sounds. No murmur heard.  Pulmonary:      Effort: Pulmonary effort is normal. No respiratory distress.      Breath sounds: Normal breath sounds.   Abdominal:      General: Abdomen is flat.   Musculoskeletal:      Right lower leg: No edema.      Left lower leg: No edema.   Skin:     General: Skin is warm and dry.   Neurological:      Mental Status: She is alert. She is disoriented.   Psychiatric:         Behavior: Behavior is cooperative.             Significant Labs: All pertinent labs within the past 24 hours have been reviewed.    Significant Imaging: I have reviewed all pertinent imaging results/findings within the past 24 hours.

## 2025-01-02 NOTE — PLAN OF CARE
AAOx4; POC reviewed & verbalizes understanding.  Admit DX: Cognitive & behavioral changes  Afebrile. No acute events on shift. No deficits noted  IV access & IVF: no IV access  BM: 12/31/24  :  WNL  Appetite: adequate  Bed alarm set; fall precautions maintained.   Bed locked in lowest position, side rails up x2, call light within reach, environment clear. Encouraged pt to call nurse with any concerns.  Safety measures maintained

## 2025-01-02 NOTE — PROGRESS NOTES
"[facility] - Internal Medicine Telemetry  Adult Nutrition  Progress Note    SUMMARY       Recommendations  1. Continue regular diet as tolerated   2. Encourage good intake   3. RD to monitor weight, labs, and PO intake    Goals: meet % een/epn by next RD f/u  Nutrition Goal Status: goal met  Communication of RD Recs: (poc)    Assessment and Plan    No nutrition diagnosis at this time.       Reason for Assessment    Reason For Assessment: RD follow-up  Diagnosis: other (see comments) (Cognitive and behavioral changes)  General Information Comments: RD follow-up: Pt consuming % of meals provided. Pt w/ no indicators for malnutrition at this time.  Nutrition Discharge Planning: general healthy diet  Nutrition Related Social Determinants of Health: SDOH: None Identified      Nutrition Risk Screen    Nutrition Risk Screen: no indicators present    Nutrition/Diet History    Spiritual, Cultural Beliefs, Episcopal Practices, Values that Affect Care: no  Food Allergies: NKFA    Anthropometrics    Temp: 98.7 °F (37.1 °C)  Height Method: Stated  Height: 5' 2" (157.5 cm)  Height (inches): 62 in  Weight Method: Bed Scale  Weight: 49.9 kg (110 lb 0.2 oz)  Weight (lb): 110.01 lb  Ideal Body Weight (IBW), Female: 110 lb  % Ideal Body Weight, Female (lb): 100 %  BMI (Calculated): 20.1  BMI Grade: 18.5-24.9 - normal       Lab/Procedures/Meds    Pertinent Labs Reviewed: reviewed  Pertinent Labs Comments: RBC 3.84 low, GFR 42.4 low, AST 55 high  Pertinent Medications Reviewed: reviewed  Pertinent Medications Comments: levetiracetam, MVI, senna    Estimated/Assessed Needs    Weight Used For Calorie Calculations: 49.9 kg (110 lb 0.2 oz)  Energy Calorie Requirements (kcal): 6325-1284 (25-30kcal/kg)  Energy Need Method: Kcal/kg  Protein Requirements: 60 (1.2 g/kg)  Weight Used For Protein Calculations: 49.9 kg (110 lb 0.2 oz)     Estimated Fluid Requirement Method: RDA Method  RDA Method (mL): 1247         Nutrition Prescription " Ordered    Current Diet Order: Regular    Evaluation of Received Nutrient/Fluid Intake    I/O: + 4.6 L since 11/6  Energy Calories Required: meeting needs  Protein Required: meeting needs  Fluid Required:  (as per MD)  Comments: LBM 12/31  Tolerance: tolerating  % Intake of Estimated Energy Needs: 50 - 75 %  % Meal Intake: 50 - 75 %    Nutrition Risk    Level of Risk/Frequency of Follow-up: low     Monitor and Evaluation    Food and Nutrient Intake: energy intake, food and beverage intake  Food and Nutrient Adminstration: diet order  Physical Activity and Function: nutrition-related ADLs and IADLs  Anthropometric Measurements: height/length, weight, weight change, body mass index  Biochemical Data, Medical Tests and Procedures: electrolyte and renal panel, gastrointestinal profile, glucose/endocrine profile, inflammatory profile, lipid profile     Nutrition Follow-Up    RD Follow-up?: Yes

## 2025-01-02 NOTE — PLAN OF CARE
Recommendations  1. Continue regular diet as tolerated   2. Encourage good intake   3. RD to monitor weight, labs, and PO intake     Goals: meet % een/epn by next RD f/u  Nutrition Goal Status: goal met  Communication of RD Recs: (poc)

## 2025-01-03 LAB — POCT GLUCOSE: 185 MG/DL (ref 70–110)

## 2025-01-03 PROCEDURE — 11000001 HC ACUTE MED/SURG PRIVATE ROOM

## 2025-01-03 PROCEDURE — 25000003 PHARM REV CODE 250

## 2025-01-03 RX ADMIN — LEVETIRACETAM 750 MG: 500 TABLET, FILM COATED ORAL at 08:01

## 2025-01-03 RX ADMIN — THERA TABS 1 TABLET: TAB at 08:01

## 2025-01-03 NOTE — PROGRESS NOTES
CM noted  Ca at bedside with pt. There are reps for two out-of-pocket caregiver services present, one in room at this time.  [facility] rep Sophia is waiting in hallway for her turn. Ricardobebeto Oswald  rep, Darlyn [phone] is in house to speak with pt/ as well.
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
## 2025-01-03 NOTE — PROGRESS NOTES
Per bedside conversation with , she stated that she has a 1430 appointment at bedside with a rep from [facility].  The facility has a memory care unit and activities and she thinks it will be more appropriate placement for pt.      was told by an outside source that pt's insurance will pay for a hospital bed.  This writer explained that there has to be a justified need for the bed and pt may not qualify.  Request sent to provider with no guarantee.

## 2025-01-03 NOTE — PROGRESS NOTES
Per staff, pt is notably upset that she is still here after visits from  and multiple agencies, refusing care/meds.  Will ask  to refrain from having bedside meetings.         Referral packet faxed to [facility] at [phone].  Phone number is [phone].

## 2025-01-03 NOTE — SUBJECTIVE & OBJECTIVE
Interval History: NAEON    Review of Systems  Objective:     Vital Signs (Most Recent):  Temp: 97.5 °F (36.4 °C) (01/03/25 0810)  Pulse: 87 (01/03/25 1127)  Resp: 18 (01/03/25 1127)  BP: 101/64 (01/03/25 1127)  SpO2: 97 % (01/03/25 1127) Vital Signs (24h Range):  Temp:  [97.5 °F (36.4 °C)-98.2 °F (36.8 °C)] 97.5 °F (36.4 °C)  Pulse:  [79-87] 87  Resp:  [18] 18  SpO2:  [96 %-97 %] 97 %  BP: (101-111)/(54-66) 101/64     Weight: 49.9 kg (110 lb 0.2 oz)  Body mass index is 20.12 kg/m².  No intake or output data in the 24 hours ending 01/03/25 1630      Physical Exam  Constitutional:       General: She is not in acute distress.  HENT:      Head: Normocephalic and atraumatic.      Nose: Nose normal.   Eyes:      Conjunctiva/sclera: Conjunctivae normal.   Cardiovascular:      Rate and Rhythm: Normal rate and regular rhythm.      Heart sounds: Normal heart sounds. No murmur heard.  Pulmonary:      Effort: Pulmonary effort is normal. No respiratory distress.      Breath sounds: Normal breath sounds.   Abdominal:      General: Abdomen is flat.   Musculoskeletal:      Right lower leg: No edema.      Left lower leg: No edema.   Skin:     General: Skin is warm and dry.   Neurological:      Mental Status: She is alert. She is disoriented.   Psychiatric:         Behavior: Behavior is cooperative.             Significant Labs: All pertinent labs within the past 24 hours have been reviewed.  None    Significant Imaging: I have reviewed all pertinent imaging results/findings within the past 24 hours.

## 2025-01-04 LAB
ALBUMIN SERPL BCP-MCNC: 3.5 G/DL (ref 3.5–5.2)
ALP SERPL-CCNC: 76 U/L (ref 40–150)
ALT SERPL W/O P-5'-P-CCNC: 14 U/L (ref 10–44)
ANION GAP SERPL CALC-SCNC: 9 MMOL/L (ref 8–16)
AST SERPL-CCNC: 19 U/L (ref 10–40)
BASOPHILS # BLD AUTO: 0.05 K/UL (ref 0–0.2)
BASOPHILS NFR BLD: 0.5 % (ref 0–1.9)
BILIRUB SERPL-MCNC: 0.6 MG/DL (ref 0.1–1)
BUN SERPL-MCNC: 20 MG/DL (ref 8–23)
CALCIUM SERPL-MCNC: 8.8 MG/DL (ref 8.7–10.5)
CHLORIDE SERPL-SCNC: 108 MMOL/L (ref 95–110)
CO2 SERPL-SCNC: 23 MMOL/L (ref 23–29)
CREAT SERPL-MCNC: 0.8 MG/DL (ref 0.5–1.4)
DIFFERENTIAL METHOD BLD: ABNORMAL
EOSINOPHIL # BLD AUTO: 0.4 K/UL (ref 0–0.5)
EOSINOPHIL NFR BLD: 3.9 % (ref 0–8)
ERYTHROCYTE [DISTWIDTH] IN BLOOD BY AUTOMATED COUNT: 13 % (ref 11.5–14.5)
EST. GFR  (NO RACE VARIABLE): >60 ML/MIN/1.73 M^2
GLUCOSE SERPL-MCNC: 88 MG/DL (ref 70–110)
HCT VFR BLD AUTO: 36.3 % (ref 37–48.5)
HGB BLD-MCNC: 11.9 G/DL (ref 12–16)
IMM GRANULOCYTES # BLD AUTO: 0.04 K/UL (ref 0–0.04)
IMM GRANULOCYTES NFR BLD AUTO: 0.4 % (ref 0–0.5)
LYMPHOCYTES # BLD AUTO: 2 K/UL (ref 1–4.8)
LYMPHOCYTES NFR BLD: 20.3 % (ref 18–48)
MAGNESIUM SERPL-MCNC: 2.1 MG/DL (ref 1.6–2.6)
MCH RBC QN AUTO: 31.2 PG (ref 27–31)
MCHC RBC AUTO-ENTMCNC: 32.8 G/DL (ref 32–36)
MCV RBC AUTO: 95 FL (ref 82–98)
MONOCYTES # BLD AUTO: 0.7 K/UL (ref 0.3–1)
MONOCYTES NFR BLD: 6.7 % (ref 4–15)
NEUTROPHILS # BLD AUTO: 6.8 K/UL (ref 1.8–7.7)
NEUTROPHILS NFR BLD: 68.2 % (ref 38–73)
NRBC BLD-RTO: 0 /100 WBC
PHOSPHATE SERPL-MCNC: 3.4 MG/DL (ref 2.7–4.5)
PLATELET # BLD AUTO: 269 K/UL (ref 150–450)
PMV BLD AUTO: 10 FL (ref 9.2–12.9)
POTASSIUM SERPL-SCNC: 3.7 MMOL/L (ref 3.5–5.1)
PROT SERPL-MCNC: 6.3 G/DL (ref 6–8.4)
RBC # BLD AUTO: 3.82 M/UL (ref 4–5.4)
SODIUM SERPL-SCNC: 140 MMOL/L (ref 136–145)
WBC # BLD AUTO: 9.95 K/UL (ref 3.9–12.7)

## 2025-01-04 PROCEDURE — 25000003 PHARM REV CODE 250

## 2025-01-04 PROCEDURE — 85025 COMPLETE CBC W/AUTO DIFF WBC: CPT

## 2025-01-04 PROCEDURE — 36415 COLL VENOUS BLD VENIPUNCTURE: CPT

## 2025-01-04 PROCEDURE — 11000001 HC ACUTE MED/SURG PRIVATE ROOM

## 2025-01-04 PROCEDURE — 84100 ASSAY OF PHOSPHORUS: CPT

## 2025-01-04 PROCEDURE — 83735 ASSAY OF MAGNESIUM: CPT

## 2025-01-04 PROCEDURE — 80053 COMPREHEN METABOLIC PANEL: CPT

## 2025-01-04 RX ADMIN — LEVETIRACETAM 750 MG: 500 TABLET, FILM COATED ORAL at 09:01

## 2025-01-04 RX ADMIN — THERA TABS 1 TABLET: TAB at 09:01

## 2025-01-04 RX ADMIN — SENNOSIDES AND DOCUSATE SODIUM 1 TABLET: 50; 8.6 TABLET ORAL at 08:01

## 2025-01-04 RX ADMIN — LEVETIRACETAM 750 MG: 500 TABLET, FILM COATED ORAL at 08:01
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
## 2025-01-04 NOTE — SUBJECTIVE & OBJECTIVE
Interval History: NAEON. HDS, on room air, afebrile. SW coordinating discharge with . Labs stable.       Objective:     Vital Signs (Most Recent):  Temp: 97.8 °F (36.6 °C) (01/03/25 2014)  Pulse: 92 (01/03/25 2014)  Resp: 18 (01/03/25 2014)  BP: 92/60 (01/03/25 2015)  SpO2: 97 % (01/03/25 2014) Vital Signs (24h Range):  Temp:  [97.8 °F (36.6 °C)] 97.8 °F (36.6 °C)  Pulse:  [92] 92  Resp:  [18] 18  SpO2:  [97 %] 97 %  BP: (92-93)/(53-60) 92/60     Weight: 49.9 kg (110 lb 0.2 oz)  Body mass index is 20.12 kg/m².    Intake/Output Summary (Last 24 hours) at 1/4/2025 1320  Last data filed at 1/3/2025 1726  Gross per 24 hour   Intake 240 ml   Output --   Net 240 ml         Physical Exam  Constitutional:       General: She is not in acute distress.  HENT:      Head: Normocephalic and atraumatic.      Nose: Nose normal.   Eyes:      Conjunctiva/sclera: Conjunctivae normal.   Cardiovascular:      Rate and Rhythm: Normal rate and regular rhythm.      Heart sounds: Normal heart sounds. No murmur heard.  Pulmonary:      Effort: Pulmonary effort is normal. No respiratory distress.      Breath sounds: Normal breath sounds.   Abdominal:      General: Abdomen is flat.   Musculoskeletal:      Right lower leg: No edema.      Left lower leg: No edema.   Skin:     General: Skin is warm and dry.   Neurological:      Mental Status: She is alert. She is disoriented.             Significant Labs: All pertinent labs within the past 24 hours have been reviewed.    Significant Imaging: I have reviewed all pertinent imaging results/findings within the past 24 hours.

## 2025-01-05 PROCEDURE — 11000001 HC ACUTE MED/SURG PRIVATE ROOM

## 2025-01-05 PROCEDURE — 25000003 PHARM REV CODE 250

## 2025-01-05 RX ADMIN — LEVETIRACETAM 750 MG: 500 TABLET, FILM COATED ORAL at 09:01

## 2025-01-05 RX ADMIN — LEVETIRACETAM 750 MG: 500 TABLET, FILM COATED ORAL at 10:01

## 2025-01-05 RX ADMIN — THERA TABS 1 TABLET: TAB at 10:01

## 2025-01-05 NOTE — PLAN OF CARE
Problem: Adult Inpatient Plan of Care  Goal: Plan of Care Review  1/4/2025 2033 by Jose Sloan LPN  Outcome: Progressing  1/4/2025 2033 by Joes Sloan LPN  Outcome: Progressing  Goal: Patient-Specific Goal (Individualized)  1/4/2025 2033 by Jose Sloan LPN  Outcome: Progressing  1/4/2025 2033 by Jose Sloan LPN  Outcome: Progressing  Goal: Absence of Hospital-Acquired Illness or Injury  1/4/2025 2033 by Jose Sloan LPN  Outcome: Progressing  1/4/2025 2033 by Jose Sloan LPN  Outcome: Progressing  Goal: Optimal Comfort and Wellbeing  1/4/2025 2033 by Jose Sloan LPN  Outcome: Progressing  1/4/2025 2033 by Jose Sloan LPN  Outcome: Progressing  Goal: Readiness for Transition of Care  1/4/2025 2033 by Jose Sloan LPN  Outcome: Progressing  1/4/2025 2033 by Jose Sloan LPN  Outcome: Progressing     Problem: Fall Injury Risk  Goal: Absence of Fall and Fall-Related Injury  1/4/2025 2033 by Jose Sloan LPN  Outcome: Progressing  1/4/2025 2033 by Jose Sloan LPN  Outcome: Progressing     Problem: Functional Deficit  Goal: Optimal Cognitive Function  1/4/2025 2033 by Jose Sloan LPN  Outcome: Progressing  1/4/2025 2033 by Jose Sloan LPN  Outcome: Progressing     Problem: Comorbidity Management  Goal: Maintenance of Seizure Control  1/4/2025 2033 by Jose Sloan LPN  Outcome: Progressing  1/4/2025 2033 by Jose Sloan LPN  Outcome: Progressing     Problem: Seizure, Active Management  Goal: Absence of Seizure/Seizure-Related Injury  1/4/2025 2033 by Jose Sloan LPN  Outcome: Progressing  1/4/2025 2033 by Jose Sloan LPN  Outcome: Progressing

## 2025-01-05 NOTE — ASSESSMENT & PLAN NOTE
"Patient stated she had regular eye doctor that was taking care of her. States she previously was on drops and got laser treatment (Possibly SLT(?)). States she is no longer on eye drops. Patient denies eye pain, changes in vision, blurry vision.     Ophtho consulted. During interview, patient became visibly upset and yelling about being "locked in skilled nursing". Interview terminated. Unable to assess intraocular pressure. Low suspicion for any acute event. Per chart review, cannot find any previous home eye meds. No complaints of vision changes.      Plan  - Will re-consult ophthalmology if patient becomes more cooperative or acute concerns arise.   "

## 2025-01-05 NOTE — SUBJECTIVE & OBJECTIVE
Interval History: NAEON. Hemodynamically stable, on room air, afebrile. Patient denies any complaints this morning. Pending dispo.      Objective:     Vital Signs (Most Recent):  Temp: 99.2 °F (37.3 °C) (01/05/25 0747)  Pulse: 85 (01/05/25 0747)  Resp: 17 (01/05/25 0747)  BP: (!) 103/53 (01/05/25 0747)  SpO2: 98 % (01/05/25 0747) Vital Signs (24h Range):  Temp:  [98.8 °F (37.1 °C)-99.2 °F (37.3 °C)] 99.2 °F (37.3 °C)  Pulse:  [76-90] 85  Resp:  [17-18] 17  SpO2:  [96 %-98 %] 98 %  BP: (103-110)/(53-65) 103/53     Weight: 49.9 kg (110 lb 0.2 oz)  Body mass index is 20.12 kg/m².  No intake or output data in the 24 hours ending 01/05/25 1306      Physical Exam  Constitutional:       General: She is not in acute distress.     Appearance: She is normal weight.   HENT:      Head: Normocephalic and atraumatic.      Nose: Nose normal.   Eyes:      Conjunctiva/sclera: Conjunctivae normal.   Cardiovascular:      Rate and Rhythm: Normal rate and regular rhythm.      Heart sounds: Normal heart sounds. No murmur heard.  Pulmonary:      Effort: Pulmonary effort is normal.      Breath sounds: Normal breath sounds.   Abdominal:      General: Abdomen is flat.   Musculoskeletal:      Right lower leg: No edema.      Left lower leg: No edema.   Skin:     General: Skin is warm and dry.   Neurological:      Mental Status: She is alert. She is disoriented.             Significant Labs: All pertinent labs within the past 24 hours have been reviewed.    Significant Imaging: I have reviewed all pertinent imaging results/findings within the past 24 hours.
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
## 2025-01-05 NOTE — ASSESSMENT & PLAN NOTE
78-year-old lady here with encephalopathy, secondary to worsening dementia.  Clinically her presentation is not concering for acute sepsis, or stroke.        Extensive workup for AMS has been negative. Neurology suspects her underlying dementia is driving her presentation. Patient very resistant to discharging to SNF or any facility. Per our team and Psychiatry the patient does not show decision making capacity. Son prefers SNF or facility placement. However, patient threatened and attempted to leave AMA on 7/15 so she was PEC'd.  PEC is to prevent AMA but she does not require further psychological stabilization, discussed with legal need for PEC regarding capacity to leave AMA.     Plan:  - CEC  on . Patient has situational capacity.    - PRN zyprexa.   - Court date ()   - Court appointed  Ca Merchant [phone]   - SW coordinating discharge with .

## 2025-01-06 PROCEDURE — 25000003 PHARM REV CODE 250

## 2025-01-06 PROCEDURE — 11000001 HC ACUTE MED/SURG PRIVATE ROOM

## 2025-01-06 RX ADMIN — SENNOSIDES AND DOCUSATE SODIUM 1 TABLET: 50; 8.6 TABLET ORAL at 08:01

## 2025-01-06 RX ADMIN — LEVETIRACETAM 750 MG: 500 TABLET, FILM COATED ORAL at 10:01

## 2025-01-06 RX ADMIN — LEVETIRACETAM 750 MG: 500 TABLET, FILM COATED ORAL at 08:01

## 2025-01-06 RX ADMIN — SENNOSIDES AND DOCUSATE SODIUM 1 TABLET: 50; 8.6 TABLET ORAL at 10:01

## 2025-01-06 RX ADMIN — THERA TABS 1 TABLET: TAB at 10:01

## 2025-01-06 RX ADMIN — POLYETHYLENE GLYCOL 3350 17 G: 17 POWDER, FOR SOLUTION ORAL at 10:01

## 2025-01-06 NOTE — ASSESSMENT & PLAN NOTE
78-year-old lady here with encephalopathy, secondary to worsening dementia.  Clinically her presentation is not concering for acute sepsis, or stroke.        Extensive workup for AMS has been negative. Neurology suspects her underlying dementia is driving her presentation. Patient very resistant to discharging to SNF or any facility. Per our team and Psychiatry the patient does not show decision making capacity. Son prefers SNF or facility placement. However, patient threatened and attempted to leave AMA on 7/15 so she was PEC'd.  PEC is to prevent AMA but she does not require further psychological stabilization, discussed with legal need for PEC regarding capacity to leave AMA.     Plan:  - CEC  on . Patient has situational capacity.    - PRN zyprexa.   - Court date ()   - Court appointed  Ca Merchant [phone]   - SW coordinating discharge with .

## 2025-01-06 NOTE — PLAN OF CARE
Received VM from curator Ca [phone] and returned her call. Ca states [facility] assisted living is coming to assess patient today at 3pm for acceptance. If accepted, they will provide 24/7 monitoring and medication management through their memory care services. Patient will also be allowed to have her dogs in her apartment.     Ca stated she ordered a bed last week for the patient; however, it will not arrive until 1/9 or 1/10. Patient cannot discharge to [facility] prior to bed delivery as they do not provide a bed in patient's apartment.     Ca to email paperwork that needs to be completed by physician prior to admission. Will forward to team for completion this date.     [facility] request patient PCP info. Reviewed patient's chart and do not note a PCP being listed as far back as 2011, only an OB/GYN. Will schedule patient an appointment to establish care with PCP post-discharge who can follow for care needs post-hospitalization.     Team notified of the above.     Alexia Gaytan, MSW, LCSW, ACCYNDIE-JULIANNA  Director - Case Management
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
## 2025-01-06 NOTE — SUBJECTIVE & OBJECTIVE
Interval History: NAEON. Hemodynamically stable, afebrile, on room air. Last BM 1/5. Patient denies complaints, asking about Sabian[employer] friend Aldo who has been sick.   Per JULIANNA, [facility] is coming to assess Ms. Rajan at 3pm today. She will have 24/7 monitoring, medication management, memory care, and be allowed to have her dogs.  ordered her bed (facility doesn't provide them) but it won't be delivered until 1/9 or 1/10.     Objective:     Vital Signs (Most Recent):  Temp: 98.7 °F (37.1 °C) (01/06/25 0814)  Pulse: 73 (01/06/25 0814)  Resp: 18 (01/06/25 0814)  BP: 120/77 (01/06/25 0814)  SpO2: 98 % (01/06/25 0814) Vital Signs (24h Range):  Temp:  [98.3 °F (36.8 °C)-98.7 °F (37.1 °C)] 98.7 °F (37.1 °C)  Pulse:  [73] 73  Resp:  [18] 18  SpO2:  [98 %] 98 %  BP: (109-120)/(73-77) 120/77     Weight: 49.9 kg (110 lb 0.2 oz)  Body mass index is 20.12 kg/m².    Intake/Output Summary (Last 24 hours) at 1/6/2025 1414  Last data filed at 1/6/2025 0900  Gross per 24 hour   Intake 240 ml   Output --   Net 240 ml         Physical Exam  Constitutional:       General: She is not in acute distress.  HENT:      Head: Normocephalic and atraumatic.      Nose: Nose normal.   Eyes:      Conjunctiva/sclera: Conjunctivae normal.   Cardiovascular:      Rate and Rhythm: Normal rate and regular rhythm.      Heart sounds: Normal heart sounds. No murmur heard.  Pulmonary:      Effort: Pulmonary effort is normal. No respiratory distress.      Breath sounds: Normal breath sounds.   Abdominal:      General: Abdomen is flat.      Palpations: Abdomen is soft.      Tenderness: There is no abdominal tenderness.   Musculoskeletal:      Right lower leg: No edema.      Left lower leg: No edema.   Skin:     General: Skin is warm and dry.   Neurological:      Mental Status: She is alert. She is disoriented.             Significant Labs: All pertinent labs within the past 24 hours have been reviewed.    Significant Imaging: I have reviewed all  pertinent imaging results/findings within the past 24 hours.

## 2025-01-06 NOTE — NURSING
Pt in bed awake lying on right side watching tv. Pt has no c/o. No signs of distress. Left bed in lowest position. Call light and personal belonging within reach.

## 2025-01-07 PROCEDURE — 25000003 PHARM REV CODE 250

## 2025-01-07 PROCEDURE — 11000001 HC ACUTE MED/SURG PRIVATE ROOM

## 2025-01-07 RX ADMIN — THERA TABS 1 TABLET: TAB at 09:01

## 2025-01-07 RX ADMIN — LEVETIRACETAM 750 MG: 500 TABLET, FILM COATED ORAL at 09:01

## 2025-01-07 RX ADMIN — LEVETIRACETAM 750 MG: 500 TABLET, FILM COATED ORAL at 08:01

## 2025-01-07 NOTE — PLAN OF CARE
01/07/25 1701   Discharge Reassessment   Assessment Type Discharge Planning Reassessment   Did the patient's condition or plan change since previous assessment? No   Discharge Plan discussed with: Caregiver   Name(s) and Number(s) Curator aC   Communicated MARVEL with patient/caregiver Yes   Discharge Plan A Assisted Living  ([facility])   DME Needed Upon Discharge  none   Post-Acute Status   Discharge Delays (!) Post-Acute Set-up  ( ordered pt a twin bed and awaiting delivery.  Anticipated delivery date 1/9)

## 2025-01-07 NOTE — SUBJECTIVE & OBJECTIVE
Interval History:  No acute events overnight.  Paperwork was provided for [facility] towards placement.     Review of Systems  Objective:     Vital Signs (Most Recent):  Temp: 98.7 °F (37.1 °C) (01/06/25 0814)  Pulse: 76 (01/06/25 1913)  Resp: 17 (01/06/25 1913)  BP: 113/77 (01/06/25 1913)  SpO2: (!) 93 % (01/06/25 1913) Vital Signs (24h Range):  Pulse:  [76] 76  Resp:  [17] 17  SpO2:  [93 %] 93 %  BP: (113)/(77) 113/77     Weight: 49.9 kg (110 lb 0.2 oz)  Body mass index is 20.12 kg/m².  No intake or output data in the 24 hours ending 01/07/25 1746      Physical Exam  Constitutional:       General: She is not in acute distress.  HENT:      Head: Normocephalic and atraumatic.      Nose: Nose normal.   Eyes:      Conjunctiva/sclera: Conjunctivae normal.   Cardiovascular:      Rate and Rhythm: Normal rate and regular rhythm.      Heart sounds: Normal heart sounds. No murmur heard.  Pulmonary:      Effort: Pulmonary effort is normal. No respiratory distress.      Breath sounds: Normal breath sounds.   Abdominal:      General: Abdomen is flat.      Palpations: Abdomen is soft.      Tenderness: There is no abdominal tenderness.   Musculoskeletal:      Right lower leg: No edema.      Left lower leg: No edema.   Skin:     General: Skin is warm and dry.   Neurological:      Mental Status: She is alert. She is disoriented.             Significant Labs: All pertinent labs within the past 24 hours have been reviewed.    Significant Imaging: I have reviewed all pertinent imaging results/findings within the past 24 hours.

## 2025-01-07 NOTE — PLAN OF CARE
CHW scheduled pcp f/u   Future Appointments   Date Time Provider Department Center   1/13/2025  9:30 AM Jose Alejandro Ellis MD [facility] PCW
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
## 2025-01-07 NOTE — PROGRESS NOTES
"NICHOLAS spoke to  Ca who stated that she attempted to send [facility] packet via email to  CM Director yesterday but that it "didn't go through".  This writer confirmed director's address and also provided mine.  Packet received.  Will request provider fill it out.   "

## 2025-01-08 PROCEDURE — 25000003 PHARM REV CODE 250

## 2025-01-08 PROCEDURE — 94761 N-INVAS EAR/PLS OXIMETRY MLT: CPT

## 2025-01-08 PROCEDURE — 11000001 HC ACUTE MED/SURG PRIVATE ROOM

## 2025-01-08 RX ADMIN — THERA TABS 1 TABLET: TAB at 08:01

## 2025-01-08 RX ADMIN — LEVETIRACETAM 750 MG: 500 TABLET, FILM COATED ORAL at 09:01

## 2025-01-08 RX ADMIN — LEVETIRACETAM 750 MG: 500 TABLET, FILM COATED ORAL at 08:01

## 2025-01-08 RX ADMIN — SENNOSIDES AND DOCUSATE SODIUM 1 TABLET: 50; 8.6 TABLET ORAL at 09:01

## 2025-01-08 NOTE — PLAN OF CARE
01/08/25 1646   Discharge Reassessment   Assessment Type Discharge Planning Reassessment   Did the patient's condition or plan change since previous assessment? No   Discharge Plan discussed with:   (Curator Ca BRUNO [phone])   Communicated MARVEL with patient/caregiver Yes   Discharge Plan A Assisted Living  ([facility])   DME Needed Upon Discharge    (pending (regular) bed delivery and assembly to [facility] on 1/9)   Post-Acute Status   Post-Acute Authorization Placement   Post-Acute Placement Status Referrals Sent  (Asya [facility] stated that she has what's needed from [facility].)   Discharge Delays   (Awaiting  to sign needed documents for [facility] admission and bed delivery.)     NICHOLAS spoke to Asya [facility] [phone].  Additional paperwork needed to be signed by .  It is asked that pt arrive by 1pm for processing and it is clarified that bed assembly is the responsibility of family.     NICHOLAS spoke to Curator Penaloza [phone] who stated that she has a few more questions that need to be clarified prior to her signing the paperwork but she didn't get a call back from the facility.  Ca is aware that bed assembly will be her responsibility.

## 2025-01-08 NOTE — PROGRESS NOTES
This writer confirmed with [facility] that fax was received.  It will be presented to [place] in admissions.  This writer requested a  call back to confirm that nothing additional is needed from [facility].
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
## 2025-01-08 NOTE — SUBJECTIVE & OBJECTIVE
Interval History:  No acute events overnight. Hemodynamically stable, afebrile, on room air. "[employer]" paperwork completed.      Objective:     Vital Signs (Most Recent):  Temp: 99.1 °F (37.3 °C) (01/08/25 0734)  Pulse: 82 (01/08/25 0734)  Resp: 17 (01/08/25 0734)  BP: 100/72 (01/08/25 0734)  SpO2: 99 % (01/08/25 0734) Vital Signs (24h Range):  Temp:  [98.3 °F (36.8 °C)-99.1 °F (37.3 °C)] 99.1 °F (37.3 °C)  Pulse:  [82-86] 82  Resp:  [17] 17  SpO2:  [97 %-99 %] 99 %  BP: (100-110)/(72-73) 100/72     Weight: 49.9 kg (110 lb 0.2 oz)  Body mass index is 20.12 kg/m².    Intake/Output Summary (Last 24 hours) at 1/8/2025 1035  Last data filed at 1/7/2025 1942  Gross per 24 hour   Intake 100 ml   Output --   Net 100 ml         Physical Exam  Constitutional:       General: She is not in acute distress.  HENT:      Head: Normocephalic and atraumatic.      Nose: Nose normal.   Eyes:      Conjunctiva/sclera: Conjunctivae normal.   Cardiovascular:      Rate and Rhythm: Normal rate.   Pulmonary:      Effort: Pulmonary effort is normal. No respiratory distress.   Abdominal:      General: Abdomen is flat.   Musculoskeletal:      Right lower leg: No edema.      Left lower leg: No edema.   Skin:     General: Skin is warm and dry.   Neurological:      Mental Status: She is disoriented.             Significant Labs: All pertinent labs within the past 24 hours have been reviewed.    Significant Imaging: I have reviewed all pertinent imaging results/findings within the past 24 hours.

## 2025-01-08 NOTE — ASSESSMENT & PLAN NOTE
78-year-old lady here with encephalopathy, secondary to worsening dementia.  Clinically her presentation is not concering for acute sepsis, or stroke.        Extensive workup for AMS has been negative. Neurology suspects her underlying dementia is driving her presentation. Patient very resistant to discharging to SNF or any facility. Per our team and Psychiatry the patient does not show decision making capacity. Son prefers SNF or facility placement. However, patient threatened and attempted to leave AMA on 7/15 so she was PEC'd.  PEC is to prevent AMA but she does not require further psychological stabilization, discussed with legal need for PEC regarding capacity to leave AMA.     Plan:  - CEC  on . Patient has situational capacity.    - PRN zyprexa.   - Court date ()   - Court appointed  [employer] [phone]   - SW coordinating discharge with rober Oliver

## 2025-01-08 NOTE — MEDICARE RECERTIFICATION
MEDICARE INPATIENT  PHYSICIAN RECERTIFICATION  OF MEDICAL NECESSITY    78 year old female who presented with slow progression of dementia now unable to care for herself. She lacks capacity regarding decision to DC home. Placement has been delayed, son unavailable per my legal team, previously he was assisting with placement but had many concerns. Hospital course now c/b seizure now on AEDs. Mentation appears back to baseline, legal team pursued interdiction.  Arranging dispo with .  Filling out paperwork for placement.      MARVEL: 1/9/2025    Medical Readiness for Discharge Date: 7/16/2024  Discharge Plan A: Assisted Living ([facility])   Discharge Delays: (!) Post-Acute Set-up ( ordered pt a twin bed and awaiting delivery.  Anticipated delivery date 1/9)

## 2025-01-09 VITALS
HEART RATE: 85 BPM | HEIGHT: 62 IN | BODY MASS INDEX: 20.24 KG/M2 | DIASTOLIC BLOOD PRESSURE: 68 MMHG | TEMPERATURE: 98 F | WEIGHT: 110 LBS | SYSTOLIC BLOOD PRESSURE: 103 MMHG | RESPIRATION RATE: 16 BRPM | OXYGEN SATURATION: 97 %

## 2025-01-09 PROCEDURE — 25000003 PHARM REV CODE 250

## 2025-01-09 PROCEDURE — 11000001 HC ACUTE MED/SURG PRIVATE ROOM

## 2025-01-09 RX ADMIN — LEVETIRACETAM 750 MG: 500 TABLET, FILM COATED ORAL at 08:01

## 2025-01-09 RX ADMIN — THERA TABS 1 TABLET: TAB at 08:01

## 2025-01-09 NOTE — PROGRESS NOTES
"[facility] - Internal Medicine Telemetry  Adult Nutrition  Progress Note    SUMMARY       Recommendations  1. Continue regular diet as tolerated-Encourage good intake   2. RD to monitor weight, labs, and PO intake    Goals: meet % een/epn by next RD f/u  Nutrition Goal Status: goal met  Communication of RD Recs: (poc)    Assessment and Plan    No nutrition dx at this time.      Reason for Assessment    Reason For Assessment: RD follow-up  Diagnosis: other (see comments) (Cognitive and behavioral changes)  General Information Comments: RD follow-up: Spoke w/ pt at bedside, reports good appetite, 100% meal consumption noted at meals. Pt w/ no indicators for malnutrition at this time. RD following.  Nutrition Discharge Planning: general healthy diet  Nutrition Related Social Determinants of Health: SDOH: None Identified     Nutrition/Diet History    Spiritual, Cultural Beliefs, Lutheran Practices, Values that Affect Care: no  Food Allergies: NKFA    Anthropometrics    Temp: 98.3 °F (36.8 °C)  Height Method: Stated  Height: 5' 2" (157.5 cm)  Height (inches): 62 in  Weight Method: Bed Scale  Weight: 49.9 kg (110 lb 0.2 oz)  Weight (lb): 110.01 lb  Ideal Body Weight (IBW), Female: 110 lb  % Ideal Body Weight, Female (lb): 100 %  BMI (Calculated): 20.1  BMI Grade: 18.5-24.9 - normal       Lab/Procedures/Meds    Pertinent Labs Reviewed: reviewed  Pertinent Labs Comments: RBC 3.84 low, GFR 42.4 low, AST 55 high  Pertinent Medications Reviewed: reviewed  Pertinent Medications Comments: levetiracetam, MVI, senna    Estimated/Assessed Needs    Weight Used For Calorie Calculations: 49.9 kg (110 lb 0.2 oz)  Energy Calorie Requirements (kcal): 2499-8113 (25-30kcal/kg)  Energy Need Method: Kcal/kg  Protein Requirements: 60 (1.2 g/kg)  Weight Used For Protein Calculations: 49.9 kg (110 lb 0.2 oz)     Estimated Fluid Requirement Method: RDA Method  RDA Method (mL): 1247     Nutrition Prescription Ordered    Current Diet Order: " Regular    Evaluation of Received Nutrient/Fluid Intake    I/O: + 4.6 L since 11/6  Energy Calories Required: meeting needs  Protein Required: meeting needs  Fluid Required:  (1 ml or fluid as per MD)  Comments: LBM 1/9  Tolerance: tolerating  % Intake of Estimated Energy Needs: 75 - 100 %  % Meal Intake: 75 - 100 %    Nutrition Risk    Level of Risk/Frequency of Follow-up: low (1x/week)    Monitor and Evaluation    Food and Nutrient Intake: energy intake, food and beverage intake  Food and Nutrient Adminstration: diet order  Physical Activity and Function: nutrition-related ADLs and IADLs  Anthropometric Measurements: height/length, weight, weight change, body mass index  Biochemical Data, Medical Tests and Procedures: electrolyte and renal panel, gastrointestinal profile, glucose/endocrine profile, inflammatory profile, lipid profile     Nutrition Follow-Up    RD Follow-up?: Yes

## 2025-01-09 NOTE — PLAN OF CARE
Recommendations  1. Continue regular diet as tolerated-Encourage good intake   2. RD to monitor weight, labs, and PO intake     Goals: meet % een/epn by next RD f/u  Nutrition Goal Status: goal met  Communication of RD Recs: (poc)
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
## 2025-01-09 NOTE — SUBJECTIVE & OBJECTIVE
Interval History: NAEON. HDS, on room air, afebrile. Pending dispo to [facility]      Objective:     Vital Signs (Most Recent):  Temp: 98.3 °F (36.8 °C) (01/09/25 0805)  Pulse: 71 (01/09/25 0805)  Resp: 17 (01/09/25 0805)  BP: (!) 108/55 (01/09/25 0805)  SpO2: 96 % (01/09/25 0805) Vital Signs (24h Range):  Temp:  [98.2 °F (36.8 °C)-98.3 °F (36.8 °C)] 98.3 °F (36.8 °C)  Pulse:  [71-77] 71  Resp:  [17-18] 17  SpO2:  [96 %-100 %] 96 %  BP: (108-109)/(55-68) 108/55     Weight: 49.9 kg (110 lb 0.2 oz)  Body mass index is 20.12 kg/m².  No intake or output data in the 24 hours ending 01/09/25 1105      Physical Exam  Constitutional:       General: She is not in acute distress.     Appearance: She is not ill-appearing.   HENT:      Head: Normocephalic and atraumatic.      Nose: Nose normal.   Eyes:      Conjunctiva/sclera: Conjunctivae normal.   Cardiovascular:      Rate and Rhythm: Normal rate and regular rhythm.      Heart sounds: Normal heart sounds. No murmur heard.  Pulmonary:      Effort: Pulmonary effort is normal. No respiratory distress.   Abdominal:      General: Abdomen is flat.   Musculoskeletal:      Right lower leg: No edema.      Left lower leg: No edema.   Skin:     General: Skin is warm and dry.   Neurological:      Mental Status: She is alert. She is disoriented.             Significant Labs: All pertinent labs within the past 24 hours have been reviewed.    Significant Imaging: I have reviewed all pertinent imaging results/findings within the past 24 hours.

## 2025-01-09 NOTE — PROGRESS NOTES
NICHOLAS spoke to Curator Penaloza who stated that the bed will be put together tonight and the plan is for pt to move to [facility] tomorrow.

## 2025-01-10 PROCEDURE — 25000003 PHARM REV CODE 250

## 2025-01-10 RX ORDER — DIAZEPAM 10 MG/2G
10 GEL RECTAL ONCE AS NEEDED
Qty: 1 EACH | Refills: 0 | Status: SHIPPED | OUTPATIENT
Start: 2025-01-10 | End: 2025-01-10

## 2025-01-10 RX ADMIN — LEVETIRACETAM 750 MG: 500 TABLET, FILM COATED ORAL at 08:01

## 2025-01-10 RX ADMIN — THERA TABS 1 TABLET: TAB at 08:01
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
## 2025-01-10 NOTE — PLAN OF CARE
01/10/25 0900   Final Note   Assessment Type Final Discharge Note   Anticipated Discharge Disposition   ([facility] room 332)   What phone number can be called within the next 1-3 days to see how you are doing after discharge? [phone]  (Curator Ca BRUNO)   Post-Acute Status   Post-Acute Authorization Placement   Home Health Status Set-up Complete/Auth obtained  (Marifer Akers)   Coverage Medicare   Discharge Delays None known at this time     CM spoke to Asya of [facility] [phone] who stated that pt is cleared to come to the facility today.  This writer spoke to Curator Ca GUZMAN.  She is agrees with transfer and this writer arranging transportation.  Will request w/c van for pt safe transport/hx dementia and wandering.  Team aware of plan to dc today.

## 2025-01-13 ENCOUNTER — HOSPITAL ENCOUNTER (EMERGENCY)
Facility: HOSPITAL | Age: 80
Discharge: HOME | End: 2025-01-14
Attending: STUDENT IN AN ORGANIZED HEALTH CARE EDUCATION/TRAINING PROGRAM
Payer: MEDICARE

## 2025-01-13 DIAGNOSIS — N39.0 URINARY TRACT INFECTION WITHOUT HEMATURIA, SITE UNSPECIFIED: ICD-10-CM

## 2025-01-13 DIAGNOSIS — F03.911 AGITATION DUE TO DEMENTIA: Primary | ICD-10-CM

## 2025-01-13 LAB
ALBUMIN SERPL BCP-MCNC: 4.2 G/DL (ref 3.5–5.2)
ALP SERPL-CCNC: 78 U/L (ref 40–150)
ALT SERPL W/O P-5'-P-CCNC: 22 U/L (ref 10–44)
AMPHET+METHAMPHET UR QL: NEGATIVE
ANION GAP SERPL CALC-SCNC: 12 MMOL/L (ref 8–16)
APAP SERPL-MCNC: <3 UG/ML (ref 10–20)
AST SERPL-CCNC: 33 U/L (ref 10–40)
BACTERIA #/AREA URNS AUTO: ABNORMAL /HPF
BARBITURATES UR QL SCN>200 NG/ML: NEGATIVE
BASOPHILS # BLD AUTO: 0.05 K/UL (ref 0–0.2)
BASOPHILS NFR BLD: 0.5 % (ref 0–1.9)
BENZODIAZ UR QL SCN>200 NG/ML: NEGATIVE
BILIRUB SERPL-MCNC: 0.8 MG/DL (ref 0.1–1)
BILIRUB UR QL STRIP: NEGATIVE
BUN SERPL-MCNC: 13 MG/DL (ref 8–23)
BZE UR QL SCN: NEGATIVE
CALCIUM SERPL-MCNC: 9.4 MG/DL (ref 8.7–10.5)
CANNABINOIDS UR QL SCN: NEGATIVE
CHLORIDE SERPL-SCNC: 105 MMOL/L (ref 95–110)
CLARITY UR REFRACT.AUTO: ABNORMAL
CO2 SERPL-SCNC: 24 MMOL/L (ref 23–29)
COLOR UR AUTO: YELLOW
CREAT SERPL-MCNC: 1 MG/DL (ref 0.5–1.4)
CREAT UR-MCNC: 97 MG/DL (ref 15–325)
DIFFERENTIAL METHOD BLD: ABNORMAL
EOSINOPHIL # BLD AUTO: 0.1 K/UL (ref 0–0.5)
EOSINOPHIL NFR BLD: 1 % (ref 0–8)
ERYTHROCYTE [DISTWIDTH] IN BLOOD BY AUTOMATED COUNT: 13.3 % (ref 11.5–14.5)
EST. GFR  (NO RACE VARIABLE): 58 ML/MIN/1.73 M^2
ETHANOL SERPL-MCNC: <10 MG/DL
GLUCOSE SERPL-MCNC: 93 MG/DL (ref 70–110)
GLUCOSE UR QL STRIP: NEGATIVE
HCT VFR BLD AUTO: 39.8 % (ref 37–48.5)
HGB BLD-MCNC: 12.8 G/DL (ref 12–16)
HGB UR QL STRIP: NEGATIVE
IMM GRANULOCYTES # BLD AUTO: 0.04 K/UL (ref 0–0.04)
IMM GRANULOCYTES NFR BLD AUTO: 0.4 % (ref 0–0.5)
KETONES UR QL STRIP: ABNORMAL
LEUKOCYTE ESTERASE UR QL STRIP: ABNORMAL
LYMPHOCYTES # BLD AUTO: 1.1 K/UL (ref 1–4.8)
LYMPHOCYTES NFR BLD: 11 % (ref 18–48)
MCH RBC QN AUTO: 31.4 PG (ref 27–31)
MCHC RBC AUTO-ENTMCNC: 32.2 G/DL (ref 32–36)
MCV RBC AUTO: 98 FL (ref 82–98)
METHADONE UR QL SCN>300 NG/ML: NEGATIVE
MICROSCOPIC COMMENT: ABNORMAL
MONOCYTES # BLD AUTO: 0.6 K/UL (ref 0.3–1)
MONOCYTES NFR BLD: 5.5 % (ref 4–15)
NEUTROPHILS # BLD AUTO: 8.4 K/UL (ref 1.8–7.7)
NEUTROPHILS NFR BLD: 81.6 % (ref 38–73)
NITRITE UR QL STRIP: POSITIVE
NRBC BLD-RTO: 0 /100 WBC
OPIATES UR QL SCN: NEGATIVE
PCP UR QL SCN>25 NG/ML: NEGATIVE
PH UR STRIP: 6 [PH] (ref 5–8)
PLATELET # BLD AUTO: 266 K/UL (ref 150–450)
PMV BLD AUTO: 9.9 FL (ref 9.2–12.9)
POTASSIUM SERPL-SCNC: 4 MMOL/L (ref 3.5–5.1)
PROT SERPL-MCNC: 7.2 G/DL (ref 6–8.4)
PROT UR QL STRIP: NEGATIVE
RBC # BLD AUTO: 4.08 M/UL (ref 4–5.4)
RBC #/AREA URNS AUTO: 1 /HPF (ref 0–4)
SODIUM SERPL-SCNC: 141 MMOL/L (ref 136–145)
SP GR UR STRIP: 1.02 (ref 1–1.03)
SQUAMOUS #/AREA URNS AUTO: 1 /HPF
TOXICOLOGY INFORMATION: NORMAL
TSH SERPL DL<=0.005 MIU/L-ACNC: 1.38 UIU/ML (ref 0.4–4)
URN SPEC COLLECT METH UR: ABNORMAL
WBC # BLD AUTO: 10.27 K/UL (ref 3.9–12.7)
WBC #/AREA URNS AUTO: >100 /HPF (ref 0–5)

## 2025-01-13 PROCEDURE — 84443 ASSAY THYROID STIM HORMONE: CPT | Performed by: EMERGENCY MEDICINE

## 2025-01-13 PROCEDURE — 80053 COMPREHEN METABOLIC PANEL: CPT | Performed by: EMERGENCY MEDICINE

## 2025-01-13 PROCEDURE — 81001 URINALYSIS AUTO W/SCOPE: CPT | Performed by: EMERGENCY MEDICINE

## 2025-01-13 PROCEDURE — 87086 URINE CULTURE/COLONY COUNT: CPT | Performed by: EMERGENCY MEDICINE

## 2025-01-13 PROCEDURE — 99283 EMERGENCY DEPT VISIT LOW MDM: CPT

## 2025-01-13 PROCEDURE — 80307 DRUG TEST PRSMV CHEM ANLYZR: CPT | Performed by: EMERGENCY MEDICINE

## 2025-01-13 PROCEDURE — 85025 COMPLETE CBC W/AUTO DIFF WBC: CPT | Performed by: EMERGENCY MEDICINE

## 2025-01-13 PROCEDURE — 87088 URINE BACTERIA CULTURE: CPT | Performed by: EMERGENCY MEDICINE

## 2025-01-13 PROCEDURE — 82077 ASSAY SPEC XCP UR&BREATH IA: CPT | Performed by: EMERGENCY MEDICINE

## 2025-01-13 PROCEDURE — 80143 DRUG ASSAY ACETAMINOPHEN: CPT | Performed by: EMERGENCY MEDICINE

## 2025-01-13 PROCEDURE — 87186 SC STD MICRODIL/AGAR DIL: CPT | Performed by: EMERGENCY MEDICINE

## 2025-01-14 VITALS
BODY MASS INDEX: 20.12 KG/M2 | WEIGHT: 110 LBS | DIASTOLIC BLOOD PRESSURE: 65 MMHG | TEMPERATURE: 99 F | OXYGEN SATURATION: 97 % | RESPIRATION RATE: 18 BRPM | SYSTOLIC BLOOD PRESSURE: 145 MMHG | HEART RATE: 94 BPM

## 2025-01-14 NOTE — ED PROVIDER NOTES
Encounter Date: 1/13/2025       History     Chief Complaint   Patient presents with    Psychiatric Evaluation     Pt was sent from assisted living for medical clearance to be placed in a psych facility, pt states she is upset because the court took her dogs away, pt denies SI/HI, hx of dementia     HPI    80 yo F w/seizure disorder, dementia, presenting from [facility] assisted living for purported psychiatric evaluation? (Per triage note)    Patient does not know why she is here, was discharged three days ago from [facility] from a 199 day stay for encephalopathy, dementia, seizure disorder.    Per [facility] staff, patient was sent in to ED due to being agitated, fighting and spitting at staff, did not vocalize any SI/HI to staff. Patient denying SI/HI at this time. Denies headache/fever/chills/abdominal pain, denies any complaints at this time. Denies recent falls.    Court  appointed- [facility] [phone]       Review of patient's allergies indicates:  No Known Allergies  History reviewed. No pertinent past medical history.  History reviewed. No pertinent surgical history.  No family history on file.  Social History     Tobacco Use    Smoking status: Never    Smokeless tobacco: Never   Substance Use Topics    Alcohol use: Never     Review of Systems    Physical Exam     Initial Vitals [01/13/25 1532]   BP Pulse Resp Temp SpO2   137/72 106 18 99 °F (37.2 °C) 96 %      MAP       --         Physical Exam    HENT:   Head: Normocephalic and atraumatic.   Neck: Neck supple.   Normal range of motion.  Pulmonary/Chest: Breath sounds normal.   Abdominal: Abdomen is soft. She exhibits no distension. There is no abdominal tenderness.   Musculoskeletal:         General: Normal range of motion.      Cervical back: Normal range of motion and neck supple.     Neurological: She is alert.   Aox2 (reports year is 2024)   Psychiatric:   Tangential, confused         ED Course   Procedures  Labs Reviewed   CULTURE, URINE  - Abnormal       Result Value    Urine Culture, Routine   (*)     Value: ESCHERICHIA COLI  >100,000 cfu/ml      Narrative:     Specimen Source->Urine   CBC W/ AUTO DIFFERENTIAL - Abnormal    WBC 10.27      RBC 4.08      Hemoglobin 12.8      Hematocrit 39.8      MCV 98      MCH 31.4 (*)     MCHC 32.2      RDW 13.3      Platelets 266      MPV 9.9      Immature Granulocytes 0.4      Gran # (ANC) 8.4 (*)     Immature Grans (Abs) 0.04      Lymph # 1.1      Mono # 0.6      Eos # 0.1      Baso # 0.05      nRBC 0      Gran % 81.6 (*)     Lymph % 11.0 (*)     Mono % 5.5      Eosinophil % 1.0      Basophil % 0.5      Differential Method Automated     COMPREHENSIVE METABOLIC PANEL - Abnormal    Sodium 141      Potassium 4.0      Chloride 105      CO2 24      Glucose 93      BUN 13      Creatinine 1.0      Calcium 9.4      Total Protein 7.2      Albumin 4.2      Total Bilirubin 0.8      Alkaline Phosphatase 78      AST 33      ALT 22      eGFR 58.0 (*)     Anion Gap 12     URINALYSIS, REFLEX TO URINE CULTURE - Abnormal    Specimen UA Urine, Clean Catch      Color, UA Yellow      Appearance, UA Hazy (*)     pH, UA 6.0      Specific Gravity, UA 1.020      Protein, UA Negative      Glucose, UA Negative      Ketones, UA 1+ (*)     Bilirubin (UA) Negative      Occult Blood UA Negative      Nitrite, UA Positive (*)     Leukocytes, UA 3+ (*)     Narrative:     Specimen Source->Urine   ACETAMINOPHEN LEVEL - Abnormal    Acetaminophen (Tylenol), Serum <3.0 (*)    URINALYSIS MICROSCOPIC - Abnormal    RBC, UA 1      WBC, UA >100 (*)     Bacteria Moderate (*)     Squam Epithel, UA 1      Microscopic Comment SEE COMMENT      Narrative:     Specimen Source->Urine   TSH    TSH 1.383     DRUG SCREEN PANEL, URINE EMERGENCY    Benzodiazepines Negative      Methadone metabolites Negative      Cocaine (Metab.) Negative      Opiate Scrn, Ur Negative      Barbiturate Screen, Ur Negative      Amphetamine Screen, Ur Negative      THC Negative       Phencyclidine Negative      Creatinine, Urine 97.0      Toxicology Information SEE COMMENT      Narrative:     Specimen Source->Urine   ALCOHOL,MEDICAL (ETHANOL)    Alcohol, Serum <10            Imaging Results    None          Medications - No data to display  Medical Decision Making  78 yo F w/seizure disorder, dementia, presenting from [facility] assisted living for purported psychiatric evaluation?      Differential diagnosis includes but is not limited to: dementia, agitation, aggressive behavior, psychiatric issue    Discussed case with staff at NH who reported that patient was sent into ED due to spitting at staff at NH today. Patient is grumpy and reticent on exam, Aox 2, following commands, somewhat tangential, consistent with her history of dementia. No other history from staff to suggest need for psychiatric consult or psychiatric decompensation in patient.    Lab work up revealed UA with positive nitrites, discharged back to  with keflex prescription. Creatinine normal on labs, no other significant abnormalities.    I discussed with the patient/family the diagnosis, treatment plan, indications for return to the emergency department, as well as for expected follow-up. The patient/family verbalized an understanding. The patient/family is asked if there were any questions or concerns, which were addressed to patient/family satisfaction. Patient/family understands and is agreeable to the plan.                    ED Course as of 01/18/25 1826   Mon Jan 13, 2025   2148 Called court  assigned to patient, with no answer, no voicemail x2 [LF]      ED Course User Index  [LF] Allison Torres MD                           Clinical Impression:  Final diagnoses:  [F03.911] Agitation due to dementia (Primary)  [N39.0] Urinary tract infection without hematuria, site unspecified          ED Disposition Condition    Discharge Stable          ED Prescriptions    None       Follow-up Information       Follow up  With Specialties Details Why Contact Info    Edwar Castaneda II, MD Obstetrics  ED follow up [address] [facility] [address] [facility]  [address]  [facility] [id number] [phone]               Allison Torres MD  01/18/25 9189

## 2025-01-14 NOTE — ED NOTES
Bed: [facility]  Expected date:   Expected time:   Means of arrival:   Comments:  Damian Ortiz

## 2025-01-14 NOTE — PLAN OF CARE
SW witnessed, patient access making an attempt to get signatures from this patient. Patient is unable to sign at this time due to an altered mental status.     Patient did not know where and why she was in the hospital.     ANNIKA Bangura, MSW-LMSW  Medical Social Worker/  ER Department

## 2025-01-14 NOTE — ED TRIAGE NOTES
Mohini Dougherty, a 79 y.o. female presents to the ED w/ complaint of psych evaluation.     Pt came from assisted living facility for medical clearance to transfer to psych facility. Pt oriented to self, denies SI/ HI.     Triage note:  Chief Complaint   Patient presents with    Psychiatric Evaluation     Pt was sent from assisted living for medical clearance to be placed in a psych facility, pt states she is upset because the court took her dogs away, pt denies SI/HI, hx of dementia     Review of patient's allergies indicates:  No Known Allergies  No past medical history on file.
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
## 2025-01-16 LAB — BACTERIA UR CULT: ABNORMAL

## 2025-01-16 RX ORDER — CEPHALEXIN 500 MG/1
500 CAPSULE ORAL EVERY 12 HOURS
Qty: 14 CAPSULE | Refills: 0 | Status: SHIPPED | OUTPATIENT
Start: 2025-01-16 | End: 2025-01-23
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
## 2025-02-28 NOTE — PLAN OF CARE
Problem: Adult Inpatient Plan of Care  Goal: Plan of Care Review  Outcome: Progressing  Goal: Patient-Specific Goal (Individualized)  Outcome: Progressing  Goal: Absence of Hospital-Acquired Illness or Injury  Outcome: Progressing  Goal: Optimal Comfort and Wellbeing  Outcome: Progressing  Goal: Readiness for Transition of Care  Outcome: Progressing     Problem: Fall Injury Risk  Goal: Absence of Fall and Fall-Related Injury  Outcome: Progressing     Problem: Functional Deficit  Goal: Optimal Cognitive Function  Outcome: Progressing     Problem: Comorbidity Management  Goal: Maintenance of Seizure Control  Outcome: Progressing     Problem: Seizure, Active Management  Goal: Absence of Seizure/Seizure-Related Injury  Outcome: Progressing     Pt had an uneventful night. VSS.  Pt upset this shift because she is still in hospital. Pt is free of falls and injuries. Sitter remains at beside . Safety and Seizure Precautions remains in place.    Spoke with patient for approximately 30 minutes during virtual visit.  Updated chart to reflect up to date patient demographics.  Allergies, medications, pharmacy, medical, surgical and family history updated. Substance and sexual activity, marital status, gender identity and OB/Gyn history updated.  Patient was guided through expectations of care during pregnancy.  first OB appts scheduled.  New pregnancy education provided & questions answered. Encouraged to send message or call office with any questions/concerns. Verbalized understanding.      Discussed with pt:     Lmp:unknown  Encouraged to begin  taking PNV daily    denies n/v:   common in 1st tri  discussed safe options per Pregnancy A to Z guide   denies cramping/spotting:   may be normal to have mild cramping/light spotting, margaret in 1st tri & with sexual activity  Precautions/warning signs discussed:   encouraged to go to ED for heavy vag discharge, saturating a pad, bright red bleeding, painful cramping/abd pain that is interrupting daily activity  Encouraged and discussed healthy diet:   nothing raw; meat well done; heat deli meats & hot dogs prior to eating   avoid soft cheeses that are not fully pasteurized-ex: brie, feta, blue cheese   avoid fish high in mercury-limit canned tuna to once per week   rinse fruits/veggies thoroughly    Encouraged adequate fluids:  8-12 cups of water/healthy liquids each day  Limit caffeine to <200 mg/day (approx 1-2 cups coffee, tea or soda)   Extra rest can be beneficial, especially during the 1st trimester when it's normal to feel tired   Sleep on side rather than back or abdomen, especially as pregnancy progresses  Ok to continue to exercise but should not start anything new or more strenuous during pregnancy  Normal weight gain:  1st trimester-may not gain anything at all or up to 1-5 lbs   Total weight gain approximately 25-35 lbs depending on starting weight   Should avoid alcohol, smoking, vaping, drugs, herbal  supplements  Should check with provider regarding safety of any prescription or OTC medications  Avoid ibuprofen, motrin, advil, aleve  Ok to take regular strength tylenol for headache or body aches  Avoid cleaning cat litter box  Wear gloves with gardening   Important to wear seat belt when riding in vehicle-lower belt should be below abdomen   Will need car seat to bring baby home from hospital    Discussed normal breast changes during early pregnancy-breasts may feel full/tender   Discussed breastfeeding recommendations/benefits for mom & baby -  plans to breastfeed   Discussed need for pediatrician-will have to schedule baby's 1st appt prior to leaving the hospital to be seen within 2-3 days of d/c  Sent Mabaya message to pt through pt portal with information regarding the [email or web address] for access to the Pregnancy A to Z guide and Prenatal Class schedule. Informed of the ConnectedMOM program, Get Ready to Meet Your Baby pamphlet, Coffective cherie and how to obtain a breast pump through insurance toward end of pregnancy

## 2025-03-09 ENCOUNTER — HOSPITAL ENCOUNTER (OUTPATIENT)
Facility: HOSPITAL | Age: 80
Discharge: HOME HEALTH | End: 2025-03-10
Attending: STUDENT IN AN ORGANIZED HEALTH CARE EDUCATION/TRAINING PROGRAM | Admitting: STUDENT IN AN ORGANIZED HEALTH CARE EDUCATION/TRAINING PROGRAM
Payer: MEDICARE

## 2025-03-09 DIAGNOSIS — N30.01 ACUTE CYSTITIS WITH HEMATURIA: ICD-10-CM

## 2025-03-09 DIAGNOSIS — A41.9 SEPSIS: ICD-10-CM

## 2025-03-09 DIAGNOSIS — R53.1 GENERALIZED WEAKNESS: ICD-10-CM

## 2025-03-09 DIAGNOSIS — A41.9 SEPSIS, DUE TO UNSPECIFIED ORGANISM, UNSPECIFIED WHETHER ACUTE ORGAN DYSFUNCTION PRESENT: Primary | ICD-10-CM

## 2025-03-09 DIAGNOSIS — J10.1 INFLUENZA A: ICD-10-CM

## 2025-03-09 DIAGNOSIS — R07.9 CHEST PAIN: ICD-10-CM

## 2025-03-09 PROBLEM — N30.00 ACUTE CYSTITIS: Status: ACTIVE | Noted: 2025-03-09

## 2025-03-09 LAB
ALBUMIN SERPL BCP-MCNC: 4.2 G/DL (ref 3.5–5.2)
ALP SERPL-CCNC: 72 U/L (ref 40–150)
ALT SERPL W/O P-5'-P-CCNC: 25 U/L (ref 10–44)
ANION GAP SERPL CALC-SCNC: 12 MMOL/L (ref 8–16)
AST SERPL-CCNC: 51 U/L (ref 10–40)
BACTERIA #/AREA URNS AUTO: ABNORMAL /HPF
BASOPHILS # BLD AUTO: 0.03 K/UL (ref 0–0.2)
BASOPHILS NFR BLD: 0.4 % (ref 0–1.9)
BILIRUB SERPL-MCNC: 0.9 MG/DL (ref 0.1–1)
BILIRUB UR QL STRIP: NEGATIVE
BIPAP: 0
BNP SERPL-MCNC: 177 PG/ML (ref 0–99)
BUN SERPL-MCNC: 12 MG/DL (ref 8–23)
CALCIUM SERPL-MCNC: 9.2 MG/DL (ref 8.7–10.5)
CHLORIDE SERPL-SCNC: 103 MMOL/L (ref 95–110)
CLARITY UR REFRACT.AUTO: CLEAR
CO2 SERPL-SCNC: 22 MMOL/L (ref 23–29)
COLOR UR AUTO: YELLOW
CREAT SERPL-MCNC: 1 MG/DL (ref 0.5–1.4)
DIFFERENTIAL METHOD BLD: ABNORMAL
EOSINOPHIL # BLD AUTO: 0 K/UL (ref 0–0.5)
EOSINOPHIL NFR BLD: 0 % (ref 0–8)
ERYTHROCYTE [DISTWIDTH] IN BLOOD BY AUTOMATED COUNT: 13.2 % (ref 11.5–14.5)
EST. GFR  (NO RACE VARIABLE): 58 ML/MIN/1.73 M^2
FIO2: 21 %
GLUCOSE SERPL-MCNC: 89 MG/DL (ref 70–110)
GLUCOSE UR QL STRIP: NEGATIVE
HCT VFR BLD AUTO: 37.6 % (ref 37–48.5)
HGB BLD-MCNC: 12.4 G/DL (ref 12–16)
HGB UR QL STRIP: ABNORMAL
IMM GRANULOCYTES # BLD AUTO: 0.04 K/UL (ref 0–0.04)
IMM GRANULOCYTES NFR BLD AUTO: 0.5 % (ref 0–0.5)
INFLUENZA A, MOLECULAR: POSITIVE
INFLUENZA B, MOLECULAR: NEGATIVE
KETONES UR QL STRIP: ABNORMAL
LDH SERPL L TO P-CCNC: 1.4 MMOL/L
LEUKOCYTE ESTERASE UR QL STRIP: ABNORMAL
LYMPHOCYTES # BLD AUTO: 0.3 K/UL (ref 1–4.8)
LYMPHOCYTES NFR BLD: 4.3 % (ref 18–48)
MAGNESIUM SERPL-MCNC: 1.9 MG/DL (ref 1.6–2.6)
MCH RBC QN AUTO: 31.3 PG (ref 27–31)
MCHC RBC AUTO-ENTMCNC: 33 G/DL (ref 32–36)
MCV RBC AUTO: 95 FL (ref 82–98)
MICROSCOPIC COMMENT: ABNORMAL
MONOCYTES # BLD AUTO: 0.7 K/UL (ref 0.3–1)
MONOCYTES NFR BLD: 8.9 % (ref 4–15)
NEUTROPHILS # BLD AUTO: 6.7 K/UL (ref 1.8–7.7)
NEUTROPHILS NFR BLD: 85.9 % (ref 38–73)
NITRITE UR QL STRIP: POSITIVE
NRBC BLD-RTO: 0 /100 WBC
PH UR STRIP: 7 [PH] (ref 5–8)
PLATELET # BLD AUTO: 244 K/UL (ref 150–450)
PMV BLD AUTO: 10 FL (ref 9.2–12.9)
POC PERFORMED BY: NORMAL
POC TEMPERATURE: 37 C
POTASSIUM SERPL-SCNC: 4.4 MMOL/L (ref 3.5–5.1)
PROT SERPL-MCNC: 7.1 G/DL (ref 6–8.4)
PROT UR QL STRIP: NEGATIVE
RBC # BLD AUTO: 3.96 M/UL (ref 4–5.4)
RBC #/AREA URNS AUTO: 3 /HPF (ref 0–4)
SARS-COV-2 RDRP RESP QL NAA+PROBE: NEGATIVE
SODIUM SERPL-SCNC: 137 MMOL/L (ref 136–145)
SP GR UR STRIP: 1.01 (ref 1–1.03)
SPECIMEN SOURCE: ABNORMAL
SPECIMEN SOURCE: NORMAL
SQUAMOUS #/AREA URNS AUTO: 0 /HPF
TROPONIN I SERPL DL<=0.01 NG/ML-MCNC: 11 NG/L (ref 0–14)
TSH SERPL DL<=0.005 MIU/L-ACNC: 0.67 UIU/ML (ref 0.4–4)
URN SPEC COLLECT METH UR: ABNORMAL
WBC # BLD AUTO: 7.74 K/UL (ref 3.9–12.7)
WBC #/AREA URNS AUTO: 20 /HPF (ref 0–5)

## 2025-03-09 PROCEDURE — 87040 BLOOD CULTURE FOR BACTERIA: CPT | Performed by: STUDENT IN AN ORGANIZED HEALTH CARE EDUCATION/TRAINING PROGRAM

## 2025-03-09 PROCEDURE — 93005 ELECTROCARDIOGRAM TRACING: CPT

## 2025-03-09 PROCEDURE — 99285 EMERGENCY DEPT VISIT HI MDM: CPT | Mod: 25

## 2025-03-09 PROCEDURE — 25000003 PHARM REV CODE 250

## 2025-03-09 PROCEDURE — 93010 ELECTROCARDIOGRAM REPORT: CPT | Mod: ,,, | Performed by: INTERNAL MEDICINE

## 2025-03-09 PROCEDURE — 93010 ELECTROCARDIOGRAM REPORT: CPT | Mod: ,,, | Performed by: STUDENT IN AN ORGANIZED HEALTH CARE EDUCATION/TRAINING PROGRAM

## 2025-03-09 PROCEDURE — 87088 URINE BACTERIA CULTURE: CPT | Performed by: STUDENT IN AN ORGANIZED HEALTH CARE EDUCATION/TRAINING PROGRAM

## 2025-03-09 PROCEDURE — 87635 SARS-COV-2 COVID-19 AMP PRB: CPT | Performed by: STUDENT IN AN ORGANIZED HEALTH CARE EDUCATION/TRAINING PROGRAM

## 2025-03-09 PROCEDURE — 83605 ASSAY OF LACTIC ACID: CPT

## 2025-03-09 PROCEDURE — 83735 ASSAY OF MAGNESIUM: CPT | Performed by: STUDENT IN AN ORGANIZED HEALTH CARE EDUCATION/TRAINING PROGRAM

## 2025-03-09 PROCEDURE — 63600175 PHARM REV CODE 636 W HCPCS: Performed by: STUDENT IN AN ORGANIZED HEALTH CARE EDUCATION/TRAINING PROGRAM

## 2025-03-09 PROCEDURE — 99900035 HC TECH TIME PER 15 MIN (STAT)

## 2025-03-09 PROCEDURE — 81001 URINALYSIS AUTO W/SCOPE: CPT | Performed by: STUDENT IN AN ORGANIZED HEALTH CARE EDUCATION/TRAINING PROGRAM

## 2025-03-09 PROCEDURE — 96366 THER/PROPH/DIAG IV INF ADDON: CPT

## 2025-03-09 PROCEDURE — 87086 URINE CULTURE/COLONY COUNT: CPT | Performed by: STUDENT IN AN ORGANIZED HEALTH CARE EDUCATION/TRAINING PROGRAM

## 2025-03-09 PROCEDURE — G0378 HOSPITAL OBSERVATION PER HR: HCPCS

## 2025-03-09 PROCEDURE — 96365 THER/PROPH/DIAG IV INF INIT: CPT

## 2025-03-09 PROCEDURE — 63600175 PHARM REV CODE 636 W HCPCS

## 2025-03-09 PROCEDURE — 84484 ASSAY OF TROPONIN QUANT: CPT | Performed by: STUDENT IN AN ORGANIZED HEALTH CARE EDUCATION/TRAINING PROGRAM

## 2025-03-09 PROCEDURE — 80053 COMPREHEN METABOLIC PANEL: CPT | Performed by: STUDENT IN AN ORGANIZED HEALTH CARE EDUCATION/TRAINING PROGRAM

## 2025-03-09 PROCEDURE — 84443 ASSAY THYROID STIM HORMONE: CPT | Performed by: STUDENT IN AN ORGANIZED HEALTH CARE EDUCATION/TRAINING PROGRAM

## 2025-03-09 PROCEDURE — 96367 TX/PROPH/DG ADDL SEQ IV INF: CPT

## 2025-03-09 PROCEDURE — 87502 INFLUENZA DNA AMP PROBE: CPT | Performed by: STUDENT IN AN ORGANIZED HEALTH CARE EDUCATION/TRAINING PROGRAM

## 2025-03-09 PROCEDURE — 85025 COMPLETE CBC W/AUTO DIFF WBC: CPT | Performed by: STUDENT IN AN ORGANIZED HEALTH CARE EDUCATION/TRAINING PROGRAM

## 2025-03-09 PROCEDURE — 87186 SC STD MICRODIL/AGAR DIL: CPT | Performed by: STUDENT IN AN ORGANIZED HEALTH CARE EDUCATION/TRAINING PROGRAM

## 2025-03-09 PROCEDURE — 83880 ASSAY OF NATRIURETIC PEPTIDE: CPT | Performed by: STUDENT IN AN ORGANIZED HEALTH CARE EDUCATION/TRAINING PROGRAM

## 2025-03-09 PROCEDURE — 96361 HYDRATE IV INFUSION ADD-ON: CPT

## 2025-03-09 PROCEDURE — 25000003 PHARM REV CODE 250: Performed by: STUDENT IN AN ORGANIZED HEALTH CARE EDUCATION/TRAINING PROGRAM

## 2025-03-09 PROCEDURE — 96372 THER/PROPH/DIAG INJ SC/IM: CPT

## 2025-03-09 RX ORDER — OSELTAMIVIR PHOSPHATE 75 MG/1
75 CAPSULE ORAL 2 TIMES DAILY
Status: DISCONTINUED | OUTPATIENT
Start: 2025-03-10 | End: 2025-03-09

## 2025-03-09 RX ORDER — IPRATROPIUM BROMIDE AND ALBUTEROL SULFATE 2.5; .5 MG/3ML; MG/3ML
3 SOLUTION RESPIRATORY (INHALATION) EVERY 4 HOURS PRN
Status: DISCONTINUED | OUTPATIENT
Start: 2025-03-09 | End: 2025-03-10 | Stop reason: HOSPADM

## 2025-03-09 RX ORDER — SODIUM CHLORIDE 0.9 % (FLUSH) 0.9 %
10 SYRINGE (ML) INJECTION
Status: DISCONTINUED | OUTPATIENT
Start: 2025-03-09 | End: 2025-03-10 | Stop reason: HOSPADM

## 2025-03-09 RX ORDER — PROMETHAZINE HYDROCHLORIDE 25 MG/1
25 TABLET ORAL EVERY 6 HOURS PRN
Status: DISCONTINUED | OUTPATIENT
Start: 2025-03-09 | End: 2025-03-10 | Stop reason: HOSPADM

## 2025-03-09 RX ORDER — IBUPROFEN 200 MG
24 TABLET ORAL
Status: DISCONTINUED | OUTPATIENT
Start: 2025-03-09 | End: 2025-03-10 | Stop reason: HOSPADM

## 2025-03-09 RX ORDER — OSELTAMIVIR PHOSPHATE 75 MG/1
75 CAPSULE ORAL
Status: COMPLETED | OUTPATIENT
Start: 2025-03-09 | End: 2025-03-09

## 2025-03-09 RX ORDER — ENOXAPARIN SODIUM 100 MG/ML
40 INJECTION SUBCUTANEOUS EVERY 24 HOURS
Status: DISCONTINUED | OUTPATIENT
Start: 2025-03-09 | End: 2025-03-10 | Stop reason: HOSPADM

## 2025-03-09 RX ORDER — ONDANSETRON 8 MG/1
8 TABLET, ORALLY DISINTEGRATING ORAL EVERY 8 HOURS PRN
Status: DISCONTINUED | OUTPATIENT
Start: 2025-03-09 | End: 2025-03-10 | Stop reason: HOSPADM

## 2025-03-09 RX ORDER — ACETAMINOPHEN 325 MG/1
650 TABLET ORAL EVERY 4 HOURS PRN
Status: DISCONTINUED | OUTPATIENT
Start: 2025-03-09 | End: 2025-03-10 | Stop reason: HOSPADM

## 2025-03-09 RX ORDER — OSELTAMIVIR PHOSPHATE 30 MG/1
30 CAPSULE ORAL 2 TIMES DAILY
Status: DISCONTINUED | OUTPATIENT
Start: 2025-03-10 | End: 2025-03-10 | Stop reason: HOSPADM

## 2025-03-09 RX ORDER — CEFTRIAXONE 1 G/1
1 INJECTION, POWDER, FOR SOLUTION INTRAMUSCULAR; INTRAVENOUS
Status: DISCONTINUED | OUTPATIENT
Start: 2025-03-10 | End: 2025-03-10 | Stop reason: HOSPADM

## 2025-03-09 RX ORDER — BISACODYL 10 MG/1
10 SUPPOSITORY RECTAL DAILY PRN
Status: DISCONTINUED | OUTPATIENT
Start: 2025-03-09 | End: 2025-03-10 | Stop reason: HOSPADM

## 2025-03-09 RX ORDER — GLUCAGON 1 MG
1 KIT INJECTION
Status: DISCONTINUED | OUTPATIENT
Start: 2025-03-09 | End: 2025-03-10 | Stop reason: HOSPADM

## 2025-03-09 RX ORDER — LEVETIRACETAM 750 MG/1
750 TABLET ORAL 2 TIMES DAILY
Status: DISCONTINUED | OUTPATIENT
Start: 2025-03-09 | End: 2025-03-10 | Stop reason: HOSPADM

## 2025-03-09 RX ORDER — ACETAMINOPHEN 325 MG/1
650 TABLET ORAL
Status: COMPLETED | OUTPATIENT
Start: 2025-03-09 | End: 2025-03-09

## 2025-03-09 RX ORDER — TALC
6 POWDER (GRAM) TOPICAL NIGHTLY PRN
Status: DISCONTINUED | OUTPATIENT
Start: 2025-03-09 | End: 2025-03-10 | Stop reason: HOSPADM

## 2025-03-09 RX ORDER — IBUPROFEN 200 MG
16 TABLET ORAL
Status: DISCONTINUED | OUTPATIENT
Start: 2025-03-09 | End: 2025-03-10 | Stop reason: HOSPADM

## 2025-03-09 RX ADMIN — LEVETIRACETAM 750 MG: 750 TABLET, FILM COATED ORAL at 09:03

## 2025-03-09 RX ADMIN — ACETAMINOPHEN 650 MG: 325 TABLET ORAL at 12:03

## 2025-03-09 RX ADMIN — VANCOMYCIN HYDROCHLORIDE 1000 MG: 1 INJECTION, POWDER, LYOPHILIZED, FOR SOLUTION INTRAVENOUS at 04:03

## 2025-03-09 RX ADMIN — OSELTAMAVIR PHOSPHATE 75 MG: 75 CAPSULE ORAL at 04:03

## 2025-03-09 RX ADMIN — PIPERACILLIN SODIUM AND TAZOBACTAM SODIUM 4.5 G: 4; .5 INJECTION, POWDER, FOR SOLUTION INTRAVENOUS at 01:03

## 2025-03-09 RX ADMIN — ENOXAPARIN SODIUM 40 MG: 40 INJECTION SUBCUTANEOUS at 04:03

## 2025-03-09 RX ADMIN — SODIUM CHLORIDE, POTASSIUM CHLORIDE, SODIUM LACTATE AND CALCIUM CHLORIDE 500 ML: 600; 310; 30; 20 INJECTION, SOLUTION INTRAVENOUS at 12:03

## 2025-03-09 RX ADMIN — ONDANSETRON 8 MG: 8 TABLET, ORALLY DISINTEGRATING ORAL at 07:03

## 2025-03-09 NOTE — HPI
"Mohini Dougherty is a 79 y.o. female with a hx of seizure and cognitive behavioral issues presents to the ED for increased fatigue and malaise. Patient is a poor historian and only oriented to person at baseline. Per chart review, patient presents to the ED for generalized malaise and inability to walk when she is normally ambulatory. Per her assisted living facility, she was "not acting like herself." Patient only endorses dysuria but denies any further complaints.     ED: Tmax 102.9, tachycardic. CBC/ CMP largely unremarkable. UA infectious. Influenza A positive. CXR showed no acute process. CTH w/o acute process. Given tamiflu, vanc and zosyn in the ED. Admitted to .   "

## 2025-03-09 NOTE — PROGRESS NOTES
Pharmacist Renal Dose Adjustment Note    Mohini Dougherty is a 79 y.o. female being treated with oseltamivir for influenza A.    Patient Data:    Vital Signs (Most Recent):  Temp: (!) 102.9 °F (39.4 °C) (03/09/25 1228)  Pulse: 99 (03/09/25 1326)  Resp: 19 (03/09/25 1326)  BP: 126/77 (03/09/25 1105)  SpO2: 95 % (03/09/25 1326) Vital Signs (72h Range):  Temp:  [100.2 °F (37.9 °C)-102.9 °F (39.4 °C)]   Pulse:  []   Resp:  [18-19]   BP: (126)/(77)   SpO2:  [95 %-97 %]      Recent Labs   Lab 03/09/25  1248   CREATININE 1.0     Serum creatinine: 1 mg/dL 03/09/25 1248  Estimated creatinine clearance: 37.3 mL/min    Oseltamivir 75 mg oral twice daily will be changed to oseltamivir 75 mg oral once followed by 30 mg oral twice daily.    Pharmacist's Name: Wil Hopkins  Pharmacist's Extension: [phone]

## 2025-03-09 NOTE — ASSESSMENT & PLAN NOTE
- hx noted  - CTH w/o acute process  - at neuro baseline per chart review  - delirium precautions

## 2025-03-09 NOTE — ED PROVIDER NOTES
Encounter Date: 3/9/2025       History     Chief Complaint   Patient presents with    Fatigue     Pt is normally ambulatory but today is too weak to walk. Pt is lethargic.     HPI    79-year-old female with history of osteoporosis, agitation, cognitive behavioral changes, presenting for generalized weakness.    Patient reports generalized malaise, she presents with EMS for inability to walk.  Patient has Alzheimer's and is alert to person but not place or time in his confused to overall events.  She denies chest pain, shortness of breath, nausea, vomiting, fever, chills, abdominal pain, diarrhea.    Obtain collateral from her cures later on file, according to her QRS later she lives in an assisted living facility, and that is starting today she was just not acting like herself.  Reports she has generalized weakness and would felt too weak to walk when she is normally ambulatory at baseline.  She also has concerns of a soft tissue mass on her neck/clavicle region.  She is alert and oriented to person only at baseline.     Review of patient's allergies indicates:  No Known Allergies  No past medical history on file.  No past surgical history on file.  No family history on file.  Social History[1]  Review of Systems  See HPI for pertinent ROS.   Physical Exam     Initial Vitals [03/09/25 1105]   BP Pulse Resp Temp SpO2   126/77 (!) 112 18 100.2 °F (37.9 °C) 97 %      MAP       --         Physical Exam    Constitutional: She appears well-developed and well-nourished.   HENT:   Head: Normocephalic and atraumatic.   Soft tissue, poorly circumscribed mass approximately 3 cm supraclavicular, nontender, no erythema, not warm to touch   Eyes: Conjunctivae are normal. No scleral icterus.   Neck: No JVD present.   Cardiovascular:  Regular rhythm and normal heart sounds.     Exam reveals no gallop and no friction rub.       No murmur heard.  Tachycardic    Pulmonary/Chest: Breath sounds normal. No stridor. She has no wheezes.  She has no rhonchi. She has no rales.   Abdominal: Abdomen is soft. There is no abdominal tenderness. There is no rebound and no guarding.   Musculoskeletal:         General: No tenderness or edema.     Neurological:   AO x1, to person only   Skin: Skin is warm. Capillary refill takes less than 2 seconds.   Psychiatric: She has a normal mood and affect.         ED Course   Procedures  Labs Reviewed   INFLUENZA A & B BY MOLECULAR - Abnormal       Result Value    Influenza A, Molecular Positive (*)     Influenza B, Molecular Negative      Flu A & B Source Nasal swab     CBC W/ AUTO DIFFERENTIAL - Abnormal    WBC 7.74      RBC 3.96 (*)     Hemoglobin 12.4      Hematocrit 37.6      MCV 95      MCH 31.3 (*)     MCHC 33.0      RDW 13.2      Platelets 244      MPV 10.0      Immature Granulocytes 0.5      Gran # (ANC) 6.7      Immature Grans (Abs) 0.04      Lymph # 0.3 (*)     Mono # 0.7      Eos # 0.0      Baso # 0.03      nRBC 0      Gran % 85.9 (*)     Lymph % 4.3 (*)     Mono % 8.9      Eosinophil % 0.0      Basophil % 0.4      Differential Method Automated     COMPREHENSIVE METABOLIC PANEL - Abnormal    Sodium 137      Potassium 4.4      Chloride 103      CO2 22 (*)     Glucose 89      BUN 12      Creatinine 1.0      Calcium 9.2      Total Protein 7.1      Albumin 4.2      Total Bilirubin 0.9      Alkaline Phosphatase 72      AST 51 (*)     ALT 25      eGFR 58.0 (*)     Anion Gap 12     B-TYPE NATRIURETIC PEPTIDE - Abnormal     (*)    URINALYSIS, REFLEX TO URINE CULTURE - Abnormal    Specimen UA Urine, Clean Catch      Color, UA Yellow      Appearance, UA Clear      pH, UA 7.0      Specific Gravity, UA 1.015      Protein, UA Negative      Glucose, UA Negative      Ketones, UA 1+ (*)     Bilirubin (UA) Negative      Occult Blood UA 2+ (*)     Nitrite, UA Positive (*)     Leukocytes, UA 1+ (*)     Narrative:     Specimen Source->Urine   URINALYSIS MICROSCOPIC - Abnormal    RBC, UA 3      WBC, UA 20 (*)      Bacteria Moderate (*)     Squam Epithel, UA 0      Microscopic Comment SEE COMMENT      Narrative:     Specimen Source->Urine   CULTURE, BLOOD   CULTURE, BLOOD   CULTURE, URINE   MAGNESIUM    Magnesium 1.9     TROPONIN I HIGH SENSITIVITY    Troponin I High Sensitivity 11     TSH    TSH 0.671     SARS-COV-2 RNA AMPLIFICATION, QUAL    SARS-CoV-2 RNA, Amplification, Qual Negative            Imaging Results              CT Head Without Contrast (Final result)  Result time 03/09/25 14:18:56      Final result by Gil Hopkins MD (03/09/25 14:18:56)                   Impression:      No acute intracranial process.      Electronically signed by: Gil Hopkins  Date:    03/09/2025  Time:    14:18               Narrative:    EXAMINATION:  CT HEAD WITHOUT CONTRAST    CLINICAL HISTORY:  Mental status change, unknown cause;    TECHNIQUE:  Low dose axial images were obtained through the head.  Coronal and sagittal reformations were also performed. Contrast was not administered.    COMPARISON:  CT 12/05/2024, MRI 06/25/2024    FINDINGS:  Volume loss.    No evidence of hydrocephalus, mass effect, intracranial hemorrhage or acute territorial infarct.    Subtle areas of decreased attenuation within the white matter are nonspecific but may reflect mild chronic small vessel ischemic change.    No acute calvarial fracture. The visualized sinuses and mastoid air cells are clear.                                       X-Ray Chest 1 View (Final result)  Result time 03/09/25 13:58:48      Final result by Jarrett Stewart MD (03/09/25 13:58:48)                   Impression:      1. Chronic appearing interstitial findings, no large focal consolidation.      Electronically signed by: Jarrett Stewart MD  Date:    03/09/2025  Time:    13:58               Narrative:    EXAMINATION:  XR CHEST 1 VIEW    CLINICAL HISTORY:  Weakness    TECHNIQUE:  Single frontal view of the chest was performed.    COMPARISON:  12/04/2024    FINDINGS:  The  cardiomediastinal silhouette is not enlarged.  There is no pleural effusion.  The trachea is midline.  The lungs are symmetrically expanded bilaterally with coarse interstitial attenuation, accentuated by habitus..  No large focal consolidation seen.  There is no pneumothorax.  The osseous structures are remarkable for degenerative change..                                       Medications   vancomycin (VANCOCIN) 1,000 mg in 0.9% NaCl 250 mL IVPB (admixture device) (has no administration in time range)   oseltamivir capsule 75 mg (has no administration in time range)   acetaminophen tablet 650 mg (650 mg Oral Given 3/9/25 1228)   piperacillin-tazobactam (ZOSYN) 4.5 g in D5W 100 mL IVPB (MB+) (0 g Intravenous Stopped 3/9/25 1355)   lactated ringers bolus 500 mL (500 mLs Intravenous New Bag 3/9/25 1228)     Medical Decision Making  Amount and/or Complexity of Data Reviewed  Labs: ordered. Decision-making details documented in ED Course.  Radiology: ordered. Decision-making details documented in ED Course.    Risk  OTC drugs.  Prescription drug management.               ED Course as of 03/09/25 1511   Sun Mar 09, 2025   1424 Influenza A, Molecular(!): Positive [KB]   1455 CBC auto differential(!)  No evidence of leukocytosis, acute anemia requiring transfusion or thrombocytopenia.   [KB]   1455 Comprehensive metabolic panel(!)  No ANEL, no significant electrolyte abnormality,  no evidence of acute hepatobiliary obstruction.   [KB]   1455 Troponin I High Sensitivity  ACS less likely [KB]   1455 TSH  Hypothyroidism/hyperthyroidism less likely the cause of presentation   [KB]   1455 SARS-CoV-2 RNA, Amplification, Qual: Negative [KB]   1455 X-Ray Chest 1 View  On my personal interpretation of this CXR, there is no evidence of acute pneumonia, pneumothorax, pleural effusion, or pulmonary edema.    [KB]   1455 CT Head Without Contrast  On my personal interpretation, no evidence of acute intracranial hemorrhage, subdural  hematoma, or intracranial mass.   [KB]      ED Course User Index  [KB] Gail Ren MD                         See ED course for personal interpretation of workup/ differential.     79-year-old female with history of dementia presenting for generalized malaise, fatigue, inability to walk.  Alert and oriented to person at baseline.  Her lungs are clear to auscultation, abdomen is soft and nontender, and no swelling in the lower extremities.  She does have a palpable soft tissue mass in the supraclavicular region, does not appear to be grossly infected, differential includes lipoma.  On arrival she is tachycardic and febrile, initial lactate reassuring and negative.  No leukocytosis.  Chest x-ray clear, no acute findings on head CT She was started on the sepsis pathway given her vital signs.  She was given 500 cc IVF given no recent ejection fraction on file.  She is influenza A positive.  She also has a urinary tract infection with recent E coli UTI back in January.  ED would like to admit for influenza a, urinary tract infection, generalized weakness.  Discussed with hospital medicine who accepted patient for admission.     Clinical Impression:  Final diagnoses:  [R53.1] Generalized weakness  [A41.9] Sepsis, due to unspecified organism, unspecified whether acute organ dysfunction present (Primary)  [J10.1] Influenza A  [N30.01] Acute cystitis with hematuria  [A41.9] Sepsis          ED Disposition Condition    Observation                     [1]   Social History  Tobacco Use    Smoking status: Never    Smokeless tobacco: Never   Substance Use Topics    Alcohol use: Never        Gail Ren MD  Resident  03/09/25 2501

## 2025-03-09 NOTE — ED TRIAGE NOTES
Mohini FELIPE Dougherty, a 79 y.o. female presents to the ED w/ complaint of lethargy. Pt is normally ambulatory at the NH but was unable to ambulate today when they tried to walk her. Pt Aox2 at baseline.    Triage note:  Chief Complaint   Patient presents with    Fatigue     Pt is normally ambulatory but today is too weak to walk. Pt is lethargic.     Review of patient's allergies indicates:  No Known Allergies  History reviewed. No pertinent past medical history.

## 2025-03-09 NOTE — SUBJECTIVE & OBJECTIVE
No past medical history on file.    No past surgical history on file.    Review of patient's allergies indicates:  No Known Allergies    No current facility-administered medications on file prior to encounter.     Current Outpatient Medications on File Prior to Encounter   Medication Sig    levETIRAcetam (KEPPRA) 750 MG Tab Take 1 tablet (750 mg total) by mouth 2 (two) times daily.    multivitamin Tab Take 1 tablet by mouth once daily.     Family History    None       Tobacco Use    Smoking status: Never    Smokeless tobacco: Never   Substance and Sexual Activity    Alcohol use: Never    Drug use: Not on file    Sexual activity: Not on file     Review of Systems   Unable to perform ROS: Dementia     Objective:     Vital Signs (Most Recent):  Temp: (!) 102.9 °F (39.4 °C) (03/09/25 1228)  Pulse: 99 (03/09/25 1326)  Resp: 19 (03/09/25 1326)  BP: 126/77 (03/09/25 1105)  SpO2: 95 % (03/09/25 1326) Vital Signs (24h Range):  Temp:  [100.2 °F (37.9 °C)-102.9 °F (39.4 °C)] 102.9 °F (39.4 °C)  Pulse:  [] 99  Resp:  [18-19] 19  SpO2:  [95 %-97 %] 95 %  BP: (126)/(77) 126/77     Weight: 52.2 kg (115 lb)  Body mass index is 21.03 kg/m².     Physical Exam  Vitals and nursing note reviewed.   Constitutional:       General: She is not in acute distress.     Appearance: She is well-developed. She is ill-appearing (chronic).   HENT:      Head: Normocephalic and atraumatic.   Eyes:      Pupils: Pupils are equal, round, and reactive to light.   Cardiovascular:      Rate and Rhythm: Normal rate and regular rhythm.   Pulmonary:      Effort: Pulmonary effort is normal. No respiratory distress.      Breath sounds: Normal breath sounds. No wheezing or rales.   Abdominal:      Palpations: Abdomen is soft.      Tenderness: There is no abdominal tenderness.   Musculoskeletal:         General: No tenderness.   Skin:     General: Skin is warm and dry.   Neurological:      Mental Status: She is alert.      Comments: Oriented to person and  place  Following commands  Moving all extremities spontaneously  Answering some questions appropriately     Psychiatric:         Behavior: Behavior normal.              CRANIAL NERVES     CN III, IV, VI   Pupils are equal, round, and reactive to light.       Significant Labs: All pertinent labs within the past 24 hours have been reviewed.  BMP:   Recent Labs   Lab 03/09/25  1248   GLU 89      K 4.4      CO2 22*   BUN 12   CREATININE 1.0   CALCIUM 9.2   MG 1.9     CBC:   Recent Labs   Lab 03/09/25  1248   WBC 7.74   HGB 12.4   HCT 37.6          Significant Imaging: I have reviewed all pertinent imaging results/findings within the past 24 hours.    Imaging Results              CT Head Without Contrast (Final result)  Result time 03/09/25 14:18:56      Final result by Gil Hopkins MD (03/09/25 14:18:56)                   Impression:      No acute intracranial process.      Electronically signed by: Gil Hopkins  Date:    03/09/2025  Time:    14:18               Narrative:    EXAMINATION:  CT HEAD WITHOUT CONTRAST    CLINICAL HISTORY:  Mental status change, unknown cause;    TECHNIQUE:  Low dose axial images were obtained through the head.  Coronal and sagittal reformations were also performed. Contrast was not administered.    COMPARISON:  CT 12/05/2024, MRI 06/25/2024    FINDINGS:  Volume loss.    No evidence of hydrocephalus, mass effect, intracranial hemorrhage or acute territorial infarct.    Subtle areas of decreased attenuation within the white matter are nonspecific but may reflect mild chronic small vessel ischemic change.    No acute calvarial fracture. The visualized sinuses and mastoid air cells are clear.                                       X-Ray Chest 1 View (Final result)  Result time 03/09/25 13:58:48      Final result by Jarrett Stewart MD (03/09/25 13:58:48)                   Impression:      1. Chronic appearing interstitial findings, no large focal  consolidation.      Electronically signed by: Jarrett Stewart MD  Date:    03/09/2025  Time:    13:58               Narrative:    EXAMINATION:  XR CHEST 1 VIEW    CLINICAL HISTORY:  Weakness    TECHNIQUE:  Single frontal view of the chest was performed.    COMPARISON:  12/04/2024    FINDINGS:  The cardiomediastinal silhouette is not enlarged.  There is no pleural effusion.  The trachea is midline.  The lungs are symmetrically expanded bilaterally with coarse interstitial attenuation, accentuated by habitus..  No large focal consolidation seen.  There is no pneumothorax.  The osseous structures are remarkable for degenerative change..

## 2025-03-09 NOTE — ASSESSMENT & PLAN NOTE
Patient presents to the ED with increased malaise, lethargic and too weak to ambulate.   - positive 3/9  - started on tamiflu  - symptomatic management   - monitor vitals closely

## 2025-03-09 NOTE — H&P
"[facility] - Emergency [facility] Medicine  History & Physical    Patient Name: Mohini Dougherty  MRN: [id number]  Patient Class: OP- Observation  Admission Date: 3/9/2025  Attending Physician: Kun Dunham MD   Primary Care Provider: Edwar Castaneda II, MD         Patient information was obtained from ER records.     Subjective:     Principal Problem:Influenza A    Chief Complaint:   Chief Complaint   Patient presents with    Fatigue     Pt is normally ambulatory but today is too weak to walk. Pt is lethargic.        HPI: Mohini Dougherty is a 79 y.o. female with a hx of seizure and cognitive behavioral issues presents to the ED for increased fatigue and malaise. Patient is a poor historian and only oriented to person at baseline. Per chart review, patient presents to the ED for generalized malaise and inability to walk when she is normally ambulatory. Per her assisted living facility, she was "not acting like herself." Patient only endorses dysuria but denies any further complaints.     ED: Tmax 102.9, tachycardic. CBC/ CMP largely unremarkable. UA infectious. Influenza A positive. CXR showed no acute process. CTH w/o acute process. Given tamiflu, vanc and zosyn in the ED. Admitted to .     No past medical history on file.    No past surgical history on file.    Review of patient's allergies indicates:  No Known Allergies    No current facility-administered medications on file prior to encounter.     Current Outpatient Medications on File Prior to Encounter   Medication Sig    levETIRAcetam (KEPPRA) 750 MG Tab Take 1 tablet (750 mg total) by mouth 2 (two) times daily.    multivitamin Tab Take 1 tablet by mouth once daily.     Family History    None       Tobacco Use    Smoking status: Never    Smokeless tobacco: Never   Substance and Sexual Activity    Alcohol use: Never    Drug use: Not on file    Sexual activity: Not on file     Review of Systems   Unable to perform ROS: Dementia     Objective: "     Vital Signs (Most Recent):  Temp: (!) 102.9 °F (39.4 °C) (03/09/25 1228)  Pulse: 99 (03/09/25 1326)  Resp: 19 (03/09/25 1326)  BP: 126/77 (03/09/25 1105)  SpO2: 95 % (03/09/25 1326) Vital Signs (24h Range):  Temp:  [100.2 °F (37.9 °C)-102.9 °F (39.4 °C)] 102.9 °F (39.4 °C)  Pulse:  [] 99  Resp:  [18-19] 19  SpO2:  [95 %-97 %] 95 %  BP: (126)/(77) 126/77     Weight: 52.2 kg (115 lb)  Body mass index is 21.03 kg/m².     Physical Exam  Vitals and nursing note reviewed.   Constitutional:       General: She is not in acute distress.     Appearance: She is well-developed. She is ill-appearing (chronic).   HENT:      Head: Normocephalic and atraumatic.   Eyes:      Pupils: Pupils are equal, round, and reactive to light.   Cardiovascular:      Rate and Rhythm: Normal rate and regular rhythm.   Pulmonary:      Effort: Pulmonary effort is normal. No respiratory distress.      Breath sounds: Normal breath sounds. No wheezing or rales.   Abdominal:      Palpations: Abdomen is soft.      Tenderness: There is no abdominal tenderness.   Musculoskeletal:         General: No tenderness.   Skin:     General: Skin is warm and dry.   Neurological:      Mental Status: She is alert.      Comments: Oriented to person and place  Following commands  Moving all extremities spontaneously  Answering some questions appropriately     Psychiatric:         Behavior: Behavior normal.              CRANIAL NERVES     CN III, IV, VI   Pupils are equal, round, and reactive to light.       Significant Labs: All pertinent labs within the past 24 hours have been reviewed.  BMP:   Recent Labs   Lab 03/09/25  1248   GLU 89      K 4.4      CO2 22*   BUN 12   CREATININE 1.0   CALCIUM 9.2   MG 1.9     CBC:   Recent Labs   Lab 03/09/25  1248   WBC 7.74   HGB 12.4   HCT 37.6          Significant Imaging: I have reviewed all pertinent imaging results/findings within the past 24 hours.    Imaging Results              CT Head Without  Contrast (Final result)  Result time 03/09/25 14:18:56      Final result by Gil Hopkins MD (03/09/25 14:18:56)                   Impression:      No acute intracranial process.      Electronically signed by: Gil Hopkins  Date:    03/09/2025  Time:    14:18               Narrative:    EXAMINATION:  CT HEAD WITHOUT CONTRAST    CLINICAL HISTORY:  Mental status change, unknown cause;    TECHNIQUE:  Low dose axial images were obtained through the head.  Coronal and sagittal reformations were also performed. Contrast was not administered.    COMPARISON:  CT 12/05/2024, MRI 06/25/2024    FINDINGS:  Volume loss.    No evidence of hydrocephalus, mass effect, intracranial hemorrhage or acute territorial infarct.    Subtle areas of decreased attenuation within the white matter are nonspecific but may reflect mild chronic small vessel ischemic change.    No acute calvarial fracture. The visualized sinuses and mastoid air cells are clear.                                       X-Ray Chest 1 View (Final result)  Result time 03/09/25 13:58:48      Final result by Jarrett Stewart MD (03/09/25 13:58:48)                   Impression:      1. Chronic appearing interstitial findings, no large focal consolidation.      Electronically signed by: Jarrett Stewart MD  Date:    03/09/2025  Time:    13:58               Narrative:    EXAMINATION:  XR CHEST 1 VIEW    CLINICAL HISTORY:  Weakness    TECHNIQUE:  Single frontal view of the chest was performed.    COMPARISON:  12/04/2024    FINDINGS:  The cardiomediastinal silhouette is not enlarged.  There is no pleural effusion.  The trachea is midline.  The lungs are symmetrically expanded bilaterally with coarse interstitial attenuation, accentuated by habitus..  No large focal consolidation seen.  There is no pneumothorax.  The osseous structures are remarkable for degenerative change..                                      Assessment/Plan:     * Influenza A  Patient presents to  the ED with increased malaise, lethargic and too weak to ambulate.   - positive 3/9  - started on tamiflu  - symptomatic management   - monitor vitals closely     Sepsis  - see management for influenza and acute cystitis     Acute cystitis  - UA infectious   - started on rocephin  - follow urine culture     Seizure  - continue home keppra  - seizure precautions     Cognitive and behavioral changes  - hx noted  - CTH w/o acute process  - at neuro baseline per chart review  - delirium precautions       VTE Risk Mitigation (From admission, onward)           Ordered     enoxaparin injection 40 mg  Daily         03/09/25 1530     IP VTE HIGH RISK PATIENT  Once         03/09/25 1530                         On 03/09/2025, patient should be placed in hospital observation services under my care in collaboration with Dr. Kun Dunham.           CLAUDIA ReevesC  Department of Hospital Medicine  [facility] - Emergency Dept

## 2025-03-09 NOTE — Clinical Note
Diagnosis: Sepsis, due to unspecified organism, unspecified whether acute organ dysfunction present [[id number]]   Future Attending Provider: JOSE CARLOS WALDEN [[id number]]

## 2025-03-09 NOTE — DISCHARGE INSTRUCTIONS
You were treated for UTI and The flu  Please complete medications as prescribed. If we need to change your antibiotics will call the facility.

## 2025-03-10 VITALS
TEMPERATURE: 99 F | OXYGEN SATURATION: 97 % | DIASTOLIC BLOOD PRESSURE: 59 MMHG | SYSTOLIC BLOOD PRESSURE: 122 MMHG | HEIGHT: 62 IN | RESPIRATION RATE: 16 BRPM | WEIGHT: 115 LBS | HEART RATE: 78 BPM | BODY MASS INDEX: 21.16 KG/M2

## 2025-03-10 PROBLEM — R74.8 ELEVATED LIVER ENZYMES: Status: ACTIVE | Noted: 2025-03-10

## 2025-03-10 PROBLEM — R53.1 WEAKNESS: Status: ACTIVE | Noted: 2025-03-10

## 2025-03-10 LAB
ALBUMIN SERPL BCP-MCNC: 3.6 G/DL (ref 3.5–5.2)
ALP SERPL-CCNC: 62 U/L (ref 40–150)
ALT SERPL W/O P-5'-P-CCNC: 48 U/L (ref 10–44)
ANION GAP SERPL CALC-SCNC: 12 MMOL/L (ref 8–16)
AST SERPL-CCNC: 110 U/L (ref 10–40)
BASOPHILS # BLD AUTO: 0.01 K/UL (ref 0–0.2)
BASOPHILS NFR BLD: 0.2 % (ref 0–1.9)
BILIRUB SERPL-MCNC: 0.5 MG/DL (ref 0.1–1)
BUN SERPL-MCNC: 15 MG/DL (ref 8–23)
CALCIUM SERPL-MCNC: 8.4 MG/DL (ref 8.7–10.5)
CHLORIDE SERPL-SCNC: 103 MMOL/L (ref 95–110)
CO2 SERPL-SCNC: 20 MMOL/L (ref 23–29)
CREAT SERPL-MCNC: 0.9 MG/DL (ref 0.5–1.4)
DIFFERENTIAL METHOD BLD: ABNORMAL
EOSINOPHIL # BLD AUTO: 0 K/UL (ref 0–0.5)
EOSINOPHIL NFR BLD: 0 % (ref 0–8)
ERYTHROCYTE [DISTWIDTH] IN BLOOD BY AUTOMATED COUNT: 13.3 % (ref 11.5–14.5)
EST. GFR  (NO RACE VARIABLE): >60 ML/MIN/1.73 M^2
GLUCOSE SERPL-MCNC: 89 MG/DL (ref 70–110)
HCT VFR BLD AUTO: 36.4 % (ref 37–48.5)
HGB BLD-MCNC: 11.9 G/DL (ref 12–16)
IMM GRANULOCYTES # BLD AUTO: 0.02 K/UL (ref 0–0.04)
IMM GRANULOCYTES NFR BLD AUTO: 0.3 % (ref 0–0.5)
LYMPHOCYTES # BLD AUTO: 0.6 K/UL (ref 1–4.8)
LYMPHOCYTES NFR BLD: 9 % (ref 18–48)
MAGNESIUM SERPL-MCNC: 1.9 MG/DL (ref 1.6–2.6)
MCH RBC QN AUTO: 31.3 PG (ref 27–31)
MCHC RBC AUTO-ENTMCNC: 32.7 G/DL (ref 32–36)
MCV RBC AUTO: 96 FL (ref 82–98)
MONOCYTES # BLD AUTO: 0.6 K/UL (ref 0.3–1)
MONOCYTES NFR BLD: 8.4 % (ref 4–15)
NEUTROPHILS # BLD AUTO: 5.4 K/UL (ref 1.8–7.7)
NEUTROPHILS NFR BLD: 82.1 % (ref 38–73)
NRBC BLD-RTO: 0 /100 WBC
OHS QRS DURATION: 74 MS
OHS QRS DURATION: 76 MS
OHS QTC CALCULATION: 447 MS
OHS QTC CALCULATION: 447 MS
PHOSPHATE SERPL-MCNC: 4.3 MG/DL (ref 2.7–4.5)
PLATELET # BLD AUTO: 195 K/UL (ref 150–450)
PMV BLD AUTO: 10.7 FL (ref 9.2–12.9)
POTASSIUM SERPL-SCNC: 4.1 MMOL/L (ref 3.5–5.1)
PROT SERPL-MCNC: 6.2 G/DL (ref 6–8.4)
RBC # BLD AUTO: 3.8 M/UL (ref 4–5.4)
SODIUM SERPL-SCNC: 135 MMOL/L (ref 136–145)
WBC # BLD AUTO: 6.55 K/UL (ref 3.9–12.7)

## 2025-03-10 PROCEDURE — G0378 HOSPITAL OBSERVATION PER HR: HCPCS

## 2025-03-10 PROCEDURE — 84100 ASSAY OF PHOSPHORUS: CPT

## 2025-03-10 PROCEDURE — 63600175 PHARM REV CODE 636 W HCPCS

## 2025-03-10 PROCEDURE — 85025 COMPLETE CBC W/AUTO DIFF WBC: CPT

## 2025-03-10 PROCEDURE — 97530 THERAPEUTIC ACTIVITIES: CPT

## 2025-03-10 PROCEDURE — 83735 ASSAY OF MAGNESIUM: CPT

## 2025-03-10 PROCEDURE — 97161 PT EVAL LOW COMPLEX 20 MIN: CPT

## 2025-03-10 PROCEDURE — 80053 COMPREHEN METABOLIC PANEL: CPT

## 2025-03-10 PROCEDURE — 97165 OT EVAL LOW COMPLEX 30 MIN: CPT

## 2025-03-10 PROCEDURE — 25000003 PHARM REV CODE 250

## 2025-03-10 RX ORDER — SERTRALINE HYDROCHLORIDE 25 MG/1
25 TABLET, FILM COATED ORAL EVERY MORNING
COMMUNITY

## 2025-03-10 RX ORDER — CEFDINIR 300 MG/1
300 CAPSULE ORAL 2 TIMES DAILY
Qty: 6 CAPSULE | Refills: 0 | Status: SHIPPED | OUTPATIENT
Start: 2025-03-10 | End: 2025-03-13

## 2025-03-10 RX ORDER — OSELTAMIVIR PHOSPHATE 30 MG/1
30 CAPSULE ORAL 2 TIMES DAILY
Qty: 10 CAPSULE | Refills: 0 | Status: SHIPPED | OUTPATIENT
Start: 2025-03-10 | End: 2025-03-15

## 2025-03-10 RX ADMIN — LEVETIRACETAM 750 MG: 750 TABLET, FILM COATED ORAL at 08:03

## 2025-03-10 RX ADMIN — OSELTAMIVIR PHOSPHATE 30 MG: 30 CAPSULE ORAL at 08:03

## 2025-03-10 RX ADMIN — CEFTRIAXONE SODIUM 1 G: 1 INJECTION, POWDER, FOR SOLUTION INTRAMUSCULAR; INTRAVENOUS at 08:03

## 2025-03-10 NOTE — PHARMACY MED REC
"Admission Medication History     The home medication history was taken by Neena Briggs.    You may go to "Admission" then "Reconcile Home Medications" tabs to review and/or act upon these items.     The home medication list has been updated by the Pharmacy department.   Please read ALL comments highlighted in yellow.   Please address this information as you see fit.    Feel free to contact us if you have any questions or require assistance.          Medications listed below were obtained from: Nursing home  Current Outpatient Medications on File Prior to Encounter   Medication Sig    levETIRAcetam (KEPPRA) 750 MG Tab Take 1 tablet (750 mg total) by mouth 2 (two) times daily.    multivitamin Tab Take 1 tablet by mouth once daily.    sertraline (ZOLOFT) 25 MG tablet Take 25 mg by mouth every morning.               Potential issues to be addressed PRIOR TO DISCHARGE  The listed medications were obtained from another facility ([facility]). The patient may not have been able to fill these prescriptions prior to this admission and may require new scripts upon discharge.     Neena Briggs  EXT [id number]                  .          "

## 2025-03-10 NOTE — PROGRESS NOTES
"[facility] - Emergency Dept  [facility] Medicine  Progress Note    Patient Name: Mohini Dougherty  MRN: [id number]  Patient Class: OP- Observation   Admission Date: 3/9/2025  Length of Stay: 0 days  Attending Physician: Carri Cueto MD  Primary Care Provider: Edwar Castaneda II, MD        Subjective     Principal Problem:Influenza A        HPI:  Mohini Dougherty is a 79 y.o. female with a hx of seizure and cognitive behavioral issues presents to the ED for increased fatigue and malaise. Patient is a poor historian and only oriented to person at baseline. Per chart review, patient presents to the ED for generalized malaise and inability to walk when she is normally ambulatory. Per her assisted living facility, she was "not acting like herself." Patient only endorses dysuria but denies any further complaints.     ED: Tmax 102.9, tachycardic. CBC/ CMP largely unremarkable. UA infectious. Influenza A positive. CXR showed no acute process. CTH w/o acute process. Given tamiflu, vanc and zosyn in the ED. Admitted to .     Overview/Hospital Course:  No notes on file    Interval History:  Confusion overnight. No complaints. Unable to tell me why she came into the hospital.     Review of Systems  Objective:     Vital Signs (Most Recent):  Temp: 98.3 °F (36.8 °C) (03/10/25 1218)  Pulse: 81 (03/10/25 1218)  Resp: 16 (03/10/25 1218)  BP: (!) 111/53 (03/10/25 1218)  SpO2: 97 % (03/10/25 1218) Vital Signs (24h Range):  Temp:  [98.3 °F (36.8 °C)-99.2 °F (37.3 °C)] 98.3 °F (36.8 °C)  Pulse:  [78-97] 81  Resp:  [16-18] 16  SpO2:  [95 %-100 %] 97 %  BP: (111-169)/(53-75) 111/53     Weight: 52.2 kg (115 lb)  Body mass index is 21.03 kg/m².  No intake or output data in the 24 hours ending 03/10/25 1444      Physical Exam      Objective:      Vital Signs (Most Recent):  Temp: 99.1 °F (37.3 °C) (03/10/25 0830)  Pulse: 97 (03/10/25 0830)  Resp: 16 (03/10/25 0830)  BP: (!) 169/75 (03/10/25 0830)  SpO2: 95 % (03/10/25 0830) Vital Signs " (24h Range):  Temp:  [98.7 °F (37.1 °C)-102.9 °F (39.4 °C)] 99.1 °F (37.3 °C)  Pulse:  [78-99] 97  Resp:  [16-19] 16  SpO2:  [95 %-100 %] 95 %  BP: (111-169)/(56-75) 169/75      Weight: 52.2 kg (115 lb)  Body mass index is 21.03 kg/m².  No intake or output data in the 24 hours ending 03/10/25 1156   Physical Exam  Constitutional:       Appearance: Normal appearance.   HENT:      Head: Normocephalic and atraumatic.      Nose: Nose normal.      Mouth/Throat:      Mouth: Mucous membranes are moist.   Eyes:      Extraocular Movements: Extraocular movements intact.      Pupils: Pupils are equal, round, and reactive to light.   Cardiovascular:      Rate and Rhythm: Normal rate and regular rhythm.      Heart sounds: No murmur heard.     No gallop.   Pulmonary:      Effort: Pulmonary effort is normal.      Breath sounds: Normal breath sounds. No wheezing or rales.   Abdominal:      General: Abdomen is flat. Bowel sounds are normal. There is no distension.      Palpations: Abdomen is soft.      Tenderness: There is no abdominal tenderness.   Musculoskeletal:         General: No swelling or tenderness. Normal range of motion.      Cervical back: Normal range of motion and neck supple.      Right lower leg: No edema.      Left lower leg: No edema.   Skin:     General: Skin is warm and dry.      Capillary Refill: Capillary refill takes less than 2 seconds.   Neurological:      General: No focal deficit present.      Mental Status: She is alert. Mental status is at baseline.   Psychiatric:         Mood and Affect: Mood normal.                   Significant Labs: All pertinent labs within the past 24 hours have been reviewed.     Significant Imaging: I have reviewed all pertinent imaging results/findings within the past 24 hour      Significant Labs: All pertinent labs within the past 24 hours have been reviewed.    Significant Imaging: I have reviewed all pertinent imaging results/findings within the past 24  hours.      Assessment & Plan  Sepsis  -SIRS with fever and tachycardia on admission   -CXR negative for focal findings. UA positive. Urine cultures pending   - see management for influenza and acute cystitis     Influenza A  Patient presents to the ED with increased malaise, lethargic and too weak to ambulate.   - positive 3/9, on RA   - started on tamiflu  - symptomatic management   - monitor vitals closely   Acute cystitis  - UA infectious   - continue on rocephin  - follow urine culture   Elevated liver enzymes  -noted on CMP this AM  -possibly in the setting of viral illness  -hepatitis panel ordered, if continuing to increase will order RUQ US     Cognitive and behavioral changes  - hx noted  - CTH w/o acute process  - at neuro baseline per chart review  - delirium precautions   Seizure  - continue home keppra  - seizure precautions   Weakness  -Pt/OT consulted     The mobility limitation cannot be sufficiently resolved by the use of a cane. Patient's functional mobility deficit can be sufficiently resolved with the use of a (Rolling Walker or Walker). Patient's mobility limitation significantly impairs their ability to participate in one of more activities of daily living. The use of a (RW or Walker) will significantly improve the patient's ability to participate in MRADLS and the patient will use it on regular basis in the home.     VTE Risk Mitigation (From admission, onward)           Ordered     enoxaparin injection 40 mg  Daily         03/09/25 1530     IP VTE HIGH RISK PATIENT  Once         03/09/25 1530                    Discharge Planning   MARVEL:      Code Status: Full Code   Medical Readiness for Discharge Date:   Discharge Plan A: Assisted Living                Please place Justification for DME        Carri Cueto MD  Department of Hospital Medicine   [facility] - Emergency Dept

## 2025-03-10 NOTE — PT/OT/SLP EVAL
Physical Therapy Co-Evaluation and Treatment    OT present for coeval due to pt's multiple medical comorbidities and functional/cognition deficits requiring two skilled therapists to appropriately progress pt's musculoskeletal strength, neuromuscular control, and endurance while taking into consideration medical acuity and pt safety.    Patient Name:  Mohini Dougherty   MRN:  [id number]    Recommendations:     Discharge Recommendations: Low Intensity Therapy   Discharge Equipment Recommendations: walker, rolling   Barriers to discharge: None    Assessment:     Mohini Dougherty is a 79 y.o. female admitted with a medical diagnosis of Influenza A.  She presents with the following impairments/functional limitations: weakness, impaired endurance, impaired self care skills, impaired functional mobility, gait instability, impaired balance, visual deficits, impaired cognition, decreased lower extremity function, decreased safety awareness     Pt receptive and tolerated PT co-eval with OT well. Pt AAO x2 only oriented to name and place. Pt reports she was IND at assisted living home PTA. Pt needed min A to amb in the room with a RW and w/o and AD just using HHA. Patient currently demonstrates a need for low intensity therapy on a scheduled basis secondary to a decline in functional status due to illness    Rehab Prognosis: Good; patient would benefit from acute skilled PT services to address these deficits and reach maximum level of function.    Recent Surgery: * No surgery found *      Plan:     During this hospitalization, patient to be seen 3 x/week to address the identified rehab impairments via gait training, therapeutic activities, therapeutic exercises, neuromuscular re-education and progress toward the following goals:    Plan of Care Expires:  04/09/25    Subjective     Chief Complaint: none reported  Patient/Family Comments/goals:   Pain/Comfort:  Pain Rating 1: 0/10  Pain Rating Post-Intervention 1:  0/10    Patients cultural, spiritual, Latter-day conflicts given the current situation: no    Patient History:     Living Environment: Pt lives at [facility]  Prior Level of Function: Pt reports she was IND with amb at [facility], notes from [facility] state she needed assistance with ADLs  DME owned: unknown  Caregiver Assistance: staff at [facility]      Objective:     Communicated with RN prior to session.  Patient found HOB elevated with PureWick  upon PT entry to room.    General Precautions: Standard, fall  Orthopedic Precautions:N/A   Braces: N/A  Respiratory Status: Room air    Exams:  Gross Motor Coordination:  WFL  Sensation:    -       Intact  RLE ROM: WFL  RLE Strength: WFL  LLE ROM: WFL  LLE Strength: WFL    Functional Mobility:    Bed Mobility:   Supine > Sit: contact guard assistance  Sit > Supine: contact guard assistance    Transfers:   Sit <> Stand Transfer: minimum assistance from EOB using rolling walker   2nd Trial: minimum assistance using HHA    Balance:   Sitting balance: FAIR+: Maintains balance through MINIMAL excursions of active trunk motion  Standing balance:   FAIR: Maintains without assist but unable to take challenges  POOR+: Needs MIN (minimal ) assist during gait                 Gait:  Distance: Trial 1: 3 lateral steps and 3 steps fwb/bwd with RW. Trial 2: 4 lateral steps and 3 steps fwd/bwd using HHA   Assistive Device: RW  Assistance Level: minimum assistance  Gait Assessment: Pt amb with shuffling gait and decreased arias but no LOB.  Pt needed manual assistance with RW to keep on the ground during amb      AM-PAC 6 CLICK MOBILITY  Total Score:20       Treatment & Education:  Pt educated on tip to reduce fall risk and safety with mobility and using call button for assistance from nursing staff with OOB mobility.  Pt educated on sitting up in chair throughout most of day  All questions answered within the scope of PT.  White board updated accordingly.    Patient left HOB elevated with all  lines intact, call button in reach, RN notified, and sitter present.    GOALS:   Multidisciplinary Problems       Physical Therapy Goals          Problem: Physical Therapy    Goal Priority Disciplines Outcome Interventions   Physical Therapy Goal     PT, PT/OT Progressing    Description: Goals to be met by: 25     Patient will increase functional independence with mobility by performin. Supine to sit with Set-up Archer  2. Sit to stand transfer with Supervision  3. Bed to chair transfer with Supervision using LRAD  4. Gait  x 150 feet with Supervision using LRAD.                          DME Justifications:   Mohini's mobility limitation cannot be sufficiently resolved by the use of a cane. Her functional mobility deficit can be sufficiently resolved with the use of a Rolling Walker. Patient's mobility limitation significantly impairs their ability to participate in one of more activities of daily living.  The use of a RW will significantly improve the patient's ability to participate in MRADLS and the patient will use it on regular basis in the home.    History:     History reviewed. No pertinent past medical history.    Past Surgical History:   Procedure Laterality Date    SKIN BIOPSY         Time Tracking:     PT Received On: 03/10/25  PT Start Time: 1331     PT Stop Time: 1344  PT Total Time (min): 13 min     Billable Minutes: Evaluation 13      03/10/2025

## 2025-03-10 NOTE — ASSESSMENT & PLAN NOTE
Patient presents to the ED with increased malaise, lethargic and too weak to ambulate.   - positive 3/9, on RA   - started on tamiflu  - symptomatic management   - monitor vitals closely

## 2025-03-10 NOTE — ASSESSMENT & PLAN NOTE
-Pt/OT consulted     The mobility limitation cannot be sufficiently resolved by the use of a cane. Patient's functional mobility deficit can be sufficiently resolved with the use of a (Rolling Walker or Walker). Patient's mobility limitation significantly impairs their ability to participate in one of more activities of daily living. The use of a (RW or Walker) will significantly improve the patient's ability to participate in MRADLS and the patient will use it on regular basis in the home.

## 2025-03-10 NOTE — PT/OT/SLP EVAL
"Occupational Therapy  Co- Evaluation    Name: Mohini Dougherty  MRN: [id number]  Admitting Diagnosis: Influenza A  Recent Surgery: * No surgery found *      Recommendations:     Discharge Recommendations: Low Intensity Therapy  Discharge Equipment Recommendations:  walker, rolling  Barriers to discharge:  Decreased caregiver support    Assessment:     Mohini Dougherty is a 79 y.o. female with a medical diagnosis of Influenza A. Performance deficits affecting function: weakness, impaired endurance, impaired self care skills, impaired balance, decreased lower extremity function, decreased safety awareness.      Rehab Prognosis: Good; patient would benefit from acute skilled OT services to address these deficits and reach maximum level of function.       Plan:     Patient to be seen   to address the above listed problems via self-care/home management, therapeutic activities, therapeutic exercises, neuromuscular re-education  Plan of Care Expires: 03/25/25  Plan of Care Reviewed with: patient    Subjective     Chief Complaint: None   Patient/Family Comments/goals: Pt. Reports "21" re: all orientation question except names    Occupational Profile:  Living Environment: Lives in a [facility]   Previous level of function: Ind in all ADLs and fxl mob   Equipment Used at Home: none  Assistance upon Discharge: [facility] staff    Pain/Comfort:  Pain Rating 1: 0/10  Pain Rating Post-Intervention 1: 0/10    Patients cultural, spiritual, Judaism conflicts given the current situation: no    Objective:     Communicated with RN prior to session.  Patient found HOB elevated with PureWick  upon OT entry to room.    General Precautions: Standard, fall  Orthopedic Precautions:N/A   Braces: N/A  Respiratory Status: Room air    Occupational Performance:    Bed Mobility:    Patient completed Supine to Sit with contact guard assistance  Patient completed Sit to Supine with contact guard assistance    Functional " Mobility/Transfers:  Patient completed Sit <> Stand Transfer with minimum assistance  with  rolling walker 2 trials from EOB  Functional Mobility: Pt demonstrated functional mobility training to simulate household distance 3 lateral steps and 3 steps fwb/bwd 1 trial with RW and 2nd trial with Hand Held Assist with Min A   no LOB and fair visual search and navigation strategies.    Activities of Daily Living: Politely decline all further ADLs    Cognitive/Visual Perceptual:  Cognitive/Psychosocial Skills:     -       Oriented to: Person, Place, Time, and Situation   -       Follows Commands/attention:Follows 1 step commands  -       Communication: clear/fluent  -       Memory: LTM and STM Impaired   -       Safety awareness/insight to disability: impaired   -       Mood/Affect/Coping skills/emotional control: Appropriate to situation    Physical Exam:  Balance:   Static Sitting: SBA  Dynamic Sitting: CGA  Static Standing: Min A  Dynamic Standing: Min A  Upper Extremity Range of Motion:     -       Right Upper Extremity: WFL  -       Left Upper Extremity: WFL  Upper Extremity Strength:    -       Right Upper Extremity: WFL  -       Left Upper Extremity: WFL   Strength:    -       Right Upper Extremity: WFL  -       Left Upper Extremity: WFL    AMPAC 6 Click ADL:  AMPAC Total Score: 16    Treatment & Education:  Pt educated on role of occupational therapy, POC, and safety during ADLs and functional mobility. Pt and OT discussed importance of safe, continued mobility to optimize daily living skills. Pt verbalized understanding. Pt given instruction to call for medical staff/nurse for assistance.   PT present for coeval due to pt's multiple medical comorbidities and functional/cognition deficits requiring two skilled therapists to appropriately progress pt's musculoskeletal strength, neuromuscular control, and endurance while taking into consideration medical acuity and pt safety.    Patient left HOB elevated with  all lines intact, call button in reach, RN notified, and sitter present.    GOALS:   Multidisciplinary Problems       Occupational Therapy Goals          Problem: Occupational Therapy    Goal Priority Disciplines Outcome Interventions   Occupational Therapy Goal     OT, PT/OT Progressing    Description: Goals to be met by: 3/25/25    Patient will increase functional independence with ADLs by performing:    LE Dressing with Stand-by Assistance.  Grooming while standing at sink with Stand-by Assistance.  Toileting from bedside commode with Stand-by Assistance for hygiene and clothing management.   Supine to sit with Stand-by Assistance.  Toilet transfer to toilet with Stand-by Assistance.                         DME Justifications:   Mohini's mobility limitation cannot be sufficiently resolved by the use of a cane. Her functional mobility deficit can be sufficiently resolved with the use of a Rolling Walker. Patient's mobility limitation significantly impairs their ability to participate in one of more activities of daily living.  The use of a RW will significantly improve the patient's ability to participate in MRADLS and the patient will use it on regular basis in the home.    History:     History reviewed. No pertinent past medical history.      Past Surgical History:   Procedure Laterality Date    SKIN BIOPSY         Time Tracking:     OT Date of Treatment: 03/10/25  OT Start Time: 1331  OT Stop Time: 1344  OT Total Time (min): 13 min    Billable Minutes:Evaluation 13    3/10/2025

## 2025-03-10 NOTE — SUBJECTIVE & OBJECTIVE
Interval History: Confusion overnight. No complaints. Unable to tell me why she came into the hospital.     Review of Systems  Objective:     Vital Signs (Most Recent):  Temp: 99.1 °F (37.3 °C) (03/10/25 0830)  Pulse: 97 (03/10/25 0830)  Resp: 16 (03/10/25 0830)  BP: (!) 169/75 (03/10/25 0830)  SpO2: 95 % (03/10/25 0830) Vital Signs (24h Range):  Temp:  [98.7 °F (37.1 °C)-102.9 °F (39.4 °C)] 99.1 °F (37.3 °C)  Pulse:  [78-99] 97  Resp:  [16-19] 16  SpO2:  [95 %-100 %] 95 %  BP: (111-169)/(56-75) 169/75     Weight: 52.2 kg (115 lb)  Body mass index is 21.03 kg/m².  No intake or output data in the 24 hours ending 03/10/25 1156      Physical Exam  Constitutional:       Appearance: Normal appearance.   HENT:      Head: Normocephalic and atraumatic.      Nose: Nose normal.      Mouth/Throat:      Mouth: Mucous membranes are moist.   Eyes:      Extraocular Movements: Extraocular movements intact.      Pupils: Pupils are equal, round, and reactive to light.   Cardiovascular:      Rate and Rhythm: Normal rate and regular rhythm.      Heart sounds: No murmur heard.     No gallop.   Pulmonary:      Effort: Pulmonary effort is normal.      Breath sounds: Normal breath sounds. No wheezing or rales.   Abdominal:      General: Abdomen is flat. Bowel sounds are normal. There is no distension.      Palpations: Abdomen is soft.      Tenderness: There is no abdominal tenderness.   Musculoskeletal:         General: No swelling or tenderness. Normal range of motion.      Cervical back: Normal range of motion and neck supple.      Right lower leg: No edema.      Left lower leg: No edema.   Skin:     General: Skin is warm and dry.      Capillary Refill: Capillary refill takes less than 2 seconds.   Neurological:      General: No focal deficit present.      Mental Status: She is alert. Mental status is at baseline.   Psychiatric:         Mood and Affect: Mood normal.               Significant Labs: All pertinent labs within the past 24  hours have been reviewed.    Significant Imaging: I have reviewed all pertinent imaging results/findings within the past 24 hours.

## 2025-03-10 NOTE — SUBJECTIVE & OBJECTIVE
Interval History:  Confusion overnight. No complaints. Unable to tell me why she came into the hospital.     Review of Systems  Objective:     Vital Signs (Most Recent):  Temp: 98.3 °F (36.8 °C) (03/10/25 1218)  Pulse: 81 (03/10/25 1218)  Resp: 16 (03/10/25 1218)  BP: (!) 111/53 (03/10/25 1218)  SpO2: 97 % (03/10/25 1218) Vital Signs (24h Range):  Temp:  [98.3 °F (36.8 °C)-99.2 °F (37.3 °C)] 98.3 °F (36.8 °C)  Pulse:  [78-97] 81  Resp:  [16-18] 16  SpO2:  [95 %-100 %] 97 %  BP: (111-169)/(53-75) 111/53     Weight: 52.2 kg (115 lb)  Body mass index is 21.03 kg/m².  No intake or output data in the 24 hours ending 03/10/25 1444      Physical Exam      Objective:      Vital Signs (Most Recent):  Temp: 99.1 °F (37.3 °C) (03/10/25 0830)  Pulse: 97 (03/10/25 0830)  Resp: 16 (03/10/25 0830)  BP: (!) 169/75 (03/10/25 0830)  SpO2: 95 % (03/10/25 0830) Vital Signs (24h Range):  Temp:  [98.7 °F (37.1 °C)-102.9 °F (39.4 °C)] 99.1 °F (37.3 °C)  Pulse:  [78-99] 97  Resp:  [16-19] 16  SpO2:  [95 %-100 %] 95 %  BP: (111-169)/(56-75) 169/75      Weight: 52.2 kg (115 lb)  Body mass index is 21.03 kg/m².  No intake or output data in the 24 hours ending 03/10/25 1156   Physical Exam  Constitutional:       Appearance: Normal appearance.   HENT:      Head: Normocephalic and atraumatic.      Nose: Nose normal.      Mouth/Throat:      Mouth: Mucous membranes are moist.   Eyes:      Extraocular Movements: Extraocular movements intact.      Pupils: Pupils are equal, round, and reactive to light.   Cardiovascular:      Rate and Rhythm: Normal rate and regular rhythm.      Heart sounds: No murmur heard.     No gallop.   Pulmonary:      Effort: Pulmonary effort is normal.      Breath sounds: Normal breath sounds. No wheezing or rales.   Abdominal:      General: Abdomen is flat. Bowel sounds are normal. There is no distension.      Palpations: Abdomen is soft.      Tenderness: There is no abdominal tenderness.   Musculoskeletal:         General:  No swelling or tenderness. Normal range of motion.      Cervical back: Normal range of motion and neck supple.      Right lower leg: No edema.      Left lower leg: No edema.   Skin:     General: Skin is warm and dry.      Capillary Refill: Capillary refill takes less than 2 seconds.   Neurological:      General: No focal deficit present.      Mental Status: She is alert. Mental status is at baseline.   Psychiatric:         Mood and Affect: Mood normal.                   Significant Labs: All pertinent labs within the past 24 hours have been reviewed.     Significant Imaging: I have reviewed all pertinent imaging results/findings within the past 24 hour      Significant Labs: All pertinent labs within the past 24 hours have been reviewed.    Significant Imaging: I have reviewed all pertinent imaging results/findings within the past 24 hours.

## 2025-03-10 NOTE — PLAN OF CARE
03/10/25 1453   Post-Acute Status   Post-Acute Authorization Home Health   Home Health Status Referrals Sent   Discharge Delays None known at this time   Discharge Plan   Discharge Plan A Assisted Living;Home Health   Discharge Plan B Assisted Living;Home Health     SW sent Home Health referral to [facility] [phone] for pt to receive Home Health at assisted living facility, [facility] [phone]. SW/NICHOLAS to follow up.    Discharge Plan A and Plan B have been determined by review of patient's clinical status, future medical and therapeutic needs, and coverage/benefits for post-acute care in coordination with multidisciplinary team members.    ERIKA Hsu, CSW.   Case Management  [facility]  Email: [email or web address]

## 2025-03-10 NOTE — PROGRESS NOTES
"[facility] - [facility]  Hospital Medicine  Progress Note    Patient Name: Mohini Dougherty  MRN: [id number]  Patient Class: OP- Observation   Admission Date: 3/9/2025  Length of Stay: 0 days  Attending Physician: Carri Cueto MD  Primary Care Provider: Edwar Castaneda II, MD        Subjective     Principal Problem:Influenza A        HPI:  Mohini Dougherty is a 79 y.o. female with a hx of seizure and cognitive behavioral issues presents to the ED for increased fatigue and malaise. Patient is a poor historian and only oriented to person at baseline. Per chart review, patient presents to the ED for generalized malaise and inability to walk when she is normally ambulatory. Per her assisted living facility, she was "not acting like herself." Patient only endorses dysuria but denies any further complaints.     ED: Tmax 102.9, tachycardic. CBC/ CMP largely unremarkable. UA infectious. Influenza A positive. CXR showed no acute process. CTH w/o acute process. Given tamiflu, vanc and zosyn in the ED. Admitted to .     Overview/Hospital Course:  No notes on file    No new subjective & objective note has been filed under this hospital service since the last note was generated.        Assessment & Plan  Sepsis  -SIRS with fever and tachycardia on admission   -CXR negative for focal findings. UA positive. Urine cultures pending   - see management for influenza and acute cystitis     Influenza A  Patient presents to the ED with increased malaise, lethargic and too weak to ambulate.   - positive 3/9, on RA   - started on tamiflu  - symptomatic management   - monitor vitals closely   Acute cystitis  - UA infectious   - continue on rocephin  - follow urine culture   Elevated liver enzymes  -noted on CMP this AM  -possibly in the setting of viral illness  -hepatitis panel ordered, if continuing to increase will order RUQ US     Cognitive and behavioral changes  - hx noted  - CTH w/o acute process  - at neuro baseline per " chart review  - delirium precautions   Seizure  - continue home keppra  - seizure precautions   Weakness  -Pt/OT consulted       VTE Risk Mitigation (From admission, onward)           Ordered     enoxaparin injection 40 mg  Daily         03/09/25 1530     IP VTE HIGH RISK PATIENT  Once         03/09/25 1530                    Discharge Planning   MARVEL:      Code Status: Full Code   Medical Readiness for Discharge Date:   Discharge Plan A: Assisted Living          Carri Cueto MD  Department of Hospital Medicine   [facility]

## 2025-03-10 NOTE — PLAN OF CARE
[facility] - [facility]  Discharge Assessment    Primary Care Provider: Edwar Castaneda II, MD     Pt is a resident at [facility] [phone] [facility] living. SW spoke with pt's curator Penaloza [phone] to complete assessment. Pt was independent with ADL's and ambulation prior to recent weakness. SW spoke with pt's nurse Farhana at [facility], pt plans is to return to assisted living when medically ready. Pt curator Penaloza to provide transportation back to facility.     Discharge Assessment (most recent)       BRIEF DISCHARGE ASSESSMENT - 03/10/25 1046          Discharge Planning    Assessment Type Discharge Planning Brief Assessment     Support Systems Other (Comment)   Curator Penaloza [phone]    Equipment Currently Used at Home none (P)      Current Living Arrangements assisted living facility (P)      Care Facility Name [facility] (P)      DME Needed Upon Discharge  none (P)      Discharge Plan A Assisted Living (P)      Discharge Plan B Assisted Living (P)         Physical Activity    On average, how many days per week do you engage in moderate to strenuous exercise (like a brisk walk)? 0 days (P)      On average, how many minutes do you engage in exercise at this level? 0 min (P)         Financial Resource Strain    How hard is it for you to pay for the very basics like food, housing, medical care, and heating? Not hard at all (P)         Housing Stability    In the last 12 months, was there a time when you were not able to pay the mortgage or rent on time? No (P)      At any time in the past 12 months, were you homeless or living in a shelter (including now)? No (P)         Transportation Needs    Has the lack of transportation kept you from medical appointments, meetings, work or from getting things needed for daily living? No (P)         Food Insecurity    Within the past 12 months, you worried that your food would run out before you got the  money to buy more. Never true (P)      Within the past 12 months, the food you bought just didn't last and you didn't have money to get more. Never true (P)         Stress    Do you feel stress - tense, restless, nervous, or anxious, or unable to sleep at night because your mind is troubled all the time - these days? Not at all (P)         Social Isolation    How often do you feel lonely or isolated from those around you?  Never (P)         Alcohol Use    Q1: How often do you have a drink containing alcohol? Never (P)      Q2: How many drinks containing alcohol do you have on a typical day when you are drinking? Patient does not drink (P)      Q3: How often do you have six or more drinks on one occasion? Never (P)         Utilities    In the past 12 months has the electric, gas, oil, or water company threatened to shut off services in your home? No (P)         Health Literacy    How often do you need to have someone help you when you read instructions, pamphlets, or other written material from your doctor or pharmacy? Never (P)                    ERIKA Hsu, CSW.   Case Management  [facility]  Email: [email or web address]

## 2025-03-10 NOTE — ASSESSMENT & PLAN NOTE
-noted on CMP this AM  -possibly in the setting of viral illness  -hepatitis panel ordered, if continuing to increase will order RUQ US

## 2025-03-10 NOTE — ED NOTES
Pt's guardian requesting to be called before discharge so that she can be her ride home. [phone] Ca Wong

## 2025-03-10 NOTE — ED NOTES
Pt arrived to [facility] 10 via stretcher, accompanied by sitter. Pt is alert and oriented to self, and location. Room air, telemetry applied. Purewick in place.

## 2025-03-10 NOTE — HOSPITAL COURSE
Started on IV Rocephin for UTI and Tamiflu for Influenza A with improvement in mental status and weakness. Not at full baseline with PT/OT eval and required rolling walker at discharge which was arranged. Swelling around the clavicle noted and XR ordered showed no fracture likely hematoma vs lipoma would recommend outpatient monitoring. Transition to PO Cefdinir and continue Tamiflu at discharge to [facility] care. Will follow up cultures and call facility if antibiotics need to be changed. Home Health and rolling walker for discharge arranged.     Pt deemed appropriate for discharge; seen and examined prior to departure. Plan discussed with pt, who was agreeable and amenable; medications were discussed and reviewed, outpatient follow-up scheduled, ER precautions were given, all questions were answered to the pt's satisfaction, and she was subsequently discharged.

## 2025-03-10 NOTE — DISCHARGE SUMMARY
"[facility] - Emergency Dept  [facility] Medicine  Discharge Summary      Patient Name: Mohini Dougherty  MRN: [id number]  PHYLLIS: [id number]  Patient Class: OP- Observation  Admission Date: 3/9/2025  Hospital Length of Stay: 0 days  Discharge Date and Time:  03/10/2025 4:24 PM  Attending Physician: Carri Cueto MD   Discharging Provider: Carri Cueto MD  Primary Care Provider: Edwar Castaneda II, MD  [facility] Medicine Team: [facility] HOSP MED U Carri Cueto MD  Primary Care Team: [facility] MED U    HPI:   Mohini Dougherty is a 79 y.o. female with a hx of seizure and cognitive behavioral issues presents to the ED for increased fatigue and malaise. Patient is a poor historian and only oriented to person at baseline. Per chart review, patient presents to the ED for generalized malaise and inability to walk when she is normally ambulatory. Per her assisted living facility, she was "not acting like herself." Patient only endorses dysuria but denies any further complaints.     ED: Tmax 102.9, tachycardic. CBC/ CMP largely unremarkable. UA infectious. Influenza A positive. CXR showed no acute process. CTH w/o acute process. Given tamiflu, vanc and zosyn in the ED. Admitted to .     * No surgery found *      Hospital Course:   Started on IV Rocephin for UTI and Tamiflu for Influenza A with improvement in mental status and weakness. Not at full baseline with PT/OT eval and required rolling walker at discharge which was arranged. Swelling around the clavicle noted and XR ordered showed no fracture likely hematoma vs lipoma would recommend outpatient monitoring. Transition to PO Cefdinir and continue Tamiflu at discharge to [facility]. Will follow up cultures and call facility if antibiotics need to be changed. Home Health and rolling walker for discharge arranged.     Pt deemed appropriate for discharge; seen and examined prior to departure. Plan discussed with pt, who was agreeable and amenable; medications were " discussed and reviewed, outpatient follow-up scheduled, ER precautions were given, all questions were answered to the pt's satisfaction, and she was subsequently discharged.       Goals of Care Treatment Preferences:  Code Status: Full Code      SDOH Screening:  The patient was screened for utility difficulties, food insecurity, transport difficulties, housing insecurity, and interpersonal safety and there were no concerns identified this admission.     Consults:     Assessment & Plan  Sepsis  -SIRS with fever and tachycardia on admission   -CXR negative for focal findings. UA positive. Urine cultures pending   - see management for influenza and acute cystitis     Influenza A  Patient presents to the ED with increased malaise, lethargic and too weak to ambulate.   - positive 3/9, on RA   - started on tamiflu  - symptomatic management   - monitor vitals closely   Acute cystitis  - UA infectious   - continue on rocephin  - follow urine culture   Elevated liver enzymes  -noted on CMP this AM  -possibly in the setting of viral illness  -hepatitis panel ordered, if continuing to increase will order RUQ US     Cognitive and behavioral changes  - hx noted  - CTH w/o acute process  - at neuro baseline per chart review  - delirium precautions   Seizure  - continue home keppra  - seizure precautions   Weakness  -Pt/OT consulted     The mobility limitation cannot be sufficiently resolved by the use of a cane. Patient's functional mobility deficit can be sufficiently resolved with the use of a (Rolling Walker or Walker). Patient's mobility limitation significantly impairs their ability to participate in one of more activities of daily living. The use of a (RW or Walker) will significantly improve the patient's ability to participate in MRADLS and the patient will use it on regular basis in the home.     Final Active Diagnoses:    Diagnosis Date Noted POA    PRINCIPAL PROBLEM:  Influenza A [J10.1] 03/09/2025 Yes    Weakness  "[R53.1] 03/10/2025 Yes    Elevated liver enzymes [R74.8] 03/10/2025 Unknown    Acute cystitis [N30.00] 03/09/2025 Yes    Sepsis [A41.9] 03/09/2025 Yes    Seizure [R56.9] 08/31/2024 Yes    Cognitive and behavioral changes [R41.89, R46.89] 06/24/2024 Yes      Problems Resolved During this Admission:       Discharged Condition: good    Disposition: [place] [place] [facility]    Follow Up:   Follow-up Information       Edwar Castaneda II, MD Follow up in 1 week(s).    Specialty: Obstetrics  Why: Recheck CMP  Contact information:  [address] [place] [address] [id number] [phone]                           Patient Instructions:      WALKER FOR HOME USE     Order Specific Question Answer Comments   Type of Walker: Adult (5'4"-6'6")    With wheels? Yes    Height: 5' 2" (1.575 m)    Weight: 52.2 kg (115 lb)    Length of need (1-99 months): 99 months   Does patient have medical equipment at home? none    Please check all that apply: Patient's condition impairs ambulation.      Diet Adult Regular     Notify your health care provider if you experience any of the following:  temperature >100.4     Notify your health care provider if you experience any of the following:  persistent nausea and vomiting or diarrhea     Notify your health care provider if you experience any of the following:  severe uncontrolled pain     Notify your health care provider if you experience any of the following:  difficulty breathing or increased cough     Notify your health care provider if you experience any of the following:  severe persistent headache     Notify your health care provider if you experience any of the following:  worsening rash     Notify your health care provider if you experience any of the following:  persistent dizziness, light-headedness, or visual disturbances     Notify your health care provider if you experience any of the following:  increased confusion or weakness     Activity as tolerated       Significant Diagnostic " Studies: N/A    Pending Diagnostic Studies:       Procedure Component Value Units Date/Time    Hepatitis panel, acute [[id number]]     Order Status: Sent Lab Status: No result     Specimen: Blood            Medications:  Reconciled Home Medications:      Medication List        START taking these medications      cefdinir 300 MG capsule  Commonly known as: OMNICEF  Take 1 capsule (300 mg total) by mouth 2 (two) times daily. for 3 days     oseltamivir 30 MG capsule  Commonly known as: TAMIFLU  Take 1 capsule (30 mg total) by mouth 2 (two) times daily. for 5 days            CONTINUE taking these medications      levETIRAcetam 750 MG Tab  Commonly known as: KEPPRA  Take 1 tablet (750 mg total) by mouth 2 (two) times daily.     multivitamin Tab  Take 1 tablet by mouth once daily.     sertraline 25 MG tablet  Commonly known as: ZOLOFT  Take 25 mg by mouth every morning.              Indwelling Lines/Drains at time of discharge:   Lines/Drains/Airways       Drain  Duration             Female External Urinary Catheter w/ Suction 03/10/25 0600 <1 day                    Time spent on the discharge of patient: 35 minutes         Carri Cueto MD  Department of Hospital Medicine  [facility] - Emergency Dept

## 2025-03-10 NOTE — PLAN OF CARE
[facility]      HOME HEALTH ORDERS  FACE TO FACE ENCOUNTER    Patient Name: Mohini oDugherty  YOB: 1945    PCP: Edwar Castaneda II, MD   PCP Address: [address] [place] [id number]  PCP Phone Number: [phone]  PCP Fax: [phone]    Encounter Date: 3/9/25    Admit to Home Health    Diagnoses:  Active Hospital Problems    Diagnosis  POA    *Influenza A [J10.1]  Yes    Weakness [R53.1]  Yes    Elevated liver enzymes [R74.8]  Unknown    Acute cystitis [N30.00]  Yes    Sepsis [A41.9]  Yes    Seizure [R56.9]  Yes    Cognitive and behavioral changes [R41.89, R46.89]  Yes      Resolved Hospital Problems   No resolved problems to display.       Follow Up Appointments:  No future appointments.    Allergies:Review of patient's allergies indicates:  No Known Allergies    Medications: Review discharge medications with patient and family and provide education.    Current Medications[1]  Current Discharge Medication List        START taking these medications    Details   cefdinir (OMNICEF) 300 MG capsule Take 1 capsule (300 mg total) by mouth 2 (two) times daily. for 3 days  Qty: 6 capsule, Refills: 0      oseltamivir (TAMIFLU) 30 MG capsule Take 1 capsule (30 mg total) by mouth 2 (two) times daily. for 5 days  Qty: 10 capsule, Refills: 0           CONTINUE these medications which have NOT CHANGED    Details   levETIRAcetam (KEPPRA) 750 MG Tab Take 1 tablet (750 mg total) by mouth 2 (two) times daily.  Qty: 180 tablet, Refills: 3      multivitamin Tab Take 1 tablet by mouth once daily.      sertraline (ZOLOFT) 25 MG tablet Take 25 mg by mouth every morning.               I have seen and examined this patient within the last 30 days. My clinical findings that support the need for the home health skilled services and home bound status are the following:no   Weakness/numbness causing balance and gait disturbance due to Infection and Weakness/Debility making it taxing  to leave home.     Diet:   regular diet    Labs:  SN to perform labs:  CMP: Once in 2 weeks then as needed;      Referrals/ Consults  Physical Therapy to evaluate and treat. Evaluate for home safety and equipment needs; Establish/upgrade home exercise program. Perform / instruct on therapeutic exercises, gait training, transfer training, and Range of Motion.  Occupational Therapy to evaluate and treat. Evaluate home environment for safety and equipment needs. Perform/Instruct on transfers, ADL training, ROM, and therapeutic exercises.    Activities:   activity as tolerated    Nursing:   Agency to admit patient within 24 hours of hospital discharge unless specified on physician order or at patient request    SN to complete comprehensive assessment including routine vital signs. Instruct on disease process and s/s of complications to report to MD. Review/verify medication list sent home with the patient at time of discharge  and instruct patient/caregiver as needed. Frequency may be adjusted depending on start of care date.     Skilled nurse to perform up to 3 visits PRN for symptoms related to diagnosis    When multiple disciplines ordered:    Start of Care occurs on Sunday - Wednesday schedule remaining discipline evaluations as ordered on separate consecutive days following the start of care.    Thursday SOC -schedule subsequent evaluations Friday and Monday the following week.     Friday - Saturday SOC - schedule subsequent discipline evaluations on consecutive days starting Monday of the following week.    Notify MD if SBP > 160 or < 90; DBP > 90 or < 50; HR > 120 or < 50; Temp > 101; O2 < 88%; Other:       Ok to schedule additional visits based on staff availability and patient request on consecutive days within the home health episode.    For all post-discharge communication and subsequent orders please contact patient's primary care physician. If unable to reach primary care physician or do not receive  response within 30 minutes, please contact [facility] for clinical staff order clarification        Home Health Aide:  Physical Therapy Three times weekly and Occupational Therapy Three times weekly    Wound Care Orders  no    I certify that this patient is confined to her home and needs intermittent skilled nursing care, physical therapy, and occupational therapy.               [1]   Current Facility-Administered Medications   Medication Dose Route Frequency Provider Last Rate Last Admin    acetaminophen tablet 650 mg  650 mg Oral Q4H PRN Maia Goodrich PA-C        albuterol-ipratropium 2.5 mg-0.5 mg/3 mL nebulizer solution 3 mL  3 mL Nebulization Q4H PRN Maia Goodrich PA-C        bisacodyL suppository 10 mg  10 mg Rectal Daily PRN Maia Goodrich PA-C        cefTRIAXone injection 1 g  1 g Intravenous Q24H Maia Goodrich PA-C   1 g at 03/10/25 0815    dextrose 50% injection 12.5 g  12.5 g Intravenous PRN Maia Goodrich PA-C        dextrose 50% injection 25 g  25 g Intravenous PRN Maia Goodrich PA-C        enoxaparin injection 40 mg  40 mg Subcutaneous Daily Maia Goodrich PA-C   40 mg at 03/09/25 1649    glucagon (human recombinant) injection 1 mg  1 mg Intramuscular PRN Maia Goodrich PA-C        glucose chewable tablet 16 g  16 g Oral PRN Maia Goodrich PA-C        glucose chewable tablet 24 g  24 g Oral PRN Maia Goodrich PA-C        levETIRAcetam tablet 750 mg  750 mg Oral BID Maia Goodrich PA-C   750 mg at 03/10/25 0815    melatonin tablet 6 mg  6 mg Oral Nightly PRN Maia Goodrich PA-C        ondansetron disintegrating tablet 8 mg  8 mg Oral Q8H PRN Maia Goodrich PA-C   8 mg at 03/09/25 1904    oseltamivir capsule 30 mg  30 mg Oral BID Kun Dunham MD   30 mg at 03/10/25 0827    promethazine tablet 25 mg  25 mg Oral Q6H PRN Maia Goodrich OCTAVIA        sodium chloride 0.9% flush 10 mL  10 mL Intravenous PRN Kp, Maia  OCTAVIA HANSEN         Current Outpatient Medications   Medication Sig Dispense Refill    levETIRAcetam (KEPPRA) 750 MG Tab Take 1 tablet (750 mg total) by mouth 2 (two) times daily. 180 tablet 3    multivitamin Tab Take 1 tablet by mouth once daily.      sertraline (ZOLOFT) 25 MG tablet Take 25 mg by mouth every morning.      cefdinir (OMNICEF) 300 MG capsule Take 1 capsule (300 mg total) by mouth 2 (two) times daily. for 3 days 6 capsule 0    oseltamivir (TAMIFLU) 30 MG capsule Take 1 capsule (30 mg total) by mouth 2 (two) times daily. for 5 days 10 capsule 0

## 2025-03-10 NOTE — ED NOTES
Received report and assumed care of patient at this time. Pt presents to ED w/ c/o fatigue. Patient is AAOx1 with a calm affect and aware of environment. ED safety sitter at bedside. Airway is open and patent, respirations are spontaneous, normal effort and rate noted. Pt denies chest pain at this time. Skin warm and dry. Movement to all extremities noted. Bed placed in low position, side rails up x 2, call light is within reach of patient. Explanation of care provided to patient. Patient offers no complaints at this time. Awaiting further MD orders and bed assignment, POC continues.

## 2025-03-10 NOTE — PLAN OF CARE
JULIANNA spoke with pt's  Ca [phone] regarding d/c plan. Ca understand and agreeable to plan. Ca will  pt's meds from pharmacy when she comes to  pt. JULIANNA waiting on update regarding pt's DME.     SW to notify nurse to call Ca when pt's ready for d/c.     Gia Daily MSW, CSW.   Case Management  [facility]  Email: [email or web address]

## 2025-03-10 NOTE — PLAN OF CARE
Problem: Occupational Therapy  Goal: Occupational Therapy Goal  Description: Goals to be met by: 3/25/25    Patient will increase functional independence with ADLs by performing:    LE Dressing with Stand-by Assistance.  Grooming while standing at sink with Stand-by Assistance.  Toileting from bedside commode with Stand-by Assistance for hygiene and clothing management.   Supine to sit with Stand-by Assistance.  Toilet transfer to toilet with Stand-by Assistance.    Outcome: Progressing

## 2025-03-10 NOTE — ED NOTES
Assumed care of pt.     Pt AAOx0, resting comfortably in bed, NAD, respirations E/UL, updated on POC, wheels locked and in low position, call bell with in reach, Comfort positioning and restroom needs were addressed. Necessary items were placed with in reach and was advised when a reassessment would take place.     Pt has safety sitter at bedside due to patient engaging in multiple attempts at removing cardiac monitoring and IVs.

## 2025-03-10 NOTE — PLAN OF CARE
PT eval completed- see note for details, goals and POC established.     Problem: Physical Therapy  Goal: Physical Therapy Goal  Description: Goals to be met by: 25     Patient will increase functional independence with mobility by performin. Supine to sit with Set-up [place]  2. Sit to stand transfer with Supervision  3. Bed to chair transfer with Supervision using LRAD  4. Gait  x 150 feet with Supervision using LRAD.     Outcome: Progressing   3/10/2025

## 2025-03-10 NOTE — ASSESSMENT & PLAN NOTE
-SIRS with fever and tachycardia on admission   -CXR negative for focal findings. UA positive. Urine cultures pending   - see management for influenza and acute cystitis

## 2025-03-11 NOTE — PLAN OF CARE
[facility] - Emergency Dept  Discharge Final Note    Primary Care Provider: Edwar Castaneda II, MD    Expected Discharge Date: 3/10/2025    Pt was accepted by [facility]. Pt d/c back to [facility] assisted living. Pt  Ca to provide transportation.     Final Discharge Note (most recent)       Final Note - 03/11/25 0829          Final Note    Assessment Type Final Discharge Note (P)      Anticipated Discharge Disposition -- (P)    Assisted Living Facility       Post-Acute Status    Post-Acute Authorization Home Health (P)      Home Health Status Set-up Complete/Auth obtained (P)      Discharge Delays None known at this time (P)                      Important Message from Medicare              Follow-up providers       Edwar Castaneda II, MD   Specialty: Obstetrics   Relationship: PCP - General    [address] [place] [id number]   Phone: [phone]       Next Steps: Follow up in 1 week(s)    Instructions: Recheck CMP              After-discharge care                Home Medical Care       [facility], [facility]   Service: Home Health Services    [address]  [address]  [place] [address]   Phone: [phone]                             ERIKA Hsu, CSW.   Case Management  [facility]  Email: [email or web address]

## 2025-03-12 ENCOUNTER — RESULTS FOLLOW-UP (OUTPATIENT)
Dept: EMERGENCY MEDICINE | Facility: HOSPITAL | Age: 80
End: 2025-03-12

## 2025-03-12 LAB — BACTERIA UR CULT: ABNORMAL

## 2025-03-14 LAB
BACTERIA BLD CULT: NORMAL
BACTERIA BLD CULT: NORMAL

## 2025-04-01 ENCOUNTER — HOSPITAL ENCOUNTER (EMERGENCY)
Facility: HOSPITAL | Age: 80
Discharge: NURSING, REHAB OR LONG-TERM CARE | End: 2025-04-02
Attending: EMERGENCY MEDICINE
Payer: MEDICARE

## 2025-04-01 DIAGNOSIS — S09.90XA CLOSED HEAD INJURY, INITIAL ENCOUNTER: Primary | ICD-10-CM

## 2025-04-01 DIAGNOSIS — S00.03XA CONTUSION OF SCALP, INITIAL ENCOUNTER: ICD-10-CM
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
## 2025-04-01 LAB
ABSOLUTE EOSINOPHIL (OHS): 0.2 K/UL
ABSOLUTE MONOCYTE (OHS): 0.68 K/UL (ref 0.3–1)
ABSOLUTE NEUTROPHIL COUNT (OHS): 8.39 K/UL (ref 1.8–7.7)
ALBUMIN SERPL BCP-MCNC: 3.8 G/DL (ref 3.5–5.2)
ALP SERPL-CCNC: 92 UNIT/L (ref 40–150)
ALT SERPL W/O P-5'-P-CCNC: 27 UNIT/L (ref 10–44)
ANION GAP (OHS): 13 MMOL/L (ref 8–16)
AST SERPL-CCNC: 40 UNIT/L (ref 11–45)
BACTERIA #/AREA URNS AUTO: NORMAL /HPF
BASOPHILS # BLD AUTO: 0.04 K/UL
BASOPHILS NFR BLD AUTO: 0.4 %
BILIRUB SERPL-MCNC: 0.6 MG/DL (ref 0.1–1)
BILIRUB UR QL STRIP.AUTO: NEGATIVE
BUN SERPL-MCNC: 15 MG/DL (ref 8–23)
CALCIUM SERPL-MCNC: 9.3 MG/DL (ref 8.7–10.5)
CHLORIDE SERPL-SCNC: 103 MMOL/L (ref 95–110)
CLARITY UR: CLEAR
CO2 SERPL-SCNC: 24 MMOL/L (ref 23–29)
COLOR UR AUTO: COLORLESS
CREAT SERPL-MCNC: 0.9 MG/DL (ref 0.5–1.4)
ERYTHROCYTE [DISTWIDTH] IN BLOOD BY AUTOMATED COUNT: 13.5 % (ref 11.5–14.5)
GFR SERPLBLD CREATININE-BSD FMLA CKD-EPI: >60 ML/MIN/1.73/M2
GLUCOSE SERPL-MCNC: 98 MG/DL (ref 70–110)
GLUCOSE UR QL STRIP: NEGATIVE
HCT VFR BLD AUTO: 36.9 % (ref 37–48.5)
HGB BLD-MCNC: 11.9 GM/DL (ref 12–16)
HGB UR QL STRIP: NEGATIVE
IMM GRANULOCYTES # BLD AUTO: 0.04 K/UL (ref 0–0.04)
IMM GRANULOCYTES NFR BLD AUTO: 0.4 % (ref 0–0.5)
KETONES UR QL STRIP: NEGATIVE
LEUKOCYTE ESTERASE UR QL STRIP: ABNORMAL
LYMPHOCYTES # BLD AUTO: 1.46 K/UL (ref 1–4.8)
MCH RBC QN AUTO: 31.4 PG (ref 27–31)
MCHC RBC AUTO-ENTMCNC: 32.2 G/DL (ref 32–36)
MCV RBC AUTO: 97 FL (ref 82–98)
MICROSCOPIC COMMENT: NORMAL
NITRITE UR QL STRIP: NEGATIVE
NUCLEATED RBC (/100WBC) (OHS): 0 /100 WBC
PH UR STRIP: 7 [PH]
PLATELET # BLD AUTO: 258 K/UL (ref 150–450)
PMV BLD AUTO: 10.4 FL (ref 9.2–12.9)
POTASSIUM SERPL-SCNC: 4.6 MMOL/L (ref 3.5–5.1)
PROT SERPL-MCNC: 7.1 GM/DL (ref 6–8.4)
PROT UR QL STRIP: NEGATIVE
RBC # BLD AUTO: 3.79 M/UL (ref 4–5.4)
RBC #/AREA URNS AUTO: 1 /HPF (ref 0–4)
RELATIVE EOSINOPHIL (OHS): 1.9 %
RELATIVE LYMPHOCYTE (OHS): 13.5 % (ref 18–48)
RELATIVE MONOCYTE (OHS): 6.3 % (ref 4–15)
RELATIVE NEUTROPHIL (OHS): 77.5 % (ref 38–73)
SODIUM SERPL-SCNC: 140 MMOL/L (ref 136–145)
SP GR UR STRIP: 1.01
SQUAMOUS #/AREA URNS AUTO: <1 /HPF
TROPONIN I SERPL HS-MCNC: <3 NG/L
UROBILINOGEN UR STRIP-ACNC: NEGATIVE EU/DL
WBC # BLD AUTO: 10.81 K/UL (ref 3.9–12.7)
WBC #/AREA URNS AUTO: 1 /HPF (ref 0–5)

## 2025-04-01 PROCEDURE — 93010 ELECTROCARDIOGRAM REPORT: CPT | Mod: ,,, | Performed by: INTERNAL MEDICINE

## 2025-04-01 PROCEDURE — 84484 ASSAY OF TROPONIN QUANT: CPT | Performed by: EMERGENCY MEDICINE

## 2025-04-01 PROCEDURE — 93005 ELECTROCARDIOGRAM TRACING: CPT

## 2025-04-01 PROCEDURE — 99285 EMERGENCY DEPT VISIT HI MDM: CPT | Mod: 25

## 2025-04-01 PROCEDURE — 81003 URINALYSIS AUTO W/O SCOPE: CPT | Performed by: EMERGENCY MEDICINE

## 2025-04-01 PROCEDURE — 83880 ASSAY OF NATRIURETIC PEPTIDE: CPT | Performed by: EMERGENCY MEDICINE

## 2025-04-01 PROCEDURE — 85025 COMPLETE CBC W/AUTO DIFF WBC: CPT | Performed by: EMERGENCY MEDICINE

## 2025-04-01 PROCEDURE — 80053 COMPREHEN METABOLIC PANEL: CPT | Performed by: EMERGENCY MEDICINE

## 2025-04-02 VITALS
HEART RATE: 76 BPM | DIASTOLIC BLOOD PRESSURE: 81 MMHG | RESPIRATION RATE: 37 BRPM | OXYGEN SATURATION: 96 % | TEMPERATURE: 98 F | SYSTOLIC BLOOD PRESSURE: 166 MMHG | WEIGHT: 115 LBS | HEIGHT: 62 IN | BODY MASS INDEX: 21.16 KG/M2

## 2025-04-02 LAB
BNP SERPL-MCNC: 45 PG/ML (ref 0–99)
OHS QRS DURATION: 72 MS
OHS QTC CALCULATION: 451 MS
OHS QTC CALCULATION: 452 MS
OHS QTC CALCULATION: 457 MS

## 2025-04-02 NOTE — ED TRIAGE NOTES
Mohini Dougherty, a 79 y.o. female presents to the ED w/ complaint of a fall from standing at her assisted living facility. Pt states that she was ambulating in her bathroom when she tripped and fell to the side and hit the left side of her head. Pt has hx of dementia and is confused at baseline. Pt denies blood thinner use and LOC. Denies SOB, CP, N/V/D.    Triage note:  Chief Complaint   Patient presents with    Fall     From assisted living mechanical fall from standing to floor c/o head pain denies loc no blood thinners per SHELBY. Per ems normally confused and ambulatory.     Review of patient's allergies indicates:  No Known Allergies  History reviewed. No pertinent past medical history.

## 2025-04-02 NOTE — ED PROVIDER NOTES
Encounter Date: 4/1/2025       History     Chief Complaint   Patient presents with    Fall     From assisted living mechanical fall from standing to floor c/o head pain denies loc no blood thinners per SHEL[facility]. Per ems normally confused and ambulatory.     HPI Patient is a 79Fwith a PMHx of seizures and alzheimer's dementia. She described a mechanical fall earlier today in the bathroom which occurred when she lost her balance while removing her pants to sit down. She hit the left side of her head but was unsure if it was on the wall or the floor. The patient denied bumping any other extremities during the fall. She denied any LOC, bleeding, dizziness, vision changes, nausea, or vomiting after the fall. The patient was unable to clarify if she lives with anyone or alone and was unsure if the fall was witnessed. She denied any neck pain, chest pain, SOB, abdo pain, hip pain, extremity pain, diarrhea, constipation, or dysuria.   Review of patient's allergies indicates:  No Known Allergies  History reviewed. No pertinent past medical history.  Past Surgical History:   Procedure Laterality Date    SKIN BIOPSY       No family history on file.  Social History[1]      Physical Exam     Initial Vitals [04/01/25 2007]   BP Pulse Resp Temp SpO2   125/77 86 18 97.3 °F (36.3 °C) 98 %      MAP       --         Physical Exam     Nursing note and vitals reviewed.  Constitutional: Patient appears well-developed and well-nourished. No distress.   HENT:   Head: Normocephalic and atraumatic.   Eyes: Conjunctivae and EOM are normal. Pupils are equal, round, and reactive to light.   Neck: Neck supple.   Normal range of motion.  Cardiovascular: Normal rate, regular rhythm, normal heart sounds and intact distal pulses.   Pulmonary/Chest: Breath sounds normal.   Abdominal: Abdomen is soft. Patient exhibits no distension. There is no abdominal tenderness.   Musculoskeletal:      Cervical back: Normal range of motion and neck supple.      No  midline thoracic/lumbar spine tenderness palpation, no     step-offs     No upper or lower extremity deformity, no joint tenderness palpation, no abrasions or lacerations noted, no pain with range of motion of bilateral hips, no pain with range of motion of bilateral knees or ankles.  Neurological: Patient is alert and oriented to person, place. No cranial nerve deficit. GCS score is 15.    Skin: Skin is warm and dry.  Psych: Normal mood/affect    ED Course   Procedures  Labs Reviewed   URINALYSIS, REFLEX TO URINE CULTURE - Abnormal       Result Value    Color, UA Colorless (*)     Appearance, UA Clear      pH, UA 7.0      Spec Grav UA 1.010      Protein, UA Negative      Glucose, UA Negative      Ketones, UA Negative      Bilirubin, UA Negative      Blood, UA Negative      Nitrites, UA Negative      Urobilinogen, UA Negative      Leukocyte Esterase, UA Trace (*)    CBC WITH DIFFERENTIAL - Abnormal    WBC 10.81      RBC 3.79 (*)     HGB 11.9 (*)     HCT 36.9 (*)     MCV 97      MCH 31.4 (*)     MCHC 32.2      RDW 13.5      Platelet Count 258      MPV 10.4      Nucleated RBC 0      Neut % 77.5 (*)     Lymph % 13.5 (*)     Mono % 6.3      Eos % 1.9      Basophil % 0.4      Imm Grans % 0.4      Neut # 8.39 (*)     Lymph # 1.46      Mono # 0.68      Eos # 0.20      Baso # 0.04      Imm Grans # 0.04     COMPREHENSIVE METABOLIC PANEL - Normal    Sodium 140      Potassium 4.6      Chloride 103      CO2 24      Glucose 98      BUN 15      Creatinine 0.9      Calcium 9.3      Protein Total 7.1      Albumin 3.8      Bilirubin Total 0.6      ALP 92      AST 40      ALT 27      Anion Gap 13      eGFR >60     B-TYPE NATRIURETIC PEPTIDE - Normal    BNP 45     TROPONIN I HIGH SENSITIVITY - Normal    Troponin High Sensitive <3     CBC W/ AUTO DIFFERENTIAL    Narrative:     The following orders were created for panel order CBC auto differential.  Procedure                               Abnormality         Status                      ---------                               -----------         ------                     CBC with Differential[[id number]]       Abnormal            Final result                 Please view results for these tests on the individual orders.   URINALYSIS MICROSCOPIC    RBC, UA 1      WBC, UA 1      Bacteria, UA Rare      Squamous Epithelial Cells, UA <1      Microscopic Comment            ECG Results              EKG 12-lead (Final result)        Collection Time Result Time QRS Duration [facility] QTC Calculation    04/01/25 22:12:52 04/02/25 08:13:06 72 457                     Final result by Interface, Lab In [facility] (04/02/25 08:13:13)                   Narrative:    Test Reason : W19.XXXA,    Vent. Rate :  82 BPM     Atrial Rate :  82 BPM     P-R Int : 176 ms          QRS Dur :  72 ms      QT Int : 392 ms       P-R-T Axes :  63 -54  44 degrees    QTcB Int : 457 ms    Normal sinus rhythm  Left anterior fascicular block  Abnormal ECG  When compared with ECG of 09-Mar-2025 16:58,  No significant change was found  Confirmed by Harshad Clifford ([id number]) on 4/2/2025 8:13:05 AM    Referred By: AAAREFERRAL SELF           Confirmed By: Harshad Clifford                                     EKG 12-lead (Final result)        Collection Time Result Time QRS Duration [facility] QTC Calculation    04/01/25 22:12:22 04/02/25 09:56:39 72 451                     Final result by Interface, Lab In [facility] (04/02/25 09:56:42)                   Narrative:    Test Reason :    Vent. Rate :  78 BPM     Atrial Rate :  78 BPM     P-R Int : 180 ms          QRS Dur :  72 ms      QT Int : 396 ms       P-R-T Axes :  75 -53  43 degrees    QTcB Int : 451 ms    Normal sinus rhythm  Left anterior fascicular block  Nonspecific ST and/or T wave abnormalities  Abnormal ECG  When compared with ECG of 01-Apr-2025 21:12,  No significant change was found  Confirmed by Shiva Chow ([id number]) on 4/2/2025 9:56:36 AM    Referred By: AAAREFERRAL SELF           Confirmed By: Shiva Chow                                      EKG 12-lead (Final result)        Collection Time Result Time QRS Duration [facility] QTC Calculation    04/01/25 21:12:10 04/02/25 09:56:30 72 452                     Final result by Interface, Lab In [facility] (04/02/25 09:56:37)                   Narrative:    Test Reason : W19.XXXA,    Vent. Rate :  89 BPM     Atrial Rate :  89 BPM     P-R Int : 198 ms          QRS Dur :  72 ms      QT Int : 372 ms       P-R-T Axes :  68 -52  57 degrees    QTcB Int : 452 ms    Normal sinus rhythm  Left anterior fascicular block  Nonspecific T wave abnormality  Abnormal ECG  When compared with ECG of 09-Mar-2025 16:58,  No significant change was found  Confirmed by Shiva Chow ([id number]) on 4/2/2025 9:56:26 AM    Referred By: AAAREFERRAL SELF           Confirmed By: Shiva Chow                                  Imaging Results              CT Head Without Contrast (Final result)  Result time 04/02/25 05:40:00      Final result by Cheng Berman MD (04/02/25 05:40:00)                   Impression:      Left lateral extracranial hematoma.  No underlying depressed calvarial fracture or acute intracranial hemorrhage.      Electronically signed by: Cheng Berman  Date:    04/02/2025  Time:    05:40               Narrative:    EXAMINATION:  CT HEAD WITHOUT CONTRAST    CLINICAL HISTORY:  Head trauma, minor (Age >= 65y);    TECHNIQUE:  Low dose axial images were obtained through the head.  Coronal and sagittal reformations were also performed. Contrast was not administered.    COMPARISON:  Noncontrast head CT 03/09/2025.    FINDINGS:  Allowing for motion and/or beam hardening artifacts, there is new extracranial hematoma overlying the left parietal calvarium, but no scan evidence of underlying depressed calvarial fracture or acute intracranial hemorrhage.    No acute large vascular territory brain infarct, intracranial mass effect, pathologic extra-axial fluid collection, midline shift, brain herniation,  brain edema, or pneumocephalus.    There is pre-existing diffuse cerebral and cerebellar parenchymal volume loss, with a proportionate degree of presumed ex vacuo enlargement of the ventricles, cisterns, sulci, fissures, and other extra-axial CSF spaces.    The orbits, visualized skull base, and visualized portions of the paranasal sinuses demonstrate no acute CT abnormalities.                                        CT Cervical Spine Without Contrast (Final result)  Result time 04/02/25 05:52:58      Final result by Cheng Berman MD (04/02/25 05:52:58)                   Impression:      Allowing for degenerative changes, the cervical spine demonstrates no acute fracture deformity.    Diffuse osteopenia and scattered degenerative changes, most pronounced at C5-6 with associated spinal stenosis as detailed in the body of the report.    Advanced biapical pleuropulmonary opacities most compatible with chronic fibronodular changes.  Underlying neoplasm, active infection such as post primary TB, or other abnormality not fully excluded.  Correlate clinically and with follow-up imaging.    This report was flagged in Epic as abnormal.      Electronically signed by: Cheng Berman  Date:    04/02/2025  Time:    05:52               Narrative:    EXAMINATION:  CT CERVICAL SPINE WITHOUT CONTRAST    CLINICAL HISTORY:  Neck trauma (Age >= 65y);    TECHNIQUE:  Low dose axial images, sagittal and coronal reformations were performed though the cervical spine.  Contrast was not administered.    COMPARISON:  None    FINDINGS:  Artifacts related to beam hardening and/or motion minimally degrade portions of the scan.    Scattered degenerative changes, most pronounced at C5-C6 where there is a moderate to large posterior disc osteophyte complex causing mild to moderate central vertebral canal and left lateral recess stenosis.  At this level, there is also some moderate to severe left neural foraminal narrowing, mainly on the basis of  uncovertebral hypertrophy (with some left facet hypertrophy as well).    Straightened cervical lordosis.    Rotation of C1 relative to C2, in the axial plane; in the absence of corresponding acute pain or point tenderness, this finding is most likely secondary to patient positioning.    Osteopenia.    Allowing for the above, this scan demonstrate no acute fracture deformity in the cervical spine.    No cervical prevertebral soft tissue swelling.  Visualized airway is patent.  Normal epiglottis.    Normal thyroid.    Visualized upper chest demonstrates biapical confluent pleuropulmonary opacities, most likely related to chronic fibronodular changes.  Other pathology, such as neoplasm or underlying active infection (active post primary TB), is not fully excluded.                                       Medications - No data to display  Medical Decision Making  Amount and/or Complexity of Data Reviewed  Labs: ordered.  Radiology: ordered.                     I have personally reviewed and interpreted the patients laboratory studies:  Mild anemia with hemoglobin 11.9, trop neg,  BNP WNL  CTH pending   CT C spine pending   I have personally reviewed and interpreted the patient's EKG:  Good reference normal sinus rhythm at 82 beats per minute, QRS 72, , no ischemic ST/T-wave changes - T-wave inversions in V1 similar to previous      I have also reviewed the following:   external records:  No prior echocardiogram available for review  EKG from 12/2024 with similar morphology     At the time of sign out, the patient is pending CTH/Cspine     Regarding fall:  No evidence of dysrhythmia on EKG, nor prolonged QTc  No evidence of ischemia on EKG and negative troponin   No evidence of significant hyponatremia   No evidence of significant anemia   No clinical evidence to suggest PE    At the time of s/o patient is pending CTH/Cspine, re-evaluation for disposition                   Clinical Impression:  Final  diagnoses:  [W19.XXXA] Fall  [S00.03XA] Contusion of scalp, initial encounter  [S09.90XA] Closed head injury, initial encounter (Primary)          ED Disposition Condition    Discharge Stable          ED Prescriptions    None       Follow-up Information       Follow up With Specialties Details Why Contact Info    Edwar Castaneda II, MD Obstetrics   [address]  [place] [address]  [phone]                   [1]   Social History  Tobacco Use    Smoking status: Never    Smokeless tobacco: Never   Substance Use Topics    Alcohol use: Never        Brit Burk MD  04/06/25 1241

## 2025-04-02 NOTE — PROVIDER PROGRESS NOTES - EMERGENCY DEPT.
Imaging Results              CT Head Without Contrast (Final result)  Result time 04/02/25 05:40:00      Final result by Cheng Berman MD (04/02/25 05:40:00)                   Impression:      Left lateral extracranial hematoma.  No underlying depressed calvarial fracture or acute intracranial hemorrhage.      Electronically signed by: Cheng Berman  Date:    04/02/2025  Time:    05:40               Narrative:    EXAMINATION:  CT HEAD WITHOUT CONTRAST    CLINICAL HISTORY:  Head trauma, minor (Age >= 65y);    TECHNIQUE:  Low dose axial images were obtained through the head.  Coronal and sagittal reformations were also performed. Contrast was not administered.    COMPARISON:  Noncontrast head CT 03/09/2025.    FINDINGS:  Allowing for motion and/or beam hardening artifacts, there is new extracranial hematoma overlying the left parietal calvarium, but no scan evidence of underlying depressed calvarial fracture or acute intracranial hemorrhage.    No acute large vascular territory brain infarct, intracranial mass effect, pathologic extra-axial fluid collection, midline shift, brain herniation, brain edema, or pneumocephalus.    There is pre-existing diffuse cerebral and cerebellar parenchymal volume loss, with a proportionate degree of presumed ex vacuo enlargement of the ventricles, cisterns, sulci, fissures, and other extra-axial CSF spaces.    The orbits, visualized skull base, and visualized portions of the paranasal sinuses demonstrate no acute CT abnormalities.                                        CT Cervical Spine Without Contrast (Final result)  Result time 04/02/25 05:52:58      Final result by Cheng Berman MD (04/02/25 05:52:58)                   Impression:      Allowing for degenerative changes, the cervical spine demonstrates no acute fracture deformity.    Diffuse osteopenia and scattered degenerative changes, most pronounced at C5-6 with associated spinal stenosis as detailed in the body of the  report.    Advanced biapical pleuropulmonary opacities most compatible with chronic fibronodular changes.  Underlying neoplasm, active infection such as post primary TB, or other abnormality not fully excluded.  Correlate clinically and with follow-up imaging.    This report was flagged in Epic as abnormal.      Electronically signed by: Cheng Berman  Date:    04/02/2025  Time:    05:52               Narrative:    EXAMINATION:  CT CERVICAL SPINE WITHOUT CONTRAST    CLINICAL HISTORY:  Neck trauma (Age >= 65y);    TECHNIQUE:  Low dose axial images, sagittal and coronal reformations were performed though the cervical spine.  Contrast was not administered.    COMPARISON:  None    FINDINGS:  Artifacts related to beam hardening and/or motion minimally degrade portions of the scan.    Scattered degenerative changes, most pronounced at C5-C6 where there is a moderate to large posterior disc osteophyte complex causing mild to moderate central vertebral canal and left lateral recess stenosis.  At this level, there is also some moderate to severe left neural foraminal narrowing, mainly on the basis of uncovertebral hypertrophy (with some left facet hypertrophy as well).    Straightened cervical lordosis.    Rotation of C1 relative to C2, in the axial plane; in the absence of corresponding acute pain or point tenderness, this finding is most likely secondary to patient positioning.    Osteopenia.    Allowing for the above, this scan demonstrate no acute fracture deformity in the cervical spine.    No cervical prevertebral soft tissue swelling.  Visualized airway is patent.  Normal epiglottis.    Normal thyroid.    Visualized upper chest demonstrates biapical confluent pleuropulmonary opacities, most likely related to chronic fibronodular changes.  Other pathology, such as neoplasm or underlying active infection (active post primary TB), is not fully excluded.                                     79-year-old female presenting  after an unwitnessed likely mechanical fall, signed out pending CMP and CT imaging.  CMP unremarkable.  CT negative for ICH or acute C-spine injury.  She was informed of her incidental findings.  She is stable for discharge home with outpatient follow up.